# Patient Record
Sex: FEMALE | Race: WHITE | NOT HISPANIC OR LATINO | Employment: OTHER | URBAN - METROPOLITAN AREA
[De-identification: names, ages, dates, MRNs, and addresses within clinical notes are randomized per-mention and may not be internally consistent; named-entity substitution may affect disease eponyms.]

---

## 2017-01-10 ENCOUNTER — DOCUMENTATION (OUTPATIENT)
Dept: INTERNAL MEDICINE | Facility: HOSPITAL | Age: 52
End: 2017-01-10

## 2017-01-10 ENCOUNTER — HOSPITAL ENCOUNTER (OUTPATIENT)
Dept: SLEEP MEDICINE | Facility: HOSPITAL | Age: 52
Discharge: HOME | End: 2017-01-10
Admitting: INTERNAL MEDICINE

## 2017-01-10 PROCEDURE — G0463 HOSPITAL OUTPT CLINIC VISIT: HCPCS

## 2017-01-10 NOTE — PROGRESS NOTES
PULMONARY SLEEP CONSULT NOTE      PATIENT IDENTIFICATION:  Name: Sánchez Carbajal  Age: 51 y.o.  Sex: female  :  1965  MRN: [id number]    DATE OF CONSULTATION:  1/10/2017   Referring Physician: No admitting provider for patient encounter.                  CHIEF COMPLAINT: Obstructive Sleep Apnea      History of Present Illness:   Sánchez Carbajal is a 51 y.o. female Pt on CPAP feeling better more energy especially the night he use it more than 4 hours, no sleepiness no fatigue no tiredness, no mask irritation no dryness, compliance report reviewed with pt AHI< 5 with good usage.       Review of Systems:   Constitutional: negative   Eyes: negative   ENT/oropharynx: negative   Cardiovascular: negative   Respiratory: negative   Gastrointestinal: negative   Genitourinary: negative   Neurological: negative   Musculoskeletal: negative   Integument/breast: negative   Endocrine: negative   Allergic/Immunologic: negative     Past Medical History:  Past Medical History   Diagnosis Date   • Chronic back pain    • H/O gastroesophageal reflux (GERD)    • History of anemia    • Kidney stone      sched ureteroscopy, stent placement   • PONV (postoperative nausea and vomiting)        Past Surgical History:  Past Surgical History   Procedure Laterality Date   • Cholecystectomy     • Kidney stone surgery     • Extracorporeal shock wave lithotripsy (eswl)     • Cystoscopy ureteroscopy laser lithotripsy N/A 2016     Procedure: URETEROSCOPY  ;  Surgeon: Vimal Azul MD;  Location: [facility];  Service:         Family History:  No family history on file.     Social History:   Social History   Substance Use Topics   • Smoking status: Never Smoker   • Smokeless tobacco: Never Used   • Alcohol use No        Allergies:  Allergies   Allergen Reactions   • Penicillins Rash   • Sulfa Antibiotics Rash       Home Meds:    (Not in a hospital admission)    Objective:    Vitals Ranges:   BP: ()/()   Arterial Line BP: ()/()    WEIGHT: 194   pound       HEIGHT: 66  inches     BMI: 31   /84      Exam:    General Appearance:      Head:  Normocephalic, without obvious abnormality, atraumatic.   Eyes:  Conjunctiva/corneas clear, EOM's intact, both eyes.         Ears:  Normal external ear canals, both ears.    Nose:  Nares normal, no drainage      Throat:  Lips, mucosa, and tongue normal. Mallampati score 3    Neck:  Supple, symmetrical, trachea midline. No JVD.  Lungs:   Bilateral basal rhonchi bilaterally, respirations unlabored symmetrical wall movement.    Chest wall:  No tenderness or deformity.    Heart:  Regular rate and rhythm, S1 and S2 normal.  Abdomen: Soft, non-tender, no masses, no organomegaly.    Extremities: Trace edema no clubbing or Cyanosis        Data Review:  All labs (24hrs): No results found for this or any previous visit (from the past 24 hour(s)).     Imaging:  [unfilled]    ASSESSMENT:  obstructive sleep apnea   GERD       PLAN:  This patient with obstructive sleep apnea, compliance is improved. Encourage to use it more frequent, I re-emphasized on pt the long and short term benefit of treating SAGAR.    Treating SAGAR will improve GERD control           Seema Moser MD. D, ABSM.  1/10/2017  11:56 AM

## 2017-01-17 ENCOUNTER — ANESTHESIA EVENT (OUTPATIENT)
Dept: PERIOP | Facility: HOSPITAL | Age: 52
End: 2017-01-17

## 2017-01-17 RX ORDER — TRAZODONE HYDROCHLORIDE 50 MG/1
50 TABLET ORAL NIGHTLY
COMMUNITY
End: 2017-12-12

## 2017-01-18 ENCOUNTER — ANESTHESIA (OUTPATIENT)
Dept: PERIOP | Facility: HOSPITAL | Age: 52
End: 2017-01-18

## 2017-01-18 ENCOUNTER — PREP FOR SURGERY (OUTPATIENT)
Dept: GASTROENTEROLOGY | Facility: HOSPITAL | Age: 52
End: 2017-01-18

## 2017-01-18 ENCOUNTER — ON CAMPUS - OUTPATIENT (OUTPATIENT)
Dept: URBAN - METROPOLITAN AREA HOSPITAL 28 | Facility: HOSPITAL | Age: 52
End: 2017-01-18
Payer: COMMERCIAL

## 2017-01-18 ENCOUNTER — HOSPITAL ENCOUNTER (OUTPATIENT)
Facility: HOSPITAL | Age: 52
Setting detail: HOSPITAL OUTPATIENT SURGERY
Discharge: HOME | End: 2017-01-18
Attending: INTERNAL MEDICINE | Admitting: INTERNAL MEDICINE

## 2017-01-18 VITALS
BODY MASS INDEX: 30.33 KG/M2 | DIASTOLIC BLOOD PRESSURE: 100 MMHG | OXYGEN SATURATION: 100 % | WEIGHT: 193.25 LBS | HEIGHT: 67 IN | RESPIRATION RATE: 16 BRPM | HEART RATE: 85 BPM | SYSTOLIC BLOOD PRESSURE: 147 MMHG | TEMPERATURE: 97.8 F

## 2017-01-18 DIAGNOSIS — K57.30 DIVERTICULOSIS OF LARGE INTESTINE WITHOUT PERFORATION OR ABS: ICD-10-CM

## 2017-01-18 DIAGNOSIS — Z12.11 ENCOUNTER FOR SCREENING FOR MALIGNANT NEOPLASM OF COLON: ICD-10-CM

## 2017-01-18 PROCEDURE — 45378 DIAGNOSTIC COLONOSCOPY: CPT

## 2017-01-18 PROCEDURE — 25010000002 PROPOFOL 10 MG/ML EMULSION: Performed by: NURSE ANESTHETIST, CERTIFIED REGISTERED

## 2017-01-18 RX ORDER — LIDOCAINE HYDROCHLORIDE 20 MG/ML
INJECTION, SOLUTION INFILTRATION; PERINEURAL AS NEEDED
Status: DISCONTINUED | OUTPATIENT
Start: 2017-01-18 | End: 2017-01-18 | Stop reason: SURG

## 2017-01-18 RX ORDER — SODIUM CHLORIDE 0.9 % (FLUSH) 0.9 %
1-10 SYRINGE (ML) INJECTION AS NEEDED
Status: DISCONTINUED | OUTPATIENT
Start: 2017-01-18 | End: 2017-01-18 | Stop reason: HOSPADM

## 2017-01-18 RX ORDER — GLYCOPYRROLATE 0.2 MG/ML
INJECTION INTRAMUSCULAR; INTRAVENOUS AS NEEDED
Status: DISCONTINUED | OUTPATIENT
Start: 2017-01-18 | End: 2017-01-18 | Stop reason: SURG

## 2017-01-18 RX ORDER — SODIUM CHLORIDE, SODIUM LACTATE, POTASSIUM CHLORIDE, CALCIUM CHLORIDE 600; 310; 30; 20 MG/100ML; MG/100ML; MG/100ML; MG/100ML
9 INJECTION, SOLUTION INTRAVENOUS CONTINUOUS
Status: DISCONTINUED | OUTPATIENT
Start: 2017-01-18 | End: 2017-01-18 | Stop reason: HOSPADM

## 2017-01-18 RX ORDER — LIDOCAINE HYDROCHLORIDE 10 MG/ML
0.3 INJECTION, SOLUTION EPIDURAL; INFILTRATION; INTRACAUDAL; PERINEURAL ONCE
Status: COMPLETED | OUTPATIENT
Start: 2017-01-18 | End: 2017-01-18

## 2017-01-18 RX ORDER — SODIUM CHLORIDE 0.9 % (FLUSH) 0.9 %
1-10 SYRINGE (ML) INJECTION AS NEEDED
Status: CANCELLED | OUTPATIENT
Start: 2017-01-18

## 2017-01-18 RX ORDER — PROPOFOL 10 MG/ML
VIAL (ML) INTRAVENOUS AS NEEDED
Status: DISCONTINUED | OUTPATIENT
Start: 2017-01-18 | End: 2017-01-18 | Stop reason: SURG

## 2017-01-18 RX ADMIN — LIDOCAINE HYDROCHLORIDE 0.3 ML: 10 INJECTION, SOLUTION EPIDURAL; INFILTRATION; INTRACAUDAL; PERINEURAL at 13:00

## 2017-01-18 RX ADMIN — GLYCOPYRROLATE 0.1 MG: 0.2 INJECTION INTRAMUSCULAR; INTRAVENOUS at 14:23

## 2017-01-18 RX ADMIN — SODIUM CHLORIDE, POTASSIUM CHLORIDE, SODIUM LACTATE AND CALCIUM CHLORIDE: 600; 310; 30; 20 INJECTION, SOLUTION INTRAVENOUS at 14:20

## 2017-01-18 RX ADMIN — LIDOCAINE HYDROCHLORIDE 50 MG: 20 INJECTION, SOLUTION INFILTRATION; PERINEURAL at 14:23

## 2017-01-18 RX ADMIN — SODIUM CHLORIDE, POTASSIUM CHLORIDE, SODIUM LACTATE AND CALCIUM CHLORIDE 9 ML/HR: 600; 310; 30; 20 INJECTION, SOLUTION INTRAVENOUS at 13:00

## 2017-01-18 RX ADMIN — PROPOFOL 380 MG: 10 INJECTION, EMULSION INTRAVENOUS at 14:23

## 2017-01-18 NOTE — ANESTHESIA PREPROCEDURE EVALUATION
Anesthesia Evaluation     Patient summary reviewed and Nursing notes reviewed    Airway   Mallampati: II  TM distance: >3 FB  Neck ROM: full  no difficulty expected  Dental          Pulmonary - normal exam    breath sounds clear to auscultation  (+) sleep apnea on CPAP,   Cardiovascular - negative cardio ROS and normal exam    Rhythm: regular  Rate: normal    Neuro/Psych- negative ROS  GI/Hepatic/Renal/Endo    (+) obesity,  GERD well controlled,     Musculoskeletal     (+) back pain,   Abdominal   (+) obese,    Substance History - negative use     OB/GYN negative ob/gyn ROS         Other                             Anesthesia Plan    ASA 2     MAC     intravenous induction   Anesthetic plan and risks discussed with patient.  Use of blood products discussed with patient  Consented to blood products.

## 2017-01-18 NOTE — OP NOTE
DATE OF PROCEDURE:   01/18/2017    PROCEDURE PERFORMED:  Colonoscopy.     SURGEON:  Tian Henderson MD     INDICATIONS:  Screening for colorectal cancer.     MEDICATIONS:  Monitored anesthesia care; please see Anesthesia record.     DESCRIPTION OF PROCEDURE:  Risks and benefits of the procedure were explained to patient. Informed consent was signed. She was placed in left lateral decubitus position and given her IV sedation. A rectal exam revealed no external lesions, normal anal tone, no rectal mass. The scope was introduced in the rectum, advanced under direct visualization with ease; rectum, sigmoid colon, descending colon, to and around the splenic flexure, reduced; advanced through the transverse colon. There were diverticula throughout the left colon, extending into the transverse colon. The scope was reduced using abdominal pressure. The scope advanced more easily to and around the hepatic flexure, through the ascending colon, and into cecum. Cecum identified by appendiceal orifice and ileocecal valve. Ileocecal valve was not intubated. Cecum was easily distended and normal-appearing. The colon was well-prepped throughout. The scope was withdrawn. There were no mucosal lesions noted throughout the entire exam. The scope was retroflexed within the rectum, revealing intact dentate line and no mucosal lesions. The scope was de-retroflexed and withdrawn. Patient tolerated the procedure very well.     IMPRESSION:    1.  Colonoscopy to cecum with good prep.   2.  Left-sided diverticulosis.   3.  Normal mucosa throughout the exam.     RECOMMENDATIONS:  Would repeat a screening exam in 10 years.      Tian Henderson M.D.  SO/ms  D:  01/18/2017 14:49:06   T:  01/18/2017 15:25:35   Job ID:  [id number]   Document ID:  [id number]  cc: Aayush Mckeon M.D.

## 2017-01-18 NOTE — IP AVS SNAPSHOT
AFTER VISIT SUMMARY             Sánchez Carbajal           About your hospitalization     You were admitted on:  January 18, 2017 You last received care in the:  [facility] OR       Procedures & Surgeries      Procedure(s) (LRB):  COLONOSCOPY (N/A)     1/18/2017     Surgeon(s):  Tian Henderson MD  -------------------      Medications    If you or your caregiver advised us that you are currently taking a medication and that medication is marked below as “Resume”, this simply indicates that we have reviewed those medications to make sure our new therapy recommendations do not interfere.  If you have concerns about medications other than those new ones which we are prescribing today, please consult the physician who prescribed them (or your primary physician).  Our review of your home medications is not meant to indicate that we are directing their use.             Your Medications      CONTINUE taking these medications     DULoxetine 60 MG capsule   Take 60 mg by mouth every night.   Commonly known as:  CYMBALTA           GABAPENTIN (ONCE-DAILY) PO   Take 300 mg by mouth every night.           HYDROcodone-acetaminophen 7.5-325 MG per tablet   1 or 2 tablets every 6 hours as needed for pain   Commonly known as:  NORCO           ibuprofen 800 MG tablet   Take 800 mg by mouth 2 (two) times a day.   Commonly known as:  ADVIL,MOTRIN           Loratadine 10 MG capsule   Take 1 tablet by mouth every morning.           MULTI VITAMIN DAILY PO   Take 1 tablet by mouth daily.           norethindrone 0.35 MG tablet   Take 1 tablet by mouth daily.   Commonly known as:  MICRONOR           RANITIDINE 75 PO   Take 1 tablet by mouth 2 (two) times a day.           traZODone 50 MG tablet   Take 50 mg by mouth Every Night.   Commonly known as:  DESYREL           VITAMIN B 12 PO   Take  by mouth Daily.                      Your Medications      Your Medication List           Morning Noon Evening Bedtime As  Needed    DULoxetine 60 MG capsule   Take 60 mg by mouth every night.   Commonly known as:  CYMBALTA                                GABAPENTIN (ONCE-DAILY) PO   Take 300 mg by mouth every night.                                HYDROcodone-acetaminophen 7.5-325 MG per tablet   1 or 2 tablets every 6 hours as needed for pain   Commonly known as:  NORCO                                ibuprofen 800 MG tablet   Take 800 mg by mouth 2 (two) times a day.   Commonly known as:  ADVIL,MOTRIN                                Loratadine 10 MG capsule   Take 1 tablet by mouth every morning.                                MULTI VITAMIN DAILY PO   Take 1 tablet by mouth daily.                                norethindrone 0.35 MG tablet   Take 1 tablet by mouth daily.   Commonly known as:  MICRONOR                                RANITIDINE 75 PO   Take 1 tablet by mouth 2 (two) times a day.                                traZODone 50 MG tablet   Take 50 mg by mouth Every Night.   Commonly known as:  DESYREL                                VITAMIN B 12 PO   Take  by mouth Daily.                                         Instructions for After Discharge        Activity Instructions     Discharge activity       1) No driving until the next AM  2) Return to school / work in the day after your procedure.              Diet Instructions     Diet:       Diet Texture / Consistency:  Regular   Resume Previous Diet             Discharge References/Attachments     COLONOSCOPY, CARE AFTER, EASY-TO-READ (ENGLISH)    HIGH-FIBER DIET (ENGLISH)    DIVERTICULOSIS (ENGLISH)    MONITORED ANESTHESIA CARE (ENGLISH)       Follow-ups for After Discharge        Follow-up Information     Follow up with Tian Henderson MD .    Specialty:  Gastroenterology    Why:  Repeat colonoscopy in 10 years    Contact information:    [address]  [place] [id number] [phone]        ICVRx Signup     [facility] ICVRx allows you to send  messages to your doctor, view your test results, renew your prescriptions, schedule appointments, and more. To sign up, go to [email or web address] and click on the Sign Up Now link in the New User? box. Enter your Vivacta Activation Code exactly as it appears below along with the last four digits of your Social Security Number and your Date of Birth () to complete the sign-up process. If you do not sign up before the expiration date, you must request a new code.    Vivacta Activation Code: [email or web address]  Expires: 2017  5:38 AM    If you have questions, you can email [email or web address] or call [phone] to talk to our Vivact[employer] staff. Remember, [employer]acta is NOT to be used for urgent needs. For medical emergencies, dial 911.           Summary of Your Hospitalization        Reason for Hospitalization     Your primary diagnosis was:  Not on File      Care Providers     Provider Service Role Specialty    Tian Henderson MD Gastroenterology Attending Provider Gastroenterology    Tian Henderson MD Gastroenterology Surgeon  Gastroenterology      Your Allergies  Date Reviewed: 2017    Allergen Reactions    Penicillins Rash         Sulfa Antibiotics Rash      Patient Belongings Returned     Document Return of Belongings Flowsheet     Were the patient bedside belongings sent home?   --   Belongings Retrieved from Security & Sent Home   --    Belongings Sent to Safe   --   Medications Retrieved from Pharmacy & Sent Home   --              More Information      Colonoscopy, Care After  These instructions give you information on caring for yourself after your procedure. Your doctor may also give you more specific instructions. Call your doctor if you have any problems or questions after your procedure.  HOME CARE  · Do not drive for 24 hours.  · Do not sign important papers or use machinery for 24 hours.  · You may shower.  · You may go back to your usual activities, but go  slower for the first 24 hours.  · Take rest breaks often during the first 24 hours.  · Walk around or use warm packs on your belly (abdomen) if you have belly cramping or gas.  · Drink enough fluids to keep your pee (urine) clear or pale yellow.  · Resume your normal diet. Avoid heavy or fried foods.  · Avoid drinking alcohol for 24 hours or as told by your doctor.  · Only take medicines as told by your doctor.  If a tissue sample (biopsy) was taken during the procedure:   · Do not take aspirin or blood thinners for 7 days, or as told by your doctor.  · Do not drink alcohol for 7 days, or as told by your doctor.  · Eat soft foods for the first 24 hours.  GET HELP IF:  You still have a small amount of blood in your poop (stool) 2-3 days after the procedure.  GET HELP RIGHT AWAY IF:  · You have more than a small amount of blood in your poop.  · You see clumps of tissue (blood clots) in your poop.  · Your belly is puffy (swollen).  · You feel sick to your stomach (nauseous) or throw up (vomit).  · You have a fever.  · You have belly pain that gets worse and medicine does not help.  MAKE SURE YOU:  · Understand these instructions.  · Will watch your condition.  · Will get help right away if you are not doing well or get worse.     This information is not intended to replace advice given to you by your health care provider. Make sure you discuss any questions you have with your health care provider.     Document Released: 01/20/2012 Document Revised: 12/23/2014 Document Reviewed: 08/25/2014  [employer] Interactive Patient Education ©2016 [employer]          High-Fiber Diet  Fiber, also called dietary fiber, is a type of carbohydrate found in fruits, vegetables, whole grains, and beans. A high-fiber diet can have many health benefits. Your health care provider may recommend a high-fiber diet to help:  · Prevent constipation. Fiber can make your bowel movements more regular.  · Lower your cholesterol.  · Relieve  hemorrhoids, uncomplicated diverticulosis, or irritable bowel syndrome.  · Prevent overeating as part of a weight-loss plan.  · Prevent heart disease, type 2 diabetes, and certain cancers.  WHAT IS MY PLAN?  The recommended daily intake of fiber includes:  · 38 grams for men under age 50.  · 30 grams for men over age 50.  · 25 grams for women under age 50.  · 21 grams for women over age 50.  You can get the recommended daily intake of dietary fiber by eating a variety of fruits, vegetables, grains, and beans. Your health care provider may also recommend a fiber supplement if it is not possible to get enough fiber through your diet.  WHAT DO I NEED TO KNOW ABOUT A HIGH-FIBER DIET?  · Fiber supplements have not been widely studied for their effectiveness, so it is better to get fiber through food sources.  · Always check the fiber content on the nutrition facts label of any prepackaged food. Look for foods that contain at least 5 grams of fiber per serving.  · Ask your dietitian if you have questions about specific foods that are related to your condition, especially if those foods are not listed in the following section.  · Increase your daily fiber consumption gradually. Increasing your intake of dietary fiber too quickly may cause bloating, cramping, or gas.  · Drink plenty of water. Water helps you to digest fiber.  WHAT FOODS CAN I EAT?  Grains  Whole-grain breads. Multigrain cereal. Oats and oatmeal. Brown rice. Barley. Bulgur wheat. Millet. Bran muffins. Popcorn. Rye wafer crackers.  Vegetables  Sweet potatoes. Spinach. Kale. Artichokes. Cabbage. Broccoli. Green peas. Carrots. Squash.  Fruits  Berries. Pears. Apples. Oranges. Avocados. Prunes and raisins. Dried figs.  Meats and Other Protein Sources  Navy, kidney, urbina, and soy beans. Split peas. Lentils. Nuts and seeds.  Dairy  Fiber-fortified yogurt.  Beverages  Fiber-fortified soy milk. Fiber-fortified orange juice.  Other  Fiber bars.  The items listed  above may not be a complete list of recommended foods or beverages. Contact your dietitian for more options.  WHAT FOODS ARE NOT RECOMMENDED?  Grains  White bread. Pasta made with refined flour. White rice.  Vegetables  Fried potatoes. Canned vegetables. Well-cooked vegetables.   Fruits  Fruit juice. Cooked, strained fruit.  Meats and Other Protein Sources  Fatty cuts of meat. Fried poultry or fried fish.  Dairy  Milk. Yogurt. Cream cheese. Sour cream.  Beverages  Soft drinks.  Other  Cakes and pastries. Butter and oils.  The items listed above may not be a complete list of foods and beverages to avoid. Contact your dietitian for more information.  WHAT ARE SOME TIPS FOR INCLUDING HIGH-FIBER FOODS IN MY DIET?  · Eat a wide variety of high-fiber foods.  · Make sure that half of all grains consumed each day are whole grains.  · Replace breads and cereals made from refined flour or white flour with whole-grain breads and cereals.  · Replace white rice with brown rice, bulgur wheat, or millet.  · Start the day with a breakfast that is high in fiber, such as a cereal that contains at least 5 grams of fiber per serving.  · Use beans in place of meat in soups, salads, or pasta.  · Eat high-fiber snacks, such as berries, raw vegetables, nuts, or popcorn.     This information is not intended to replace advice given to you by your health care provider. Make sure you discuss any questions you have with your health care provider.     Document Released: 12/18/2006 Document Revised: 01/08/2016 Document Reviewed: 06/02/2015  [employer] Interactive Patient Education ©2016 [employer]          Diverticulosis  Diverticulosis is the condition that develops when small pouches (diverticula) form in the wall of your colon. Your colon, or large intestine, is where water is absorbed and stool is formed. The pouches form when the inside layer of your colon pushes through weak spots in the outer layers of your colon.  CAUSES   No one knows  exactly what causes diverticulosis.  RISK FACTORS  · Being older than 50. Your risk for this condition increases with age. Diverticulosis is rare in people younger than 40 years. By age 80, almost everyone has it.  · Eating a low-fiber diet.  · Being frequently constipated.  · Being overweight.  · Not getting enough exercise.  · Smoking.  · Taking over-the-counter pain medicines, like aspirin and ibuprofen.  SYMPTOMS   Most people with diverticulosis do not have symptoms.  DIAGNOSIS   Because diverticulosis often has no symptoms, health care providers often discover the condition during an exam for other colon problems. In many cases, a health care provider will diagnose diverticulosis while using a flexible scope to examine the colon (colonoscopy).  TREATMENT   If you have never developed an infection related to diverticulosis, you may not need treatment. If you have had an infection before, treatment may include:  · Eating more fruits, vegetables, and grains.  · Taking a fiber supplement.  · Taking a live bacteria supplement (probiotic).  · Taking medicine to relax your colon.  HOME CARE INSTRUCTIONS   · Drink at least 6-8 glasses of water each day to prevent constipation.  · Try not to strain when you have a bowel movement.  · Keep all follow-up appointments.  If you have had an infection before:   · Increase the fiber in your diet as directed by your health care provider or dietitian.  · Take a dietary fiber supplement if your health care provider approves.  · Only take medicines as directed by your health care provider.  SEEK MEDICAL CARE IF:   · You have abdominal pain.  · You have bloating.  · You have cramps.  · You have not gone to the bathroom in 3 days.  SEEK IMMEDIATE MEDICAL CARE IF:   · Your pain gets worse.  · Your bloating becomes very bad.  · You have a fever or chills, and your symptoms suddenly get worse.  · You begin vomiting.  · You have bowel movements that are bloody or black.  MAKE SURE  YOU:  · Understand these instructions.  · Will watch your condition.  · Will get help right away if you are not doing well or get worse.     This information is not intended to replace advice given to you by your health care provider. Make sure you discuss any questions you have with your health care provider.     Document Released: 09/14/2005 Document Revised: 12/23/2014 Document Reviewed: 11/12/2014  [employer] Interactive Patient Education ©2016 [employer]          Monitored Anesthesia Care  Monitored anesthesia care is an anesthesia service for a medical procedure. Anesthesia is the loss of the ability to feel pain. It is produced by medicines called anesthetics. It may affect a small area of your body (local anesthesia), a large area of your body (regional anesthesia), or your entire body (general anesthesia). The need for monitored anesthesia care depends your procedure, your condition, and the potential need for regional or general anesthesia. It is often provided during procedures where:   · General anesthesia may be needed if there are complications. This is because you need special care when you are under general anesthesia.    · You will be under local or regional anesthesia. This is so that you are able to have higher levels of anesthesia if needed.    · You will receive calming medicines (sedatives). This is especially the case if sedatives are given to put you in a semi-conscious state of relaxation (deep sedation). This is because the amount of sedative needed to produce this state can be hard to predict. Too much of a sedative can produce general anesthesia.  Monitored anesthesia care is performed by one or more health care providers who have special training in all types of anesthesia. You will need to meet with these health care providers before your procedure. During this meeting, they will ask you about your medical history. They will also give you instructions to follow. (For example, you will  need to stop eating and drinking before your procedure. You may also need to stop or change medicines you are taking.) During your procedure, your health care providers will stay with you. They will:   · Watch your condition. This includes watching your blood pressure, breathing, and level of pain.    · Diagnose and treat problems that occur.    · Give medicines if they are needed. These may include calming medicines (sedatives) and anesthetics.    · Make sure you are comfortable.    Having monitored anesthesia care does not necessarily mean that you will be under anesthesia. It does mean that your health care providers will be able to manage anesthesia if you need it or if it occurs. It also means that you will be able to have a different type of anesthesia than you are having if you need it. When your procedure is complete, your health care providers will continue to watch your condition. They will make sure any medicines wear off before you are allowed to go home.      This information is not intended to replace advice given to you by your health care provider. Make sure you discuss any questions you have with your health care provider.     Document Released: 09/13/2006 Document Revised: 01/08/2016 Document Reviewed: 01/29/2014  [employer] Interactive Patient Education ©2016 [employer]         PREVENTING SURGICAL SITE INFECTIONS     Surgical Site Infections FAQs  What is a Surgical Site Infection (SSI)?  A surgical site infection is an infection that occurs after surgery in the part of the body where the surgery took place. Most patients who have surgery do not develop an infection. However, infections develop in about 1 to 3 out of every 100 patients who have surgery.  Some of the common symptoms of a surgical site infection are:  · Redness and pain around the area where you had surgery  · Drainage of cloudy fluid from your surgical wound  · Fever  Can SSIs be treated?  Yes. Most surgical site infections can be  treated with antibiotics. The antibiotic given to you depends on the bacteria (germs) causing the infection. Sometimes patients with SSIs also need another surgery to treat the infection.  What are some of the things that hospitals are doing to prevent SSIs?  To prevent SSIs, doctors, nurses, and other healthcare providers:  · Clean their hands and arms up to their elbows with an antiseptic agent just before the surgery.  · Clean their hands with soap and water or an alcohol-based hand rub before and after caring for each patient.  · May remove some of your hair immediately before your surgery using electric clippers if the hair is in the same area where the procedure will occur. They should not shave you with a razor.  · Wear special hair covers, masks, gowns, and gloves during surgery to keep the surgery area clean.  · Give you antibiotics before your surgery starts. In most cases, you should get antibiotics within 60 minutes before the surgery starts and the antibiotics should be stopped within 24 hours after surgery.  · Clean the skin at the site of your surgery with a special soap that kills germs.  What can I do to help prevent SSIs?  Before your surgery:  · Tell your doctor about other medical problems you may have. Health problems such as allergies, diabetes, and obesity could affect your surgery and your treatment.  · Quit smoking. Patients who smoke get more infections. Talk to your doctor about how you can quit before your surgery.  · Do not shave near where you will have surgery. Shaving with a razor can irritate your skin and make it easier to develop an infection.  At the time of your surgery:  · Speak up if someone tries to shave you with a razor before surgery. Ask why you need to be shaved and talk with your surgeon if you have any concerns.  · Ask if you will get antibiotics before surgery.  After your surgery:  · Make sure that your healthcare providers clean their hands before examining you,  either with soap and water or an alcohol-based hand rub.    If you do not see your providers clean their hands, please ask them to do so.  · Family and friends who visit you should not touch the surgical wound or dressings.  · Family and friends should clean their hands with soap and water or an alcohol-based hand rub before and after visiting you. If you do not see them clean their hands, ask them to clean their hands.  What do I need to do when I go home from the hospital?  · Before you go home, your doctor or nurse should explain everything you need to know about taking care of your wound. Make sure you understand how to care for your wound before you leave the hospital.  · Always clean your hands before and after caring for your wound.  · Before you go home, make sure you know who to contact if you have questions or problems after you get home.  · If you have any symptoms of an infection, such as redness and pain at the surgery site, drainage, or fever, call your doctor immediately.  If you have additional questions, please ask your doctor or nurse.  Developed and co-sponsored by The Society for Healthcare Epidemiology of [place] (SHEA); Infectious Diseases Society of [place] (IDSA); American Hospital Association; Association for Professionals in Infection Control and Epidemiology (APIC); Centers for Disease Control and Prevention (CDC); and The Joint Commission.     This information is not intended to replace advice given to you by your health care provider. Make sure you discuss any questions you have with your health care provider.     Document Released: 12/23/2014 Document Revised: 01/08/2016 Document Reviewed: 03/02/2016  [employer] Interactive Patient Education ©2016 [employer]             SYMPTOMS OF A STROKE    Call 911 or have someone take you to the Emergency Department if you have any of the following:    · Sudden numbness or weakness of your face, arm or leg especially on one side of the  body  · Sudden confusion, diffiiculty speaking or trouble understanding   · Changes in your vision or loss of sight in one eye  · Sudden severe headache with no known cause  · sudden dizziness, trouble walking, loss of balance or coordination    It is important to seek emergency care right away if you have further stroke symptoms. If you get emergency help quickly, the powerful clot-dissolving medicines can reduce the disabilities caused by a stroke.     For more information:    American Stroke Association  2-776-6-STROKE  www.strokeassociation.org           IF YOU SMOKE OR USE TOBACCO PLEASE READ THE FOLLOWING:    Why is smoking bad for me?  Smoking increases the risk of heart disease, lung disease, vascular disease, stroke, and cancer.     If you smoke, STOP!    If you would like more information on quitting smoking, please visit the [employer] website: [email or web address]   This link will provide additional resources including the QUIT line and the Beat the Pack support groups.     For more information:    American Cancer Society  ([phone] 735-6269    American Heart Association  1-783.922.9246               YOU ARE THE MOST IMPORTANT FACTOR IN YOUR RECOVERY.     Follow all instructions carefully.     I have reviewed my discharge instructions with my nurse, including the following information, if applicable:     Information about my illness and diagnosis   Follow up appointments (including lab draws)   Wound Care   Equipment Needs   Medications (new and continuing) along with side effects   Preventative information such as vaccines and smoking cessations   Diet   Pain   I know when to contact my Doctor's office or seek emergency care      I want my nurse to describe the side effects of my medications: YES NO   If the answer is no, I understand the side effects of my medications: YES NO   My nurse described the side effects of my medications in a way that I could  understand: YES NO   I have taken my personal belongings and my own medications with me at discharge: YES NO            I have received this information and my questions have been answered. I have discussed any concerns I see with this plan with the nurse or physician. I understand these instructions.    Signature of Patient or Responsible Person: _____________________________________    Date: _________________  Time: __________________    Signature of Healthcare Provider: _______________________________________  Date: _________________  Time: __________________

## 2017-01-18 NOTE — ANESTHESIA POSTPROCEDURE EVALUATION
Patient: Sánchez Carbajal    Procedure Summary     Date Anesthesia Start Anesthesia Stop Room / Location    01/18/17 1420 1448 [facility] / [facility]       Procedure Diagnosis Surgeon Provider    COLONOSCOPY (N/A ) Colonic diverticulum; Screening for colon cancer  (Z12.11) MD Marisela Welsh MD          Anesthesia Type: MAC  Last vitals  /100 (01/18/17 1510)    Temp 97.8 °F (36.6 °C) (01/18/17 1450)    Pulse 85 (01/18/17 1510)   Resp 16 (01/18/17 1510)    SpO2 100 % (01/18/17 1510)      Post Anesthesia Care and Evaluation    Patient location during evaluation: PHASE II  Patient participation: complete - patient participated  Level of consciousness: awake and alert  Pain score: 0  Pain management: adequate  Airway patency: patent  Anesthetic complications: No anesthetic complications  PONV Status: none  Cardiovascular status: acceptable  Respiratory status: acceptable  Hydration status: acceptable

## 2017-01-18 NOTE — PLAN OF CARE
Problem: Patient Care Overview (Adult)  Goal: Plan of Care Review  Outcome: Ongoing (interventions implemented as appropriate)    01/18/17 1241   Coping/Psychosocial Response Interventions   Plan Of Care Reviewed With patient;spouse   Patient Care Overview   Progress no change   Outcome Evaluation   Outcome Summary/Follow up Plan vss       Goal: Adult Individualization and Mutuality  Outcome: Ongoing (interventions implemented as appropriate)    Problem: GI Endoscopy (Adult)  Goal: Signs and Symptoms of Listed Potential Problems Will be Absent or Manageable (GI Endoscopy)  Outcome: Ongoing (interventions implemented as appropriate)

## 2017-01-18 NOTE — BRIEF OP NOTE
COLONOSCOPY  Procedure Note    Sánchez Carbajal  1/18/2017    Pre-op Diagnosis:   Z12.11    Post-op Diagnosis:     Post-Op Diagnosis Codes:     * Colonic diverticulum [K57.30]     * Screening for colon cancer [Z12.11]    Procedure/CPT® Codes:      Procedure(s):  COLONOSCOPY    Surgeon(s):  Tian Henderson MD    Anesthesia: Monitor Anesthesia Care    Staff:   Circulator: Carla Barrera RN  Scrub Person: Therese Holder    Estimated Blood Loss: * No values recorded between 1/18/2017  2:20 PM and 1/18/2017  2:42 PM *    Specimens:                * No specimens in log *      Drains:           Findings: Colon to Cecum good prep  Diverticulosis    Complications: none    2174      Tian Henderson MD     Date: 1/18/2017  Time: 2:46 PM

## 2017-01-18 NOTE — PLAN OF CARE
Problem: GI Endoscopy (Adult)  Goal: Signs and Symptoms of Listed Potential Problems Will be Absent or Manageable (GI Endoscopy)  Outcome: Ongoing (interventions implemented as appropriate)    01/18/17 1232   GI Endoscopy   Problems Assessed (GI Endoscopy) all   Problems Present (GI Endoscopy) none

## 2017-02-19 ENCOUNTER — APPOINTMENT (OUTPATIENT)
Dept: CT IMAGING | Facility: HOSPITAL | Age: 52
End: 2017-02-19

## 2017-02-19 ENCOUNTER — HOSPITAL ENCOUNTER (EMERGENCY)
Facility: HOSPITAL | Age: 52
Discharge: HOME | End: 2017-02-19
Attending: EMERGENCY MEDICINE | Admitting: EMERGENCY MEDICINE

## 2017-02-19 VITALS
WEIGHT: 191 LBS | HEART RATE: 85 BPM | DIASTOLIC BLOOD PRESSURE: 81 MMHG | TEMPERATURE: 98.1 F | BODY MASS INDEX: 30.7 KG/M2 | OXYGEN SATURATION: 97 % | RESPIRATION RATE: 6 BRPM | HEIGHT: 66 IN | SYSTOLIC BLOOD PRESSURE: 133 MMHG

## 2017-02-19 DIAGNOSIS — N39.0 URINARY TRACT INFECTION IN FEMALE: ICD-10-CM

## 2017-02-19 DIAGNOSIS — N20.0 KIDNEY STONE ON LEFT SIDE: Primary | ICD-10-CM

## 2017-02-19 LAB
ALBUMIN SERPL-MCNC: 4.4 G/DL (ref 3.5–5.2)
ALBUMIN/GLOB SERPL: 1.5 G/DL
ALP SERPL-CCNC: 93 U/L (ref 40–129)
ALT SERPL W P-5'-P-CCNC: 37 U/L (ref 5–33)
AMYLASE SERPL-CCNC: 38 U/L (ref 28–100)
ANION GAP SERPL CALCULATED.3IONS-SCNC: 16.5 MMOL/L
AST SERPL-CCNC: 29 U/L (ref 5–32)
BACTERIA UR QL AUTO: ABNORMAL /HPF
BASOPHILS # BLD AUTO: 0.03 10*3/MM3 (ref 0–0.2)
BASOPHILS NFR BLD AUTO: 0.5 % (ref 0–2)
BILIRUB SERPL-MCNC: 0.4 MG/DL (ref 0.2–1.2)
BILIRUB UR QL STRIP: NEGATIVE
BUN BLD-MCNC: 21 MG/DL (ref 6–20)
BUN/CREAT SERPL: 26.6 (ref 7–25)
CALCIUM SPEC-SCNC: 9.2 MG/DL (ref 8.6–10.5)
CHLORIDE SERPL-SCNC: 101 MMOL/L (ref 98–107)
CLARITY UR: ABNORMAL
CO2 SERPL-SCNC: 23.5 MMOL/L (ref 22–29)
COLOR UR: YELLOW
CREAT BLD-MCNC: 0.79 MG/DL (ref 0.57–1)
DEPRECATED RDW RBC AUTO: 39.8 FL (ref 37–54)
EOSINOPHIL # BLD AUTO: 0.19 10*3/MM3 (ref 0.1–0.3)
EOSINOPHIL NFR BLD AUTO: 3.4 % (ref 0–4)
ERYTHROCYTE [DISTWIDTH] IN BLOOD BY AUTOMATED COUNT: 12.9 % (ref 11.5–14.5)
GFR SERPL CREATININE-BSD FRML MDRD: 76 ML/MIN/1.73
GLOBULIN UR ELPH-MCNC: 2.9 GM/DL
GLUCOSE BLD-MCNC: 169 MG/DL (ref 65–99)
GLUCOSE UR STRIP-MCNC: NEGATIVE MG/DL
HCT VFR BLD AUTO: 38 % (ref 37–47)
HGB BLD-MCNC: 12.9 G/DL (ref 12–16)
HGB UR QL STRIP.AUTO: ABNORMAL
HYALINE CASTS UR QL AUTO: ABNORMAL /LPF
IMM GRANULOCYTES # BLD: 0.03 10*3/MM3 (ref 0–0.03)
IMM GRANULOCYTES NFR BLD: 0.5 % (ref 0–0.5)
KETONES UR QL STRIP: NEGATIVE
LEUKOCYTE ESTERASE UR QL STRIP.AUTO: ABNORMAL
LIPASE SERPL-CCNC: 16 U/L (ref 13–60)
LYMPHOCYTES # BLD AUTO: 2.31 10*3/MM3 (ref 0.6–4.8)
LYMPHOCYTES NFR BLD AUTO: 41.6 % (ref 20–45)
MCH RBC QN AUTO: 28.9 PG (ref 27–31)
MCHC RBC AUTO-ENTMCNC: 33.9 G/DL (ref 31–37)
MCV RBC AUTO: 85 FL (ref 81–99)
MONOCYTES # BLD AUTO: 0.41 10*3/MM3 (ref 0–1)
MONOCYTES NFR BLD AUTO: 7.4 % (ref 3–8)
NEUTROPHILS # BLD AUTO: 2.58 10*3/MM3 (ref 1.5–8.3)
NEUTROPHILS NFR BLD AUTO: 46.6 % (ref 45–70)
NITRITE UR QL STRIP: NEGATIVE
NRBC BLD MANUAL-RTO: 0 /100 WBC (ref 0–0)
PH UR STRIP.AUTO: 6 [PH] (ref 4.5–8)
PLATELET # BLD AUTO: 288 10*3/MM3 (ref 140–500)
PMV BLD AUTO: 10.2 FL (ref 7.4–10.4)
POTASSIUM BLD-SCNC: 4 MMOL/L (ref 3.5–5.2)
PROT SERPL-MCNC: 7.3 G/DL (ref 6–8.5)
PROT UR QL STRIP: ABNORMAL
RBC # BLD AUTO: 4.47 10*6/MM3 (ref 4.2–5.4)
RBC # UR: ABNORMAL /HPF
REF LAB TEST METHOD: ABNORMAL
SODIUM BLD-SCNC: 141 MMOL/L (ref 136–145)
SP GR UR STRIP: 1.03 (ref 1–1.03)
SQUAMOUS #/AREA URNS HPF: ABNORMAL /HPF
UROBILINOGEN UR QL STRIP: ABNORMAL
WBC NRBC COR # BLD: 5.55 10*3/MM3 (ref 4.8–10.8)
WBC UR QL AUTO: ABNORMAL /HPF

## 2017-02-19 PROCEDURE — 85025 COMPLETE CBC W/AUTO DIFF WBC: CPT | Performed by: EMERGENCY MEDICINE

## 2017-02-19 PROCEDURE — 25010000002 FENTANYL CITRATE (PF) 100 MCG/2ML SOLUTION: Performed by: EMERGENCY MEDICINE

## 2017-02-19 PROCEDURE — 25010000002 CEFTRIAXONE: Performed by: EMERGENCY MEDICINE

## 2017-02-19 PROCEDURE — 87086 URINE CULTURE/COLONY COUNT: CPT | Performed by: EMERGENCY MEDICINE

## 2017-02-19 PROCEDURE — 99284 EMERGENCY DEPT VISIT MOD MDM: CPT

## 2017-02-19 PROCEDURE — 81001 URINALYSIS AUTO W/SCOPE: CPT | Performed by: EMERGENCY MEDICINE

## 2017-02-19 PROCEDURE — 96375 TX/PRO/DX INJ NEW DRUG ADDON: CPT

## 2017-02-19 PROCEDURE — 82150 ASSAY OF AMYLASE: CPT | Performed by: EMERGENCY MEDICINE

## 2017-02-19 PROCEDURE — 74176 CT ABD & PELVIS W/O CONTRAST: CPT

## 2017-02-19 PROCEDURE — 25010000002 KETOROLAC TROMETHAMINE PER 15 MG: Performed by: EMERGENCY MEDICINE

## 2017-02-19 PROCEDURE — 99285 EMERGENCY DEPT VISIT HI MDM: CPT | Performed by: EMERGENCY MEDICINE

## 2017-02-19 PROCEDURE — 96365 THER/PROPH/DIAG IV INF INIT: CPT

## 2017-02-19 PROCEDURE — 83690 ASSAY OF LIPASE: CPT | Performed by: EMERGENCY MEDICINE

## 2017-02-19 PROCEDURE — 96361 HYDRATE IV INFUSION ADD-ON: CPT

## 2017-02-19 PROCEDURE — 80053 COMPREHEN METABOLIC PANEL: CPT | Performed by: EMERGENCY MEDICINE

## 2017-02-19 RX ORDER — PROMETHAZINE HYDROCHLORIDE 25 MG/1
25 TABLET ORAL EVERY 6 HOURS PRN
Qty: 10 TABLET | Refills: 0 | OUTPATIENT
Start: 2017-02-19 | End: 2017-12-12

## 2017-02-19 RX ORDER — KETOROLAC TROMETHAMINE 30 MG/ML
30 INJECTION, SOLUTION INTRAMUSCULAR; INTRAVENOUS ONCE
Status: COMPLETED | OUTPATIENT
Start: 2017-02-19 | End: 2017-02-19

## 2017-02-19 RX ORDER — OXYCODONE HYDROCHLORIDE AND ACETAMINOPHEN 5; 325 MG/1; MG/1
TABLET ORAL
Qty: 24 TABLET | Refills: 0 | OUTPATIENT
Start: 2017-02-19 | End: 2017-12-12

## 2017-02-19 RX ORDER — FENTANYL CITRATE 50 UG/ML
75 INJECTION, SOLUTION INTRAMUSCULAR; INTRAVENOUS ONCE
Status: COMPLETED | OUTPATIENT
Start: 2017-02-19 | End: 2017-02-19

## 2017-02-19 RX ORDER — SODIUM CHLORIDE 0.9 % (FLUSH) 0.9 %
10 SYRINGE (ML) INJECTION AS NEEDED
Status: DISCONTINUED | OUTPATIENT
Start: 2017-02-19 | End: 2017-02-19 | Stop reason: HOSPADM

## 2017-02-19 RX ORDER — CEFUROXIME AXETIL 500 MG/1
500 TABLET ORAL 2 TIMES DAILY
Qty: 20 TABLET | Refills: 0 | Status: SHIPPED | OUTPATIENT
Start: 2017-02-19 | End: 2017-03-01

## 2017-02-19 RX ADMIN — SODIUM CHLORIDE 1000 ML: 900 INJECTION, SOLUTION INTRAVENOUS at 07:51

## 2017-02-19 RX ADMIN — FENTANYL CITRATE 75 MCG: 50 INJECTION, SOLUTION INTRAMUSCULAR; INTRAVENOUS at 07:54

## 2017-02-19 RX ADMIN — CEFTRIAXONE 2 G: 2 INJECTION, POWDER, FOR SOLUTION INTRAMUSCULAR; INTRAVENOUS at 08:23

## 2017-02-19 RX ADMIN — KETOROLAC TROMETHAMINE 30 MG: 30 INJECTION, SOLUTION INTRAMUSCULAR at 07:56

## 2017-02-19 RX ADMIN — Medication 10 ML: at 07:15

## 2017-02-19 NOTE — ED PROVIDER NOTES
Subjective     History provided by:  Patient    History of Present Illness    · Chief complaint: Back and abdominal pain    · Location: Generalized lower abdomen and bilateral low back is a constant ache, but when it gets severe is primarily on the left side.    · Quality/Severity: Severe aching pain.    · Timing/Onset: Pain started about 2 weeks ago and is worse today.    · Modifying Factors: No aggravating or relieving factors.    · Associated symptoms: Patient reports blood in her urine.  She denies any fever, nausea, vomiting, diarrhea, or upper respiratory symptoms.    Narrative: The patient is a 52-year-old white female complaining of bilateral flank pain and bilateral lower abdominal pain for the last 2 weeks.  The pain lateralizes to the left side when it gets worse.  She states the pain feels similar to prior kidney stone she's had.  The patient is a history of multiple kidney stones and is status post removal by lithotripsy and laser by Dr. Azul.  He denies any associated nausea or vomiting.  She does report blood in her urine.    ED Triage Vitals   Temp Heart Rate Resp BP SpO2   02/19/17 0712 02/19/17 0712 02/19/17 0712 02/19/17 0712 02/19/17 0712   98.1 °F (36.7 °C) 101 20 184/117 97 %      Temp src Heart Rate Source Patient Position BP Location FiO2 (%)   02/19/17 0712 02/19/17 0712 02/19/17 0712 02/19/17 0712 --   Oral Monitor Sitting Right arm        Review of Systems   Constitutional: Negative for activity change, appetite change, chills, diaphoresis, fatigue and fever.   HENT: Negative for congestion, dental problem, ear pain, hearing loss, mouth sores, postnasal drip, rhinorrhea, sinus pressure, sore throat and voice change.    Eyes: Negative for photophobia, pain, discharge, redness and visual disturbance.   Respiratory: Negative for cough, chest tightness, shortness of breath, wheezing and stridor.    Cardiovascular: Negative for chest pain, palpitations and leg swelling.   Gastrointestinal:  Positive for abdominal pain. Negative for diarrhea, nausea and vomiting.   Genitourinary: Positive for hematuria. Negative for difficulty urinating, dysuria, flank pain, frequency, pelvic pain, urgency, vaginal bleeding and vaginal discharge.   Musculoskeletal: Positive for back pain. Negative for arthralgias, gait problem, joint swelling, myalgias, neck pain and neck stiffness.   Skin: Negative for color change and rash.   Neurological: Negative for dizziness, tremors, seizures, syncope, facial asymmetry, speech difficulty, weakness, light-headedness, numbness and headaches.   Hematological: Negative for adenopathy.   Psychiatric/Behavioral: Negative.  Negative for confusion and decreased concentration. The patient is not nervous/anxious.        Past Medical History   Diagnosis Date   • Chronic back pain    • H/O gastroesophageal reflux (GERD)    • History of anemia    • Kidney stone      sched ureteroscopy, stent placement   • PONV (postoperative nausea and vomiting)    • Sleep apnea with use of continuous positive airway pressure (CPAP)        Allergies   Allergen Reactions   • Penicillins Rash   • Sulfa Antibiotics Rash       Past Surgical History   Procedure Laterality Date   • Cholecystectomy     • Kidney stone surgery     • Extracorporeal shock wave lithotripsy (eswl)  2015   • Cystoscopy ureteroscopy laser lithotripsy N/A 4/6/2016     Procedure: URETEROSCOPY  ;  Surgeon: Vimal Azul MD;  Location: [facility];  Service:    • Colonoscopy N/A 1/18/2017     Procedure: COLONOSCOPY;  Surgeon: Tian Henderson MD;  Location: [facility];  Service:        History reviewed. No pertinent family history.    Social History     Social History   • Marital status:      Spouse name: N/A   • Number of children: N/A   • Years of education: N/A     Social History Main Topics   • Smoking status: Never Smoker   • Smokeless tobacco: Never Used   • Alcohol use No   • Drug use: No   • Sexual activity: Defer      Other Topics Concern   • None     Social History Narrative           Objective   Physical Exam   Constitutional: She is oriented to person, place, and time. She appears well-developed and well-nourished. She appears distressed.   The patient appears in discomfort.  She does not appear toxic.   HENT:   Head: Normocephalic and atraumatic.   Right Ear: External ear normal.   Left Ear: External ear normal.   Nose: Nose normal.   Mouth/Throat: Oropharynx is clear and moist. No oropharyngeal exudate.   Eyes: Conjunctivae and EOM are normal. Pupils are equal, round, and reactive to light. Right eye exhibits no discharge. Left eye exhibits no discharge. No scleral icterus.   Neck: Normal range of motion. Neck supple. No JVD present. No thyromegaly present.   Cardiovascular: Normal rate, regular rhythm and normal heart sounds.    No murmur heard.  Pulmonary/Chest: Effort normal and breath sounds normal. She has no wheezes. She has no rales. She exhibits no tenderness.   Abdominal: Soft. Bowel sounds are normal. She exhibits no distension and no mass. There is tenderness. There is no rebound and no guarding. No hernia.   Subjective tenderness in the lower quadrants.   Musculoskeletal: Normal range of motion. She exhibits tenderness. She exhibits no edema or deformity.   Bilateral flank tenderness to percussion   Lymphadenopathy:     She has no cervical adenopathy.   Neurological: She is alert and oriented to person, place, and time. No cranial nerve deficit. Coordination normal.   No focal motor sensory deficit   Skin: Skin is warm and dry. No rash noted. She is not diaphoretic.   Psychiatric: She has a normal mood and affect. Her behavior is normal. Judgment and thought content normal.   Nursing note and vitals reviewed.      Procedures         ED Course  ED Course   Comment By Time   IVFs - NS one liter bolus.  Fentanyl 75mcg and Toridol 30mg IV. Coleman Monroe MD 02/19 0814   Rocephin 2 grams IV. Coleman Monroe MD  02/19 0814   [facility] Report [id number] Coleman Monroe MD 02/19 0906   09:17  Pt discussed with Dr. Myers, urology covering for Dr. Azul, who is comfortable with my treatment plan of putting the patient on antibiotics and following up with Dr. Azul this coming week. Coleman Monroe MD 02/19 0919                  MDM  Number of Diagnoses or Management Options  Kidney stone on left side: new and requires workup  Urinary tract infection in female: new and requires workup     Amount and/or Complexity of Data Reviewed  Clinical lab tests: ordered and reviewed  Tests in the radiology section of CPT®: ordered and reviewed  Discuss the patient with other providers: yes  Independent visualization of images, tracings, or specimens: yes    Risk of Complications, Morbidity, and/or Mortality  Presenting problems: high  Diagnostic procedures: high  Management options: high  General comments: My differential diagnosis for abdominal pain includes but is not limited to:  Gastritis, gastroenteritis, peptic ulcer disease, GERD, esophageal perforation, acute appendicitis, mesenteric adenitis, Meckel’s diverticulum, epiploic appendagitis, diverticulitis, colon cancer, ulcerative colitis, Crohn’s disease, intussusception, small bowel obstruction, adhesions, ischemic bowel, perforated viscus, ileus, obstipation, biliary colic, cholecystitis, cholelithiasis, Tay-Karlos Goyo, hepatitis, pancreatitis, common bile duct obstruction, cholangitis, bile leak, splenic trauma, splenic rupture, splenic infarction, splenic abscess, abdominal abscess, ascites, spontaneous bacterial peritonitis, hernia, UTI, cystitis, prostatitis, ureterolithiasis, urinary obstruction, ovarian cyst, torsion, pregnancy, ectopic pregnancy, PID, pelvic abscess, mittelschmerz, endometriosis, AAA, myocardial infarction, pneumonia, cancer, porphyria, DKA, medications, sickle cell, viral syndrome, zoster    Patient Progress  Patient progress: improved      Final  diagnoses:   Kidney stone on left side   Urinary tract infection in female           Labs Reviewed   URINALYSIS W/ CULTURE IF INDICATED - Abnormal; Notable for the following:        Result Value    Appearance, UA Cloudy (*)     Blood, UA Large (3+) (*)     Protein,  mg/dL (2+) (*)     Leuk Esterase, UA Small (1+) (*)     All other components within normal limits   COMPREHENSIVE METABOLIC PANEL - Abnormal; Notable for the following:     Glucose 169 (*)     BUN 21 (*)     ALT (SGPT) 37 (*)     BUN/Creatinine Ratio 26.6 (*)     All other components within normal limits   URINALYSIS, MICROSCOPIC ONLY - Abnormal; Notable for the following:     RBC, UA 21-30 (*)     WBC, UA 13-20 (*)     Bacteria, UA 2+ (*)     Squamous Epithelial Cells, UA 3-6 (*)     All other components within normal limits   LIPASE - Normal   AMYLASE - Normal   CBC WITH AUTO DIFFERENTIAL - Normal   URINE CULTURE   CBC AND DIFFERENTIAL    Narrative:     The following orders were created for panel order CBC & Differential.  Procedure                               Abnormality         Status                     ---------                               -----------         ------                     CBC Auto Differential[[id number]]         Normal              Final result                 Please view results for these tests on the individual orders.     CT Abdomen Pelvis Without Contrast   ED Interpretation   There are 2 stones identified within the left distal ureter,   a 4 mm stone at the left ureteral orifice and a distal 3 mm stone. There   is mild left-sided hydronephrosis.   2. Diffuse hepatic steatosis with postop changes of prior   cholecystectomy.       This report was finalized on 2/19/2017 9:03 AM by Dr. Robert Colin MD.          Final Result   There are 2 stones identified within the left distal ureter,   a 4 mm stone at the left ureteral orifice and a distal 3 mm stone. There   is mild left-sided hydronephrosis.   2. Diffuse hepatic steatosis  with postop changes of prior   cholecystectomy.       This report was finalized on 2/19/2017 9:03 AM by Dr. Robert Colin MD.                 Medication List      New Prescriptions          cefuroxime 500 MG tablet   Commonly known as:  CEFTIN   Take 1 tablet by mouth 2 (Two) Times a Day for 10 days.       oxyCODONE-acetaminophen 5-325 MG per tablet   Commonly known as:  PERCOCET   1 - 2 tablets po q 4 hours PRN sever pain       promethazine 25 MG tablet   Commonly known as:  PHENERGAN   Take 1 tablet by mouth Every 6 (Six) Hours As Needed for nausea or   vomiting for up to 10 doses.         Stop          HYDROcodone-acetaminophen 7.5-325 MG per tablet   Commonly known as:  BHARATHI Monroe MD  02/19/17 0979

## 2017-02-21 LAB — BACTERIA SPEC AEROBE CULT: NORMAL

## 2017-04-12 ENCOUNTER — HOSPITAL ENCOUNTER (EMERGENCY)
Facility: HOSPITAL | Age: 52
Discharge: HOME | End: 2017-04-12
Attending: EMERGENCY MEDICINE | Admitting: EMERGENCY MEDICINE

## 2017-04-12 ENCOUNTER — APPOINTMENT (OUTPATIENT)
Dept: GENERAL RADIOLOGY | Facility: HOSPITAL | Age: 52
End: 2017-04-12

## 2017-04-12 ENCOUNTER — APPOINTMENT (OUTPATIENT)
Dept: CT IMAGING | Facility: HOSPITAL | Age: 52
End: 2017-04-12
Attending: EMERGENCY MEDICINE

## 2017-04-12 VITALS
BODY MASS INDEX: 34.31 KG/M2 | TEMPERATURE: 98.3 F | HEART RATE: 83 BPM | RESPIRATION RATE: 16 BRPM | DIASTOLIC BLOOD PRESSURE: 97 MMHG | WEIGHT: 201 LBS | SYSTOLIC BLOOD PRESSURE: 153 MMHG | OXYGEN SATURATION: 100 % | HEIGHT: 64 IN

## 2017-04-12 DIAGNOSIS — R07.9 CHEST PAIN, UNSPECIFIED TYPE: Primary | ICD-10-CM

## 2017-04-12 LAB
ALBUMIN SERPL-MCNC: 4.3 G/DL (ref 3.5–5.2)
ALBUMIN/GLOB SERPL: 1.3 G/DL
ALP SERPL-CCNC: 97 U/L (ref 40–129)
ALT SERPL W P-5'-P-CCNC: 45 U/L (ref 5–33)
ANION GAP SERPL CALCULATED.3IONS-SCNC: 16 MMOL/L
APTT PPP: 31.1 SECONDS (ref 24.3–38.1)
AST SERPL-CCNC: 26 U/L (ref 5–32)
BASOPHILS # BLD AUTO: 0.02 10*3/MM3 (ref 0–0.2)
BASOPHILS NFR BLD AUTO: 0.3 % (ref 0–2)
BILIRUB SERPL-MCNC: 0.7 MG/DL (ref 0.2–1.2)
BUN BLD-MCNC: 20 MG/DL (ref 6–20)
BUN/CREAT SERPL: 27.4 (ref 7–25)
CALCIUM SPEC-SCNC: 9.1 MG/DL (ref 8.6–10.5)
CHLORIDE SERPL-SCNC: 100 MMOL/L (ref 98–107)
CO2 SERPL-SCNC: 23 MMOL/L (ref 22–29)
CREAT BLD-MCNC: 0.73 MG/DL (ref 0.57–1)
D DIMER PPP FEU-MCNC: 1.59 MCGFEU/ML (ref 0–0.46)
DEPRECATED RDW RBC AUTO: 39.7 FL (ref 37–54)
EOSINOPHIL # BLD AUTO: 0.12 10*3/MM3 (ref 0.1–0.3)
EOSINOPHIL NFR BLD AUTO: 1.7 % (ref 0–4)
ERYTHROCYTE [DISTWIDTH] IN BLOOD BY AUTOMATED COUNT: 12.9 % (ref 11.5–14.5)
GFR SERPL CREATININE-BSD FRML MDRD: 84 ML/MIN/1.73
GLOBULIN UR ELPH-MCNC: 3.2 GM/DL
GLUCOSE BLD-MCNC: 170 MG/DL (ref 65–99)
HCT VFR BLD AUTO: 40.6 % (ref 37–47)
HGB BLD-MCNC: 13.5 G/DL (ref 12–16)
IMM GRANULOCYTES # BLD: 0.03 10*3/MM3 (ref 0–0.03)
IMM GRANULOCYTES NFR BLD: 0.4 % (ref 0–0.5)
INR PPP: 0.88 (ref 0.9–1.1)
LYMPHOCYTES # BLD AUTO: 1.55 10*3/MM3 (ref 0.6–4.8)
LYMPHOCYTES NFR BLD AUTO: 22.3 % (ref 20–45)
MCH RBC QN AUTO: 28.5 PG (ref 27–31)
MCHC RBC AUTO-ENTMCNC: 33.3 G/DL (ref 31–37)
MCV RBC AUTO: 85.8 FL (ref 81–99)
MONOCYTES # BLD AUTO: 0.36 10*3/MM3 (ref 0–1)
MONOCYTES NFR BLD AUTO: 5.2 % (ref 3–8)
NEUTROPHILS # BLD AUTO: 4.87 10*3/MM3 (ref 1.5–8.3)
NEUTROPHILS NFR BLD AUTO: 70.1 % (ref 45–70)
NRBC BLD MANUAL-RTO: 0 /100 WBC (ref 0–0)
PLATELET # BLD AUTO: 284 10*3/MM3 (ref 140–500)
PMV BLD AUTO: 10.2 FL (ref 7.4–10.4)
POTASSIUM BLD-SCNC: 3.8 MMOL/L (ref 3.5–5.2)
PROT SERPL-MCNC: 7.5 G/DL (ref 6–8.5)
PROTHROMBIN TIME: 11.9 SECONDS (ref 12.1–15)
RBC # BLD AUTO: 4.73 10*6/MM3 (ref 4.2–5.4)
SODIUM BLD-SCNC: 139 MMOL/L (ref 136–145)
TROPONIN T SERPL-MCNC: <0.01 NG/ML (ref 0–0.03)
TROPONIN T SERPL-MCNC: <0.01 NG/ML (ref 0–0.03)
WBC NRBC COR # BLD: 6.95 10*3/MM3 (ref 4.8–10.8)

## 2017-04-12 PROCEDURE — 85730 THROMBOPLASTIN TIME PARTIAL: CPT | Performed by: EMERGENCY MEDICINE

## 2017-04-12 PROCEDURE — 71010 HC CHEST PA OR AP: CPT

## 2017-04-12 PROCEDURE — 93010 ELECTROCARDIOGRAM REPORT: CPT | Performed by: INTERNAL MEDICINE

## 2017-04-12 PROCEDURE — 99284 EMERGENCY DEPT VISIT MOD MDM: CPT | Performed by: EMERGENCY MEDICINE

## 2017-04-12 PROCEDURE — 0 IOPAMIDOL PER 1 ML: Performed by: EMERGENCY MEDICINE

## 2017-04-12 PROCEDURE — 84484 ASSAY OF TROPONIN QUANT: CPT | Performed by: EMERGENCY MEDICINE

## 2017-04-12 PROCEDURE — 93005 ELECTROCARDIOGRAM TRACING: CPT | Performed by: EMERGENCY MEDICINE

## 2017-04-12 PROCEDURE — 99283 EMERGENCY DEPT VISIT LOW MDM: CPT

## 2017-04-12 PROCEDURE — 71275 CT ANGIOGRAPHY CHEST: CPT

## 2017-04-12 PROCEDURE — 85610 PROTHROMBIN TIME: CPT | Performed by: EMERGENCY MEDICINE

## 2017-04-12 PROCEDURE — 85025 COMPLETE CBC W/AUTO DIFF WBC: CPT | Performed by: EMERGENCY MEDICINE

## 2017-04-12 PROCEDURE — 80053 COMPREHEN METABOLIC PANEL: CPT | Performed by: EMERGENCY MEDICINE

## 2017-04-12 PROCEDURE — 85379 FIBRIN DEGRADATION QUANT: CPT | Performed by: EMERGENCY MEDICINE

## 2017-04-12 RX ORDER — ASPIRIN 81 MG/1
324 TABLET, CHEWABLE ORAL ONCE
Status: COMPLETED | OUTPATIENT
Start: 2017-04-12 | End: 2017-04-12

## 2017-04-12 RX ORDER — ASPIRIN 81 MG/1
81 TABLET ORAL DAILY
Qty: 30 TABLET | Refills: 0 | OUTPATIENT
Start: 2017-04-12 | End: 2017-12-12

## 2017-04-12 RX ADMIN — SODIUM CHLORIDE 1000 ML: 9 INJECTION, SOLUTION INTRAVENOUS at 11:57

## 2017-04-12 RX ADMIN — ASPIRIN 81 MG CHEWABLE TABLET 324 MG: 81 TABLET CHEWABLE at 09:56

## 2017-04-12 RX ADMIN — IOPAMIDOL 100 ML: 755 INJECTION, SOLUTION INTRAVENOUS at 12:08

## 2017-04-12 NOTE — ED NOTES
Went over discharge instructions with patient, alert and oriented at time of discharge, no questions at this time. Left ambulatory.       Laquita Medina RN  04/12/17 9120

## 2017-04-12 NOTE — DISCHARGE INSTRUCTIONS
Follow-up with Aayush Mckeon within one week.  Return to the emergency department if you have increasing pain, shortness of breath, or worse in any way at all.  Reduce your stress.

## 2017-04-12 NOTE — ED PROVIDER NOTES
Subjective   History of Present Illness  History of Present Illness    Chief complaint: Chest pain    Location: Substernal    Quality/Severity:  Like a vice is squeezing her chest, 10 over 10 at its worst, 0/10 currently    Timing/Onset/Duration: Acute onset 45 minutes ago.  It lasted for minutes.    Modifying Factors: Time seems to make it better, nothing seemed to make it worse    Associated Symptoms: The patient states she has had a pounding headache..  No fever chills or cough.  The patient has had a sore throat with a right earache.  The patient said clear nasal congestion.  There is no abdominal pain.  No diarrhea or burning when she urinates.  The patient did not get short of breath with it.  She did not get sweaty.  She did not get nauseated or vomit.  When she had the chest pain, she felt weak..    Narrative: This 52-year-old white female presents with substernal chest pain that started about 45 minutes prior to arrival.  The pain lasted for minutes.  The pain was substernal.  The patient had tingling in the arms with the chest pain.  He states it was like a vice squeezing her chest.  The pain was 10 over 10 at its worst, 0/10 now.  The patient did not get short of breath.  She did not get sweaty, she did not get nauseated, she did not vomit, she felt weak.  She complains of pounding headache, a sore throat, and nasal congestion.  She claims a right earache.  She had a stress test 1-2 years ago that was normal.  The patient has no history of hypertension, diabetes, smoking, or family history of coronary artery disease.  The patient has no history of coronary artery disease.  The patient is under a lot of stress.    PCP:  Aayush Mckeon      Review of Systems   Constitutional: Negative for chills and fever.   HENT: Positive for congestion, ear pain (right ear) and sore throat.    Eyes: Negative for pain and discharge.   Respiratory: Negative for cough, chest tightness, shortness of breath and wheezing.     Cardiovascular: Positive for chest pain. Negative for palpitations and leg swelling.   Gastrointestinal: Negative for abdominal pain, blood in stool, constipation, diarrhea, nausea and vomiting.   Genitourinary: Negative for dysuria.   Musculoskeletal: Negative for back pain.   Skin: Negative for rash.   Neurological: Negative for weakness and headaches.   Hematological: Negative for adenopathy.   Psychiatric/Behavioral: Negative for confusion.        Medication List      ASK your doctor about these medications          DULoxetine 60 MG capsule   Commonly known as:  CYMBALTA       GABAPENTIN (ONCE-DAILY) PO       HYDROcodone-acetaminophen 7.5-325 MG per tablet   Commonly known as:  NORCO   1 or 2 tablets every 6 hours as needed for pain       ibuprofen 800 MG tablet   Commonly known as:  ADVIL,MOTRIN       Loratadine 10 MG capsule       MULTI VITAMIN DAILY PO       norethindrone 0.35 MG tablet   Commonly known as:  MICRONOR       oxyCODONE-acetaminophen 5-325 MG per tablet   Commonly known as:  PERCOCET   1 - 2 tablets po q 4 hours PRN sever pain       promethazine 25 MG tablet   Commonly known as:  PHENERGAN   Take 1 tablet by mouth Every 6 (Six) Hours As Needed for nausea or   vomiting for up to 10 doses.       RANITIDINE 75 PO       traZODone 50 MG tablet   Commonly known as:  DESYREL       VITAMIN B 12 PO           Past Medical History:   Diagnosis Date   • Chronic back pain    • H/O gastroesophageal reflux (GERD)    • History of anemia    • Kidney stone     sched ureteroscopy, stent placement   • PONV (postoperative nausea and vomiting)    • Sleep apnea with use of continuous positive airway pressure (CPAP)        Allergies   Allergen Reactions   • Penicillins Rash   • Sulfa Antibiotics Rash       Past Surgical History:   Procedure Laterality Date   • CHOLECYSTECTOMY     • COLONOSCOPY N/A 1/18/2017    Procedure: COLONOSCOPY;  Surgeon: Tian Henderson MD;  Location: [facility];  Service:    •  CYSTOSCOPY URETEROSCOPY LASER LITHOTRIPSY N/A 4/6/2016    Procedure: URETEROSCOPY  ;  Surgeon: Vimal Azul MD;  Location: [facility];  Service:    • EXTRACORPOREAL SHOCK WAVE LITHOTRIPSY (ESWL)  2015   • KIDNEY STONE SURGERY         No family history on file.    Social History     Social History   • Marital status:      Spouse name: N/A   • Number of children: N/A   • Years of education: N/A     Social History Main Topics   • Smoking status: Never Smoker   • Smokeless tobacco: Never Used   • Alcohol use No   • Drug use: No   • Sexual activity: Defer     Other Topics Concern   • Not on file     Social History Narrative           Objective   Physical Exam   Constitutional: She is oriented to person, place, and time. She appears well-developed and well-nourished. No distress.   ED Triage Vitals:  Temp: 98.3 °F (36.8 °C) (04/12/17 0933)  Heart Rate: 95 (04/12/17 0933)  Resp: 16 (04/12/17 0933)  BP: 164/106 (04/12/17 0933)  SpO2: 95 % (04/12/17 0933)  Temp src: Oral (04/12/17 0933)  Heart Rate Source: Monitor (04/12/17 0933)  Patient Position: Lying (04/12/17 0933)  BP Location: Right arm (04/12/17 0933)  FiO2 (%): n/a    The patient's vitals were reviewed by me.  Unless otherwise noted they are within normal limits.  The patient is hypertensive with a blood pressure 164/106.     HENT:   Head: Normocephalic and atraumatic.   Right Ear: External ear normal.   Left Ear: External ear normal.   Nose: Nose normal.   Mouth/Throat: Oropharynx is clear and moist.   Eyes: Conjunctivae and EOM are normal. Pupils are equal, round, and reactive to light. Right eye exhibits no discharge. Left eye exhibits no discharge. No scleral icterus.   Neck: Normal range of motion. Neck supple. No JVD present. No tracheal deviation present. No thyromegaly present.   No carotid bruit   Cardiovascular: Normal rate, regular rhythm, normal heart sounds and intact distal pulses.  Exam reveals no gallop and no friction rub.    No murmur  heard.  Pulmonary/Chest: Effort normal and breath sounds normal. No stridor. No respiratory distress. She has no wheezes. She has no rales. She exhibits no tenderness.   Abdominal: Soft. Bowel sounds are normal. She exhibits no distension and no mass. There is no tenderness. There is no rebound and no guarding. No hernia.   Musculoskeletal: Normal range of motion. She exhibits no edema or deformity.   Lymphadenopathy:     She has no cervical adenopathy.   Neurological: She is alert and oriented to person, place, and time.   Skin: Skin is warm and dry. No rash noted. She is not diaphoretic. No erythema. No pallor.   Psychiatric: Her behavior is normal.   Nursing note and vitals reviewed.      Procedures         ED Course  ED Course   Comment By Time   The laboratory values were reviewed by me.  The d-dimer is 1.59.  The glucose is 170.  The ALT is 45.  The PT is 11.9.  The INR 0.88.  Lab for values are otherwise unremarkable. Jam Maguire MD 04/12 1104   The repeat troponin is normal. Jam Maguire MD 04/12 1355   9:38 AM, 04/12/17:  The EKG was obtained at 934.  EKG was read by me at 936.  EKG shows a normal sinus rhythm with a rate of 92.  There is a normal axis with no hypertrophy.  There is no ectopy.  There is poor anterior R-wave progression.  The AL, QRS and QT intervals are normal.  There is no acute ST elevation or depression.     1:58 PM, 04/12/17:  The patient was reassessed by me.  Her vital signs are stable.  The patient has remained in free since she has been here in emergency department.  2 sets of troponin's are normal.  The patient's CT of the chest is unremarkable.    1:58 PM, 04/12/17:  The patient's diagnosis of chest pain was discussed with her.  The plan will be to start patient on a baby aspirin a day.  She is to follow-up with Dr. Aayush Mckeon within the next week for further evaluation.  The patient has been instructed to return to emergency department if she has increasing pain,  shortness of breath, worse in any way at all.  The patient is on ranitidine.  A component of her symptoms may be be due to anxiety level she needs further treatment and evaluation from her primary care provider.  All of the patient's questions were answered she will be discharged in good condition.              MDM  No orders to display     Labs Reviewed - No data to display  No results found.    Final diagnoses:   None         ED Medications:  Medications - No data to display    New Medications:     Medication List      ASK your doctor about these medications          DULoxetine 60 MG capsule   Commonly known as:  CYMBALTA       GABAPENTIN (ONCE-DAILY) PO       HYDROcodone-acetaminophen 7.5-325 MG per tablet   Commonly known as:  NORCO   1 or 2 tablets every 6 hours as needed for pain       ibuprofen 800 MG tablet   Commonly known as:  ADVIL,MOTRIN       Loratadine 10 MG capsule       MULTI VITAMIN DAILY PO       norethindrone 0.35 MG tablet   Commonly known as:  MICRONOR       oxyCODONE-acetaminophen 5-325 MG per tablet   Commonly known as:  PERCOCET   1 - 2 tablets po q 4 hours PRN sever pain       promethazine 25 MG tablet   Commonly known as:  PHENERGAN   Take 1 tablet by mouth Every 6 (Six) Hours As Needed for nausea or   vomiting for up to 10 doses.       RANITIDINE 75 PO       traZODone 50 MG tablet   Commonly known as:  DESYREL       VITAMIN B 12 PO           Stopped Medications:     Medication List      ASK your doctor about these medications          DULoxetine 60 MG capsule   Commonly known as:  CYMBALTA       GABAPENTIN (ONCE-DAILY) PO       HYDROcodone-acetaminophen 7.5-325 MG per tablet   Commonly known as:  NORCO   1 or 2 tablets every 6 hours as needed for pain       ibuprofen 800 MG tablet   Commonly known as:  ADVIL,MOTRIN       Loratadine 10 MG capsule       MULTI VITAMIN DAILY PO       norethindrone 0.35 MG tablet   Commonly known as:  MICRONOR       oxyCODONE-acetaminophen 5-325 MG per tablet    Commonly known as:  PERCOCET   1 - 2 tablets po q 4 hours PRN sever pain       promethazine 25 MG tablet   Commonly known as:  PHENERGAN   Take 1 tablet by mouth Every 6 (Six) Hours As Needed for nausea or   vomiting for up to 10 doses.       RANITIDINE 75 PO       traZODone 50 MG tablet   Commonly known as:  DESYREL       VITAMIN B 12 PO             Final diagnoses:   Chest pain, unspecified type            Jam Maguire MD  04/12/17 6154

## 2017-08-26 ENCOUNTER — HOSPITAL ENCOUNTER (EMERGENCY)
Facility: HOSPITAL | Age: 52
Discharge: HOME | End: 2017-08-27
Attending: EMERGENCY MEDICINE | Admitting: EMERGENCY MEDICINE

## 2017-08-26 ENCOUNTER — APPOINTMENT (OUTPATIENT)
Dept: CT IMAGING | Facility: HOSPITAL | Age: 52
End: 2017-08-26

## 2017-08-26 DIAGNOSIS — R10.9 ABDOMINAL PAIN OF UNKNOWN ETIOLOGY: Primary | ICD-10-CM

## 2017-08-26 LAB
ALBUMIN SERPL-MCNC: 4.3 G/DL (ref 3.5–5.2)
ALBUMIN/GLOB SERPL: 1.3 G/DL
ALP SERPL-CCNC: 90 U/L (ref 40–129)
ALT SERPL W P-5'-P-CCNC: 38 U/L (ref 5–33)
AMYLASE SERPL-CCNC: 33 U/L (ref 28–100)
ANION GAP SERPL CALCULATED.3IONS-SCNC: 14.6 MMOL/L
AST SERPL-CCNC: 21 U/L (ref 5–32)
BACTERIA UR QL AUTO: ABNORMAL /HPF
BASOPHILS # BLD AUTO: 0.03 10*3/MM3 (ref 0–0.2)
BASOPHILS NFR BLD AUTO: 0.2 % (ref 0–2)
BILIRUB SERPL-MCNC: 0.7 MG/DL (ref 0.2–1.2)
BILIRUB UR QL STRIP: NEGATIVE
BUN BLD-MCNC: 13 MG/DL (ref 6–20)
BUN/CREAT SERPL: 19.7 (ref 7–25)
CALCIUM SPEC-SCNC: 9.2 MG/DL (ref 8.6–10.5)
CHLORIDE SERPL-SCNC: 97 MMOL/L (ref 98–107)
CLARITY UR: ABNORMAL
CO2 SERPL-SCNC: 26.4 MMOL/L (ref 22–29)
COLOR UR: YELLOW
CREAT BLD-MCNC: 0.66 MG/DL (ref 0.57–1)
D-LACTATE SERPL-SCNC: 1.7 MMOL/L (ref 0.5–2)
DEPRECATED RDW RBC AUTO: 40.3 FL (ref 37–54)
EOSINOPHIL # BLD AUTO: 0.09 10*3/MM3 (ref 0.1–0.3)
EOSINOPHIL NFR BLD AUTO: 0.7 % (ref 0–4)
ERYTHROCYTE [DISTWIDTH] IN BLOOD BY AUTOMATED COUNT: 12.7 % (ref 11.5–14.5)
GFR SERPL CREATININE-BSD FRML MDRD: 94 ML/MIN/1.73
GLOBULIN UR ELPH-MCNC: 3.2 GM/DL
GLUCOSE BLD-MCNC: 170 MG/DL (ref 65–99)
GLUCOSE UR STRIP-MCNC: ABNORMAL MG/DL
HCG SERPL QL: NEGATIVE
HCT VFR BLD AUTO: 38.6 % (ref 37–47)
HGB BLD-MCNC: 12.9 G/DL (ref 12–16)
HGB UR QL STRIP.AUTO: NEGATIVE
HYALINE CASTS UR QL AUTO: ABNORMAL /LPF
IMM GRANULOCYTES # BLD: 0.05 10*3/MM3 (ref 0–0.03)
IMM GRANULOCYTES NFR BLD: 0.4 % (ref 0–0.5)
KETONES UR QL STRIP: NEGATIVE
LEUKOCYTE ESTERASE UR QL STRIP.AUTO: ABNORMAL
LIPASE SERPL-CCNC: 15 U/L (ref 13–60)
LYMPHOCYTES # BLD AUTO: 2.39 10*3/MM3 (ref 0.6–4.8)
LYMPHOCYTES NFR BLD AUTO: 18.2 % (ref 20–45)
MCH RBC QN AUTO: 28.9 PG (ref 27–31)
MCHC RBC AUTO-ENTMCNC: 33.4 G/DL (ref 31–37)
MCV RBC AUTO: 86.5 FL (ref 81–99)
MONOCYTES # BLD AUTO: 0.9 10*3/MM3 (ref 0–1)
MONOCYTES NFR BLD AUTO: 6.9 % (ref 3–8)
NEUTROPHILS # BLD AUTO: 9.64 10*3/MM3 (ref 1.5–8.3)
NEUTROPHILS NFR BLD AUTO: 73.6 % (ref 45–70)
NITRITE UR QL STRIP: NEGATIVE
NRBC BLD MANUAL-RTO: 0 /100 WBC (ref 0–0)
PH UR STRIP.AUTO: 7.5 [PH] (ref 4.5–8)
PLATELET # BLD AUTO: 234 10*3/MM3 (ref 140–500)
PMV BLD AUTO: 9.7 FL (ref 7.4–10.4)
POTASSIUM BLD-SCNC: 3.6 MMOL/L (ref 3.5–5.2)
PROT SERPL-MCNC: 7.5 G/DL (ref 6–8.5)
PROT UR QL STRIP: NEGATIVE
RBC # BLD AUTO: 4.46 10*6/MM3 (ref 4.2–5.4)
RBC # UR: ABNORMAL /HPF
REF LAB TEST METHOD: ABNORMAL
SODIUM BLD-SCNC: 138 MMOL/L (ref 136–145)
SP GR UR STRIP: 1.01 (ref 1–1.03)
SQUAMOUS #/AREA URNS HPF: ABNORMAL /HPF
UNIDENT CRYS URNS QL MICRO: ABNORMAL /HPF
UROBILINOGEN UR QL STRIP: ABNORMAL
WBC NRBC COR # BLD: 13.1 10*3/MM3 (ref 4.8–10.8)
WBC UR QL AUTO: ABNORMAL /HPF

## 2017-08-26 PROCEDURE — 96374 THER/PROPH/DIAG INJ IV PUSH: CPT

## 2017-08-26 PROCEDURE — 83605 ASSAY OF LACTIC ACID: CPT | Performed by: EMERGENCY MEDICINE

## 2017-08-26 PROCEDURE — 99284 EMERGENCY DEPT VISIT MOD MDM: CPT

## 2017-08-26 PROCEDURE — 84703 CHORIONIC GONADOTROPIN ASSAY: CPT | Performed by: EMERGENCY MEDICINE

## 2017-08-26 PROCEDURE — 25010000002 HYDROMORPHONE PER 4 MG: Performed by: EMERGENCY MEDICINE

## 2017-08-26 PROCEDURE — 81001 URINALYSIS AUTO W/SCOPE: CPT | Performed by: EMERGENCY MEDICINE

## 2017-08-26 PROCEDURE — 99282 EMERGENCY DEPT VISIT SF MDM: CPT | Performed by: EMERGENCY MEDICINE

## 2017-08-26 PROCEDURE — 0 IOPAMIDOL PER 1 ML: Performed by: EMERGENCY MEDICINE

## 2017-08-26 PROCEDURE — 74177 CT ABD & PELVIS W/CONTRAST: CPT

## 2017-08-26 PROCEDURE — 82150 ASSAY OF AMYLASE: CPT | Performed by: EMERGENCY MEDICINE

## 2017-08-26 PROCEDURE — 96361 HYDRATE IV INFUSION ADD-ON: CPT

## 2017-08-26 PROCEDURE — 85025 COMPLETE CBC W/AUTO DIFF WBC: CPT | Performed by: EMERGENCY MEDICINE

## 2017-08-26 PROCEDURE — 96375 TX/PRO/DX INJ NEW DRUG ADDON: CPT

## 2017-08-26 PROCEDURE — 83690 ASSAY OF LIPASE: CPT | Performed by: EMERGENCY MEDICINE

## 2017-08-26 PROCEDURE — 25010000002 ONDANSETRON PER 1 MG: Performed by: EMERGENCY MEDICINE

## 2017-08-26 PROCEDURE — 80053 COMPREHEN METABOLIC PANEL: CPT | Performed by: EMERGENCY MEDICINE

## 2017-08-26 RX ORDER — ONDANSETRON 2 MG/ML
4 INJECTION INTRAMUSCULAR; INTRAVENOUS ONCE
Status: COMPLETED | OUTPATIENT
Start: 2017-08-26 | End: 2017-08-26

## 2017-08-26 RX ORDER — SODIUM CHLORIDE 0.9 % (FLUSH) 0.9 %
10 SYRINGE (ML) INJECTION AS NEEDED
Status: DISCONTINUED | OUTPATIENT
Start: 2017-08-26 | End: 2017-08-27 | Stop reason: HOSPADM

## 2017-08-26 RX ADMIN — SODIUM CHLORIDE 500 ML: 9 INJECTION, SOLUTION INTRAVENOUS at 21:35

## 2017-08-26 RX ADMIN — HYDROMORPHONE HYDROCHLORIDE 0.5 MG: 1 INJECTION, SOLUTION INTRAMUSCULAR; INTRAVENOUS; SUBCUTANEOUS at 21:36

## 2017-08-26 RX ADMIN — IOPAMIDOL 100 ML: 755 INJECTION, SOLUTION INTRAVENOUS at 23:54

## 2017-08-26 RX ADMIN — ONDANSETRON 4 MG: 2 INJECTION, SOLUTION INTRAMUSCULAR; INTRAVENOUS at 21:36

## 2017-08-27 VITALS
TEMPERATURE: 98 F | RESPIRATION RATE: 16 BRPM | OXYGEN SATURATION: 95 % | HEART RATE: 97 BPM | HEIGHT: 66 IN | DIASTOLIC BLOOD PRESSURE: 89 MMHG | BODY MASS INDEX: 32.47 KG/M2 | SYSTOLIC BLOOD PRESSURE: 139 MMHG | WEIGHT: 202 LBS

## 2017-08-27 RX ORDER — HYDROCODONE BITARTRATE AND ACETAMINOPHEN 7.5; 325 MG/1; MG/1
1 TABLET ORAL EVERY 6 HOURS PRN
Qty: 12 TABLET | Refills: 0 | Status: SHIPPED | OUTPATIENT
Start: 2017-08-27 | End: 2018-05-06

## 2017-08-27 RX ORDER — METRONIDAZOLE 500 MG/1
500 TABLET ORAL ONCE
Status: COMPLETED | OUTPATIENT
Start: 2017-08-27 | End: 2017-08-27

## 2017-08-27 RX ORDER — HYDROCODONE BITARTRATE AND ACETAMINOPHEN 5; 325 MG/1; MG/1
1 TABLET ORAL ONCE
Status: COMPLETED | OUTPATIENT
Start: 2017-08-27 | End: 2017-08-27

## 2017-08-27 RX ADMIN — HYDROCODONE BITARTRATE AND ACETAMINOPHEN 1 TABLET: 5; 325 TABLET ORAL at 01:00

## 2017-08-27 RX ADMIN — METRONIDAZOLE 500 MG: 500 TABLET ORAL at 01:00

## 2017-12-11 ENCOUNTER — TRANSCRIBE ORDERS (OUTPATIENT)
Dept: ADMINISTRATIVE | Facility: HOSPITAL | Age: 52
End: 2017-12-11

## 2017-12-11 ENCOUNTER — HOSPITAL ENCOUNTER (OUTPATIENT)
Dept: GENERAL RADIOLOGY | Facility: HOSPITAL | Age: 52
Discharge: HOME | End: 2017-12-11
Admitting: NURSE PRACTITIONER

## 2017-12-11 DIAGNOSIS — M25.511 RIGHT SHOULDER PAIN, UNSPECIFIED CHRONICITY: Primary | ICD-10-CM

## 2017-12-11 DIAGNOSIS — M25.511 RIGHT SHOULDER PAIN, UNSPECIFIED CHRONICITY: ICD-10-CM

## 2017-12-11 PROCEDURE — 73030 X-RAY EXAM OF SHOULDER: CPT

## 2018-01-23 ENCOUNTER — OFFICE VISIT (OUTPATIENT)
Dept: SLEEP MEDICINE | Facility: HOSPITAL | Age: 53
End: 2018-01-23
Attending: INTERNAL MEDICINE

## 2018-01-23 VITALS
SYSTOLIC BLOOD PRESSURE: 157 MMHG | BODY MASS INDEX: 32.82 KG/M2 | DIASTOLIC BLOOD PRESSURE: 91 MMHG | HEIGHT: 65 IN | WEIGHT: 197 LBS | HEART RATE: 97 BPM

## 2018-01-23 DIAGNOSIS — G47.33 OBSTRUCTIVE SLEEP APNEA SYNDROME: Primary | ICD-10-CM

## 2018-01-23 DIAGNOSIS — J30.2 SEASONAL ALLERGIC RHINITIS, UNSPECIFIED CHRONICITY, UNSPECIFIED TRIGGER: ICD-10-CM

## 2018-01-23 PROCEDURE — G0463 HOSPITAL OUTPT CLINIC VISIT: HCPCS

## 2018-01-23 NOTE — PROGRESS NOTES
PULMONARY SLEEP CONSULT NOTE      PATIENT IDENTIFICATION:  Name: Sánchez Carbajal  Age: 53 y.o.  Sex: female  :  1965  MRN: [id number]    DATE OF CONSULTATION:  2018   Referring Physician: No admitting provider for patient encounter.                  CHIEF COMPLAINT: Obstructive Sleep Apnea    History of Present Illness: < 5  Sánchez Carbajal is a 53 y.o. female  Pt on CPAP feeling better more energy especially the night he use it more than 4 hours, no sleepiness no fatigue no tiredness, no mask irritation no dryness, compliance report reviewed with pt AHI 5.2 with good usage.   Review of Systems:   Constitutional: negative   Eyes: negative   ENT/oropharynx: negative   Cardiovascular: negative   Respiratory: negative   Gastrointestinal: negative   Genitourinary: negative   Neurological: negative   Musculoskeletal: negative   Integument/breast: negative   Endocrine: negative   Allergic/Immunologic: negative     Past Medical History:  Past Medical History:   Diagnosis Date   • Chronic back pain    • H/O gastroesophageal reflux (GERD)    • History of anemia    • Kidney stone     sched ureteroscopy, stent placement   • PONV (postoperative nausea and vomiting)    • Sleep apnea with use of continuous positive airway pressure (CPAP)        Past Surgical History:  Past Surgical History:   Procedure Laterality Date   • CHOLECYSTECTOMY     • COLONOSCOPY N/A 2017    Procedure: COLONOSCOPY;  Surgeon: Tian Henderson MD;  Location: [facility];  Service:    • CYSTOSCOPY URETEROSCOPY LASER LITHOTRIPSY N/A 2016    Procedure: URETEROSCOPY  ;  Surgeon: Vimal Azul MD;  Location: [facility];  Service:    • EXTRACORPOREAL SHOCK WAVE LITHOTRIPSY (ESWL)     • KIDNEY STONE SURGERY          Family History:  No family history on file.     Social History:   Social History   Substance Use Topics   • Smoking status: Never Smoker   • Smokeless tobacco: Never Used   • Alcohol use No         Allergies:  Allergies   Allergen Reactions   • Penicillins Rash   • Sulfa Antibiotics Rash       Home Meds:    (Not in a hospital admission)    Objective:    Vitals Ranges:   Heart Rate:  [97] 97  BP: (157)/(91) 157/91  Body mass index is 32.78 kg/(m^2).         Exam:     General Appearance:    WDWN  Head:  Normocephalic, without obvious abnormality, atraumatic.   Eyes:  Conjunctiva/corneas clear, EOM's intact, both eyes.         Ears:  Normal external ear canals, both ears.    Nose:  Nares normal, no drainage      Throat:  Lips, mucosa, and tongue normal. Mallampati score 3    Neck:  Supple, symmetrical, trachea midline. No JVD.  Lungs:   Bilateral basal rhonchi bilaterally, respirations unlabored symmetrical wall movement.    Chest wall:  No tenderness or deformity.    Heart:  Regular rate and rhythm, S1 and S2 normal.  Abdomen: Soft, non-tender, no masses, no organomegaly.    Extremities: Trace edema no clubbing or Cyanosis        Data Review:  All labs (24hrs): No results found for this or any previous visit (from the past 24 hour(s)).     Imaging:  [unfilled]    ASSESSMENT:  Diagnoses and all orders for this visit:    Obstructive sleep apnea syndrome    Seasonal allergic rhinitis, unspecified chronicity, unspecified trigger        PLAN:  This patient with obstructive sleep apnea, compliance is improved. Encourage to use it more frequent, I re-emphasized on pt the long and short term benefit of treating SAGAR.   change pressure to 8-16 cm H2o  It is recommended to keep allergic rhinitis  optimized to improve quality of sleep        Seema Moser MD. D, ABSM.  1/23/2018  12:09 PM

## 2018-04-09 ENCOUNTER — TRANSCRIBE ORDERS (OUTPATIENT)
Dept: ADMINISTRATIVE | Facility: HOSPITAL | Age: 53
End: 2018-04-09

## 2018-04-09 DIAGNOSIS — G45.9 TRANSIENT CEREBRAL ISCHEMIA, UNSPECIFIED TYPE: Primary | ICD-10-CM

## 2018-04-17 ENCOUNTER — HOSPITAL ENCOUNTER (OUTPATIENT)
Dept: ULTRASOUND IMAGING | Facility: HOSPITAL | Age: 53
Discharge: HOME | End: 2018-04-17
Attending: INTERNAL MEDICINE | Admitting: INTERNAL MEDICINE

## 2018-04-17 ENCOUNTER — HOSPITAL ENCOUNTER (OUTPATIENT)
Dept: CARDIOLOGY | Facility: HOSPITAL | Age: 53
Discharge: HOME | End: 2018-04-17
Attending: INTERNAL MEDICINE

## 2018-04-17 ENCOUNTER — HOSPITAL ENCOUNTER (OUTPATIENT)
Dept: MRI IMAGING | Facility: HOSPITAL | Age: 53
Discharge: HOME | End: 2018-04-17
Attending: INTERNAL MEDICINE

## 2018-04-17 VITALS
OXYGEN SATURATION: 94 % | BODY MASS INDEX: 31.66 KG/M2 | DIASTOLIC BLOOD PRESSURE: 118 MMHG | HEART RATE: 88 BPM | SYSTOLIC BLOOD PRESSURE: 197 MMHG | WEIGHT: 197 LBS | HEIGHT: 66 IN

## 2018-04-17 DIAGNOSIS — G45.9 TRANSIENT CEREBRAL ISCHEMIA, UNSPECIFIED TYPE: ICD-10-CM

## 2018-04-17 LAB
AORTIC DIMENSIONLESS INDEX: 0.8 (DI)
BH CV ECHO MEAS - ACS: 1.8 CM
BH CV ECHO MEAS - AO MAX PG (FULL): 2.8 MMHG
BH CV ECHO MEAS - AO MAX PG: 6.9 MMHG
BH CV ECHO MEAS - AO MEAN PG (FULL): 2 MMHG
BH CV ECHO MEAS - AO MEAN PG: 4 MMHG
BH CV ECHO MEAS - AO ROOT AREA (BSA CORRECTED): 1.7
BH CV ECHO MEAS - AO ROOT AREA: 8.6 CM^2
BH CV ECHO MEAS - AO ROOT DIAM: 3.3 CM
BH CV ECHO MEAS - AO V2 MAX: 131 CM/SEC
BH CV ECHO MEAS - AO V2 MEAN: 90.3 CM/SEC
BH CV ECHO MEAS - AO V2 VTI: 24.6 CM
BH CV ECHO MEAS - AVA(I,A): 1.9 CM^2
BH CV ECHO MEAS - AVA(I,D): 1.9 CM^2
BH CV ECHO MEAS - AVA(V,A): 1.8 CM^2
BH CV ECHO MEAS - AVA(V,D): 1.8 CM^2
BH CV ECHO MEAS - BSA(HAYCOCK): 2.1 M^2
BH CV ECHO MEAS - BSA: 2 M^2
BH CV ECHO MEAS - BZI_BMI: 31.6 KILOGRAMS/M^2
BH CV ECHO MEAS - BZI_METRIC_HEIGHT: 167.6 CM
BH CV ECHO MEAS - BZI_METRIC_WEIGHT: 88.9 KG
BH CV ECHO MEAS - CONTRAST EF (2CH): 53.4 ML/M^2
BH CV ECHO MEAS - CONTRAST EF 4CH: 55.4 ML/M^2
BH CV ECHO MEAS - EDV(CUBED): 82.3 ML
BH CV ECHO MEAS - EDV(MOD-SP2): 87.2 ML
BH CV ECHO MEAS - EDV(MOD-SP4): 89.9 ML
BH CV ECHO MEAS - EDV(TEICH): 85.4 ML
BH CV ECHO MEAS - EF(CUBED): 63.8 %
BH CV ECHO MEAS - EF(MOD-BP): 54 %
BH CV ECHO MEAS - EF(MOD-SP2): 53.4 %
BH CV ECHO MEAS - EF(MOD-SP4): 55.4 %
BH CV ECHO MEAS - EF(TEICH): 55.6 %
BH CV ECHO MEAS - ESV(CUBED): 29.8 ML
BH CV ECHO MEAS - ESV(MOD-SP2): 40.6 ML
BH CV ECHO MEAS - ESV(MOD-SP4): 40.1 ML
BH CV ECHO MEAS - ESV(TEICH): 37.9 ML
BH CV ECHO MEAS - FS: 28.7 %
BH CV ECHO MEAS - IVS/LVPW: 0.97
BH CV ECHO MEAS - IVSD: 0.79 CM
BH CV ECHO MEAS - LA DIMENSION: 2.8 CM
BH CV ECHO MEAS - LA/AO: 0.85
BH CV ECHO MEAS - LAT PEAK E' VEL: 7 CM/SEC
BH CV ECHO MEAS - LV DIASTOLIC VOL/BSA (35-75): 45.3 ML/M^2
BH CV ECHO MEAS - LV MASS(C)D: 107.2 GRAMS
BH CV ECHO MEAS - LV MASS(C)DI: 54.1 GRAMS/M^2
BH CV ECHO MEAS - LV MAX PG: 4.1 MMHG
BH CV ECHO MEAS - LV MEAN PG: 2 MMHG
BH CV ECHO MEAS - LV SYSTOLIC VOL/BSA (12-30): 20.2 ML/M^2
BH CV ECHO MEAS - LV V1 MAX: 101 CM/SEC
BH CV ECHO MEAS - LV V1 MEAN: 66 CM/SEC
BH CV ECHO MEAS - LV V1 VTI: 20.8 CM
BH CV ECHO MEAS - LVIDD: 4.4 CM
BH CV ECHO MEAS - LVIDS: 3.1 CM
BH CV ECHO MEAS - LVLD AP2: 7.2 CM
BH CV ECHO MEAS - LVLD AP4: 7.3 CM
BH CV ECHO MEAS - LVLS AP2: 7.2 CM
BH CV ECHO MEAS - LVLS AP4: 6.4 CM
BH CV ECHO MEAS - LVOT AREA (M): 2.3 CM^2
BH CV ECHO MEAS - LVOT AREA: 2.3 CM^2
BH CV ECHO MEAS - LVOT DIAM: 1.7 CM
BH CV ECHO MEAS - LVPWD: 0.81 CM
BH CV ECHO MEAS - MED PEAK E' VEL: 5 CM/SEC
BH CV ECHO MEAS - MR MAX PG: 91.4 MMHG
BH CV ECHO MEAS - MR MAX VEL: 478 CM/SEC
BH CV ECHO MEAS - MV A DUR: 0.14 SEC
BH CV ECHO MEAS - MV A MAX VEL: 84.9 CM/SEC
BH CV ECHO MEAS - MV DEC SLOPE: 325 CM/SEC^2
BH CV ECHO MEAS - MV DEC TIME: 0.18 SEC
BH CV ECHO MEAS - MV E MAX VEL: 50.4 CM/SEC
BH CV ECHO MEAS - MV E/A: 0.59
BH CV ECHO MEAS - MV MAX PG: 3.1 MMHG
BH CV ECHO MEAS - MV MEAN PG: 1 MMHG
BH CV ECHO MEAS - MV P1/2T MAX VEL: 63.5 CM/SEC
BH CV ECHO MEAS - MV P1/2T: 57.2 MSEC
BH CV ECHO MEAS - MV V2 MAX: 88.4 CM/SEC
BH CV ECHO MEAS - MV V2 MEAN: 45.2 CM/SEC
BH CV ECHO MEAS - MV V2 VTI: 18.2 CM
BH CV ECHO MEAS - MVA P1/2T LCG: 3.5 CM^2
BH CV ECHO MEAS - MVA(P1/2T): 3.8 CM^2
BH CV ECHO MEAS - MVA(VTI): 2.6 CM^2
BH CV ECHO MEAS - PA ACC TIME: 0.12 SEC
BH CV ECHO MEAS - PA MAX PG (FULL): 1.9 MMHG
BH CV ECHO MEAS - PA MAX PG: 3.2 MMHG
BH CV ECHO MEAS - PA PR(ACCEL): 23.7 MMHG
BH CV ECHO MEAS - PA V2 MAX: 90 CM/SEC
BH CV ECHO MEAS - PULM A REVS DUR: 0.14 SEC
BH CV ECHO MEAS - PULM A REVS VEL: 38.8 CM/SEC
BH CV ECHO MEAS - PULM DIAS VEL: 49.5 CM/SEC
BH CV ECHO MEAS - PULM S/D: 1.3
BH CV ECHO MEAS - PULM SYS VEL: 65.5 CM/SEC
BH CV ECHO MEAS - PVA(V,A): 1.8 CM^2
BH CV ECHO MEAS - PVA(V,D): 1.8 CM^2
BH CV ECHO MEAS - QP/QS: 0.66
BH CV ECHO MEAS - RAP SYSTOLE: 3 MMHG
BH CV ECHO MEAS - RV MAX PG: 1.3 MMHG
BH CV ECHO MEAS - RV MEAN PG: 1 MMHG
BH CV ECHO MEAS - RV V1 MAX: 56.9 CM/SEC
BH CV ECHO MEAS - RV V1 MEAN: 37.2 CM/SEC
BH CV ECHO MEAS - RV V1 VTI: 11 CM
BH CV ECHO MEAS - RVOT AREA: 2.8 CM^2
BH CV ECHO MEAS - RVOT DIAM: 1.9 CM
BH CV ECHO MEAS - RVSP: 16.7 MMHG
BH CV ECHO MEAS - SI(AO): 106.1 ML/M^2
BH CV ECHO MEAS - SI(CUBED): 26.5 ML/M^2
BH CV ECHO MEAS - SI(LVOT): 23.8 ML/M^2
BH CV ECHO MEAS - SI(MOD-SP2): 23.5 ML/M^2
BH CV ECHO MEAS - SI(MOD-SP4): 25.1 ML/M^2
BH CV ECHO MEAS - SI(TEICH): 23.9 ML/M^2
BH CV ECHO MEAS - SV(AO): 210.4 ML
BH CV ECHO MEAS - SV(CUBED): 52.5 ML
BH CV ECHO MEAS - SV(LVOT): 47.2 ML
BH CV ECHO MEAS - SV(MOD-SP2): 46.6 ML
BH CV ECHO MEAS - SV(MOD-SP4): 49.8 ML
BH CV ECHO MEAS - SV(RVOT): 31.2 ML
BH CV ECHO MEAS - SV(TEICH): 47.4 ML
BH CV ECHO MEAS - TAPSE (>1.6): 2.2 CM2
BH CV ECHO MEAS - TR MAX VEL: 185 CM/SEC
BH CV ECHO MEASUREMENTS AVERAGE E/E' RATIO: 9
BH CV VAS BP RIGHT ARM: NORMAL MMHG
BH CV XLRA - RV BASE: 2.9 CM
BH CV XLRA - TDI S': 18 CM/SEC
LEFT ATRIUM VOLUME INDEX: 21 ML/M2
LEFT ATRIUM VOLUME: 38 CM3
MAXIMAL PREDICTED HEART RATE: 167 BPM
STRESS TARGET HR: 142 BPM

## 2018-04-17 PROCEDURE — 93880 EXTRACRANIAL BILAT STUDY: CPT

## 2018-04-17 PROCEDURE — 70551 MRI BRAIN STEM W/O DYE: CPT

## 2018-04-17 PROCEDURE — 0399T ADULT TRANSTHORACIC ECHO COMPLETE W/ CONT IF NECESSARY PER PROTOCOL: CPT | Performed by: INTERNAL MEDICINE

## 2018-04-17 PROCEDURE — 93306 TTE W/DOPPLER COMPLETE: CPT

## 2018-04-17 PROCEDURE — 0399T HC MYOCARDL STRAIN IMAG QUAN ASSMT PER SESS: CPT

## 2018-04-17 PROCEDURE — 93306 TTE W/DOPPLER COMPLETE: CPT | Performed by: INTERNAL MEDICINE

## 2018-05-06 ENCOUNTER — APPOINTMENT (OUTPATIENT)
Dept: GENERAL RADIOLOGY | Facility: HOSPITAL | Age: 53
End: 2018-05-06

## 2018-05-06 ENCOUNTER — HOSPITAL ENCOUNTER (EMERGENCY)
Facility: HOSPITAL | Age: 53
Discharge: HOME | End: 2018-05-06
Attending: EMERGENCY MEDICINE | Admitting: EMERGENCY MEDICINE

## 2018-05-06 VITALS
TEMPERATURE: 98 F | HEART RATE: 98 BPM | OXYGEN SATURATION: 100 % | BODY MASS INDEX: 30.68 KG/M2 | RESPIRATION RATE: 16 BRPM | DIASTOLIC BLOOD PRESSURE: 92 MMHG | WEIGHT: 190 LBS | SYSTOLIC BLOOD PRESSURE: 132 MMHG

## 2018-05-06 DIAGNOSIS — M77.52 LEFT ANKLE TENDINITIS: Primary | ICD-10-CM

## 2018-05-06 PROCEDURE — 99283 EMERGENCY DEPT VISIT LOW MDM: CPT

## 2018-05-06 PROCEDURE — 73610 X-RAY EXAM OF ANKLE: CPT

## 2018-05-06 PROCEDURE — 99282 EMERGENCY DEPT VISIT SF MDM: CPT | Performed by: EMERGENCY MEDICINE

## 2018-05-06 RX ORDER — DICLOFENAC SODIUM 75 MG/1
75 TABLET, DELAYED RELEASE ORAL 2 TIMES DAILY PRN
Qty: 20 TABLET | Refills: 0 | Status: SHIPPED | OUTPATIENT
Start: 2018-05-06 | End: 2018-06-24

## 2018-05-06 RX ORDER — HYDROCODONE BITARTRATE AND ACETAMINOPHEN 5; 325 MG/1; MG/1
1-2 TABLET ORAL EVERY 4 HOURS PRN
Qty: 12 TABLET | Refills: 0 | Status: SHIPPED | OUTPATIENT
Start: 2018-05-06 | End: 2019-01-02

## 2018-05-06 RX ORDER — RANITIDINE 150 MG/1
150 TABLET ORAL 2 TIMES DAILY
COMMUNITY
End: 2022-11-29

## 2018-05-06 RX ORDER — LISINOPRIL 10 MG/1
10 TABLET ORAL DAILY
COMMUNITY

## 2018-05-07 NOTE — ED PROVIDER NOTES
Subjective     History provided by:  Patient    History of Present Illness    · Chief complaint: Ankle pain and swelling    · Location: Lateral aspect the left ankle    · Quality/Severity: Moderately severe pain and swelling in the lateral aspect of the left ankle.  Swelling goes up and down depending on whether she's been up on her feet.    · Timing/Onset: Started 3 days ago.    · Modifying Factors: Range of motion of her left ankle exacerbates pain.  Standing and walking exacerbates pain.  She states she's been up on her feet her ankle becomes more swollen, and when she laying down for a period of time the swelling in the ankle goes down.    · Associated symptoms: No pain or swelling on the medial aspect the ankle or the distal foot.    · Narrative: The patient is a 53-year-old white female complaining of swelling and pain on the lateral aspect of the left ankle 3 days.  She denies any history of trauma.  She works at [employer] and is on her feet all day which exacerbates the pain.  Her past medical history significant for recently being diagnosed with type 2 diabetes, she also has a history of hypertension.  Past surgical history significant for a cholecystectomy and lithotripsy for kidney stone.  Social history she lives with her , she doesn't smoke or drink alcohol.    ED Triage Vitals [05/06/18 2006]   Temp Heart Rate Resp BP SpO2   98 °F (36.7 °C) 98 16 132/92 100 %      Temp src Heart Rate Source Patient Position BP Location FiO2 (%)   -- -- -- -- --       Review of Systems   Constitutional: Negative for activity change, appetite change, chills, diaphoresis, fatigue and fever.   HENT: Negative for congestion, dental problem, ear pain, hearing loss, mouth sores, postnasal drip, rhinorrhea, sinus pressure, sore throat and voice change.    Eyes: Negative for photophobia, pain, discharge, redness and visual disturbance.   Respiratory: Negative for cough, chest tightness, shortness of breath, wheezing and  stridor.    Cardiovascular: Negative for chest pain, palpitations and leg swelling.   Gastrointestinal: Negative for abdominal pain, diarrhea, nausea and vomiting.   Genitourinary: Negative for difficulty urinating, dysuria, flank pain, frequency, hematuria and urgency.   Musculoskeletal: Positive for gait problem (due to left ankle pain) and joint swelling (lateral aspect of left ankle). Negative for arthralgias, back pain, myalgias, neck pain and neck stiffness.   Skin: Negative for color change and rash.   Neurological: Negative for dizziness, tremors, seizures, syncope, facial asymmetry, speech difficulty, weakness, light-headedness, numbness and headaches.   Hematological: Negative for adenopathy.   Psychiatric/Behavioral: Negative.  Negative for confusion and decreased concentration. The patient is not nervous/anxious.        Past Medical History:   Diagnosis Date   • Chronic back pain    • Diabetes mellitus    • H/O gastroesophageal reflux (GERD)    • History of anemia    • Hypertension    • Kidney stone     sched ureteroscopy, stent placement   • PONV (postoperative nausea and vomiting)    • Sleep apnea with use of continuous positive airway pressure (CPAP)        Allergies   Allergen Reactions   • Penicillins Rash   • Flagyl [Metronidazole] Hives   • Sulfa Antibiotics Rash       Past Surgical History:   Procedure Laterality Date   • CHOLECYSTECTOMY     • COLONOSCOPY N/A 1/18/2017    Procedure: COLONOSCOPY;  Surgeon: Tian Henderson MD;  Location: [facility] OR;  Service:    • CYSTOSCOPY URETEROSCOPY LASER LITHOTRIPSY N/A 4/6/2016    Procedure: URETEROSCOPY  ;  Surgeon: Vimal Azul MD;  Location: [facility] OR;  Service:    • EXTRACORPOREAL SHOCK WAVE LITHOTRIPSY (ESWL)  2015   • KIDNEY STONE SURGERY         History reviewed. No pertinent family history.    Social History     Social History   • Marital status:      Social History Main Topics   • Smoking status: Never Smoker   • Smokeless  tobacco: Never Used   • Alcohol use No   • Drug use: No   • Sexual activity: Defer     Other Topics Concern   • Not on file           Objective   Physical Exam   Constitutional: She is oriented to person, place, and time. She appears well-developed and well-nourished. No distress.   The patient appears in no acute distress.  Review of her vital signs she is afebrile, she is mildly tachycardic with a heart rate 98, the remainder of vital signs are within normal limits.   HENT:   Head: Normocephalic and atraumatic.   Eyes: Conjunctivae are normal. Pupils are equal, round, and reactive to light.   Neck: Normal range of motion.   Musculoskeletal:   Left ankle has no bony deformity nor any ligamental laxity.  She has soft tissue tenderness and mild edema on the lateral aspect of the ankle extending into the lateral aspect of the left leg.  There is no tenderness or swelling on medial aspect of the ankle.  There is no swelling or tenderness of the left foot.  Her left foot is neurovascularly intact.   Neurological: She is alert and oriented to person, place, and time. No cranial nerve deficit.   No focal motor sensory deficit   Skin: Skin is warm and dry. Capillary refill takes less than 2 seconds. No rash noted. She is not diaphoretic. No erythema.   Psychiatric: She has a normal mood and affect. Her behavior is normal. Judgment and thought content normal.   Nursing note and vitals reviewed.      Procedures           ED Course  ED Course   Comment By Time   Fam Report [id number] Coleman Monroe MD 05/06 2202   The patient's ankle x-ray was negative.  She has no history of trauma.  Is my impression she has tendinitis of the lateral aspect of the left ankle.  I instructed her to be no weightbearing for the next 4 days.  The patient states she has crutches so I instructed her to use on.  I instructed her to keep her ankle elevated and apply ice.  She is prescribed Ultram as an anti-inflammatory and Lortab 5 mg #12 for  pain.  She is instructed to make an appointment to follow-up with Dr. Bernardo, podiatry, if not improved in the next couple days. Coleman Monroe MD 05/07 0031                  MDM  Number of Diagnoses or Management Options  Left ankle tendinitis: new and requires workup     Amount and/or Complexity of Data Reviewed  Tests in the radiology section of CPT®: ordered and reviewed  Independent visualization of images, tracings, or specimens: yes    Patient Progress  Patient progress: stable        Final diagnoses:   Left ankle tendinitis           Labs Reviewed - No data to display  XR Ankle 3+ View Left   ED Interpretation   No fracture or bony deformity.             Medication List      New Prescriptions    diclofenac 75 MG EC tablet  Commonly known as:  VOLTAREN  Take 1 tablet by mouth 2 (Two) Times a Day As Needed (Pain) for up to 20   doses.     HYDROcodone-acetaminophen 5-325 MG per tablet  Commonly known as:  NORCO  Take 1-2 tablets by mouth Every 4 (Four) Hours As Needed for Severe Pain    for up to 12 doses.  Replaces:  HYDROcodone-acetaminophen 7.5-325 MG per tablet        Stop    HYDROcodone-acetaminophen 7.5-325 MG per tablet  Commonly known as:  NORCO  Replaced by:  HYDROcodone-acetaminophen 5-325 MG per tablet     ibuprofen 800 MG tablet  Commonly known as:  ADVERICA ELLIS MD  05/07/18 0033

## 2018-05-07 NOTE — DISCHARGE INSTRUCTIONS
Keep your ankle elevated above your pelvis.  Apply ice to your ankle for the next couple of days.  No weight bearing until pain free, use crutches.

## 2018-06-24 ENCOUNTER — APPOINTMENT (OUTPATIENT)
Dept: CT IMAGING | Facility: HOSPITAL | Age: 53
End: 2018-06-24

## 2018-06-24 ENCOUNTER — HOSPITAL ENCOUNTER (EMERGENCY)
Facility: HOSPITAL | Age: 53
Discharge: HOME | End: 2018-06-24
Attending: EMERGENCY MEDICINE | Admitting: EMERGENCY MEDICINE

## 2018-06-24 VITALS
HEIGHT: 65 IN | WEIGHT: 190 LBS | RESPIRATION RATE: 14 BRPM | BODY MASS INDEX: 31.65 KG/M2 | HEART RATE: 80 BPM | SYSTOLIC BLOOD PRESSURE: 120 MMHG | DIASTOLIC BLOOD PRESSURE: 74 MMHG | OXYGEN SATURATION: 95 % | TEMPERATURE: 98.2 F

## 2018-06-24 DIAGNOSIS — N20.1 LEFT URETERAL STONE: Primary | ICD-10-CM

## 2018-06-24 LAB
ALBUMIN SERPL-MCNC: 4.6 G/DL (ref 3.5–5.2)
ALBUMIN/GLOB SERPL: 1.9 G/DL
ALP SERPL-CCNC: 78 U/L (ref 40–129)
ALT SERPL W P-5'-P-CCNC: 31 U/L (ref 5–33)
ANION GAP SERPL CALCULATED.3IONS-SCNC: 14.3 MMOL/L
AST SERPL-CCNC: 18 U/L (ref 5–32)
BACTERIA UR QL AUTO: ABNORMAL /HPF
BASOPHILS # BLD AUTO: 0.05 10*3/MM3 (ref 0–0.2)
BASOPHILS NFR BLD AUTO: 0.7 % (ref 0–2)
BILIRUB SERPL-MCNC: 0.3 MG/DL (ref 0.2–1.2)
BILIRUB UR QL STRIP: NEGATIVE
BUN BLD-MCNC: 30 MG/DL (ref 6–20)
BUN/CREAT SERPL: 41.1 (ref 7–25)
CALCIUM SPEC-SCNC: 9.6 MG/DL (ref 8.6–10.5)
CHLORIDE SERPL-SCNC: 101 MMOL/L (ref 98–107)
CLARITY UR: ABNORMAL
CO2 SERPL-SCNC: 23.7 MMOL/L (ref 22–29)
COLOR UR: YELLOW
CREAT BLD-MCNC: 0.73 MG/DL (ref 0.57–1)
DEPRECATED RDW RBC AUTO: 42 FL (ref 37–54)
EOSINOPHIL # BLD AUTO: 0.17 10*3/MM3 (ref 0.1–0.3)
EOSINOPHIL NFR BLD AUTO: 2.3 % (ref 0–4)
ERYTHROCYTE [DISTWIDTH] IN BLOOD BY AUTOMATED COUNT: 13.1 % (ref 11.5–14.5)
GFR SERPL CREATININE-BSD FRML MDRD: 83 ML/MIN/1.73
GLOBULIN UR ELPH-MCNC: 2.4 GM/DL
GLUCOSE BLD-MCNC: 136 MG/DL (ref 65–99)
GLUCOSE UR STRIP-MCNC: NEGATIVE MG/DL
HCT VFR BLD AUTO: 37.7 % (ref 37–47)
HGB BLD-MCNC: 13 G/DL (ref 12–16)
HGB UR QL STRIP.AUTO: ABNORMAL
HYALINE CASTS UR QL AUTO: ABNORMAL /LPF
IMM GRANULOCYTES # BLD: 0.02 10*3/MM3 (ref 0–0.03)
IMM GRANULOCYTES NFR BLD: 0.3 % (ref 0–0.5)
KETONES UR QL STRIP: NEGATIVE
LEUKOCYTE ESTERASE UR QL STRIP.AUTO: NEGATIVE
LYMPHOCYTES # BLD AUTO: 2.13 10*3/MM3 (ref 0.6–4.8)
LYMPHOCYTES NFR BLD AUTO: 28.5 % (ref 20–45)
MCH RBC QN AUTO: 30.4 PG (ref 27–31)
MCHC RBC AUTO-ENTMCNC: 34.5 G/DL (ref 31–37)
MCV RBC AUTO: 88.3 FL (ref 81–99)
MONOCYTES # BLD AUTO: 0.57 10*3/MM3 (ref 0–1)
MONOCYTES NFR BLD AUTO: 7.6 % (ref 3–8)
NEUTROPHILS # BLD AUTO: 4.53 10*3/MM3 (ref 1.5–8.3)
NEUTROPHILS NFR BLD AUTO: 60.6 % (ref 45–70)
NITRITE UR QL STRIP: NEGATIVE
NRBC BLD MANUAL-RTO: 0 /100 WBC (ref 0–0)
PH UR STRIP.AUTO: 7 [PH] (ref 4.5–8)
PLATELET # BLD AUTO: 264 10*3/MM3 (ref 140–500)
PMV BLD AUTO: 10.1 FL (ref 7.4–10.4)
POTASSIUM BLD-SCNC: 3.8 MMOL/L (ref 3.5–5.2)
PROT SERPL-MCNC: 7 G/DL (ref 6–8.5)
PROT UR QL STRIP: NEGATIVE
RBC # BLD AUTO: 4.27 10*6/MM3 (ref 4.2–5.4)
RBC # UR: ABNORMAL /HPF
REF LAB TEST METHOD: ABNORMAL
SODIUM BLD-SCNC: 139 MMOL/L (ref 136–145)
SP GR UR STRIP: 1.01 (ref 1–1.03)
SQUAMOUS #/AREA URNS HPF: ABNORMAL /HPF
UROBILINOGEN UR QL STRIP: ABNORMAL
WBC NRBC COR # BLD: 7.47 10*3/MM3 (ref 4.8–10.8)
WBC UR QL AUTO: ABNORMAL /HPF

## 2018-06-24 PROCEDURE — 25010000002 ONDANSETRON PER 1 MG: Performed by: EMERGENCY MEDICINE

## 2018-06-24 PROCEDURE — 99284 EMERGENCY DEPT VISIT MOD MDM: CPT | Performed by: EMERGENCY MEDICINE

## 2018-06-24 PROCEDURE — 81001 URINALYSIS AUTO W/SCOPE: CPT | Performed by: EMERGENCY MEDICINE

## 2018-06-24 PROCEDURE — 96374 THER/PROPH/DIAG INJ IV PUSH: CPT

## 2018-06-24 PROCEDURE — 96375 TX/PRO/DX INJ NEW DRUG ADDON: CPT

## 2018-06-24 PROCEDURE — P9612 CATHETERIZE FOR URINE SPEC: HCPCS

## 2018-06-24 PROCEDURE — 80053 COMPREHEN METABOLIC PANEL: CPT | Performed by: EMERGENCY MEDICINE

## 2018-06-24 PROCEDURE — 85025 COMPLETE CBC W/AUTO DIFF WBC: CPT | Performed by: EMERGENCY MEDICINE

## 2018-06-24 PROCEDURE — 99283 EMERGENCY DEPT VISIT LOW MDM: CPT

## 2018-06-24 PROCEDURE — 25010000002 KETOROLAC TROMETHAMINE PER 15 MG: Performed by: EMERGENCY MEDICINE

## 2018-06-24 PROCEDURE — 74176 CT ABD & PELVIS W/O CONTRAST: CPT

## 2018-06-24 RX ORDER — KETOROLAC TROMETHAMINE 10 MG/1
10 TABLET, FILM COATED ORAL EVERY 6 HOURS PRN
Qty: 20 TABLET | Refills: 0 | Status: SHIPPED | OUTPATIENT
Start: 2018-06-24 | End: 2019-01-02

## 2018-06-24 RX ORDER — KETOROLAC TROMETHAMINE 30 MG/ML
30 INJECTION, SOLUTION INTRAMUSCULAR; INTRAVENOUS ONCE
Status: COMPLETED | OUTPATIENT
Start: 2018-06-24 | End: 2018-06-24

## 2018-06-24 RX ORDER — ONDANSETRON 2 MG/ML
4 INJECTION INTRAMUSCULAR; INTRAVENOUS ONCE
Status: COMPLETED | OUTPATIENT
Start: 2018-06-24 | End: 2018-06-24

## 2018-06-24 RX ORDER — ONDANSETRON 8 MG/1
8 TABLET, ORALLY DISINTEGRATING ORAL EVERY 8 HOURS PRN
Qty: 10 TABLET | Refills: 0 | Status: SHIPPED | OUTPATIENT
Start: 2018-06-24 | End: 2019-01-02

## 2018-06-24 RX ADMIN — KETOROLAC TROMETHAMINE 30 MG: 30 INJECTION INTRAMUSCULAR; INTRAVENOUS at 20:53

## 2018-06-24 RX ADMIN — ONDANSETRON 4 MG: 2 INJECTION, SOLUTION INTRAMUSCULAR; INTRAVENOUS at 20:49

## 2018-06-25 ENCOUNTER — HOSPITAL ENCOUNTER (OUTPATIENT)
Facility: HOSPITAL | Age: 53
Setting detail: HOSPITAL OUTPATIENT SURGERY
Discharge: HOME | End: 2018-06-25
Attending: UROLOGY | Admitting: UROLOGY

## 2018-06-25 ENCOUNTER — APPOINTMENT (OUTPATIENT)
Dept: GENERAL RADIOLOGY | Facility: HOSPITAL | Age: 53
End: 2018-06-25

## 2018-06-25 ENCOUNTER — ANESTHESIA (OUTPATIENT)
Dept: PERIOP | Facility: HOSPITAL | Age: 53
End: 2018-06-25

## 2018-06-25 ENCOUNTER — ANESTHESIA EVENT (OUTPATIENT)
Dept: PERIOP | Facility: HOSPITAL | Age: 53
End: 2018-06-25

## 2018-06-25 VITALS
WEIGHT: 184.8 LBS | BODY MASS INDEX: 30.75 KG/M2 | HEART RATE: 86 BPM | TEMPERATURE: 97.8 F | SYSTOLIC BLOOD PRESSURE: 135 MMHG | DIASTOLIC BLOOD PRESSURE: 87 MMHG | RESPIRATION RATE: 15 BRPM | OXYGEN SATURATION: 98 %

## 2018-06-25 LAB — GLUCOSE BLDC GLUCOMTR-MCNC: 105 MG/DL (ref 70–130)

## 2018-06-25 PROCEDURE — 82962 GLUCOSE BLOOD TEST: CPT

## 2018-06-25 PROCEDURE — C1769 GUIDE WIRE: HCPCS | Performed by: UROLOGY

## 2018-06-25 PROCEDURE — 25010000002 ONDANSETRON PER 1 MG: Performed by: NURSE ANESTHETIST, CERTIFIED REGISTERED

## 2018-06-25 PROCEDURE — 25010000002 MIDAZOLAM PER 1 MG: Performed by: NURSE ANESTHETIST, CERTIFIED REGISTERED

## 2018-06-25 PROCEDURE — 25010000002 DEXAMETHASONE PER 1 MG: Performed by: NURSE ANESTHETIST, CERTIFIED REGISTERED

## 2018-06-25 PROCEDURE — 25010000002 IOPAMIDOL 61 % SOLUTION: Performed by: UROLOGY

## 2018-06-25 PROCEDURE — 25010000002 LEVOFLOXACIN PER 250 MG: Performed by: UROLOGY

## 2018-06-25 PROCEDURE — C1758 CATHETER, URETERAL: HCPCS | Performed by: UROLOGY

## 2018-06-25 PROCEDURE — 25010000002 PROPOFOL 10 MG/ML EMULSION: Performed by: NURSE ANESTHETIST, CERTIFIED REGISTERED

## 2018-06-25 PROCEDURE — C2617 STENT, NON-COR, TEM W/O DEL: HCPCS | Performed by: UROLOGY

## 2018-06-25 PROCEDURE — 76000 FLUOROSCOPY <1 HR PHYS/QHP: CPT

## 2018-06-25 DEVICE — URETERAL STENT
Type: IMPLANTABLE DEVICE | Site: URETER | Status: FUNCTIONAL
Brand: CONTOUR™

## 2018-06-25 RX ORDER — ACETAMINOPHEN 650 MG/1
650 SUPPOSITORY RECTAL ONCE AS NEEDED
Status: DISCONTINUED | OUTPATIENT
Start: 2018-06-25 | End: 2018-06-26 | Stop reason: HOSPADM

## 2018-06-25 RX ORDER — LIDOCAINE HYDROCHLORIDE 20 MG/ML
INJECTION, SOLUTION INFILTRATION; PERINEURAL AS NEEDED
Status: DISCONTINUED | OUTPATIENT
Start: 2018-06-25 | End: 2018-06-25 | Stop reason: SURG

## 2018-06-25 RX ORDER — FAMOTIDINE 20 MG/1
20 TABLET, FILM COATED ORAL EVERY 12 HOURS SCHEDULED
Status: DISCONTINUED | OUTPATIENT
Start: 2018-06-25 | End: 2018-06-26 | Stop reason: HOSPADM

## 2018-06-25 RX ORDER — SODIUM CHLORIDE, SODIUM LACTATE, POTASSIUM CHLORIDE, CALCIUM CHLORIDE 600; 310; 30; 20 MG/100ML; MG/100ML; MG/100ML; MG/100ML
9 INJECTION, SOLUTION INTRAVENOUS CONTINUOUS
Status: DISCONTINUED | OUTPATIENT
Start: 2018-06-25 | End: 2018-06-26 | Stop reason: HOSPADM

## 2018-06-25 RX ORDER — LEVOFLOXACIN 5 MG/ML
500 INJECTION, SOLUTION INTRAVENOUS EVERY 24 HOURS
Status: COMPLETED | OUTPATIENT
Start: 2018-06-25 | End: 2018-06-25

## 2018-06-25 RX ORDER — SODIUM CHLORIDE 0.9 % (FLUSH) 0.9 %
1-10 SYRINGE (ML) INJECTION AS NEEDED
Status: DISCONTINUED | OUTPATIENT
Start: 2018-06-25 | End: 2018-06-26 | Stop reason: HOSPADM

## 2018-06-25 RX ORDER — PROPOFOL 10 MG/ML
VIAL (ML) INTRAVENOUS AS NEEDED
Status: DISCONTINUED | OUTPATIENT
Start: 2018-06-25 | End: 2018-06-25 | Stop reason: SURG

## 2018-06-25 RX ORDER — DEXAMETHASONE SODIUM PHOSPHATE 4 MG/ML
8 INJECTION, SOLUTION INTRA-ARTICULAR; INTRALESIONAL; INTRAMUSCULAR; INTRAVENOUS; SOFT TISSUE ONCE AS NEEDED
Status: COMPLETED | OUTPATIENT
Start: 2018-06-25 | End: 2018-06-25

## 2018-06-25 RX ORDER — MIDAZOLAM HYDROCHLORIDE 1 MG/ML
1 INJECTION INTRAMUSCULAR; INTRAVENOUS
Status: DISCONTINUED | OUTPATIENT
Start: 2018-06-25 | End: 2018-06-26 | Stop reason: HOSPADM

## 2018-06-25 RX ORDER — LIDOCAINE HYDROCHLORIDE 10 MG/ML
0.5 INJECTION, SOLUTION EPIDURAL; INFILTRATION; INTRACAUDAL; PERINEURAL ONCE AS NEEDED
Status: COMPLETED | OUTPATIENT
Start: 2018-06-25 | End: 2018-06-25

## 2018-06-25 RX ORDER — ONDANSETRON 2 MG/ML
4 INJECTION INTRAMUSCULAR; INTRAVENOUS ONCE AS NEEDED
Status: COMPLETED | OUTPATIENT
Start: 2018-06-25 | End: 2018-06-25

## 2018-06-25 RX ORDER — MAGNESIUM HYDROXIDE 1200 MG/15ML
LIQUID ORAL AS NEEDED
Status: DISCONTINUED | OUTPATIENT
Start: 2018-06-25 | End: 2018-06-25 | Stop reason: HOSPADM

## 2018-06-25 RX ORDER — DIPHENHYDRAMINE HYDROCHLORIDE 50 MG/ML
12.5 INJECTION INTRAMUSCULAR; INTRAVENOUS
Status: DISCONTINUED | OUTPATIENT
Start: 2018-06-25 | End: 2018-06-26 | Stop reason: HOSPADM

## 2018-06-25 RX ORDER — SODIUM CHLORIDE 9 MG/ML
40 INJECTION, SOLUTION INTRAVENOUS AS NEEDED
Status: DISCONTINUED | OUTPATIENT
Start: 2018-06-25 | End: 2018-06-26 | Stop reason: HOSPADM

## 2018-06-25 RX ORDER — ACETAMINOPHEN 325 MG/1
650 TABLET ORAL ONCE AS NEEDED
Status: DISCONTINUED | OUTPATIENT
Start: 2018-06-25 | End: 2018-06-26 | Stop reason: HOSPADM

## 2018-06-25 RX ORDER — OXYCODONE AND ACETAMINOPHEN 7.5; 325 MG/1; MG/1
1 TABLET ORAL ONCE AS NEEDED
Status: DISCONTINUED | OUTPATIENT
Start: 2018-06-25 | End: 2018-06-26 | Stop reason: HOSPADM

## 2018-06-25 RX ORDER — SCOLOPAMINE TRANSDERMAL SYSTEM 1 MG/1
1 PATCH, EXTENDED RELEASE TRANSDERMAL ONCE
Status: DISCONTINUED | OUTPATIENT
Start: 2018-06-25 | End: 2018-06-26 | Stop reason: HOSPADM

## 2018-06-25 RX ORDER — MIDAZOLAM HYDROCHLORIDE 1 MG/ML
2 INJECTION INTRAMUSCULAR; INTRAVENOUS
Status: DISCONTINUED | OUTPATIENT
Start: 2018-06-25 | End: 2018-06-26 | Stop reason: HOSPADM

## 2018-06-25 RX ORDER — MEPERIDINE HYDROCHLORIDE 25 MG/ML
12.5 INJECTION INTRAMUSCULAR; INTRAVENOUS; SUBCUTANEOUS
Status: DISCONTINUED | OUTPATIENT
Start: 2018-06-25 | End: 2018-06-26 | Stop reason: HOSPADM

## 2018-06-25 RX ORDER — ONDANSETRON 2 MG/ML
4 INJECTION INTRAMUSCULAR; INTRAVENOUS ONCE AS NEEDED
Status: DISCONTINUED | OUTPATIENT
Start: 2018-06-25 | End: 2018-06-26 | Stop reason: HOSPADM

## 2018-06-25 RX ORDER — PHENAZOPYRIDINE HYDROCHLORIDE 100 MG/1
200 TABLET, FILM COATED ORAL ONCE
Status: COMPLETED | OUTPATIENT
Start: 2018-06-25 | End: 2018-06-25

## 2018-06-25 RX ADMIN — FAMOTIDINE 20 MG: 10 INJECTION, SOLUTION INTRAVENOUS at 12:31

## 2018-06-25 RX ADMIN — PROPOFOL 150 MG: 10 INJECTION, EMULSION INTRAVENOUS at 13:01

## 2018-06-25 RX ADMIN — SODIUM CHLORIDE, POTASSIUM CHLORIDE, SODIUM LACTATE AND CALCIUM CHLORIDE 9 ML/HR: 600; 310; 30; 20 INJECTION, SOLUTION INTRAVENOUS at 12:05

## 2018-06-25 RX ADMIN — LIDOCAINE HYDROCHLORIDE 60 MG: 20 INJECTION, SOLUTION INFILTRATION; PERINEURAL at 13:00

## 2018-06-25 RX ADMIN — LEVOFLOXACIN 500 MG: 500 INJECTION, SOLUTION INTRAVENOUS at 13:00

## 2018-06-25 RX ADMIN — DEXAMETHASONE SODIUM PHOSPHATE 8 MG: 4 INJECTION, SOLUTION INTRAMUSCULAR; INTRAVENOUS at 12:33

## 2018-06-25 RX ADMIN — MIDAZOLAM HYDROCHLORIDE 1 MG: 1 INJECTION, SOLUTION INTRAMUSCULAR; INTRAVENOUS at 12:34

## 2018-06-25 RX ADMIN — ONDANSETRON 4 MG: 2 INJECTION, SOLUTION INTRAMUSCULAR; INTRAVENOUS at 12:31

## 2018-06-25 RX ADMIN — PHENAZOPYRIDINE HYDROCHLORIDE 200 MG: 100 TABLET ORAL at 13:57

## 2018-06-25 RX ADMIN — SCOPALAMINE 1 PATCH: 1 PATCH, EXTENDED RELEASE TRANSDERMAL at 12:33

## 2018-06-25 RX ADMIN — SODIUM CHLORIDE, POTASSIUM CHLORIDE, SODIUM LACTATE AND CALCIUM CHLORIDE: 600; 310; 30; 20 INJECTION, SOLUTION INTRAVENOUS at 12:58

## 2018-06-25 RX ADMIN — LIDOCAINE HYDROCHLORIDE 0.1 ML: 10 INJECTION, SOLUTION EPIDURAL; INFILTRATION; INTRACAUDAL; PERINEURAL at 12:05

## 2018-06-25 NOTE — ANESTHESIA PREPROCEDURE EVALUATION
Anesthesia Evaluation     Patient summary reviewed and Nursing notes reviewed   history of anesthetic complications: PONV    NPO Liquid Status: > 6 hours           Airway   Mallampati: II  TM distance: >3 FB  Neck ROM: full  no difficulty expected  Dental    (+) partials        Pulmonary - normal exam    breath sounds clear to auscultation  (+) sleep apnea ( nightly cpap) on CPAP,   Cardiovascular - normal exam  Exercise tolerance: good (4-7 METS)    Rhythm: regular  Rate: normal    (+) hypertension well controlled,       Neuro/Psych- negative ROS  GI/Hepatic/Renal/Endo    (+) obesity,  GERD well controlled,  renal disease stones, diabetes mellitus well controlled,     Musculoskeletal     (+) back pain,   Abdominal   (+) obese,    Substance History - negative use     OB/GYN negative ob/gyn ROS         Other        ROS/Med Hx Other: Coffee and cereal at 0700    Menopausal    nterpretation Summary     · Calculated EF = 54%  · Left ventricular systolic function is normal.  · Left ventricular diastolic dysfunction (grade I) consistent with impaired relaxation.  · Mild-to-moderate mitral valve regurgitation is present  · Trace to mild tricuspid valve regurgitation is present. Calculated right ventricular systolic pressure from tricuspid regurgitation is 16.7 mmHg.  · Saline test results are negative.      Narrative     RR Interval= 652 ms  IN Interval= 136 ms  QRSD Interval= 88 ms  QT Interval= 380 ms  QTc Interval= 471 ms  Heart Rate= 92 ms  P Axis= 36 deg  QRS Axis= 18 deg  T Wave Axis= 15 deg  I: 40 Axis= 2 deg  T: 40 Axis= 16 deg  ST Axis= 209 deg  SINUS RHYTHM  NO SIGNIFICANT CHANGE FROM PREVIOUS ECG  Electronically Signed by:  Jourdan Kingsley 13-Apr-2017 12:14:40  Date and Time of Study: 2017-04-12 09:34:17              Phys Exam Other: Partial up and down                Anesthesia Plan    ASA 2     general     intravenous induction   Anesthetic plan and risks discussed with patient.  Use of blood products  discussed with patient  Consented to blood products.

## 2018-06-25 NOTE — ANESTHESIA POSTPROCEDURE EVALUATION
Patient: Sánchez Carbajal    Procedure Summary     Date:  06/25/18 Room / Location:   [facility] /  [facility]    Anesthesia Start:  1259 Anesthesia Stop:  1331    Procedure:  CYSTOSCOPY, LEFT URETEROSCOPY, left  STENT PLACEMENT, right retrograde (Left Ureter) Diagnosis:      Surgeon:  Vimal Azul MD Provider:  Hari Dunaway CRNA    Anesthesia Type:  general ASA Status:  2          Anesthesia Type: general  Last vitals  BP   119/81 (06/25/18 1347)   Temp   97.3 °F (36.3 °C) (06/25/18 1328)   Pulse   83 (06/25/18 1347)   Resp   14 (06/25/18 1342)     SpO2   100 % (06/25/18 1347)     Post Anesthesia Care and Evaluation    Patient location during evaluation: PHASE II  Patient participation: complete - patient participated  Level of consciousness: awake and alert  Pain score: 0  Pain management: adequate  Airway patency: patent  Anesthetic complications: No anesthetic complications  PONV Status: none  Cardiovascular status: acceptable  Respiratory status: acceptable  Hydration status: acceptable

## 2018-06-25 NOTE — ANESTHESIA PROCEDURE NOTES
Airway  Urgency: elective    Airway not difficult    General Information and Staff    Patient location during procedure: OR  CRNA: YUMIKO ISABEL    Indications and Patient Condition  Indications for airway management: airway protection    Preoxygenated: yes  MILS maintained throughout  Mask difficulty assessment: 1 - vent by mask    Final Airway Details  Final airway type: supraglottic airway      Successful airway: classic  Size 3    Number of attempts at approach: 1    Additional Comments  To OR, monitors and O2 on. Smooth IV ind. - LMA inserted  x 1 attempt + BBS. Tape OU. Pressure points checked and padded

## 2018-07-02 ENCOUNTER — HOSPITAL ENCOUNTER (EMERGENCY)
Facility: HOSPITAL | Age: 53
Discharge: HOME | End: 2018-07-03
Attending: EMERGENCY MEDICINE | Admitting: EMERGENCY MEDICINE

## 2018-07-02 ENCOUNTER — APPOINTMENT (OUTPATIENT)
Dept: CT IMAGING | Facility: HOSPITAL | Age: 53
End: 2018-07-02

## 2018-07-02 DIAGNOSIS — N23 URETERAL COLIC: Primary | ICD-10-CM

## 2018-07-02 DIAGNOSIS — R10.9 ACUTE LEFT FLANK PAIN: ICD-10-CM

## 2018-07-02 DIAGNOSIS — R11.2 NON-INTRACTABLE VOMITING WITH NAUSEA, UNSPECIFIED VOMITING TYPE: ICD-10-CM

## 2018-07-02 DIAGNOSIS — N39.0 ACUTE UTI: ICD-10-CM

## 2018-07-02 LAB
ALBUMIN SERPL-MCNC: 4.2 G/DL (ref 3.5–5.2)
ALBUMIN/GLOB SERPL: 1.5 G/DL
ALP SERPL-CCNC: 82 U/L (ref 40–129)
ALT SERPL W P-5'-P-CCNC: 23 U/L (ref 5–33)
ANION GAP SERPL CALCULATED.3IONS-SCNC: 14.8 MMOL/L
AST SERPL-CCNC: 16 U/L (ref 5–32)
BACTERIA UR QL AUTO: ABNORMAL /HPF
BASOPHILS # BLD AUTO: 0.04 10*3/MM3 (ref 0–0.2)
BASOPHILS NFR BLD AUTO: 0.3 % (ref 0–2)
BILIRUB SERPL-MCNC: 0.4 MG/DL (ref 0.2–1.2)
BILIRUB UR QL STRIP: ABNORMAL
BUN BLD-MCNC: 25 MG/DL (ref 6–20)
BUN/CREAT SERPL: 41.7 (ref 7–25)
CALCIUM SPEC-SCNC: 9.4 MG/DL (ref 8.6–10.5)
CHLORIDE SERPL-SCNC: 101 MMOL/L (ref 98–107)
CLARITY UR: ABNORMAL
CO2 SERPL-SCNC: 22.2 MMOL/L (ref 22–29)
COLOR UR: ABNORMAL
CREAT BLD-MCNC: 0.6 MG/DL (ref 0.57–1)
DEPRECATED RDW RBC AUTO: 41.6 FL (ref 37–54)
EOSINOPHIL # BLD AUTO: 0.16 10*3/MM3 (ref 0.1–0.3)
EOSINOPHIL NFR BLD AUTO: 1.2 % (ref 0–4)
ERYTHROCYTE [DISTWIDTH] IN BLOOD BY AUTOMATED COUNT: 13 % (ref 11.5–14.5)
GFR SERPL CREATININE-BSD FRML MDRD: 105 ML/MIN/1.73
GLOBULIN UR ELPH-MCNC: 2.8 GM/DL
GLUCOSE BLD-MCNC: 194 MG/DL (ref 65–99)
GLUCOSE UR STRIP-MCNC: ABNORMAL MG/DL
HCT VFR BLD AUTO: 37.6 % (ref 37–47)
HGB BLD-MCNC: 12.7 G/DL (ref 12–16)
HGB UR QL STRIP.AUTO: ABNORMAL
HOLD SPECIMEN: NORMAL
HOLD SPECIMEN: NORMAL
HYALINE CASTS UR QL AUTO: ABNORMAL /LPF
IMM GRANULOCYTES # BLD: 0.06 10*3/MM3 (ref 0–0.03)
IMM GRANULOCYTES NFR BLD: 0.5 % (ref 0–0.5)
KETONES UR QL STRIP: ABNORMAL
LEUKOCYTE ESTERASE UR QL STRIP.AUTO: ABNORMAL
LIPASE SERPL-CCNC: 14 U/L (ref 13–60)
LYMPHOCYTES # BLD AUTO: 1.62 10*3/MM3 (ref 0.6–4.8)
LYMPHOCYTES NFR BLD AUTO: 12.4 % (ref 20–45)
MCH RBC QN AUTO: 29.7 PG (ref 27–31)
MCHC RBC AUTO-ENTMCNC: 33.8 G/DL (ref 31–37)
MCV RBC AUTO: 88.1 FL (ref 81–99)
MONOCYTES # BLD AUTO: 0.55 10*3/MM3 (ref 0–1)
MONOCYTES NFR BLD AUTO: 4.2 % (ref 3–8)
NEUTROPHILS # BLD AUTO: 10.63 10*3/MM3 (ref 1.5–8.3)
NEUTROPHILS NFR BLD AUTO: 81.4 % (ref 45–70)
NITRITE UR QL STRIP: POSITIVE
NRBC BLD MANUAL-RTO: 0 /100 WBC (ref 0–0)
PH UR STRIP.AUTO: <=5 [PH] (ref 4.5–8)
PLATELET # BLD AUTO: 297 10*3/MM3 (ref 140–500)
PMV BLD AUTO: 10.7 FL (ref 7.4–10.4)
POTASSIUM BLD-SCNC: 4.2 MMOL/L (ref 3.5–5.2)
PROT SERPL-MCNC: 7 G/DL (ref 6–8.5)
PROT UR QL STRIP: ABNORMAL
RBC # BLD AUTO: 4.27 10*6/MM3 (ref 4.2–5.4)
RBC # UR: ABNORMAL /HPF
REF LAB TEST METHOD: ABNORMAL
SODIUM BLD-SCNC: 138 MMOL/L (ref 136–145)
SP GR UR STRIP: 1.02 (ref 1–1.03)
SQUAMOUS #/AREA URNS HPF: ABNORMAL /HPF
UROBILINOGEN UR QL STRIP: ABNORMAL
WBC NRBC COR # BLD: 13.06 10*3/MM3 (ref 4.8–10.8)
WBC UR QL AUTO: ABNORMAL /HPF
WHOLE BLOOD HOLD SPECIMEN: NORMAL
WHOLE BLOOD HOLD SPECIMEN: NORMAL

## 2018-07-02 PROCEDURE — 25010000002 ONDANSETRON PER 1 MG: Performed by: EMERGENCY MEDICINE

## 2018-07-02 PROCEDURE — 87086 URINE CULTURE/COLONY COUNT: CPT | Performed by: EMERGENCY MEDICINE

## 2018-07-02 PROCEDURE — 99284 EMERGENCY DEPT VISIT MOD MDM: CPT | Performed by: EMERGENCY MEDICINE

## 2018-07-02 PROCEDURE — 81001 URINALYSIS AUTO W/SCOPE: CPT | Performed by: EMERGENCY MEDICINE

## 2018-07-02 PROCEDURE — 80053 COMPREHEN METABOLIC PANEL: CPT | Performed by: EMERGENCY MEDICINE

## 2018-07-02 PROCEDURE — 99284 EMERGENCY DEPT VISIT MOD MDM: CPT

## 2018-07-02 PROCEDURE — 83690 ASSAY OF LIPASE: CPT | Performed by: EMERGENCY MEDICINE

## 2018-07-02 PROCEDURE — 74176 CT ABD & PELVIS W/O CONTRAST: CPT

## 2018-07-02 PROCEDURE — 25010000002 HYDROMORPHONE PER 4 MG: Performed by: EMERGENCY MEDICINE

## 2018-07-02 PROCEDURE — 96374 THER/PROPH/DIAG INJ IV PUSH: CPT

## 2018-07-02 PROCEDURE — 85025 COMPLETE CBC W/AUTO DIFF WBC: CPT | Performed by: EMERGENCY MEDICINE

## 2018-07-02 PROCEDURE — 96375 TX/PRO/DX INJ NEW DRUG ADDON: CPT

## 2018-07-02 RX ORDER — NITROFURANTOIN MACROCRYSTALS 100 MG/1
100 CAPSULE ORAL DAILY
COMMUNITY
End: 2019-01-02

## 2018-07-02 RX ORDER — ONDANSETRON 2 MG/ML
4 INJECTION INTRAMUSCULAR; INTRAVENOUS ONCE
Status: COMPLETED | OUTPATIENT
Start: 2018-07-02 | End: 2018-07-02

## 2018-07-02 RX ORDER — PHENAZOPYRIDINE HYDROCHLORIDE 200 MG/1
200 TABLET, FILM COATED ORAL 3 TIMES DAILY PRN
COMMUNITY
End: 2019-01-02

## 2018-07-02 RX ORDER — SODIUM CHLORIDE 0.9 % (FLUSH) 0.9 %
10 SYRINGE (ML) INJECTION AS NEEDED
Status: DISCONTINUED | OUTPATIENT
Start: 2018-07-02 | End: 2018-07-03 | Stop reason: HOSPADM

## 2018-07-02 RX ORDER — SODIUM CHLORIDE 9 MG/ML
INJECTION, SOLUTION INTRAVENOUS
Status: DISCONTINUED
Start: 2018-07-02 | End: 2018-07-03 | Stop reason: HOSPADM

## 2018-07-02 RX ADMIN — ONDANSETRON 4 MG: 2 INJECTION, SOLUTION INTRAMUSCULAR; INTRAVENOUS at 23:40

## 2018-07-02 RX ADMIN — SODIUM CHLORIDE 500 ML: 9 INJECTION, SOLUTION INTRAVENOUS at 23:40

## 2018-07-02 RX ADMIN — HYDROMORPHONE HYDROCHLORIDE 1 MG: 1 INJECTION, SOLUTION INTRAMUSCULAR; INTRAVENOUS; SUBCUTANEOUS at 23:42

## 2018-07-03 VITALS
HEART RATE: 77 BPM | WEIGHT: 182.98 LBS | BODY MASS INDEX: 29.41 KG/M2 | HEIGHT: 66 IN | OXYGEN SATURATION: 91 % | DIASTOLIC BLOOD PRESSURE: 71 MMHG | SYSTOLIC BLOOD PRESSURE: 118 MMHG | RESPIRATION RATE: 20 BRPM | TEMPERATURE: 98.4 F

## 2018-07-03 PROCEDURE — 96375 TX/PRO/DX INJ NEW DRUG ADDON: CPT

## 2018-07-03 PROCEDURE — 25010000002 KETOROLAC TROMETHAMINE PER 15 MG: Performed by: EMERGENCY MEDICINE

## 2018-07-03 PROCEDURE — 25010000002 PROMETHAZINE PER 50 MG: Performed by: EMERGENCY MEDICINE

## 2018-07-03 RX ORDER — CEFUROXIME AXETIL 250 MG/1
250 TABLET ORAL 2 TIMES DAILY
Qty: 14 TABLET | Refills: 0 | Status: SHIPPED | OUTPATIENT
Start: 2018-07-03 | End: 2018-07-10

## 2018-07-03 RX ORDER — CEFUROXIME AXETIL 250 MG/1
250 TABLET ORAL ONCE
Status: COMPLETED | OUTPATIENT
Start: 2018-07-03 | End: 2018-07-03

## 2018-07-03 RX ORDER — KETOROLAC TROMETHAMINE 30 MG/ML
15 INJECTION, SOLUTION INTRAMUSCULAR; INTRAVENOUS ONCE
Status: COMPLETED | OUTPATIENT
Start: 2018-07-03 | End: 2018-07-03

## 2018-07-03 RX ORDER — PROMETHAZINE HYDROCHLORIDE 25 MG/1
TABLET ORAL
Qty: 30 TABLET | Refills: 0 | Status: SHIPPED | OUTPATIENT
Start: 2018-07-03 | End: 2019-01-02

## 2018-07-03 RX ORDER — PROMETHAZINE HYDROCHLORIDE 25 MG/ML
12.5 INJECTION, SOLUTION INTRAMUSCULAR; INTRAVENOUS ONCE
Status: COMPLETED | OUTPATIENT
Start: 2018-07-03 | End: 2018-07-03

## 2018-07-03 RX ADMIN — PROMETHAZINE HYDROCHLORIDE 12.5 MG: 25 INJECTION INTRAMUSCULAR; INTRAVENOUS at 00:16

## 2018-07-03 RX ADMIN — CEFUROXIME AXETIL 250 MG: 250 TABLET ORAL at 00:52

## 2018-07-03 RX ADMIN — KETOROLAC TROMETHAMINE 15 MG: 30 INJECTION, SOLUTION INTRAMUSCULAR at 00:52

## 2018-07-03 NOTE — ED PROVIDER NOTES
EMERGENCY DEPARTMENT ENCOUNTER      Room Number: D/D      HPI:    Chief complaint: Abdominal pain    Location: Left flank, radiating to left lower quadrant    Quality/Severity:  Moderately severe    Timing/Duration: One week but acutely worsened yesterday    Modifying Factors: None clearly identified    Associated Symptoms: Nausea and vomiting today.  No fever    Narrative: Pt is a 53 y.o. female who presents complaining of worsening left flank pain into the left abdomen.  Patient has a history of ureterolithiasis requiring a ureteral stent last Monday.  The stent remains in place.  She is a patient of Dr. Azul's.  Patient believes that she is on antibiotic and likely also Pyridium.  Attempting to get the name of her other medications for her son at this time.    PMD: Aayush Mckeon MD    REVIEW OF SYSTEMS  Review of Systems  All other systems reviewed and are otherwise negative as related chief complaint.  PAST MEDICAL HISTORY  Active Ambulatory Problems     Diagnosis Date Noted   • Left ureteral stone 04/06/2016   • Obstructive sleep apnea syndrome    • Seasonal allergic rhinitis 01/23/2018     Resolved Ambulatory Problems     Diagnosis Date Noted   • No Resolved Ambulatory Problems     Past Medical History:   Diagnosis Date   • Chronic back pain    • Diabetes mellitus (CMS/HCC)    • H/O gastroesophageal reflux (GERD)    • History of anemia    • Hypertension    • Kidney stone    • PONV (postoperative nausea and vomiting)    • Sleep apnea with use of continuous positive airway pressure (CPAP)        PAST SURGICAL HISTORY  Past Surgical History:   Procedure Laterality Date   • CHOLECYSTECTOMY     • COLONOSCOPY N/A 1/18/2017    Procedure: COLONOSCOPY;  Surgeon: Tian Henderson MD;  Location: [facility] OR;  Service:    • CYSTOSCOPY URETEROSCOPY LASER LITHOTRIPSY N/A 4/6/2016    Procedure: URETEROSCOPY  ;  Surgeon: Vimal Azul MD;  Location: [facility] OR;  Service:    • CYSTOSCOPY W/ LASER  LITHOTRIPSY Left 6/25/2018    Procedure: CYSTOSCOPY, LEFT URETEROSCOPY, left  STENT PLACEMENT, right retrograde;  Surgeon: Vimal Azul MD;  Location: [facility];  Service: Urology   • EXTRACORPOREAL SHOCK WAVE LITHOTRIPSY (ESWL)  2015   • KIDNEY STONE SURGERY         FAMILY HISTORY  History reviewed. No pertinent family history.    SOCIAL HISTORY  Social History     Social History   • Marital status:      Spouse name: N/A   • Number of children: N/A   • Years of education: N/A     Occupational History   • Not on file.     Social History Main Topics   • Smoking status: Never Smoker   • Smokeless tobacco: Never Used   • Alcohol use No   • Drug use: No   • Sexual activity: Defer     Other Topics Concern   • Not on file     Social History Narrative   • No narrative on file       ALLERGIES  Penicillins; Flagyl [metronidazole]; and Sulfa antibiotics    PHYSICAL EXAM  ED Triage Vitals [07/02/18 2032]   Temp Heart Rate Resp BP SpO2   98.4 °F (36.9 °C) 94 20 124/86 95 %      Temp src Heart Rate Source Patient Position BP Location FiO2 (%)   Oral Monitor Sitting Right arm --       Physical Exam   Constitutional: She is oriented to person, place, and time and well-developed, well-nourished, and in no distress. No distress.   Uncomfortable appearing though not overtly toxic   HENT:   Head: Normocephalic.   Mucous membranes moist   Eyes: No scleral icterus.   Neck:   Painless range of motion   Cardiovascular: Normal rate and regular rhythm.    Pulmonary/Chest: Effort normal and breath sounds normal. No respiratory distress.   Abdominal: Soft.   Mild left lower quadrant tenderness and mild left CVA tenderness.  No rebound, rigidity, or guarding    Musculoskeletal:   Moves all extremities equally   Neurological: She is alert and oriented to person, place, and time.   Skin: Skin is warm and dry.   Psychiatric: Mood and affect normal.   Nursing note and vitals reviewed.      LAB RESULTS  Results for orders placed or  performed during the hospital encounter of 07/02/18   Urinalysis With Microscopic If Indicated (No Culture) - Urine, Clean Catch   Result Value Ref Range    Color, UA Orange (A) Yellow, Straw    Appearance, UA Cloudy (A) Clear    pH, UA <=5.0 4.5 - 8.0    Specific Gravity, UA 1.020 1.003 - 1.030    Glucose,  mg/dL (Trace) (A) Negative    Ketones, UA 15 mg/dL (1+) (A) Negative, 80 mg/dL (3+), >=160 mg/dL (4+)    Bilirubin, UA Moderate (2+) (A) Negative    Blood, UA Large (3+) (A) Negative    Protein, UA >=300 mg/dL (3+) (A) Negative    Leuk Esterase, UA Large (3+) (A) Negative    Nitrite, UA Positive (A) Negative    Urobilinogen, UA 4.0 E.U./dL (A) 0.2 - 1.0 E.U./dL   Urinalysis, Microscopic Only - Urine, Clean Catch   Result Value Ref Range    RBC, UA Too Numerous to Count (A) None Seen /HPF    WBC, UA Unable to determine due to loaded field (A) None Seen /HPF    Bacteria, UA Trace (A) None Seen /HPF    Squamous Epithelial Cells, UA 3-6 (A) None Seen, 0-2 /HPF    Hyaline Casts, UA Unable to determine due to loaded field None Seen /LPF    Methodology Manual Light Microscopy    Comprehensive Metabolic Panel   Result Value Ref Range    Glucose 194 (H) 65 - 99 mg/dL    BUN 25 (H) 6 - 20 mg/dL    Creatinine 0.60 0.57 - 1.00 mg/dL    Sodium 138 136 - 145 mmol/L    Potassium 4.2 3.5 - 5.2 mmol/L    Chloride 101 98 - 107 mmol/L    CO2 22.2 22.0 - 29.0 mmol/L    Calcium 9.4 8.6 - 10.5 mg/dL    Total Protein 7.0 6.0 - 8.5 g/dL    Albumin 4.20 3.50 - 5.20 g/dL    ALT (SGPT) 23 5 - 33 U/L    AST (SGOT) 16 5 - 32 U/L    Alkaline Phosphatase 82 40 - 129 U/L    Total Bilirubin 0.4 0.2 - 1.2 mg/dL    eGFR Non African Amer 105 >60 mL/min/1.73    Globulin 2.8 gm/dL    A/G Ratio 1.5 g/dL    BUN/Creatinine Ratio 41.7 (H) 7.0 - 25.0    Anion Gap 14.8 mmol/L   Lipase   Result Value Ref Range    Lipase 14 13 - 60 U/L   CBC Auto Differential   Result Value Ref Range    WBC 13.06 (H) 4.80 - 10.80 10*3/mm3    RBC 4.27 4.20 - 5.40  10*6/mm3    Hemoglobin 12.7 12.0 - 16.0 g/dL    Hematocrit 37.6 37.0 - 47.0 %    MCV 88.1 81.0 - 99.0 fL    MCH 29.7 27.0 - 31.0 pg    MCHC 33.8 31.0 - 37.0 g/dL    RDW 13.0 11.5 - 14.5 %    RDW-SD 41.6 37.0 - 54.0 fl    MPV 10.7 (H) 7.4 - 10.4 fL    Platelets 297 140 - 500 10*3/mm3    Neutrophil % 81.4 (H) 45.0 - 70.0 %    Lymphocyte % 12.4 (L) 20.0 - 45.0 %    Monocyte % 4.2 3.0 - 8.0 %    Eosinophil % 1.2 0.0 - 4.0 %    Basophil % 0.3 0.0 - 2.0 %    Immature Grans % 0.5 0.0 - 0.5 %    Neutrophils, Absolute 10.63 (H) 1.50 - 8.30 10*3/mm3    Lymphocytes, Absolute 1.62 0.60 - 4.80 10*3/mm3    Monocytes, Absolute 0.55 0.00 - 1.00 10*3/mm3    Eosinophils, Absolute 0.16 0.10 - 0.30 10*3/mm3    Basophils, Absolute 0.04 0.00 - 0.20 10*3/mm3    Immature Grans, Absolute 0.06 (H) 0.00 - 0.03 10*3/mm3    nRBC 0.0 0.0 - 0.0 /100 WBC   Light Blue Top   Result Value Ref Range    Extra Tube hold for add-on    Green Top (Gel)   Result Value Ref Range    Extra Tube Hold for add-ons.    Lavender Top   Result Value Ref Range    Extra Tube hold for add-on    Gold Top - SST   Result Value Ref Range    Extra Tube Hold for add-ons.          I ordered the above labs and reviewed the results    RADIOLOGY  RADIOLOGY        Study: CT abdomen and pelvis stone protocol    Findings: Left ureteral stent in good position.  Slight dilatation of the left renal collecting system.  Bilateral nonobstructing renal calculi.  Diverticulosis and status post cholecystectomy    Interpreted contemporaneously with treatment by Dr. Peralta-radiologist  I ordered the above radiologic testing and reviewed the results    PROCEDURES  Procedures      PROGRESS AND CONSULTS  ED Course as of Jul 03 0049   Tue Jul 03, 2018   0038 Urinalysis significantly affected by the fact the patient is on Pyridium at home.  Patient is also being treated with nitrofurantoin prior to arrival.  [RS]   0038 My suspicion is that the patient is having ureteral stent pain and possibly  ureteral spasm.  Add Toradol and consider adding/changing antibiotic.  [RS]      ED Course User Index  [RS] Naeem Orr MD           MEDICAL DECISION MAKING  Results were reviewed/discussed with the patient and they were also made aware of online access. Pt also made aware that some labs, such as cultures, will not be resulted during ER visit and follow up with PMD is necessary.     MDM  Number of Diagnoses or Management Options     Amount and/or Complexity of Data Reviewed  Clinical lab tests: reviewed and ordered  Tests in the radiology section of CPT®: ordered and reviewed  Decide to obtain previous medical records or to obtain history from someone other than the patient: yes  Review and summarize past medical records: yes (Ct Abdomen Pelvis Without Contrast    Result Date: 6/25/2018  Narrative: CT ABDOMEN AND PELVIS, NONCONTRAST, 6/24/2018  HISTORY: 53-year-old female in the ED complaining of two day history of left flank pain. Microhematuria. History of kidney stones and prior lithotripsies.  TECHNIQUE: CT examination of the abdomen and pelvis without oral or IV contrast using kidney stone protocol. Radiation dose reduction techniques included automated exposure control or exposure modulation based on body size. Radiation audit for CT and nuclear cardiology exams in the last 12 months: 1.  COMPARISON: *  CT abdomen/pelvis, 8/26/2017.  ABDOMEN FINDINGS: There is a 5 mm obstructing calculus in the left distal ureter at the UVJ causing mild-to-moderate left hydronephrosis. Multiple small nonobstructing calculi are scattered throughout the medullary portion of the left kidney measuring 2 to 6 mm. In the right kidney, there is a 2 mm nonobstructing upper pole renal calculus.  Cholecystectomy. No bile duct dilatation. Mild diffuse hepatic steatosis. Liver, pancreas and spleen are otherwise unremarkable.  Severe sigmoid and descending colonic diverticulosis with a few scattered diverticula elsewhere within the  colon. No evidence of acute diverticulitis. Normal appendix.  PELVIS FINDINGS: Uterus, ovaries, urinary bladder and rectum are within normal limits. No inguinal hernia.  Limited lung base images show no active disease the lower chest.      Impression: 1. Obstructing 5 mm left distal ureter calculus at the UVJ causing mild-to-moderate left hydronephrosis. 2. Bilateral nonobstructing renal calculi, more numerous on the left. 3. Cholecystectomy. Diffuse hepatic steatosis. 4. Severe colonic diverticulosis. Normal appendix. 5. Initial stat report to the ED from Dr. Dot Brower at 2203 hours on 6/24/2018.  This report was finalized on 6/25/2018 6:47 AM by Dr. Christiano Peralta MD.      )           DIAGNOSIS  Final diagnoses:   Ureteral colic   Acute left flank pain   Acute UTI   Non-intractable vomiting with nausea, unspecified vomiting type       Latest Documented Vital Signs:  As of 12:49 AM  BP- 114/77 HR- 86 Temp- 98.4 °F (36.9 °C) (Oral) O2 sat- 94%    DISPOSITION  Discharged in stable condition       Medication List      New Prescriptions    cefuroxime 250 MG tablet  Commonly known as:  CEFTIN  Take 1 tablet by mouth 2 (Two) Times a Day for 7 days.     promethazine 25 MG tablet  Commonly known as:  PHENERGAN  Take one tablet by mouth every 6 hours as needed for nausea and vomiting          Follow-up Information     Schedule an appointment as soon as possible for a visit  with Aayush Mckeon MD.    Specialty:  Family Medicine  Why:  As needed  Contact information:  [address] [place] [address]  [phone]             Maxi Azul MD.    Specialty:  Urology  Why:  As needed  Contact information:  [address]  [place] [id number] [phone]                        Naeem Orr MD  07/03/18 0049

## 2018-07-03 NOTE — ED NOTES
Educated pt on medications, home care, follow-up care, and reasons to return to ER. Patient verbalized understanding. Patient ambulatory from ER to POV.     Dominga Vogt RN  07/03/18 0106

## 2018-07-04 LAB — BACTERIA SPEC AEROBE CULT: NO GROWTH

## 2018-10-01 ENCOUNTER — TRANSCRIBE ORDERS (OUTPATIENT)
Dept: ADMINISTRATIVE | Facility: HOSPITAL | Age: 53
End: 2018-10-01

## 2018-10-01 DIAGNOSIS — N20.0 CALCULUS OF KIDNEY: Primary | ICD-10-CM

## 2018-11-30 ENCOUNTER — HOSPITAL ENCOUNTER (OUTPATIENT)
Dept: GENERAL RADIOLOGY | Facility: HOSPITAL | Age: 53
Discharge: HOME | End: 2018-11-30

## 2018-11-30 ENCOUNTER — HOSPITAL ENCOUNTER (OUTPATIENT)
Dept: ULTRASOUND IMAGING | Facility: HOSPITAL | Age: 53
Discharge: HOME | End: 2018-11-30
Attending: UROLOGY | Admitting: UROLOGY

## 2018-11-30 DIAGNOSIS — N20.0 CALCULUS OF KIDNEY: ICD-10-CM

## 2018-11-30 PROCEDURE — 74018 RADEX ABDOMEN 1 VIEW: CPT

## 2018-11-30 PROCEDURE — 76775 US EXAM ABDO BACK WALL LIM: CPT

## 2019-01-02 ENCOUNTER — APPOINTMENT (OUTPATIENT)
Dept: PREADMISSION TESTING | Facility: HOSPITAL | Age: 54
End: 2019-01-02

## 2019-01-02 VITALS
BODY MASS INDEX: 30.73 KG/M2 | DIASTOLIC BLOOD PRESSURE: 84 MMHG | HEIGHT: 64 IN | TEMPERATURE: 98 F | RESPIRATION RATE: 16 BRPM | HEART RATE: 90 BPM | WEIGHT: 180 LBS | OXYGEN SATURATION: 98 % | SYSTOLIC BLOOD PRESSURE: 143 MMHG

## 2019-01-02 LAB
ABO GROUP BLD: NORMAL
ANION GAP SERPL CALCULATED.3IONS-SCNC: 13.7 MMOL/L
BACTERIA UR QL AUTO: ABNORMAL /HPF
BILIRUB UR QL STRIP: NEGATIVE
BLD GP AB SCN SERPL QL: NEGATIVE
BUN BLD-MCNC: 22 MG/DL (ref 6–20)
BUN/CREAT SERPL: 31.9 (ref 7–25)
CALCIUM SPEC-SCNC: 9.9 MG/DL (ref 8.6–10.5)
CHLORIDE SERPL-SCNC: 103 MMOL/L (ref 98–107)
CLARITY UR: CLEAR
CO2 SERPL-SCNC: 25.3 MMOL/L (ref 22–29)
COLOR UR: ABNORMAL
CREAT BLD-MCNC: 0.69 MG/DL (ref 0.57–1)
DEPRECATED RDW RBC AUTO: 42.5 FL (ref 37–54)
ERYTHROCYTE [DISTWIDTH] IN BLOOD BY AUTOMATED COUNT: 13.4 % (ref 11.7–13)
GFR SERPL CREATININE-BSD FRML MDRD: 89 ML/MIN/1.73
GLUCOSE BLD-MCNC: 145 MG/DL (ref 65–99)
GLUCOSE UR STRIP-MCNC: NEGATIVE MG/DL
HCT VFR BLD AUTO: 39.9 % (ref 35.6–45.5)
HGB BLD-MCNC: 13.4 G/DL (ref 11.9–15.5)
HGB UR QL STRIP.AUTO: NEGATIVE
HYALINE CASTS UR QL AUTO: ABNORMAL /LPF
KETONES UR QL STRIP: ABNORMAL
LEUKOCYTE ESTERASE UR QL STRIP.AUTO: ABNORMAL
MCH RBC QN AUTO: 29.1 PG (ref 26.9–32)
MCHC RBC AUTO-ENTMCNC: 33.6 G/DL (ref 32.4–36.3)
MCV RBC AUTO: 86.6 FL (ref 80.5–98.2)
NITRITE UR QL STRIP: NEGATIVE
PH UR STRIP.AUTO: 5.5 [PH] (ref 5–8)
PLATELET # BLD AUTO: 262 10*3/MM3 (ref 140–500)
PMV BLD AUTO: 10 FL (ref 6–12)
POTASSIUM BLD-SCNC: 4.2 MMOL/L (ref 3.5–5.2)
PROT UR QL STRIP: NEGATIVE
RBC # BLD AUTO: 4.61 10*6/MM3 (ref 3.9–5.2)
RBC # UR: ABNORMAL /HPF
REF LAB TEST METHOD: ABNORMAL
RH BLD: POSITIVE
SODIUM BLD-SCNC: 142 MMOL/L (ref 136–145)
SP GR UR STRIP: 1.02 (ref 1–1.03)
SQUAMOUS #/AREA URNS HPF: ABNORMAL /HPF
T&S EXPIRATION DATE: NORMAL
UROBILINOGEN UR QL STRIP: ABNORMAL
WBC NRBC COR # BLD: 5.61 10*3/MM3 (ref 4.5–10.7)
WBC UR QL AUTO: ABNORMAL /HPF

## 2019-01-02 PROCEDURE — 85027 COMPLETE CBC AUTOMATED: CPT | Performed by: OBSTETRICS & GYNECOLOGY

## 2019-01-02 PROCEDURE — 36415 COLL VENOUS BLD VENIPUNCTURE: CPT

## 2019-01-02 PROCEDURE — 86850 RBC ANTIBODY SCREEN: CPT | Performed by: OBSTETRICS & GYNECOLOGY

## 2019-01-02 PROCEDURE — 93005 ELECTROCARDIOGRAM TRACING: CPT

## 2019-01-02 PROCEDURE — 86901 BLOOD TYPING SEROLOGIC RH(D): CPT | Performed by: OBSTETRICS & GYNECOLOGY

## 2019-01-02 PROCEDURE — 80048 BASIC METABOLIC PNL TOTAL CA: CPT | Performed by: OBSTETRICS & GYNECOLOGY

## 2019-01-02 PROCEDURE — 86900 BLOOD TYPING SEROLOGIC ABO: CPT | Performed by: OBSTETRICS & GYNECOLOGY

## 2019-01-02 PROCEDURE — 93010 ELECTROCARDIOGRAM REPORT: CPT | Performed by: INTERNAL MEDICINE

## 2019-01-02 PROCEDURE — 81001 URINALYSIS AUTO W/SCOPE: CPT | Performed by: OBSTETRICS & GYNECOLOGY

## 2019-01-02 PROCEDURE — 87086 URINE CULTURE/COLONY COUNT: CPT | Performed by: OBSTETRICS & GYNECOLOGY

## 2019-01-02 RX ORDER — ASPIRIN 81 MG/1
81 TABLET ORAL DAILY
COMMUNITY

## 2019-01-02 RX ORDER — DULOXETIN HYDROCHLORIDE 60 MG/1
60 CAPSULE, DELAYED RELEASE ORAL EVERY EVENING
COMMUNITY

## 2019-01-02 RX ORDER — IBUPROFEN 800 MG/1
800 TABLET ORAL 2 TIMES DAILY
COMMUNITY
End: 2022-11-29

## 2019-01-02 RX ORDER — VITAMIN E 268 MG
400 CAPSULE ORAL DAILY
Status: ON HOLD | COMMUNITY
End: 2019-11-25

## 2019-01-02 RX ORDER — ZINC 25 MG
25 TABLET ORAL DAILY
Status: ON HOLD | COMMUNITY
End: 2019-11-25

## 2019-01-02 NOTE — DISCHARGE INSTRUCTIONS
Take the following medications the morning of surgery with a small sip of water:BRING LISINOPRIL AM OF SURGERY        General Instructions:  • Do not eat or drink anything after midnight the night before surgery.  • If applicable bring your C-PAP/ BI-PAP machine.  • Bring any papers given to you in the doctor’s office.  • Wear clean comfortable clothes and socks.  • Do not wear contact lenses or make-up.  Bring a case for your glasses.   • Remove all piercings.  Leave jewelry and any other valuables at home.  • The Pre-Admission Testing nurse will instruct you to bring medications if unable to obtain an accurate list in Pre-Admission Testing.        If you were given a blood bank ID arm band remember to bring it with you the day of surgery.    Preventing a Surgical Site Infection:  • For 2 to 3 days before surgery, avoid shaving with a razor because the razor can irritate skin and make it easier to develop an infection.    • Any areas of open skin can increase the risk of a post-operative wound infection by allowing bacteria to enter and travel throughout the body.  Notify your surgeon if you have any skin wounds / rashes even if it is not near the expected surgical site.  The area will need assessed to determine if surgery should be delayed until it is healed.  • The night prior to surgery sleep in a clean bed with clean clothing.  Do not allow pets to sleep with you.  • Shower on the morning of surgery using a fresh bar of anti-bacterial soap (such as Dial) and clean washcloth.  Dry with a clean towel and dress in clean clothing.  • Ask your surgeon if you will be receiving antibiotics prior to surgery.  • Make sure you, your family, and all healthcare providers clean their hands with soap and water or an alcohol based hand  before caring for you or your wound.    Day of surgery: 1/15/2019. MAIN OR. ARRIVAL TIME 9AM  Upon arrival, a Pre-op nurse and Anesthesiologist will review your health history, obtain  vital signs, and answer questions you may have.  The only belongings needed at this time will be your home medications and if applicable your C-PAP/BI-PAP machine.  If you are staying overnight your family can leave the rest of your belongings in the car and bring them to your room later.  A Pre-op nurse will start an IV and you may receive medication in preparation for surgery, including something to help you relax.  Your family will be able to see you in the Pre-op area.  While you are in surgery your family should notify the waiting room  if they leave the waiting room area and provide a contact phone number.    Please be aware that surgery does come with discomfort.  We want to make every effort to control your discomfort so please discuss any uncontrolled symptoms with your nurse.   Your doctor will most likely have prescribed pain medications.          If you are staying overnight following surgery, you will be transported to your hospital room following the recovery period.  [facility] has all private rooms.    You have received a list of surgical assistants for your reference.  If you have any questions please call Pre-Admission Testing at [phone].  Deductibles and co-payments are collected on the day of service. Please be prepared to pay the required co-pay, deductible or deposit on the day of service as defined by your plan.

## 2019-01-04 LAB — BACTERIA SPEC AEROBE CULT: NORMAL

## 2019-01-14 NOTE — H&P
I have reviewed the attached history and physical and there are no updates or changes to history and or physical.

## 2019-01-15 ENCOUNTER — ANESTHESIA (OUTPATIENT)
Dept: PERIOP | Facility: HOSPITAL | Age: 54
End: 2019-01-15

## 2019-01-15 ENCOUNTER — ANESTHESIA EVENT (OUTPATIENT)
Dept: PERIOP | Facility: HOSPITAL | Age: 54
End: 2019-01-15

## 2019-01-15 ENCOUNTER — HOSPITAL ENCOUNTER (OUTPATIENT)
Facility: HOSPITAL | Age: 54
Discharge: HOME | End: 2019-01-16
Attending: OBSTETRICS & GYNECOLOGY | Admitting: OBSTETRICS & GYNECOLOGY

## 2019-01-15 DIAGNOSIS — N81.4 UTEROVAGINAL PROLAPSE: ICD-10-CM

## 2019-01-15 LAB
GLUCOSE BLDC GLUCOMTR-MCNC: 146 MG/DL (ref 70–130)
GLUCOSE BLDC GLUCOMTR-MCNC: 222 MG/DL (ref 70–130)

## 2019-01-15 PROCEDURE — 25010000002 MIDAZOLAM PER 1 MG: Performed by: ANESTHESIOLOGY

## 2019-01-15 PROCEDURE — 25010000002 DEXAMETHASONE PER 1 MG: Performed by: NURSE ANESTHETIST, CERTIFIED REGISTERED

## 2019-01-15 PROCEDURE — 94799 UNLISTED PULMONARY SVC/PX: CPT

## 2019-01-15 PROCEDURE — 25010000002 ONDANSETRON PER 1 MG: Performed by: ANESTHESIOLOGY

## 2019-01-15 PROCEDURE — G0378 HOSPITAL OBSERVATION PER HR: HCPCS

## 2019-01-15 PROCEDURE — 25010000002 PROPOFOL 10 MG/ML EMULSION: Performed by: NURSE ANESTHETIST, CERTIFIED REGISTERED

## 2019-01-15 PROCEDURE — 25010000002 MORPHINE (PF) 10 MG/ML SOLUTION: Performed by: OBSTETRICS & GYNECOLOGY

## 2019-01-15 PROCEDURE — 25010000002 FENTANYL CITRATE (PF) 100 MCG/2ML SOLUTION: Performed by: NURSE ANESTHETIST, CERTIFIED REGISTERED

## 2019-01-15 PROCEDURE — 25010000002 KETOROLAC TROMETHAMINE PER 15 MG: Performed by: ANESTHESIOLOGY

## 2019-01-15 PROCEDURE — 25010000002 ONDANSETRON PER 1 MG: Performed by: OBSTETRICS & GYNECOLOGY

## 2019-01-15 PROCEDURE — 25010000002 HYDROMORPHONE PER 4 MG: Performed by: NURSE ANESTHETIST, CERTIFIED REGISTERED

## 2019-01-15 PROCEDURE — 82962 GLUCOSE BLOOD TEST: CPT

## 2019-01-15 PROCEDURE — 88305 TISSUE EXAM BY PATHOLOGIST: CPT | Performed by: OBSTETRICS & GYNECOLOGY

## 2019-01-15 PROCEDURE — C1763 CONN TISS, NON-HUMAN: HCPCS | Performed by: OBSTETRICS & GYNECOLOGY

## 2019-01-15 PROCEDURE — 25010000003 CEFAZOLIN IN DEXTROSE 2-4 GM/100ML-% SOLUTION: Performed by: OBSTETRICS & GYNECOLOGY

## 2019-01-15 DEVICE — POLYPROPYLENE MESH FOR SACROCOLPOSUSPENSION/SACROCOLPOPEXY - Y
Type: IMPLANTABLE DEVICE | Site: PELVIS | Status: FUNCTIONAL
Brand: RESTORELLE

## 2019-01-15 RX ORDER — ESMOLOL HYDROCHLORIDE 10 MG/ML
INJECTION, SOLUTION INTRAVENOUS CONTINUOUS PRN
Status: DISCONTINUED | OUTPATIENT
Start: 2019-01-15 | End: 2019-01-15

## 2019-01-15 RX ORDER — EPHEDRINE SULFATE 50 MG/ML
INJECTION, SOLUTION INTRAVENOUS AS NEEDED
Status: DISCONTINUED | OUTPATIENT
Start: 2019-01-15 | End: 2019-01-15 | Stop reason: SURG

## 2019-01-15 RX ORDER — MEPERIDINE HYDROCHLORIDE 25 MG/ML
12.5 INJECTION INTRAMUSCULAR; INTRAVENOUS; SUBCUTANEOUS
Status: DISCONTINUED | OUTPATIENT
Start: 2019-01-15 | End: 2019-01-15 | Stop reason: HOSPADM

## 2019-01-15 RX ORDER — ONDANSETRON 4 MG/1
4 TABLET, FILM COATED ORAL EVERY 6 HOURS PRN
Status: DISCONTINUED | OUTPATIENT
Start: 2019-01-15 | End: 2019-01-16 | Stop reason: HOSPADM

## 2019-01-15 RX ORDER — FENTANYL CITRATE 50 UG/ML
INJECTION, SOLUTION INTRAMUSCULAR; INTRAVENOUS AS NEEDED
Status: DISCONTINUED | OUTPATIENT
Start: 2019-01-15 | End: 2019-01-15 | Stop reason: SURG

## 2019-01-15 RX ORDER — CEFAZOLIN SODIUM 2 G/100ML
2 INJECTION, SOLUTION INTRAVENOUS ONCE
Status: COMPLETED | OUTPATIENT
Start: 2019-01-15 | End: 2019-01-15

## 2019-01-15 RX ORDER — ACETAMINOPHEN 325 MG/1
650 TABLET ORAL ONCE AS NEEDED
Status: DISCONTINUED | OUTPATIENT
Start: 2019-01-15 | End: 2019-01-15 | Stop reason: HOSPADM

## 2019-01-15 RX ORDER — HYDROMORPHONE HYDROCHLORIDE 1 MG/ML
0.5 INJECTION, SOLUTION INTRAMUSCULAR; INTRAVENOUS; SUBCUTANEOUS
Status: DISCONTINUED | OUTPATIENT
Start: 2019-01-15 | End: 2019-01-15 | Stop reason: HOSPADM

## 2019-01-15 RX ORDER — ONDANSETRON 4 MG/1
4 TABLET, ORALLY DISINTEGRATING ORAL EVERY 6 HOURS PRN
Status: DISCONTINUED | OUTPATIENT
Start: 2019-01-15 | End: 2019-01-16 | Stop reason: HOSPADM

## 2019-01-15 RX ORDER — DOCUSATE SODIUM 100 MG/1
100 CAPSULE, LIQUID FILLED ORAL 2 TIMES DAILY PRN
Status: DISCONTINUED | OUTPATIENT
Start: 2019-01-15 | End: 2019-01-16 | Stop reason: HOSPADM

## 2019-01-15 RX ORDER — PROMETHAZINE HYDROCHLORIDE 25 MG/ML
12.5 INJECTION, SOLUTION INTRAMUSCULAR; INTRAVENOUS ONCE AS NEEDED
Status: DISCONTINUED | OUTPATIENT
Start: 2019-01-15 | End: 2019-01-15 | Stop reason: HOSPADM

## 2019-01-15 RX ORDER — DIPHENHYDRAMINE HCL 25 MG
25 CAPSULE ORAL
Status: DISCONTINUED | OUTPATIENT
Start: 2019-01-15 | End: 2019-01-15 | Stop reason: HOSPADM

## 2019-01-15 RX ORDER — ZOLPIDEM TARTRATE 5 MG/1
5 TABLET ORAL NIGHTLY PRN
Status: DISCONTINUED | OUTPATIENT
Start: 2019-01-15 | End: 2019-01-16 | Stop reason: HOSPADM

## 2019-01-15 RX ORDER — ESMOLOL HYDROCHLORIDE 10 MG/ML
INJECTION INTRAVENOUS AS NEEDED
Status: DISCONTINUED | OUTPATIENT
Start: 2019-01-15 | End: 2019-01-15 | Stop reason: SURG

## 2019-01-15 RX ORDER — SODIUM CHLORIDE, SODIUM LACTATE, POTASSIUM CHLORIDE, CALCIUM CHLORIDE 600; 310; 30; 20 MG/100ML; MG/100ML; MG/100ML; MG/100ML
INJECTION, SOLUTION INTRAVENOUS CONTINUOUS PRN
Status: DISCONTINUED | OUTPATIENT
Start: 2019-01-15 | End: 2019-01-15 | Stop reason: SURG

## 2019-01-15 RX ORDER — MIDAZOLAM HYDROCHLORIDE 1 MG/ML
1 INJECTION INTRAMUSCULAR; INTRAVENOUS
Status: DISCONTINUED | OUTPATIENT
Start: 2019-01-15 | End: 2019-01-15 | Stop reason: HOSPADM

## 2019-01-15 RX ORDER — PROMETHAZINE HYDROCHLORIDE 25 MG/1
25 SUPPOSITORY RECTAL ONCE AS NEEDED
Status: DISCONTINUED | OUTPATIENT
Start: 2019-01-15 | End: 2019-01-15 | Stop reason: HOSPADM

## 2019-01-15 RX ORDER — LIDOCAINE HYDROCHLORIDE 10 MG/ML
0.5 INJECTION, SOLUTION EPIDURAL; INFILTRATION; INTRACAUDAL; PERINEURAL ONCE AS NEEDED
Status: DISCONTINUED | OUTPATIENT
Start: 2019-01-15 | End: 2019-01-15 | Stop reason: HOSPADM

## 2019-01-15 RX ORDER — HYDROMORPHONE HCL 110MG/55ML
PATIENT CONTROLLED ANALGESIA SYRINGE INTRAVENOUS AS NEEDED
Status: DISCONTINUED | OUTPATIENT
Start: 2019-01-15 | End: 2019-01-15 | Stop reason: SURG

## 2019-01-15 RX ORDER — LIDOCAINE HYDROCHLORIDE 40 MG/ML
SOLUTION TOPICAL AS NEEDED
Status: DISCONTINUED | OUTPATIENT
Start: 2019-01-15 | End: 2019-01-15 | Stop reason: SURG

## 2019-01-15 RX ORDER — SODIUM CHLORIDE 0.9 % (FLUSH) 0.9 %
1-10 SYRINGE (ML) INJECTION AS NEEDED
Status: DISCONTINUED | OUTPATIENT
Start: 2019-01-15 | End: 2019-01-15 | Stop reason: HOSPADM

## 2019-01-15 RX ORDER — NALOXONE HCL 0.4 MG/ML
0.2 VIAL (ML) INJECTION AS NEEDED
Status: DISCONTINUED | OUTPATIENT
Start: 2019-01-15 | End: 2019-01-15 | Stop reason: HOSPADM

## 2019-01-15 RX ORDER — OXYCODONE AND ACETAMINOPHEN 7.5; 325 MG/1; MG/1
1 TABLET ORAL ONCE AS NEEDED
Status: DISCONTINUED | OUTPATIENT
Start: 2019-01-15 | End: 2019-01-15 | Stop reason: HOSPADM

## 2019-01-15 RX ORDER — PROPOFOL 10 MG/ML
VIAL (ML) INTRAVENOUS AS NEEDED
Status: DISCONTINUED | OUTPATIENT
Start: 2019-01-15 | End: 2019-01-15 | Stop reason: SURG

## 2019-01-15 RX ORDER — NALOXONE HCL 0.4 MG/ML
0.4 VIAL (ML) INJECTION
Status: DISCONTINUED | OUTPATIENT
Start: 2019-01-15 | End: 2019-01-16 | Stop reason: HOSPADM

## 2019-01-15 RX ORDER — OXYCODONE AND ACETAMINOPHEN 7.5; 325 MG/1; MG/1
2 TABLET ORAL EVERY 4 HOURS PRN
Status: DISCONTINUED | OUTPATIENT
Start: 2019-01-15 | End: 2019-01-16 | Stop reason: HOSPADM

## 2019-01-15 RX ORDER — EPHEDRINE SULFATE 50 MG/ML
5 INJECTION, SOLUTION INTRAVENOUS ONCE AS NEEDED
Status: DISCONTINUED | OUTPATIENT
Start: 2019-01-15 | End: 2019-01-15 | Stop reason: HOSPADM

## 2019-01-15 RX ORDER — SODIUM CHLORIDE 9 MG/ML
125 INJECTION, SOLUTION INTRAVENOUS CONTINUOUS
Status: DISCONTINUED | OUTPATIENT
Start: 2019-01-15 | End: 2019-01-16 | Stop reason: HOSPADM

## 2019-01-15 RX ORDER — LIDOCAINE HYDROCHLORIDE 20 MG/ML
INJECTION, SOLUTION INFILTRATION; PERINEURAL AS NEEDED
Status: DISCONTINUED | OUTPATIENT
Start: 2019-01-15 | End: 2019-01-15 | Stop reason: SURG

## 2019-01-15 RX ORDER — SODIUM CHLORIDE, SODIUM LACTATE, POTASSIUM CHLORIDE, CALCIUM CHLORIDE 600; 310; 30; 20 MG/100ML; MG/100ML; MG/100ML; MG/100ML
9 INJECTION, SOLUTION INTRAVENOUS CONTINUOUS
Status: DISCONTINUED | OUTPATIENT
Start: 2019-01-15 | End: 2019-01-15 | Stop reason: HOSPADM

## 2019-01-15 RX ORDER — PROMETHAZINE HYDROCHLORIDE 25 MG/1
25 TABLET ORAL ONCE AS NEEDED
Status: DISCONTINUED | OUTPATIENT
Start: 2019-01-15 | End: 2019-01-15 | Stop reason: HOSPADM

## 2019-01-15 RX ORDER — KETOROLAC TROMETHAMINE 30 MG/ML
INJECTION, SOLUTION INTRAMUSCULAR; INTRAVENOUS AS NEEDED
Status: DISCONTINUED | OUTPATIENT
Start: 2019-01-15 | End: 2019-01-15 | Stop reason: SURG

## 2019-01-15 RX ORDER — SCOLOPAMINE TRANSDERMAL SYSTEM 1 MG/1
1 PATCH, EXTENDED RELEASE TRANSDERMAL ONCE
Status: DISCONTINUED | OUTPATIENT
Start: 2019-01-15 | End: 2019-01-16

## 2019-01-15 RX ORDER — ROCURONIUM BROMIDE 10 MG/ML
INJECTION, SOLUTION INTRAVENOUS AS NEEDED
Status: DISCONTINUED | OUTPATIENT
Start: 2019-01-15 | End: 2019-01-15 | Stop reason: SURG

## 2019-01-15 RX ORDER — FAMOTIDINE 10 MG/ML
20 INJECTION, SOLUTION INTRAVENOUS ONCE
Status: COMPLETED | OUTPATIENT
Start: 2019-01-15 | End: 2019-01-15

## 2019-01-15 RX ORDER — MIDAZOLAM HYDROCHLORIDE 1 MG/ML
2 INJECTION INTRAMUSCULAR; INTRAVENOUS
Status: DISCONTINUED | OUTPATIENT
Start: 2019-01-15 | End: 2019-01-15 | Stop reason: HOSPADM

## 2019-01-15 RX ORDER — FLUMAZENIL 0.1 MG/ML
0.2 INJECTION INTRAVENOUS AS NEEDED
Status: DISCONTINUED | OUTPATIENT
Start: 2019-01-15 | End: 2019-01-15 | Stop reason: HOSPADM

## 2019-01-15 RX ORDER — DEXAMETHASONE SODIUM PHOSPHATE 10 MG/ML
INJECTION INTRAMUSCULAR; INTRAVENOUS AS NEEDED
Status: DISCONTINUED | OUTPATIENT
Start: 2019-01-15 | End: 2019-01-15 | Stop reason: SURG

## 2019-01-15 RX ORDER — HYDROCODONE BITARTRATE AND ACETAMINOPHEN 7.5; 325 MG/1; MG/1
1 TABLET ORAL ONCE AS NEEDED
Status: DISCONTINUED | OUTPATIENT
Start: 2019-01-15 | End: 2019-01-15 | Stop reason: HOSPADM

## 2019-01-15 RX ORDER — SODIUM CHLORIDE 9 MG/ML
INJECTION, SOLUTION INTRAVENOUS AS NEEDED
Status: DISCONTINUED | OUTPATIENT
Start: 2019-01-15 | End: 2019-01-15 | Stop reason: HOSPADM

## 2019-01-15 RX ORDER — DEXTROSE MONOHYDRATE 25 G/50ML
INJECTION, SOLUTION INTRAVENOUS AS NEEDED
Status: DISCONTINUED | OUTPATIENT
Start: 2019-01-15 | End: 2019-01-15 | Stop reason: HOSPADM

## 2019-01-15 RX ORDER — MORPHINE SULFATE 2 MG/ML
2 INJECTION, SOLUTION INTRAMUSCULAR; INTRAVENOUS EVERY 4 HOURS PRN
Status: DISCONTINUED | OUTPATIENT
Start: 2019-01-15 | End: 2019-01-16 | Stop reason: HOSPADM

## 2019-01-15 RX ORDER — ONDANSETRON 2 MG/ML
4 INJECTION INTRAMUSCULAR; INTRAVENOUS EVERY 6 HOURS PRN
Status: DISCONTINUED | OUTPATIENT
Start: 2019-01-15 | End: 2019-01-16 | Stop reason: HOSPADM

## 2019-01-15 RX ORDER — MAGNESIUM HYDROXIDE 1200 MG/15ML
LIQUID ORAL AS NEEDED
Status: DISCONTINUED | OUTPATIENT
Start: 2019-01-15 | End: 2019-01-15 | Stop reason: HOSPADM

## 2019-01-15 RX ORDER — ONDANSETRON 2 MG/ML
4 INJECTION INTRAMUSCULAR; INTRAVENOUS ONCE AS NEEDED
Status: DISCONTINUED | OUTPATIENT
Start: 2019-01-15 | End: 2019-01-15 | Stop reason: HOSPADM

## 2019-01-15 RX ORDER — ONDANSETRON 2 MG/ML
INJECTION INTRAMUSCULAR; INTRAVENOUS AS NEEDED
Status: DISCONTINUED | OUTPATIENT
Start: 2019-01-15 | End: 2019-01-15 | Stop reason: SURG

## 2019-01-15 RX ORDER — FENTANYL CITRATE 50 UG/ML
50 INJECTION, SOLUTION INTRAMUSCULAR; INTRAVENOUS
Status: DISCONTINUED | OUTPATIENT
Start: 2019-01-15 | End: 2019-01-15 | Stop reason: HOSPADM

## 2019-01-15 RX ADMIN — SCOPALAMINE 1 PATCH: 1 PATCH, EXTENDED RELEASE TRANSDERMAL at 09:26

## 2019-01-15 RX ADMIN — ONDANSETRON 4 MG: 2 INJECTION INTRAMUSCULAR; INTRAVENOUS at 15:11

## 2019-01-15 RX ADMIN — LIDOCAINE HYDROCHLORIDE 1 EACH: 40 SOLUTION TOPICAL at 12:01

## 2019-01-15 RX ADMIN — PROPOFOL 150 MG: 10 INJECTION, EMULSION INTRAVENOUS at 11:59

## 2019-01-15 RX ADMIN — PROPOFOL 50 MG: 10 INJECTION, EMULSION INTRAVENOUS at 12:02

## 2019-01-15 RX ADMIN — KETOROLAC TROMETHAMINE 30 MG: 30 INJECTION, SOLUTION INTRAMUSCULAR; INTRAVENOUS at 15:32

## 2019-01-15 RX ADMIN — ESMOLOL HYDROCHLORIDE 20 MG: 10 INJECTION, SOLUTION INTRAVENOUS at 12:10

## 2019-01-15 RX ADMIN — ONDANSETRON 4 MG: 2 INJECTION INTRAMUSCULAR; INTRAVENOUS at 21:02

## 2019-01-15 RX ADMIN — FENTANYL CITRATE 50 MCG: 50 INJECTION INTRAMUSCULAR; INTRAVENOUS at 11:59

## 2019-01-15 RX ADMIN — SUGAMMADEX 165 MG: 100 INJECTION, SOLUTION INTRAVENOUS at 15:22

## 2019-01-15 RX ADMIN — ROCURONIUM BROMIDE 30 MG: 10 INJECTION INTRAVENOUS at 11:59

## 2019-01-15 RX ADMIN — FENTANYL CITRATE 50 MCG: 50 INJECTION INTRAMUSCULAR; INTRAVENOUS at 15:02

## 2019-01-15 RX ADMIN — FENTANYL CITRATE 50 MCG: 50 INJECTION INTRAMUSCULAR; INTRAVENOUS at 12:30

## 2019-01-15 RX ADMIN — FENTANYL CITRATE 50 MCG: 50 INJECTION INTRAMUSCULAR; INTRAVENOUS at 14:23

## 2019-01-15 RX ADMIN — FENTANYL CITRATE 50 MCG: 50 INJECTION INTRAMUSCULAR; INTRAVENOUS at 12:02

## 2019-01-15 RX ADMIN — SODIUM CHLORIDE, POTASSIUM CHLORIDE, SODIUM LACTATE AND CALCIUM CHLORIDE: 600; 310; 30; 20 INJECTION, SOLUTION INTRAVENOUS at 10:28

## 2019-01-15 RX ADMIN — CEFAZOLIN SODIUM 2 G: 2 INJECTION, SOLUTION INTRAVENOUS at 11:55

## 2019-01-15 RX ADMIN — FAMOTIDINE 20 MG: 10 INJECTION, SOLUTION INTRAVENOUS at 09:26

## 2019-01-15 RX ADMIN — MORPHINE SULFATE 2 MG: 10 INJECTION INTRAVENOUS at 21:19

## 2019-01-15 RX ADMIN — SODIUM CHLORIDE, POTASSIUM CHLORIDE, SODIUM LACTATE AND CALCIUM CHLORIDE 9 ML/HR: 600; 310; 30; 20 INJECTION, SOLUTION INTRAVENOUS at 09:26

## 2019-01-15 RX ADMIN — DEXAMETHASONE SODIUM PHOSPHATE 6 MG: 10 INJECTION INTRAMUSCULAR; INTRAVENOUS at 11:59

## 2019-01-15 RX ADMIN — ROCURONIUM BROMIDE 20 MG: 10 INJECTION INTRAVENOUS at 12:48

## 2019-01-15 RX ADMIN — FENTANYL CITRATE 50 MCG: 50 INJECTION INTRAMUSCULAR; INTRAVENOUS at 13:20

## 2019-01-15 RX ADMIN — HYDROMORPHONE HYDROCHLORIDE 0.5 MG: 2 INJECTION INTRAMUSCULAR; INTRAVENOUS; SUBCUTANEOUS at 13:09

## 2019-01-15 RX ADMIN — ROCURONIUM BROMIDE 10 MG: 10 INJECTION INTRAVENOUS at 15:05

## 2019-01-15 RX ADMIN — EPHEDRINE SULFATE 10 MG: 50 INJECTION INTRAMUSCULAR; INTRAVENOUS; SUBCUTANEOUS at 12:47

## 2019-01-15 RX ADMIN — ROCURONIUM BROMIDE 20 MG: 10 INJECTION INTRAVENOUS at 13:37

## 2019-01-15 RX ADMIN — Medication 2 MG: at 09:26

## 2019-01-15 RX ADMIN — ROCURONIUM BROMIDE 15 MG: 10 INJECTION INTRAVENOUS at 14:32

## 2019-01-15 RX ADMIN — FENTANYL CITRATE 50 MCG: 50 INJECTION, SOLUTION INTRAMUSCULAR; INTRAVENOUS at 17:18

## 2019-01-15 RX ADMIN — HYDROMORPHONE HYDROCHLORIDE 0.5 MG: 2 INJECTION INTRAMUSCULAR; INTRAVENOUS; SUBCUTANEOUS at 12:59

## 2019-01-15 RX ADMIN — LIDOCAINE HYDROCHLORIDE 80 MG: 20 INJECTION, SOLUTION INFILTRATION; PERINEURAL at 11:59

## 2019-01-15 RX ADMIN — Medication 2 MG: at 11:55

## 2019-01-15 RX ADMIN — SODIUM CHLORIDE 125 ML/HR: 9 INJECTION, SOLUTION INTRAVENOUS at 18:44

## 2019-01-15 NOTE — ANESTHESIA PROCEDURE NOTES
ANESTHESIA INTUBATION  Urgency: elective    Date/Time: 1/15/2019 12:01 PM  Airway not difficult    General Information and Staff    Patient location during procedure: OR  Anesthesiologist: Chicho Ballard MD  CRNA: Edouard Hale CRNA    Indications and Patient Condition  Indications for airway management: airway protection    Preoxygenated: yes  MILS not maintained throughout  Mask difficulty assessment: 1 - vent by mask    Final Airway Details  Final airway type: endotracheal airway      Successful airway: ETT  Cuffed: yes   Successful intubation technique: direct laryngoscopy  Endotracheal tube insertion site: oral  Blade: Jaquan  Blade size: 3  ETT size (mm): 7.0  Cormack-Lehane Classification: grade I - full view of glottis  Placement verified by: chest auscultation   Cuff volume (mL): 6  Measured from: lips  ETT to lips (cm): 19  Number of attempts at approach: 1    Additional Comments  Pre O2, SIAI

## 2019-01-15 NOTE — BRIEF OP NOTE
SACROCOLPOPEXY LAPAROSCOPIC WITH DAVINCI ROBOT  Progress Note    Sánchez JULIAN Carbajal  1/15/2019    Pre-op Diagnosis:   Uterovaginal Prolpase       Post-Op Diagnosis Codes:   Uterovsginal  Prolapse    Procedure/CPT® Codes:      Procedure(s):  ROBOTIC SACROCOLPOPEXY WITH MESH, TOTAL LAPAROSCOPICE HYSTERECTOMY BILATERAL SALPINGECTOMY WITH DAVINCI   /POSTERIOR REPAIR/ Cystosopy     Surgeon(s):  James Holloway MD Francis, Sean L., MD    Anesthesia: General    Staff:   Circulator: Lima Cruz RN; Chari Brink RN; Tian Pride RN  Scrub Person: Loly Toscano    Estimated Blood Loss: 25 mL    Urine Voided: 200 mL    Specimens:                ID Type Source Tests Collected by Time   A : UTERUS, CERVIX, BILATERAL FALLOPIAN TUBE Tissue Uterus, Cervix, Bilateral Fallopian Tubes  TISSUE PATHOLOGY EXAM James Holloway MD 1/15/2019 1329         Drains:   Urethral Catheter Non-latex 16 Fr. (Active)       Findings: Significant posterior wall elongation and prolpase    Complications: none      James Holloway MD     Date: 1/15/2019  Time: 3:56 PM

## 2019-01-15 NOTE — OP NOTE
PREOPERATIVE DIAGNOSIS: Vaginal prolapse.     POSTOPERATIVE DIAGNOSIS: Vaginal prolapse.     PROCEDURES: Robotic hysterectomy with salpingectomy bilaterally, sacrocolpopexy posterior repair post cystoscopy.     SURGEON: James Holloway MD    ASSISTANT: FILI Patel    ANESTHESIA: General with endotracheal intubation.     ESTIMATED BLOOD LOSS: Less than 100 mL.    IV FLUIDS: Per anesthesia.     DRAINS: Whitfield catheter.    POSITION: Lithotomy.     DESCRIPTION OF PROCEDURE: Once the risks, benefits, and alternatives were discussed with the patient written consent was obtained. The patient was taken to the operating room with IV fluids running, she was placed on the operating room table in the supine position. Anesthesia was obtained. Once anesthesia was achieved she was placed in the lithotomy position using Jason stirrups. She was prepped and draped in usual sterile fashion for vaginal/abdominal surgery. Attention was turned to the cervix where a WINSOME II uterine manipulator was inserted without difficulty. We then moved to the umbilicus where a midline vertical incision was made through the umbilicus and peritoneal cavity entered under direct visualization. Two 8 mm robotic trocars were inserted to the right of the midline under direct visualization, 1 to the left. An additional 12 mm trocar was inserted under direct visualization. At this point the robot was docked and the instruments inserted. Attention was first turned to the right round ligament which was transected. The lateral leaf of the broad ligament was incised, isolating the infundibulopelvic and uterine ovarian ligament. After identifying the ureter, the medial leaf of the broad ligament was incised further isolating the ligaments. This incision was carried down to the peritoneum over the bladder and the bladder flap was created. The same was performed on the left side. This was done bilaterally after excising using monopolar fallopian tubes bilaterally,  leaving them connected to the uterus. Next, the LigaSure device, the vessel sealing device for the robot, was used to fulgurate and cut the uterine ovarian ligaments followed by the uterine ligaments, cardinal ligaments and uterosacral ligaments. This was performed on the left side as well, after which the cervix was excised and uterus excised with monopolar. The uterus was delivered through the vagina without difficulty. Next, the vagina was closed with 2 separate 0 Vicryl sutures, first running to close the cut edges followed by a running imbricating 0 Vicryl suture covering the entire original incision line from the peritoneal cavity. Once this was completed a sacral promontory was reached. The dissection using sharp dissection was carried down to the underling sacral promontory so it could be visualized so it could be visualized clearly. The peritoneum was opened, extending the incision all the way to the vagina. Next, the peritoneum and the bladder were dissected off of the vagina both anteriorly and posteriorly. A portion of Y-shaped mesh from [employer] was attached to the anterior portion of the mesh using 8 interrupted sutures, 4 of Prolene and 4 PDS posteriorly, 12 interrupted sutures, 4 of Prolene and the rest PDS. These were all 2-0 PDS. The Y-portion of the mesh was attached to the uterosacral ligament with 2-0 interrupted Prolene sutures. The peritoneum was then closed with running Monocryl suture and running fascia without difficulty. Cystoscopy was then performed with bilateral patency of the ureters. There was no damage to the bladder. We then turned our attention to proceed with the posterior repair. After 2 Allis clamps were attached tot he peroneal body an inverted triangular incision was made excising approximately 1 cm wide portion of the posterior wall. The defect was closed with 0 Vicryl suture in a running, locked fashion. The peroneum was approximated with a #1 Vicryl suture and  closed with 3-0 similar to an episiotomy repair. At that point we moved back to the abdomen, all large incisions greater than 10 were closed by closing the fascia with 0 Vicryl suture and all skin incisions were closed.     All skin incisions were closed with 4-0 Monocryl. The patient was then taken out of lithotomy position, awakened from anesthesia, and sent to the recovery room in excellent condition. Sponge, instrument, and lap count was correct x2.

## 2019-01-15 NOTE — ANESTHESIA PREPROCEDURE EVALUATION
Anesthesia Evaluation     Patient summary reviewed and Nursing notes reviewed   history of anesthetic complications: PONV  NPO Solid Status: > 8 hours  NPO Liquid Status: > 8 hours           Airway   Mallampati: II  TM distance: >3 FB  Neck ROM: full  no difficulty expected  Dental - normal exam     Pulmonary - normal exam   (+) sleep apnea on CPAP,   Cardiovascular - normal exam    (+) hypertension,       Neuro/Psych  GI/Hepatic/Renal/Endo    (+) obesity,  GERD,  diabetes mellitus type 2,     Musculoskeletal     Abdominal  - normal exam   Substance History      OB/GYN          Other   (+) arthritis                     Anesthesia Plan    ASA 3     general     intravenous induction   Anesthetic plan, all risks, benefits, and alternatives have been provided, discussed and informed consent has been obtained with: patient.

## 2019-01-15 NOTE — ANESTHESIA POSTPROCEDURE EVALUATION
"Patient: Sánchez Carbajal    Procedure Summary     Date:  01/15/19 Room / Location:  [facility] OR 08 /  [facility] OR    Anesthesia Start:  1152 Anesthesia Stop:  1600    Procedures:       ROBOTIC SACROCOLPOPEXY WITH MESH, TOTAL LAPAROSCOPICE HYSTERECTOMY BILATERAL SALPINGECTOMY WITH DAVINCI  (N/A Abdomen)      /POSTERIOR REPAIR (N/A Vagina) Diagnosis:      Surgeon:  James Holloway MD Provider:  Chicho Ballard MD    Anesthesia Type:  general ASA Status:  3          Anesthesia Type: general  Last vitals  BP   123/76 (01/15/19 1815)   Temp   36.7 °C (98 °F) (01/15/19 1745)   Pulse   89 (01/15/19 1815)   Resp   12 (01/15/19 1815)     SpO2   94 % (01/15/19 1815)     Post Anesthesia Care and Evaluation    Patient location during evaluation: bedside  Patient participation: complete - patient participated  Level of consciousness: awake and alert  Pain management: adequate  Airway patency: patent  Anesthetic complications: No anesthetic complications    Cardiovascular status: acceptable  Respiratory status: acceptable  Hydration status: acceptable    Comments: /76   Pulse 89   Temp 36.7 °C (98 °F) (Oral)   Resp 12   Ht 167.6 cm (66\")   Wt 82.6 kg (182 lb 1.6 oz)   LMP  (LMP Unknown) Comment: thinks 1-2 yrs ago  SpO2 94%   BMI 29.39 kg/m²         "

## 2019-01-16 VITALS
TEMPERATURE: 98.1 F | HEIGHT: 66 IN | WEIGHT: 182.1 LBS | DIASTOLIC BLOOD PRESSURE: 74 MMHG | OXYGEN SATURATION: 90 % | SYSTOLIC BLOOD PRESSURE: 120 MMHG | HEART RATE: 81 BPM | RESPIRATION RATE: 16 BRPM | BODY MASS INDEX: 29.27 KG/M2

## 2019-01-16 PROCEDURE — 63710000001 PROMETHAZINE PER 25 MG: Performed by: OBSTETRICS & GYNECOLOGY

## 2019-01-16 PROCEDURE — G0378 HOSPITAL OBSERVATION PER HR: HCPCS

## 2019-01-16 RX ORDER — PSEUDOEPHEDRINE HCL 30 MG
100 TABLET ORAL 2 TIMES DAILY PRN
Qty: 60 EACH | Refills: 0 | Status: SHIPPED | OUTPATIENT
Start: 2019-01-16 | End: 2022-11-29

## 2019-01-16 RX ORDER — PROMETHAZINE HYDROCHLORIDE 25 MG/1
25 TABLET ORAL EVERY 6 HOURS PRN
Qty: 12 TABLET | Refills: 0 | Status: ON HOLD | OUTPATIENT
Start: 2019-01-16 | End: 2019-11-25

## 2019-01-16 RX ORDER — PROMETHAZINE HYDROCHLORIDE 25 MG/1
25 TABLET ORAL EVERY 6 HOURS PRN
Status: DISCONTINUED | OUTPATIENT
Start: 2019-01-16 | End: 2019-01-16 | Stop reason: HOSPADM

## 2019-01-16 RX ORDER — OXYCODONE AND ACETAMINOPHEN 7.5; 325 MG/1; MG/1
1 TABLET ORAL EVERY 4 HOURS PRN
Qty: 40 TABLET | Refills: 0 | Status: SHIPPED | OUTPATIENT
Start: 2019-01-16 | End: 2019-01-25

## 2019-01-16 RX ADMIN — OXYCODONE HYDROCHLORIDE AND ACETAMINOPHEN 2 TABLET: 7.5; 325 TABLET ORAL at 17:11

## 2019-01-16 RX ADMIN — SODIUM CHLORIDE 125 ML/HR: 9 INJECTION, SOLUTION INTRAVENOUS at 03:12

## 2019-01-16 RX ADMIN — PROMETHAZINE HYDROCHLORIDE 25 MG: 25 TABLET ORAL at 08:04

## 2019-01-16 RX ADMIN — ONDANSETRON 4 MG: 4 TABLET, FILM COATED ORAL at 17:11

## 2019-01-16 RX ADMIN — OXYCODONE HYDROCHLORIDE AND ACETAMINOPHEN 2 TABLET: 7.5; 325 TABLET ORAL at 12:36

## 2019-01-16 NOTE — DISCHARGE SUMMARY
Date of Discharge:  1/16/2019    Discharge Diagnosis: Uterovaginal prolapse [N81.4]     Presenting Problem/History of Present Illness  Uterovaginal prolapse [N81.4]     Hospital Course  Patient is a 53 y.o. female presented for her planned surgical procedure for Uterovaginal prolapse [N81.4]. Her surgery was uncomplicated and on the morning of postoperative day #1 she had nausea with one episode of emesis. As the day progressed, her nausea improved and she began to tolerate po intake and was able to convert to oral medication. As such she was discharged to home on postoperative day #1 with instructions to follow up in 6 weeks for a postoperative evaluation.    Procedures Performed  Procedure(s):  ROBOTIC SACROCOLPOPEXY WITH MESH, TOTAL LAPAROSCOPIC HYSTERECTOMY BILATERAL SALPINGECTOMY WITH DAVINCI   /POSTERIOR REPAIR       Condition on Discharge:  good    Discharge Disposition: Home      Discharge Diet: regular    Activity at Discharge: pelvic rest, otherwise activity as tolerated    Follow-up Appointments   In 6 weeks with [facility], [phone]      Luca Knox MD  01/16/19  7:27 AM

## 2019-01-16 NOTE — PROGRESS NOTES
Discharge Planning Assessment  [facility]     Patient Name: Sánchez Carbajal  MRN: [id number]  Today's Date: 1/16/2019    Admit Date: 1/15/2019    Discharge Needs Assessment    No documentation.       Discharge Plan     Row Name 01/16/19 1625       Plan    Final Discharge Disposition Code  01 - home or self-care         Najma Thomas RN

## 2019-01-16 NOTE — PLAN OF CARE
Problem: Patient Care Overview  Goal: Plan of Care Review  Outcome: Ongoing (interventions implemented as appropriate)   01/16/19 0557   Coping/Psychosocial   Plan of Care Reviewed With patient   Plan of Care Review   Progress no change   OTHER   Outcome Summary slow to progress. intermittent nausea and vomiting. refusing prn meds. up at bedside x2 but unable to walk due to nausea, vomiting, weakness. good uop from newton cath. cath left in for now until mobilizing more. trying to take some jello and gingerale this am.      Goal: Individualization and Mutuality  Outcome: Ongoing (interventions implemented as appropriate)    Goal: Discharge Needs Assessment  Outcome: Ongoing (interventions implemented as appropriate)    Goal: Interprofessional Rounds/Family Conf  Outcome: Ongoing (interventions implemented as appropriate)      Problem: Pain, Acute (Adult)  Goal: Identify Related Risk Factors and Signs and Symptoms  Outcome: Outcome(s) achieved Date Met: 01/16/19    Goal: Acceptable Pain Control/Comfort Level  Outcome: Ongoing (interventions implemented as appropriate)

## 2019-01-16 NOTE — PROGRESS NOTES
Doing okay this morning. Has had some nausea. Newton remains in place. Has been up at bedside but no ambulation thus far. Has not tried po. Pain controlled on IV meds.    Vitals:    01/16/19 0341   BP: 110/67   Pulse: 92   Resp: 16   Temp: 98 °F (36.7 °C)   SpO2: 94%       AO, NT, NAD  Normal perfusion  Nonlabored breathing  Abdomen soft, appropriately tender, incisions clean/dry/intact    Postop day #: 1  Will treat nausea.  Continue routine postop care.  Advance diet as tolerated.  Oral pain meds.  Remove newton catheter and perform voiding trial.  Encourage ambulation & incentive spirometry.    Will reevaluate around 1400 to determine disposition  I have reviewed the attached history and physical and there are no updates or changes to history and or physical.

## 2019-01-17 LAB
CYTO UR: NORMAL
LAB AP CASE REPORT: NORMAL
PATH REPORT.FINAL DX SPEC: NORMAL
PATH REPORT.GROSS SPEC: NORMAL

## 2019-01-22 ENCOUNTER — OFFICE VISIT (OUTPATIENT)
Dept: SLEEP MEDICINE | Facility: HOSPITAL | Age: 54
End: 2019-01-22
Attending: INTERNAL MEDICINE

## 2019-01-22 VITALS
HEART RATE: 100 BPM | BODY MASS INDEX: 28.93 KG/M2 | HEIGHT: 66 IN | DIASTOLIC BLOOD PRESSURE: 76 MMHG | SYSTOLIC BLOOD PRESSURE: 133 MMHG | WEIGHT: 180 LBS

## 2019-01-22 DIAGNOSIS — G47.33 OBSTRUCTIVE SLEEP APNEA SYNDROME: Primary | ICD-10-CM

## 2019-01-22 DIAGNOSIS — J30.2 SEASONAL ALLERGIC RHINITIS, UNSPECIFIED TRIGGER: ICD-10-CM

## 2019-01-22 PROCEDURE — G0463 HOSPITAL OUTPT CLINIC VISIT: HCPCS

## 2019-01-22 NOTE — PROGRESS NOTES
PULMONARY SLEEP CONSULT NOTE      PATIENT IDENTIFICATION:  Name: Sánchez Carbajal  Age: 54 y.o.  Sex: female  :  1965  MRN: [id number]    DATE OF CONSULTATION:  2019   Referring Physician: No admitting provider for patient encounter.                  CHIEF COMPLAINT: Obstructive Sleep Apnea    History of Present Illness:   Sánchez Carbajal is a 54 y.o. female Pt on CPAP feeling better more energy especially the night he use it more than 4 hours, no sleepiness no fatigue no tiredness, no mask irritation no dryness, compliance report reviewed with pt AHI< 5 with good usage.       Review of Systems:   Constitutional: negative   Eyes: negative   ENT/oropharynx: negative   Cardiovascular: negative   Respiratory: negative   Gastrointestinal: negative   Genitourinary: negative   Neurological: negative   Musculoskeletal: negative   Integument/breast: negative   Endocrine: negative   Allergic/Immunologic: negative     Past Medical History:  Past Medical History:   Diagnosis Date   • Arthritis     RIGHT SHOULDER   • Chronic back pain    • Diabetes mellitus (CMS/HCC)    • GERD (gastroesophageal reflux disease)    • H/O seasonal allergies    • History of anemia    • History of diverticulitis    • History of kidney stones    • Hypertension    • PONV (postoperative nausea and vomiting)    • Sleep apnea with use of continuous positive airway pressure (CPAP)    • Uterine prolapse     UTERINE VAG PROLAPSE       Past Surgical History:  Past Surgical History:   Procedure Laterality Date   • ANTERIOR AND POSTERIOR VAGINAL REPAIR N/A 1/15/2019    Procedure: /POSTERIOR REPAIR;  Surgeon: James Holloway MD;  Location: [facility];  Service: Gynecology   • CHOLECYSTECTOMY     • COLONOSCOPY N/A 2017    Procedure: COLONOSCOPY;  Surgeon: Tian Henderson MD;  Location: [facility];  Service:    • CYSTOSCOPY URETEROSCOPY LASER LITHOTRIPSY N/A 2016    Procedure: URETEROSCOPY  ;  Surgeon: Vimal MUNSON  MD Jorge Alberto;  Location: [facility] OR;  Service:    • CYSTOSCOPY W/ LASER LITHOTRIPSY Left 6/25/2018    Procedure: CYSTOSCOPY, LEFT URETEROSCOPY, left  STENT PLACEMENT, right retrograde;  Surgeon: Vimal Azul MD;  Location: [facility] OR;  Service: Urology   • EXTRACORPOREAL SHOCK WAVE LITHOTRIPSY (ESWL)  2015   • KIDNEY STONE SURGERY     • SACROCOLPOPEXY N/A 1/15/2019    Procedure: ROBOTIC SACROCOLPOPEXY WITH MESH, TOTAL LAPAROSCOPICE HYSTERECTOMY BILATERAL SALPINGECTOMY WITH DAVINCI ;  Surgeon: James Holloway MD;  Location: [facility] OR;  Service: DaVinci        Family History:  No family history on file.     Social History:   Social History     Tobacco Use   • Smoking status: Never Smoker   • Smokeless tobacco: Never Used   Substance Use Topics   • Alcohol use: No        Allergies:  Allergies   Allergen Reactions   • Penicillins Rash   • Flagyl [Metronidazole] Hives   • Sulfa Antibiotics Rash       Home Meds:    (Not in a hospital admission)    Objective:    Vitals Ranges:   Heart Rate:  [100] 100  BP: (133)/(76) 133/76  Body mass index is 29.05 kg/m².     Exam:  General Appearance:  WDWN    HEENT:   without obvious abnormality,  Conjunctiva/corneas clear,  Normal external ear canals, no drainage    Clear orsalmucosa,  Mallampati score 3    Neck:  Supple, symmetrical, trachea midline. No JVD.  Lungs:   Bilateral basal rhonchi bilaterally, respirations unlabored symmetrical wall movement.    Chest wall:  No tenderness or deformity.    Heart:  Regular rate and rhythm, S1 and S2 normal.  Extremities: Trace edema no clubbing or Cyanosis        Data Review:  All labs (24hrs): No results found for this or any previous visit (from the past 24 hour(s)).     Imaging:  [unfilled]    ASSESSMENT:  Diagnoses and all orders for this visit:    Obstructive sleep apnea syndrome    Seasonal allergic rhinitis, unspecified trigger        PLAN:     This patient with obstructive sleep apnea, compliance is improved. Encourage to use  it more frequent, I re-emphasized on pt the long and short term benefit of treating SAAGR.       It is recommended to keep allergic rhinitis treatment  optimized to improve quality of sleep    Seema Moser MD. D, ABSM.  1/22/2019  11:31 AM

## 2019-06-10 ENCOUNTER — TRANSCRIBE ORDERS (OUTPATIENT)
Dept: ADMINISTRATIVE | Facility: HOSPITAL | Age: 54
End: 2019-06-10

## 2019-06-10 ENCOUNTER — HOSPITAL ENCOUNTER (OUTPATIENT)
Dept: GENERAL RADIOLOGY | Facility: HOSPITAL | Age: 54
Discharge: HOME | End: 2019-06-10
Admitting: UROLOGY

## 2019-06-10 DIAGNOSIS — N20.0 RENAL CALCULUS: ICD-10-CM

## 2019-06-10 DIAGNOSIS — N20.0 RENAL CALCULUS: Primary | ICD-10-CM

## 2019-06-10 PROCEDURE — 74018 RADEX ABDOMEN 1 VIEW: CPT

## 2019-06-24 ENCOUNTER — HOSPITAL ENCOUNTER (OUTPATIENT)
Dept: GENERAL RADIOLOGY | Facility: HOSPITAL | Age: 54
Discharge: HOME | End: 2019-06-24
Admitting: UROLOGY

## 2019-06-24 ENCOUNTER — TRANSCRIBE ORDERS (OUTPATIENT)
Dept: ADMINISTRATIVE | Facility: HOSPITAL | Age: 54
End: 2019-06-24

## 2019-06-24 DIAGNOSIS — N20.0 KIDNEY STONES: Primary | ICD-10-CM

## 2019-06-24 DIAGNOSIS — N20.0 KIDNEY STONES: ICD-10-CM

## 2019-06-24 PROCEDURE — 74018 RADEX ABDOMEN 1 VIEW: CPT

## 2019-07-18 ENCOUNTER — TRANSCRIBE ORDERS (OUTPATIENT)
Dept: ADMINISTRATIVE | Facility: HOSPITAL | Age: 54
End: 2019-07-18

## 2019-07-18 DIAGNOSIS — N20.0 RENAL CALCULI: ICD-10-CM

## 2019-07-18 DIAGNOSIS — N28.89 RENAL CALCIFICATION: Primary | ICD-10-CM

## 2019-07-20 ENCOUNTER — APPOINTMENT (OUTPATIENT)
Dept: CT IMAGING | Facility: HOSPITAL | Age: 54
End: 2019-07-20

## 2019-07-20 ENCOUNTER — HOSPITAL ENCOUNTER (EMERGENCY)
Facility: HOSPITAL | Age: 54
Discharge: HOME | End: 2019-07-20
Attending: EMERGENCY MEDICINE | Admitting: EMERGENCY MEDICINE

## 2019-07-20 VITALS
WEIGHT: 185 LBS | RESPIRATION RATE: 16 BRPM | HEIGHT: 66 IN | TEMPERATURE: 97.9 F | DIASTOLIC BLOOD PRESSURE: 90 MMHG | SYSTOLIC BLOOD PRESSURE: 148 MMHG | HEART RATE: 84 BPM | OXYGEN SATURATION: 99 % | BODY MASS INDEX: 29.73 KG/M2

## 2019-07-20 DIAGNOSIS — N39.0 URINARY TRACT INFECTION IN FEMALE: Primary | ICD-10-CM

## 2019-07-20 LAB
ALBUMIN SERPL-MCNC: 4.5 G/DL (ref 3.5–5.2)
ALBUMIN/GLOB SERPL: 1.7 G/DL
ALP SERPL-CCNC: 90 U/L (ref 39–117)
ALT SERPL W P-5'-P-CCNC: 20 U/L (ref 1–33)
ANION GAP SERPL CALCULATED.3IONS-SCNC: 16.8 MMOL/L (ref 5–15)
AST SERPL-CCNC: 15 U/L (ref 1–32)
BACTERIA UR QL AUTO: ABNORMAL /HPF
BASOPHILS # BLD AUTO: 0.03 10*3/MM3 (ref 0–0.2)
BASOPHILS NFR BLD AUTO: 0.5 % (ref 0–1.5)
BILIRUB SERPL-MCNC: 0.3 MG/DL (ref 0.2–1.2)
BILIRUB UR QL STRIP: NEGATIVE
BUN BLD-MCNC: 23 MG/DL (ref 6–20)
BUN/CREAT SERPL: 37.7 (ref 7–25)
CALCIUM SPEC-SCNC: 9.5 MG/DL (ref 8.6–10.5)
CHLORIDE SERPL-SCNC: 101 MMOL/L (ref 98–107)
CLARITY UR: CLEAR
CO2 SERPL-SCNC: 22.2 MMOL/L (ref 22–29)
COLOR UR: YELLOW
CREAT BLD-MCNC: 0.61 MG/DL (ref 0.57–1)
DEPRECATED RDW RBC AUTO: 40.8 FL (ref 37–54)
EOSINOPHIL # BLD AUTO: 0.18 10*3/MM3 (ref 0–0.4)
EOSINOPHIL NFR BLD AUTO: 2.7 % (ref 0.3–6.2)
ERYTHROCYTE [DISTWIDTH] IN BLOOD BY AUTOMATED COUNT: 13.2 % (ref 12.3–15.4)
GFR SERPL CREATININE-BSD FRML MDRD: 102 ML/MIN/1.73
GLOBULIN UR ELPH-MCNC: 2.6 GM/DL
GLUCOSE BLD-MCNC: 173 MG/DL (ref 65–99)
GLUCOSE UR STRIP-MCNC: NEGATIVE MG/DL
HCT VFR BLD AUTO: 37.6 % (ref 34–46.6)
HGB BLD-MCNC: 12.7 G/DL (ref 12–15.9)
HGB UR QL STRIP.AUTO: NEGATIVE
HOLD SPECIMEN: NORMAL
HOLD SPECIMEN: NORMAL
HYALINE CASTS UR QL AUTO: ABNORMAL /LPF
IMM GRANULOCYTES # BLD AUTO: 0.04 10*3/MM3 (ref 0–0.05)
IMM GRANULOCYTES NFR BLD AUTO: 0.6 % (ref 0–0.5)
KETONES UR QL STRIP: NEGATIVE
LEUKOCYTE ESTERASE UR QL STRIP.AUTO: ABNORMAL
LYMPHOCYTES # BLD AUTO: 2.41 10*3/MM3 (ref 0.7–3.1)
LYMPHOCYTES NFR BLD AUTO: 36.5 % (ref 19.6–45.3)
MCH RBC QN AUTO: 29.1 PG (ref 26.6–33)
MCHC RBC AUTO-ENTMCNC: 33.8 G/DL (ref 31.5–35.7)
MCV RBC AUTO: 86.2 FL (ref 79–97)
MONOCYTES # BLD AUTO: 0.42 10*3/MM3 (ref 0.1–0.9)
MONOCYTES NFR BLD AUTO: 6.4 % (ref 5–12)
NEUTROPHILS # BLD AUTO: 3.52 10*3/MM3 (ref 1.7–7)
NEUTROPHILS NFR BLD AUTO: 53.3 % (ref 42.7–76)
NITRITE UR QL STRIP: NEGATIVE
NRBC BLD AUTO-RTO: 0 /100 WBC (ref 0–0.2)
PH UR STRIP.AUTO: 5.5 [PH] (ref 4.5–8)
PLATELET # BLD AUTO: 273 10*3/MM3 (ref 140–450)
PMV BLD AUTO: 9.9 FL (ref 6–12)
POTASSIUM BLD-SCNC: 3.8 MMOL/L (ref 3.5–5.2)
PROT SERPL-MCNC: 7.1 G/DL (ref 6–8.5)
PROT UR QL STRIP: NEGATIVE
RBC # BLD AUTO: 4.36 10*6/MM3 (ref 3.77–5.28)
RBC # UR: ABNORMAL /HPF
REF LAB TEST METHOD: ABNORMAL
SODIUM BLD-SCNC: 140 MMOL/L (ref 136–145)
SP GR UR STRIP: 1.01 (ref 1–1.03)
SQUAMOUS #/AREA URNS HPF: ABNORMAL /HPF
UROBILINOGEN UR QL STRIP: ABNORMAL
WBC NRBC COR # BLD: 6.6 10*3/MM3 (ref 3.4–10.8)
WBC UR QL AUTO: ABNORMAL /HPF
WHOLE BLOOD HOLD SPECIMEN: NORMAL
WHOLE BLOOD HOLD SPECIMEN: NORMAL

## 2019-07-20 PROCEDURE — 0 IOPAMIDOL PER 1 ML: Performed by: EMERGENCY MEDICINE

## 2019-07-20 PROCEDURE — 96374 THER/PROPH/DIAG INJ IV PUSH: CPT

## 2019-07-20 PROCEDURE — 99284 EMERGENCY DEPT VISIT MOD MDM: CPT | Performed by: PHYSICIAN ASSISTANT

## 2019-07-20 PROCEDURE — 74177 CT ABD & PELVIS W/CONTRAST: CPT

## 2019-07-20 PROCEDURE — 81001 URINALYSIS AUTO W/SCOPE: CPT | Performed by: EMERGENCY MEDICINE

## 2019-07-20 PROCEDURE — 80053 COMPREHEN METABOLIC PANEL: CPT | Performed by: EMERGENCY MEDICINE

## 2019-07-20 PROCEDURE — 85025 COMPLETE CBC W/AUTO DIFF WBC: CPT | Performed by: EMERGENCY MEDICINE

## 2019-07-20 PROCEDURE — 25010000002 ONDANSETRON PER 1 MG: Performed by: EMERGENCY MEDICINE

## 2019-07-20 PROCEDURE — 99283 EMERGENCY DEPT VISIT LOW MDM: CPT

## 2019-07-20 PROCEDURE — 25010000002 HYDROMORPHONE PER 4 MG: Performed by: EMERGENCY MEDICINE

## 2019-07-20 PROCEDURE — 96375 TX/PRO/DX INJ NEW DRUG ADDON: CPT

## 2019-07-20 PROCEDURE — 96361 HYDRATE IV INFUSION ADD-ON: CPT

## 2019-07-20 RX ORDER — CEFUROXIME AXETIL 500 MG/1
500 TABLET ORAL 2 TIMES DAILY
Qty: 14 TABLET | Refills: 0 | Status: SHIPPED | OUTPATIENT
Start: 2019-07-20 | End: 2019-07-27

## 2019-07-20 RX ORDER — HYDROMORPHONE HCL 110MG/55ML
0.5 PATIENT CONTROLLED ANALGESIA SYRINGE INTRAVENOUS ONCE
Status: COMPLETED | OUTPATIENT
Start: 2019-07-20 | End: 2019-07-20

## 2019-07-20 RX ORDER — ONDANSETRON 4 MG/1
4 TABLET, ORALLY DISINTEGRATING ORAL EVERY 6 HOURS PRN
Qty: 20 TABLET | Refills: 0 | Status: ON HOLD | OUTPATIENT
Start: 2019-07-20 | End: 2019-11-25

## 2019-07-20 RX ORDER — CEFUROXIME AXETIL 250 MG/1
500 TABLET ORAL ONCE
Status: COMPLETED | OUTPATIENT
Start: 2019-07-20 | End: 2019-07-20

## 2019-07-20 RX ORDER — ONDANSETRON 2 MG/ML
4 INJECTION INTRAMUSCULAR; INTRAVENOUS ONCE
Status: COMPLETED | OUTPATIENT
Start: 2019-07-20 | End: 2019-07-20

## 2019-07-20 RX ADMIN — ONDANSETRON 4 MG: 2 INJECTION, SOLUTION INTRAMUSCULAR; INTRAVENOUS at 19:08

## 2019-07-20 RX ADMIN — IOPAMIDOL 100 ML: 755 INJECTION, SOLUTION INTRAVENOUS at 19:45

## 2019-07-20 RX ADMIN — CEFUROXIME AXETIL 500 MG: 250 TABLET, FILM COATED ORAL at 21:28

## 2019-07-20 RX ADMIN — HYDROMORPHONE HYDROCHLORIDE 0.5 MG: 2 INJECTION, SOLUTION INTRAMUSCULAR; INTRAVENOUS; SUBCUTANEOUS at 19:10

## 2019-07-20 RX ADMIN — SODIUM CHLORIDE 1000 ML: 9 INJECTION, SOLUTION INTRAVENOUS at 19:34

## 2019-08-22 ENCOUNTER — APPOINTMENT (OUTPATIENT)
Dept: CT IMAGING | Facility: HOSPITAL | Age: 54
End: 2019-08-22

## 2019-08-31 ENCOUNTER — APPOINTMENT (OUTPATIENT)
Dept: CT IMAGING | Facility: HOSPITAL | Age: 54
End: 2019-08-31

## 2019-08-31 ENCOUNTER — HOSPITAL ENCOUNTER (EMERGENCY)
Facility: HOSPITAL | Age: 54
Discharge: ANOTHER HOSPITAL | End: 2019-09-01
Attending: EMERGENCY MEDICINE | Admitting: EMERGENCY MEDICINE

## 2019-08-31 DIAGNOSIS — N20.1 LEFT URETERAL STONE: Primary | ICD-10-CM

## 2019-08-31 DIAGNOSIS — N39.0 ACUTE UTI: ICD-10-CM

## 2019-08-31 LAB
ALBUMIN SERPL-MCNC: 4.6 G/DL (ref 3.5–5.2)
ALBUMIN/GLOB SERPL: 1.6 G/DL
ALP SERPL-CCNC: 93 U/L (ref 39–117)
ALT SERPL W P-5'-P-CCNC: 13 U/L (ref 1–33)
ANION GAP SERPL CALCULATED.3IONS-SCNC: 12.1 MMOL/L (ref 5–15)
AST SERPL-CCNC: 13 U/L (ref 1–32)
BACTERIA UR QL AUTO: ABNORMAL /HPF
BASOPHILS # BLD AUTO: 0.02 10*3/MM3 (ref 0–0.2)
BASOPHILS NFR BLD AUTO: 0.3 % (ref 0–1.5)
BILIRUB SERPL-MCNC: 0.2 MG/DL (ref 0.2–1.2)
BILIRUB UR QL STRIP: NEGATIVE
BUN BLD-MCNC: 24 MG/DL (ref 6–20)
BUN/CREAT SERPL: 31.2 (ref 7–25)
CALCIUM SPEC-SCNC: 9.6 MG/DL (ref 8.6–10.5)
CHLORIDE SERPL-SCNC: 103 MMOL/L (ref 98–107)
CLARITY UR: CLEAR
CO2 SERPL-SCNC: 24.9 MMOL/L (ref 22–29)
COLOR UR: YELLOW
CREAT BLD-MCNC: 0.77 MG/DL (ref 0.57–1)
DEPRECATED RDW RBC AUTO: 41.8 FL (ref 37–54)
EOSINOPHIL # BLD AUTO: 0.15 10*3/MM3 (ref 0–0.4)
EOSINOPHIL NFR BLD AUTO: 2.5 % (ref 0.3–6.2)
ERYTHROCYTE [DISTWIDTH] IN BLOOD BY AUTOMATED COUNT: 13.3 % (ref 12.3–15.4)
GFR SERPL CREATININE-BSD FRML MDRD: 78 ML/MIN/1.73
GLOBULIN UR ELPH-MCNC: 2.9 GM/DL
GLUCOSE BLD-MCNC: 176 MG/DL (ref 65–99)
GLUCOSE UR STRIP-MCNC: NEGATIVE MG/DL
HCT VFR BLD AUTO: 36.1 % (ref 34–46.6)
HGB BLD-MCNC: 12.1 G/DL (ref 12–15.9)
HGB UR QL STRIP.AUTO: ABNORMAL
HYALINE CASTS UR QL AUTO: ABNORMAL /LPF
IMM GRANULOCYTES # BLD AUTO: 0.01 10*3/MM3 (ref 0–0.05)
IMM GRANULOCYTES NFR BLD AUTO: 0.2 % (ref 0–0.5)
KETONES UR QL STRIP: NEGATIVE
LEUKOCYTE ESTERASE UR QL STRIP.AUTO: ABNORMAL
LYMPHOCYTES # BLD AUTO: 2.82 10*3/MM3 (ref 0.7–3.1)
LYMPHOCYTES NFR BLD AUTO: 46.9 % (ref 19.6–45.3)
MCH RBC QN AUTO: 29.1 PG (ref 26.6–33)
MCHC RBC AUTO-ENTMCNC: 33.5 G/DL (ref 31.5–35.7)
MCV RBC AUTO: 86.8 FL (ref 79–97)
MONOCYTES # BLD AUTO: 0.49 10*3/MM3 (ref 0.1–0.9)
MONOCYTES NFR BLD AUTO: 8.2 % (ref 5–12)
NEUTROPHILS # BLD AUTO: 2.52 10*3/MM3 (ref 1.7–7)
NEUTROPHILS NFR BLD AUTO: 41.9 % (ref 42.7–76)
NITRITE UR QL STRIP: NEGATIVE
NRBC BLD AUTO-RTO: 0 /100 WBC (ref 0–0.2)
PH UR STRIP.AUTO: 6 [PH] (ref 4.5–8)
PLATELET # BLD AUTO: 288 10*3/MM3 (ref 140–450)
PMV BLD AUTO: 9.9 FL (ref 6–12)
POTASSIUM BLD-SCNC: 4 MMOL/L (ref 3.5–5.2)
PROT SERPL-MCNC: 7.5 G/DL (ref 6–8.5)
PROT UR QL STRIP: ABNORMAL
RBC # BLD AUTO: 4.16 10*6/MM3 (ref 3.77–5.28)
RBC # UR: ABNORMAL /HPF
REF LAB TEST METHOD: ABNORMAL
SODIUM BLD-SCNC: 140 MMOL/L (ref 136–145)
SP GR UR STRIP: 1.02 (ref 1–1.03)
SQUAMOUS #/AREA URNS HPF: ABNORMAL /HPF
UROBILINOGEN UR QL STRIP: ABNORMAL
WBC NRBC COR # BLD: 6.01 10*3/MM3 (ref 3.4–10.8)
WBC UR QL AUTO: ABNORMAL /HPF

## 2019-08-31 PROCEDURE — 85025 COMPLETE CBC W/AUTO DIFF WBC: CPT | Performed by: EMERGENCY MEDICINE

## 2019-08-31 PROCEDURE — 99284 EMERGENCY DEPT VISIT MOD MDM: CPT | Performed by: EMERGENCY MEDICINE

## 2019-08-31 PROCEDURE — 81001 URINALYSIS AUTO W/SCOPE: CPT | Performed by: EMERGENCY MEDICINE

## 2019-08-31 PROCEDURE — 87086 URINE CULTURE/COLONY COUNT: CPT | Performed by: EMERGENCY MEDICINE

## 2019-08-31 PROCEDURE — 96375 TX/PRO/DX INJ NEW DRUG ADDON: CPT

## 2019-08-31 PROCEDURE — 80053 COMPREHEN METABOLIC PANEL: CPT | Performed by: EMERGENCY MEDICINE

## 2019-08-31 PROCEDURE — 74176 CT ABD & PELVIS W/O CONTRAST: CPT

## 2019-08-31 PROCEDURE — 25010000002 ONDANSETRON PER 1 MG: Performed by: EMERGENCY MEDICINE

## 2019-08-31 PROCEDURE — 99283 EMERGENCY DEPT VISIT LOW MDM: CPT

## 2019-08-31 PROCEDURE — 25010000002 KETOROLAC TROMETHAMINE PER 15 MG: Performed by: EMERGENCY MEDICINE

## 2019-08-31 RX ORDER — KETOROLAC TROMETHAMINE 30 MG/ML
30 INJECTION, SOLUTION INTRAMUSCULAR; INTRAVENOUS ONCE
Status: COMPLETED | OUTPATIENT
Start: 2019-08-31 | End: 2019-08-31

## 2019-08-31 RX ORDER — HYDROMORPHONE HCL 110MG/55ML
1 PATIENT CONTROLLED ANALGESIA SYRINGE INTRAVENOUS ONCE
Status: COMPLETED | OUTPATIENT
Start: 2019-08-31 | End: 2019-09-01

## 2019-08-31 RX ORDER — SODIUM CHLORIDE 0.9 % (FLUSH) 0.9 %
10 SYRINGE (ML) INJECTION AS NEEDED
Status: DISCONTINUED | OUTPATIENT
Start: 2019-08-31 | End: 2019-09-01 | Stop reason: HOSPADM

## 2019-08-31 RX ORDER — CEFTRIAXONE 1 G/50ML
1 INJECTION, SOLUTION INTRAVENOUS ONCE
Status: COMPLETED | OUTPATIENT
Start: 2019-09-01 | End: 2019-09-01

## 2019-08-31 RX ORDER — ONDANSETRON 2 MG/ML
4 INJECTION INTRAMUSCULAR; INTRAVENOUS ONCE
Status: COMPLETED | OUTPATIENT
Start: 2019-08-31 | End: 2019-08-31

## 2019-08-31 RX ADMIN — ONDANSETRON 4 MG: 2 INJECTION, SOLUTION INTRAMUSCULAR; INTRAVENOUS at 23:02

## 2019-08-31 RX ADMIN — KETOROLAC TROMETHAMINE 30 MG: 30 INJECTION, SOLUTION INTRAMUSCULAR at 23:05

## 2019-09-01 ENCOUNTER — HOSPITAL ENCOUNTER (OUTPATIENT)
Facility: HOSPITAL | Age: 54
Discharge: HOME | End: 2019-09-01
Attending: UROLOGY | Admitting: UROLOGY

## 2019-09-01 ENCOUNTER — ANESTHESIA (OUTPATIENT)
Dept: PERIOP | Facility: HOSPITAL | Age: 54
End: 2019-09-01

## 2019-09-01 ENCOUNTER — APPOINTMENT (OUTPATIENT)
Dept: GENERAL RADIOLOGY | Facility: HOSPITAL | Age: 54
End: 2019-09-01

## 2019-09-01 ENCOUNTER — ANESTHESIA EVENT (OUTPATIENT)
Dept: PERIOP | Facility: HOSPITAL | Age: 54
End: 2019-09-01

## 2019-09-01 VITALS
RESPIRATION RATE: 18 BRPM | BODY MASS INDEX: 30.53 KG/M2 | WEIGHT: 190 LBS | HEIGHT: 66 IN | TEMPERATURE: 97.9 F | SYSTOLIC BLOOD PRESSURE: 123 MMHG | HEART RATE: 80 BPM | DIASTOLIC BLOOD PRESSURE: 72 MMHG | OXYGEN SATURATION: 94 %

## 2019-09-01 VITALS
WEIGHT: 193 LBS | BODY MASS INDEX: 31.15 KG/M2 | HEART RATE: 77 BPM | SYSTOLIC BLOOD PRESSURE: 128 MMHG | TEMPERATURE: 97.9 F | RESPIRATION RATE: 16 BRPM | DIASTOLIC BLOOD PRESSURE: 83 MMHG | OXYGEN SATURATION: 95 %

## 2019-09-01 PROBLEM — N13.2 URETERAL STONE WITH HYDRONEPHROSIS: Status: ACTIVE | Noted: 2019-09-01

## 2019-09-01 LAB
GLUCOSE BLDC GLUCOMTR-MCNC: 126 MG/DL (ref 70–130)
VIT B12 BLD-MCNC: 1384 PG/ML (ref 211–946)

## 2019-09-01 PROCEDURE — 96365 THER/PROPH/DIAG IV INF INIT: CPT

## 2019-09-01 PROCEDURE — G0378 HOSPITAL OBSERVATION PER HR: HCPCS

## 2019-09-01 PROCEDURE — 74420 UROGRAPHY RTRGR +-KUB: CPT

## 2019-09-01 PROCEDURE — 82607 VITAMIN B-12: CPT | Performed by: UROLOGY

## 2019-09-01 PROCEDURE — 25010000002 FENTANYL CITRATE (PF) 100 MCG/2ML SOLUTION: Performed by: ANESTHESIOLOGY

## 2019-09-01 PROCEDURE — 25010000002 HYDROMORPHONE HCL PF 2 MG/ML SOLUTION: Performed by: EMERGENCY MEDICINE

## 2019-09-01 PROCEDURE — 96375 TX/PRO/DX INJ NEW DRUG ADDON: CPT

## 2019-09-01 PROCEDURE — 25010000002 PROPOFOL 10 MG/ML EMULSION: Performed by: NURSE ANESTHETIST, CERTIFIED REGISTERED

## 2019-09-01 PROCEDURE — 25010000002 DEXAMETHASONE PER 1 MG: Performed by: NURSE ANESTHETIST, CERTIFIED REGISTERED

## 2019-09-01 PROCEDURE — 25010000002 FENTANYL CITRATE (PF) 100 MCG/2ML SOLUTION: Performed by: NURSE ANESTHETIST, CERTIFIED REGISTERED

## 2019-09-01 PROCEDURE — 25010000002 LEVOFLOXACIN PER 250 MG: Performed by: UROLOGY

## 2019-09-01 PROCEDURE — 96360 HYDRATION IV INFUSION INIT: CPT

## 2019-09-01 PROCEDURE — 82962 GLUCOSE BLOOD TEST: CPT

## 2019-09-01 PROCEDURE — 82360 CALCULUS ASSAY QUANT: CPT | Performed by: UROLOGY

## 2019-09-01 PROCEDURE — 25010000002 MIDAZOLAM PER 1 MG: Performed by: ANESTHESIOLOGY

## 2019-09-01 PROCEDURE — 25010000002 CEFTRIAXONE SODIUM-DEXTROSE 1-3.74 GM-%(50ML) RECONSTITUTED SOLUTION: Performed by: EMERGENCY MEDICINE

## 2019-09-01 PROCEDURE — 25010000002 ONDANSETRON PER 1 MG: Performed by: NURSE ANESTHETIST, CERTIFIED REGISTERED

## 2019-09-01 PROCEDURE — 0 IOTHALAMATE 60 % SOLUTION: Performed by: UROLOGY

## 2019-09-01 PROCEDURE — 96361 HYDRATE IV INFUSION ADD-ON: CPT

## 2019-09-01 RX ORDER — NALOXONE HCL 0.4 MG/ML
0.2 VIAL (ML) INJECTION AS NEEDED
Status: DISCONTINUED | OUTPATIENT
Start: 2019-09-01 | End: 2019-09-01 | Stop reason: HOSPADM

## 2019-09-01 RX ORDER — SODIUM CHLORIDE, SODIUM LACTATE, POTASSIUM CHLORIDE, CALCIUM CHLORIDE 600; 310; 30; 20 MG/100ML; MG/100ML; MG/100ML; MG/100ML
9 INJECTION, SOLUTION INTRAVENOUS CONTINUOUS
Status: DISCONTINUED | OUTPATIENT
Start: 2019-09-01 | End: 2019-09-01

## 2019-09-01 RX ORDER — TRAZODONE HYDROCHLORIDE 50 MG/1
50 TABLET ORAL NIGHTLY PRN
Status: DISCONTINUED | OUTPATIENT
Start: 2019-09-01 | End: 2019-09-01 | Stop reason: HOSPADM

## 2019-09-01 RX ORDER — HYDRALAZINE HYDROCHLORIDE 20 MG/ML
5 INJECTION INTRAMUSCULAR; INTRAVENOUS
Status: DISCONTINUED | OUTPATIENT
Start: 2019-09-01 | End: 2019-09-01 | Stop reason: HOSPADM

## 2019-09-01 RX ORDER — HYDROMORPHONE HYDROCHLORIDE 1 MG/ML
0.5 INJECTION, SOLUTION INTRAMUSCULAR; INTRAVENOUS; SUBCUTANEOUS
Status: DISCONTINUED | OUTPATIENT
Start: 2019-09-01 | End: 2019-09-01 | Stop reason: HOSPADM

## 2019-09-01 RX ORDER — FLUMAZENIL 0.1 MG/ML
0.2 INJECTION INTRAVENOUS AS NEEDED
Status: DISCONTINUED | OUTPATIENT
Start: 2019-09-01 | End: 2019-09-01 | Stop reason: HOSPADM

## 2019-09-01 RX ORDER — CETIRIZINE HYDROCHLORIDE 10 MG/1
10 TABLET ORAL DAILY
Status: DISCONTINUED | OUTPATIENT
Start: 2019-09-01 | End: 2019-09-01 | Stop reason: HOSPADM

## 2019-09-01 RX ORDER — FENTANYL CITRATE 50 UG/ML
INJECTION, SOLUTION INTRAMUSCULAR; INTRAVENOUS AS NEEDED
Status: DISCONTINUED | OUTPATIENT
Start: 2019-09-01 | End: 2019-09-01 | Stop reason: SURG

## 2019-09-01 RX ORDER — DIPHENHYDRAMINE HYDROCHLORIDE 50 MG/ML
12.5 INJECTION INTRAMUSCULAR; INTRAVENOUS
Status: DISCONTINUED | OUTPATIENT
Start: 2019-09-01 | End: 2019-09-01 | Stop reason: HOSPADM

## 2019-09-01 RX ORDER — HYDROMORPHONE HYDROCHLORIDE 1 MG/ML
0.25 INJECTION, SOLUTION INTRAMUSCULAR; INTRAVENOUS; SUBCUTANEOUS
Status: DISCONTINUED | OUTPATIENT
Start: 2019-09-01 | End: 2019-09-01 | Stop reason: HOSPADM

## 2019-09-01 RX ORDER — LABETALOL HYDROCHLORIDE 5 MG/ML
5 INJECTION, SOLUTION INTRAVENOUS
Status: DISCONTINUED | OUTPATIENT
Start: 2019-09-01 | End: 2019-09-01 | Stop reason: HOSPADM

## 2019-09-01 RX ORDER — ONDANSETRON 2 MG/ML
4 INJECTION INTRAMUSCULAR; INTRAVENOUS EVERY 4 HOURS PRN
Status: DISCONTINUED | OUTPATIENT
Start: 2019-09-01 | End: 2019-09-01 | Stop reason: HOSPADM

## 2019-09-01 RX ORDER — SODIUM CHLORIDE 0.9 % (FLUSH) 0.9 %
3 SYRINGE (ML) INJECTION EVERY 12 HOURS SCHEDULED
Status: DISCONTINUED | OUTPATIENT
Start: 2019-09-01 | End: 2019-09-01 | Stop reason: HOSPADM

## 2019-09-01 RX ORDER — SODIUM CHLORIDE, SODIUM LACTATE, POTASSIUM CHLORIDE, CALCIUM CHLORIDE 600; 310; 30; 20 MG/100ML; MG/100ML; MG/100ML; MG/100ML
100 INJECTION, SOLUTION INTRAVENOUS CONTINUOUS
Status: DISCONTINUED | OUTPATIENT
Start: 2019-09-01 | End: 2019-09-01 | Stop reason: HOSPADM

## 2019-09-01 RX ORDER — METHENAMINE, SODIUM PHOSPHATE, MONOBASIC, MONOHYDRATE, PHENYL SALICYLATE, METHYLENE BLUE, AND HYOSCYAMINE SULFATE 120; 40.8; 36; 10; .12 MG/1; MG/1; MG/1; MG/1; MG/1
CAPSULE ORAL
Status: COMPLETED
Start: 2019-09-01 | End: 2019-09-01

## 2019-09-01 RX ORDER — ASPIRIN 81 MG/1
81 TABLET ORAL DAILY
Status: DISCONTINUED | OUTPATIENT
Start: 2019-09-01 | End: 2019-09-01 | Stop reason: HOSPADM

## 2019-09-01 RX ORDER — MAGNESIUM HYDROXIDE 1200 MG/15ML
LIQUID ORAL AS NEEDED
Status: DISCONTINUED | OUTPATIENT
Start: 2019-09-01 | End: 2019-09-01 | Stop reason: HOSPADM

## 2019-09-01 RX ORDER — DOCUSATE SODIUM 100 MG/1
100 CAPSULE, LIQUID FILLED ORAL 2 TIMES DAILY PRN
Status: DISCONTINUED | OUTPATIENT
Start: 2019-09-01 | End: 2019-09-01 | Stop reason: HOSPADM

## 2019-09-01 RX ORDER — DEXAMETHASONE SODIUM PHOSPHATE 10 MG/ML
INJECTION INTRAMUSCULAR; INTRAVENOUS AS NEEDED
Status: DISCONTINUED | OUTPATIENT
Start: 2019-09-01 | End: 2019-09-01 | Stop reason: SURG

## 2019-09-01 RX ORDER — LEVOFLOXACIN 5 MG/ML
500 INJECTION, SOLUTION INTRAVENOUS EVERY 24 HOURS
Status: COMPLETED | OUTPATIENT
Start: 2019-09-01 | End: 2019-09-01

## 2019-09-01 RX ORDER — LISINOPRIL 10 MG/1
10 TABLET ORAL
Status: DISCONTINUED | OUTPATIENT
Start: 2019-09-01 | End: 2019-09-01 | Stop reason: HOSPADM

## 2019-09-01 RX ORDER — SCOLOPAMINE TRANSDERMAL SYSTEM 1 MG/1
1 PATCH, EXTENDED RELEASE TRANSDERMAL ONCE
Status: DISCONTINUED | OUTPATIENT
Start: 2019-09-01 | End: 2019-09-01

## 2019-09-01 RX ORDER — MIDAZOLAM HYDROCHLORIDE 1 MG/ML
2 INJECTION INTRAMUSCULAR; INTRAVENOUS
Status: DISCONTINUED | OUTPATIENT
Start: 2019-09-01 | End: 2019-09-01 | Stop reason: HOSPADM

## 2019-09-01 RX ORDER — ONDANSETRON 2 MG/ML
INJECTION INTRAMUSCULAR; INTRAVENOUS AS NEEDED
Status: DISCONTINUED | OUTPATIENT
Start: 2019-09-01 | End: 2019-09-01 | Stop reason: SURG

## 2019-09-01 RX ORDER — LIDOCAINE HYDROCHLORIDE 10 MG/ML
0.5 INJECTION, SOLUTION EPIDURAL; INFILTRATION; INTRACAUDAL; PERINEURAL ONCE AS NEEDED
Status: DISCONTINUED | OUTPATIENT
Start: 2019-09-01 | End: 2019-09-01 | Stop reason: HOSPADM

## 2019-09-01 RX ORDER — LIDOCAINE HYDROCHLORIDE 20 MG/ML
INJECTION, SOLUTION INFILTRATION; PERINEURAL AS NEEDED
Status: DISCONTINUED | OUTPATIENT
Start: 2019-09-01 | End: 2019-09-01 | Stop reason: SURG

## 2019-09-01 RX ORDER — METHENAMINE, SODIUM PHOSPHATE, MONOBASIC, MONOHYDRATE, PHENYL SALICYLATE, METHYLENE BLUE, AND HYOSCYAMINE SULFATE 120; 40.8; 36; 10; .12 MG/1; MG/1; MG/1; MG/1; MG/1
1 CAPSULE ORAL ONCE
Status: COMPLETED | OUTPATIENT
Start: 2019-09-01 | End: 2019-09-01

## 2019-09-01 RX ORDER — HYDROCODONE BITARTRATE AND ACETAMINOPHEN 7.5; 325 MG/1; MG/1
1 TABLET ORAL ONCE AS NEEDED
Status: DISCONTINUED | OUTPATIENT
Start: 2019-09-01 | End: 2019-09-01 | Stop reason: HOSPADM

## 2019-09-01 RX ORDER — DULOXETIN HYDROCHLORIDE 60 MG/1
60 CAPSULE, DELAYED RELEASE ORAL NIGHTLY
Status: DISCONTINUED | OUTPATIENT
Start: 2019-09-01 | End: 2019-09-01 | Stop reason: HOSPADM

## 2019-09-01 RX ORDER — EPHEDRINE SULFATE 50 MG/ML
5 INJECTION, SOLUTION INTRAVENOUS ONCE AS NEEDED
Status: DISCONTINUED | OUTPATIENT
Start: 2019-09-01 | End: 2019-09-01 | Stop reason: HOSPADM

## 2019-09-01 RX ORDER — FENTANYL CITRATE 50 UG/ML
50 INJECTION, SOLUTION INTRAMUSCULAR; INTRAVENOUS
Status: DISCONTINUED | OUTPATIENT
Start: 2019-09-01 | End: 2019-09-01 | Stop reason: HOSPADM

## 2019-09-01 RX ORDER — PROPOFOL 10 MG/ML
VIAL (ML) INTRAVENOUS AS NEEDED
Status: DISCONTINUED | OUTPATIENT
Start: 2019-09-01 | End: 2019-09-01 | Stop reason: SURG

## 2019-09-01 RX ORDER — PROMETHAZINE HYDROCHLORIDE 25 MG/ML
6.25 INJECTION, SOLUTION INTRAMUSCULAR; INTRAVENOUS
Status: DISCONTINUED | OUTPATIENT
Start: 2019-09-01 | End: 2019-09-01 | Stop reason: HOSPADM

## 2019-09-01 RX ORDER — PROMETHAZINE HYDROCHLORIDE 25 MG/ML
12.5 INJECTION, SOLUTION INTRAMUSCULAR; INTRAVENOUS ONCE AS NEEDED
Status: DISCONTINUED | OUTPATIENT
Start: 2019-09-01 | End: 2019-09-01 | Stop reason: HOSPADM

## 2019-09-01 RX ORDER — DIPHENHYDRAMINE HCL 25 MG
25 CAPSULE ORAL
Status: DISCONTINUED | OUTPATIENT
Start: 2019-09-01 | End: 2019-09-01 | Stop reason: HOSPADM

## 2019-09-01 RX ORDER — MIDAZOLAM HYDROCHLORIDE 1 MG/ML
1 INJECTION INTRAMUSCULAR; INTRAVENOUS
Status: DISCONTINUED | OUTPATIENT
Start: 2019-09-01 | End: 2019-09-01 | Stop reason: HOSPADM

## 2019-09-01 RX ORDER — SODIUM CHLORIDE 0.9 % (FLUSH) 0.9 %
3-10 SYRINGE (ML) INJECTION AS NEEDED
Status: DISCONTINUED | OUTPATIENT
Start: 2019-09-01 | End: 2019-09-01 | Stop reason: HOSPADM

## 2019-09-01 RX ORDER — FAMOTIDINE 20 MG/1
20 TABLET, FILM COATED ORAL 2 TIMES DAILY
Status: DISCONTINUED | OUTPATIENT
Start: 2019-09-01 | End: 2019-09-01 | Stop reason: HOSPADM

## 2019-09-01 RX ORDER — ACETAMINOPHEN 325 MG/1
650 TABLET ORAL ONCE AS NEEDED
Status: DISCONTINUED | OUTPATIENT
Start: 2019-09-01 | End: 2019-09-01 | Stop reason: HOSPADM

## 2019-09-01 RX ORDER — PROMETHAZINE HYDROCHLORIDE 25 MG/1
25 TABLET ORAL ONCE AS NEEDED
Status: DISCONTINUED | OUTPATIENT
Start: 2019-09-01 | End: 2019-09-01 | Stop reason: HOSPADM

## 2019-09-01 RX ORDER — FAMOTIDINE 10 MG/ML
20 INJECTION, SOLUTION INTRAVENOUS ONCE
Status: COMPLETED | OUTPATIENT
Start: 2019-09-01 | End: 2019-09-01

## 2019-09-01 RX ORDER — GABAPENTIN 300 MG/1
300 CAPSULE ORAL NIGHTLY
Status: DISCONTINUED | OUTPATIENT
Start: 2019-09-01 | End: 2019-09-01 | Stop reason: HOSPADM

## 2019-09-01 RX ORDER — ONDANSETRON 2 MG/ML
4 INJECTION INTRAMUSCULAR; INTRAVENOUS ONCE AS NEEDED
Status: DISCONTINUED | OUTPATIENT
Start: 2019-09-01 | End: 2019-09-01 | Stop reason: HOSPADM

## 2019-09-01 RX ORDER — OXYCODONE AND ACETAMINOPHEN 7.5; 325 MG/1; MG/1
1 TABLET ORAL ONCE AS NEEDED
Status: DISCONTINUED | OUTPATIENT
Start: 2019-09-01 | End: 2019-09-01 | Stop reason: HOSPADM

## 2019-09-01 RX ORDER — GABAPENTIN 300 MG/1
300 CAPSULE ORAL NIGHTLY
Status: DISCONTINUED | OUTPATIENT
Start: 2019-09-01 | End: 2019-09-01

## 2019-09-01 RX ORDER — PROMETHAZINE HYDROCHLORIDE 25 MG/1
25 SUPPOSITORY RECTAL ONCE AS NEEDED
Status: DISCONTINUED | OUTPATIENT
Start: 2019-09-01 | End: 2019-09-01 | Stop reason: HOSPADM

## 2019-09-01 RX ORDER — FENTANYL CITRATE 50 UG/ML
INJECTION, SOLUTION INTRAMUSCULAR; INTRAVENOUS
Status: COMPLETED
Start: 2019-09-01 | End: 2019-09-01

## 2019-09-01 RX ADMIN — GABAPENTIN 300 MG: 300 CAPSULE ORAL at 02:51

## 2019-09-01 RX ADMIN — SODIUM CHLORIDE, POTASSIUM CHLORIDE, SODIUM LACTATE AND CALCIUM CHLORIDE 9 ML/HR: 600; 310; 30; 20 INJECTION, SOLUTION INTRAVENOUS at 10:20

## 2019-09-01 RX ADMIN — LISINOPRIL 10 MG: 10 TABLET ORAL at 13:25

## 2019-09-01 RX ADMIN — PROPOFOL 200 MG: 10 INJECTION, EMULSION INTRAVENOUS at 12:11

## 2019-09-01 RX ADMIN — LEVOFLOXACIN 500 MG: 5 INJECTION, SOLUTION INTRAVENOUS at 10:20

## 2019-09-01 RX ADMIN — CETIRIZINE HYDROCHLORIDE 10 MG: 10 TABLET, FILM COATED ORAL at 13:25

## 2019-09-01 RX ADMIN — METHENAMINE, SODIUM PHOSPHATE, MONOBASIC, MONOHYDRATE, PHENYL SALICYLATE, METHYLENE BLUE, AND HYOSCYAMINE SULFATE 118 MG: 120; 40.8; 36; 10; .12 CAPSULE ORAL at 12:54

## 2019-09-01 RX ADMIN — LIDOCAINE HYDROCHLORIDE 60 MG: 20 INJECTION, SOLUTION INFILTRATION; PERINEURAL at 12:11

## 2019-09-01 RX ADMIN — FAMOTIDINE 20 MG: 10 INJECTION INTRAVENOUS at 10:20

## 2019-09-01 RX ADMIN — FAMOTIDINE 20 MG: 20 TABLET, FILM COATED ORAL at 13:26

## 2019-09-01 RX ADMIN — SCOPALAMINE 1 PATCH: 1 PATCH, EXTENDED RELEASE TRANSDERMAL at 10:31

## 2019-09-01 RX ADMIN — SODIUM CHLORIDE, POTASSIUM CHLORIDE, SODIUM LACTATE AND CALCIUM CHLORIDE 100 ML/HR: 600; 310; 30; 20 INJECTION, SOLUTION INTRAVENOUS at 02:51

## 2019-09-01 RX ADMIN — FENTANYL CITRATE 50 MCG: 50 INJECTION INTRAMUSCULAR; INTRAVENOUS at 10:20

## 2019-09-01 RX ADMIN — FENTANYL CITRATE 75 MCG: 50 INJECTION INTRAMUSCULAR; INTRAVENOUS at 12:34

## 2019-09-01 RX ADMIN — CEFTRIAXONE 1 G: 1 INJECTION, SOLUTION INTRAVENOUS at 00:12

## 2019-09-01 RX ADMIN — MIDAZOLAM 1 MG: 1 INJECTION INTRAMUSCULAR; INTRAVENOUS at 10:20

## 2019-09-01 RX ADMIN — FENTANYL CITRATE 25 MCG: 50 INJECTION INTRAMUSCULAR; INTRAVENOUS at 12:10

## 2019-09-01 RX ADMIN — ONDANSETRON 4 MG: 2 INJECTION INTRAMUSCULAR; INTRAVENOUS at 12:27

## 2019-09-01 RX ADMIN — HYDROMORPHONE HYDROCHLORIDE 1 MG: 2 INJECTION, SOLUTION INTRAMUSCULAR; INTRAVENOUS; SUBCUTANEOUS at 00:01

## 2019-09-01 RX ADMIN — DEXAMETHASONE SODIUM PHOSPHATE 4 MG: 10 INJECTION INTRAMUSCULAR; INTRAVENOUS at 12:18

## 2019-09-01 NOTE — H&P
[facility] History and Physical    Patient Care Team:  Aayush Mckeon MD as PCP - General  Aayush Mckeon MD as PCP - Family Medicine    Chief complaint got a left-sided kidney stone    Subjective     Patient is a 54 y.o. female presents with history of recurrent stone disease CT scan showing bilateral stones with left-sided colic pain up to 8 out of 10 nausea without fever and chills findings and CT scan of left distal 6 mm ureteral stone hydronephrosis bilateral nephro calculi      Review of Systems   No fever chills    Past Medical History:   Diagnosis Date   • Arthritis     RIGHT SHOULDER   • Chronic back pain    • Diabetes mellitus (CMS/HCC)    • GERD (gastroesophageal reflux disease)    • H/O seasonal allergies    • History of anemia    • History of diverticulitis    • History of kidney stones    • Hypertension    • PONV (postoperative nausea and vomiting)    • Sleep apnea with use of continuous positive airway pressure (CPAP)    • Uterine prolapse     UTERINE VAG PROLAPSE     Past Surgical History:   Procedure Laterality Date   • ANTERIOR AND POSTERIOR VAGINAL REPAIR N/A 1/15/2019    Procedure: /POSTERIOR REPAIR;  Surgeon: James Holloway MD;  Location: [facility] OR;  Service: Gynecology   • CHOLECYSTECTOMY     • COLONOSCOPY N/A 1/18/2017    Procedure: COLONOSCOPY;  Surgeon: Tian Henderson MD;  Location: [facility] OR;  Service:    • CYSTOSCOPY URETEROSCOPY LASER LITHOTRIPSY N/A 4/6/2016    Procedure: URETEROSCOPY  ;  Surgeon: Vimal Azul MD;  Location: [facility] OR;  Service:    • CYSTOSCOPY W/ LASER LITHOTRIPSY Left 6/25/2018    Procedure: CYSTOSCOPY, LEFT URETEROSCOPY, left  STENT PLACEMENT, right retrograde;  Surgeon: Vimal Azul MD;  Location: [facility] OR;  Service: Urology   • EXTRACORPOREAL SHOCK WAVE LITHOTRIPSY (ESWL)  2015   • KIDNEY STONE SURGERY     • SACROCOLPOPEXY N/A 1/15/2019    Procedure: ROBOTIC SACROCOLPOPEXY WITH MESH, TOTAL LAPAROSCOPICE HYSTERECTOMY  BILATERAL SALPINGECTOMY WITH DAVINCI ;  Surgeon: James Holloway MD;  Location: [facility] OR;  Service: DaVinci     No family history on file.  Social History     Tobacco Use   • Smoking status: Never Smoker   • Smokeless tobacco: Never Used   Substance Use Topics   • Alcohol use: No   • Drug use: No       Meds:  Medications Prior to Admission   Medication Sig Dispense Refill Last Dose   • aspirin 81 MG EC tablet Take 81 mg by mouth Daily. HOLD FOR SURGERY   7/20/2019 at Unknown time   • Cyanocobalamin (VITAMIN B 12 PO) Take 1,000 Units by mouth Daily.   7/20/2019 at Unknown time   • docusate sodium 100 MG capsule Take 100 mg by mouth 2 (Two) Times a Day As Needed (constipation). 60 each 0 7/19/2019 at Unknown time   • DULoxetine (CYMBALTA) 60 MG capsule Take 60 mg by mouth Every Evening.   7/19/2019 at Unknown time   • GABAPENTIN, ONCE-DAILY, PO Take 300 mg by mouth every night.   7/19/2019 at Unknown time   • ibuprofen (ADVIL,MOTRIN) 800 MG tablet Take 800 mg by mouth 2 (Two) Times a Day. WILL CHECK WHEN TO STOP    7/20/2019 at Unknown time   • lisinopril (PRINIVIL,ZESTRIL) 10 MG tablet Take 10 mg by mouth Daily.   7/20/2019 at Unknown time   • Loratadine 10 MG capsule Take 10 mg by mouth Every Morning.   7/20/2019 at Unknown time   • METFORMIN HCL PO Take 500 mg by mouth 2 (Two) Times a Day Before Meals.   7/20/2019 at Unknown time   • Multiple Vitamin (MULTI VITAMIN DAILY PO) Take 1 tablet by mouth Daily.   7/20/2019 at Unknown time   • ondansetron ODT (ZOFRAN-ODT) 4 MG disintegrating tablet Take 1 tablet by mouth Every 6 (Six) Hours As Needed for Nausea or Vomiting. 20 tablet 0    • promethazine (PHENERGAN) 25 MG tablet Take 1 tablet by mouth Every 6 (Six) Hours As Needed for Nausea or Vomiting. 12 tablet 0 More than a month at Unknown time   • raNITIdine (ZANTAC) 150 MG tablet Take 150 mg by mouth 2 (Two) Times a Day.   7/20/2019 at Unknown time   • TRAZODONE HCL PO Take 50 mg by mouth As Needed.    1/14/2019 at Unknown time   • TURMERIC PO Take 1 tablet by mouth Daily. HOLD FOR SURGERY   1/8/2019   • vitamin E 400 UNIT capsule Take 400 Units by mouth Daily. WILL HOLD FOR SURGERY   1/8/2019   • Zinc 25 MG tablet Take 25 mg by mouth Daily.   1/14/2019 at Unknown time       Allergies:  Penicillins; Flagyl [metronidazole]; and Sulfa antibiotics    Debilities:  None    Objective     Vital Signs  Temp:  [97.5 °F (36.4 °C)-97.9 °F (36.6 °C)] 97.5 °F (36.4 °C)  Heart Rate:  [80-93] 90  Resp:  [16-18] 18  BP: (123-156)/(72-91) 143/81    Intake/Output Summary (Last 24 hours) at 9/1/2019 0722  Last data filed at 9/1/2019 0514  Gross per 24 hour   Intake --   Output 400 ml   Net -400 ml     Flowsheet Rows      First Filed Value   Admission Height  --   Admission Weight  87.5 kg (193 lb) Documented at 09/01/2019 0243           Physical Exam:      General Appearance:    Alert, cooperative, in no acute distress   Head:    Normocephalic, without obvious abnormality, atraumatic   Eyes:            Lids and lashes normal, conjunctivae and sclerae normal, no   icterus, no pallor, corneas clear,    Ears:    Ears appear intact with no abnormalities noted   Throat:   No oral lesions, no thrush, oral mucosa moist   Neck:   No adenopathy, supple, trachea midline, no thyromegaly, no     carotid bruit, no JVD   Back:     No kyphosis present, no scoliosis present, no skin lesions,       erythema or scars, left greater than right CVA tenderness to percussion or                   palpation,   range of motion normal   Lungs:     Clear to auscultation,respirations regular, even and                   unlabored    Heart:    Regular rhythm and normal rate, normal S1 and S2, no            murmur, no gallop, no rub, no click   Breast Exam:    Deferred   Abdomen:     Normal bowel sounds, no masses, no organomegaly, soft        non-tender, non-distended, no guarding, no rebound                 tenderness   Genitalia:    Deferred   Extremities:    Moves all extremities well, no edema, no cyanosis, no              redness   Pulses:   Pulses palpable and equal bilaterally   Skin:   No bleeding, bruising or rash   Lymph nodes:   No palpable adenopathy         Results Review:    I reviewed the patient's new clinical results.  Including CT scan and films  Results for orders placed or performed during the hospital encounter of 09/01/19   Vitamin B12   Result Value Ref Range    Vitamin B-12 1,384 (H) 211 - 946 pg/mL        Assessment:  Left distal ureteral stone hydronephrosis and colic inability to pass    Plan:    Parenteral analgesics hydration and plan stone manipulation R-B-0  I discussed the patients findings and my recommendations with patient and nursing staff .     Vimal Azul MD  09/01/19  7:22 AM

## 2019-09-01 NOTE — PLAN OF CARE
"Allyssa Wright  : 1978   MRN: [id number]  Date: 2018     Psychiatric - ECT  H&P     Chief complaint: MDD recurrent in partial remission   Procedure: ECT #50    SUBJECTIVE:     HPI:   Allyssa Wright is a 40 y.o. female with MDD recurrent in partial remission  who presents for ECT #50. The pt does not have any physical complaints this AM. Last ECT treatment on  and patient reports she feels "better than before".    The pt does not need any prescriptions and has not had any med changes since meeting with Dr Boyce. Patient denies changes in dentition. The pt has not had anything by mouth since 9:30pm, and is ready for ECT.    Denies lightheadedness, headache, changes in vision, chest pain, shortness of breath, abdominal pain, nausea, vomiting, diarrhea, constipation, dysuria, hematuria, hematochezia, numbness and weakness.     Psychiatric Review of Systems:  Mood: Mild  Appetite: No problem  Psychomotor: No problem  Cognitive Impairment: None  Insomnia: None  Psychosis: None  Diurnal Variation: None  Suicidal Ideation: Absent      Medical Review Of Systems:  Pertinent items are noted in HPI.    Current Medications:   No current facility-administered medications on file prior to encounter.      Current Outpatient Prescriptions on File Prior to Encounter   Medication Sig Dispense Refill    clozapine (CLOZARIL) 50 MG tablet Take 100 mg by mouth once daily.       dapsone 7.5 % GlwP Apply topically once daily.      famciclovir (FAMVIR) 500 MG tablet Take 1 tablet (500 mg total) by mouth 2 (two) times daily. 30 tablet 12    mirtazapine (REMERON) 15 MG tablet Take 1 tablet (15 mg total) by mouth every evening. (Patient taking differently: Take 7.5 mg by mouth every evening. ) 90 tablet 0    spironolactone (ALDACTONE) 50 MG tablet 50 mg 2 (two) times daily. 50  mg qAM, 50 mg qHS.      thyroid (ARMOUR THYROID) 30 mg Tab Take 1 tablet (30 mg total) by mouth every morning. 30 tablet 11    trazodone (DESYREL) " Problem: Patient Care Overview  Goal: Plan of Care Review  Outcome: Ongoing (interventions implemented as appropriate)    Goal: Individualization and Mutuality  Outcome: Ongoing (interventions implemented as appropriate)    Goal: Discharge Needs Assessment  Outcome: Ongoing (interventions implemented as appropriate)    Goal: Interprofessional Rounds/Family Conf  Outcome: Ongoing (interventions implemented as appropriate)      Problem: Pain, Chronic (Adult)  Goal: Identify Related Risk Factors and Signs and Symptoms  Outcome: Outcome(s) achieved Date Met: 09/01/19    Goal: Acceptable Pain/Comfort Level and Functional Ability  Outcome: Ongoing (interventions implemented as appropriate)         100 MG tablet Take 200 mg by mouth every evening.       UNABLE TO FIND 2 (two) times daily. n-azetyl-cysteine      venlafaxine (EFFEXOR) 100 MG Tab Take 3 tablets (300 mg total) by mouth once daily. (Patient taking differently: Take 75 mg by mouth once daily. )      vortioxetine (TRINTELLIX) 5 mg Tab Take 2.5 mg by mouth once daily.      dextroamphetamine-amphetamine 30 mg Tab Take 30 mg by mouth 2 (two) times daily.       ZOVIRAX 5 % Crea   5          Allergies:   Review of patient's allergies indicates:   Allergen Reactions    Benzodiazepines Other (See Comments)     Contraindicated while taking Clozapine    Ampicillin      Mom says so    Erythromycin     Levaquin [levofloxacin] Other (See Comments)     Depression side effects    Penicillins      Mom says so    Pristiq [desvenlafaxine]      psycotic      Sulfa (sulfonamide antibiotics)      Rash      Azithromycin Anxiety        Past Medical/Surgical History:   Past Medical History:   Diagnosis Date    Anxiety     Depression     History of psychiatric hospitalization     HSV-1 (herpes simplex virus 1) infection     Hx of psychiatric care     Moderate depressed bipolar II disorder 06/13/2016    reports no history of bipolar    Obsessive-compulsive disorder     Psychiatric problem     Schizophrenia 4/3/2018    Self-harming behavior     Therapy      Past Surgical History:   Procedure Laterality Date    ANKLE SURGERY Right     BREAST augmentation      OVARIAN CYST REMOVAL Bilateral           OBJECTIVE:   Vitals (pre-procedure):  Vitals:    08/09/18 0618   BP: 110/66   Pulse: 79   Resp: 16   Temp: 97.5 °F (36.4 °C)        Labs/Imaging/Studies:   Recent Results (from the past 48 hour(s))   POCT urine pregnancy    Collection Time: 08/09/18  6:35 AM   Result Value Ref Range    POC Preg Test, Ur Negative Negative     Acceptable Yes       No results found for: PHENYTOIN, PHENOBARB, VALPROATE, CBMZ      Physical Exam:   General: well  "nourished and NAD  Orientation: oriented to person, place, and time  Mental Status: alert and responsive  Chest: breath sounds clear and equal bilaterally  Heart: RRR, no murmurs rubs or gallops   Abdomen: soft, non-, no palpable masses, +BS     Mental Status Exam:   Appearance: age appropriate, well nourished, dressed in hospital gown  Behavior: friendly and cooperative, eye contact appropriate  Speech: normal tone, normal rate, normal pitch, normal volume  Mood: "better, about a 5"  Affect: full, reactive  Thought Process: linear and organized  Thought Content: no SI, no HI or AVH  Cognition: grossly intact  Insight: good  Judgment: appropriate for setting     ASSESSMENT/PLAN:   Allyssa Wright is a 40 y.o. female with MDD recurrent in partial remission who presents for ECT treatment #50.    Recommendations:   Proceed with ECT #50.      Pamella Viramontes M.D.  PGY 2 [employer] [facility]   8/9/2018    "

## 2019-09-01 NOTE — ED NOTES
Call made to Urology @[phone], Dr. Azul is on-call     Alicia Claudio  08/31/19 3333       Alicia Claudio  08/31/19 4206

## 2019-09-01 NOTE — ED PROVIDER NOTES
Subjective   History of Present Illness  History of Present Illness    Chief complaint: Flank pain    Location: Right flank and left flank pain.  It radiates around to the front of the abdomen.    Quality/Severity: 8/10 is worst, 8/10 currently, stabbing    Timing/Onset/Duration: Began this afternoon    Modifying Factors: Nothing seems to make it better or worse    Associated Symptoms: No headache.  No fever chills or cough.  No sore throat or earache.  The patient has clear nasal congestion related to allergies.  No chest pain or shortness of breath.  The patient complains of diffuse abdominal pain.  No diarrhea or burning when she urinates.  She does complain of pressure when she urinates.    Narrative: This 54-year-old white female with a history of kidney stones 1 month ago, presents with right flank pain.    PCP:  Linda    Urologist: Jorge Alberto      Review of Systems   Constitutional: Negative for chills and fever.   HENT: Negative for ear pain and sore throat.    Respiratory: Negative for cough, chest tightness and shortness of breath.    Cardiovascular: Negative for chest pain and leg swelling.   Gastrointestinal: Positive for abdominal pain and nausea. Negative for blood in stool, constipation, diarrhea and vomiting.   Genitourinary: Positive for flank pain. Negative for decreased urine volume, difficulty urinating, dysuria, frequency, hematuria and urgency.   Musculoskeletal: Negative for back pain and neck stiffness.   Skin: Negative for rash.   Neurological: Negative for weakness, numbness and headaches.   Psychiatric/Behavioral: Negative.  Negative for confusion.        Medication List      ASK your doctor about these medications    aspirin 81 MG EC tablet     docusate sodium 100 MG capsule  Take 100 mg by mouth 2 (Two) Times a Day As Needed (constipation).     DULoxetine 60 MG capsule  Commonly known as:  CYMBALTA     GABAPENTIN (ONCE-DAILY) PO     ibuprofen 800 MG tablet  Commonly known as:   ADVIL,MOTRIN     lisinopril 10 MG tablet  Commonly known as:  PRINIVIL,ZESTRIL     Loratadine 10 MG capsule     METFORMIN HCL PO     MULTI VITAMIN DAILY PO     ondansetron ODT 4 MG disintegrating tablet  Commonly known as:  ZOFRAN-ODT  Take 1 tablet by mouth Every 6 (Six) Hours As Needed for Nausea or   Vomiting.     promethazine 25 MG tablet  Commonly known as:  PHENERGAN  Take 1 tablet by mouth Every 6 (Six) Hours As Needed for Nausea or   Vomiting.     raNITIdine 150 MG tablet  Commonly known as:  ZANTAC     TRAZODONE HCL PO     TURMERIC PO     VITAMIN B 12 PO     vitamin E 400 UNIT capsule     Zinc 25 MG tablet          Past Medical History:   Diagnosis Date   • Arthritis     RIGHT SHOULDER   • Chronic back pain    • Diabetes mellitus (CMS/HCC)    • GERD (gastroesophageal reflux disease)    • H/O seasonal allergies    • History of anemia    • History of diverticulitis    • History of kidney stones    • Hypertension    • PONV (postoperative nausea and vomiting)    • Sleep apnea with use of continuous positive airway pressure (CPAP)    • Uterine prolapse     UTERINE VAG PROLAPSE       Allergies   Allergen Reactions   • Penicillins Rash   • Flagyl [Metronidazole] Hives   • Sulfa Antibiotics Rash       Past Surgical History:   Procedure Laterality Date   • ANTERIOR AND POSTERIOR VAGINAL REPAIR N/A 1/15/2019    Procedure: /POSTERIOR REPAIR;  Surgeon: James Holloway MD;  Location: [facility];  Service: Gynecology   • CHOLECYSTECTOMY     • COLONOSCOPY N/A 1/18/2017    Procedure: COLONOSCOPY;  Surgeon: Tian Henderson MD;  Location: [facility] OR;  Service:    • CYSTOSCOPY URETEROSCOPY LASER LITHOTRIPSY N/A 4/6/2016    Procedure: URETEROSCOPY  ;  Surgeon: Vimal Azul MD;  Location: [facility] OR;  Service:    • CYSTOSCOPY W/ LASER LITHOTRIPSY Left 6/25/2018    Procedure: CYSTOSCOPY, LEFT URETEROSCOPY, left  STENT PLACEMENT, right retrograde;  Surgeon: Vimal Azul MD;  Location: [facility] OR;  Service:  Urology   • EXTRACORPOREAL SHOCK WAVE LITHOTRIPSY (ESWL)  2015   • KIDNEY STONE SURGERY     • SACROCOLPOPEXY N/A 1/15/2019    Procedure: ROBOTIC SACROCOLPOPEXY WITH MESH, TOTAL LAPAROSCOPICE HYSTERECTOMY BILATERAL SALPINGECTOMY WITH DAVINCI ;  Surgeon: James Holloway MD;  Location: [facility];  Service: [facility]       No family history on file.    Social History     Socioeconomic History   • Marital status:      Spouse name: Not on file   • Number of children: Not on file   • Years of education: Not on file   • Highest education level: Not on file   Tobacco Use   • Smoking status: Never Smoker   • Smokeless tobacco: Never Used   Substance and Sexual Activity   • Alcohol use: No   • Drug use: No           Objective   Physical Exam   Constitutional: She is oriented to person, place, and time. She appears well-developed and well-nourished. No distress.   ED Triage Vitals (08/31/19 2229)  Temp: 97.9 °F (36.6 °C)  Heart Rate: 93  Resp: 16  BP: 156/91  SpO2: 96 %  Temp src: Oral  Heart Rate Source: n/a  Patient Position: n/a  BP Location: n/a  FiO2 (%): n/a    The patient's vitals were reviewed by me.  Unless otherwise noted they are within normal limits.     HENT:   Head: Normocephalic and atraumatic.   Right Ear: External ear normal.   Left Ear: External ear normal.   Nose: Nose normal.   Mouth/Throat: Oropharynx is clear and moist. No oropharyngeal exudate.   Eyes: Conjunctivae and EOM are normal. Pupils are equal, round, and reactive to light. Right eye exhibits no discharge. Left eye exhibits no discharge. No scleral icterus.   Neck: Normal range of motion. Neck supple. No JVD present. No tracheal deviation present. No thyromegaly present.   Cardiovascular: Normal rate, regular rhythm, normal heart sounds and intact distal pulses. Exam reveals no gallop and no friction rub.   No murmur heard.  Pulmonary/Chest: Effort normal and breath sounds normal. No stridor. No respiratory distress. She has no  wheezes. She has no rales. She exhibits no tenderness.   Abdominal: Soft. Bowel sounds are normal. She exhibits no distension and no mass. There is tenderness (Mild anterior abdominal tenderness diffuse). There is no rebound and no guarding. No hernia.   Moderate bilateral cva tenderness, worse on left.   Musculoskeletal: Normal range of motion. She exhibits no edema or deformity.   Lymphadenopathy:     She has no cervical adenopathy.   Neurological: She is alert and oriented to person, place, and time. No cranial nerve deficit or sensory deficit. She exhibits normal muscle tone. Coordination normal.   Skin: Skin is warm and dry. No rash noted. She is not diaphoretic. No erythema. No pallor.   Psychiatric: Her behavior is normal.   Nursing note and vitals reviewed.      Procedures           ED Course  ED Course as of Aug 31 2340   Sat Aug 31, 2019   2338 The laboratory values were reviewed by me.  The serum glucose is 176.  The BUN is 24.  The relative neutrophil count is 41%.  The urinalysis shows too numerous to count red blood cells, 6-12 white blood cells, trace bacteria.  The leukocyte is trace.  The nitrite is negative.  The laboratory values are otherwise unremarkable.  [RC]      ED Course User Index  [RC] Jam Maguire MD      11:44 PM, 08/31/19:  The patient was reassessed.  She rates her pain 5/10.  Her vital signs were reviewed and are stable.  Abdominal exam: Soft nontender no masses positive bowel sounds.    11:44 PM, 08/31/19:  The patient's diagnosis of left ureteral lithiasis with UTI was discussed with her.  I will consult urology.  Urology has been paged out.    11:54 PM, 08/31/19:  I spoke with Dr. Azul.  He would like to admit the patient.  He has cases scheduled in the morning at [facility] and would not be able to get to the patient to later in the afternoon.  The patient was informed of this.  She prefers to go to [facility] to receive her procedure  earlier and to help relieve her pain.          MDM    No orders to display     Labs Reviewed - No data to display  No results found.    Final diagnoses:   Left ureteral stone   Acute UTI         ED Medications:  Medications - No data to display    New Medications:     Medication List      ASK your doctor about these medications    aspirin 81 MG EC tablet     docusate sodium 100 MG capsule  Take 100 mg by mouth 2 (Two) Times a Day As Needed (constipation).     DULoxetine 60 MG capsule  Commonly known as:  CYMBALTA     GABAPENTIN (ONCE-DAILY) PO     ibuprofen 800 MG tablet  Commonly known as:  ADVIL,MOTRIN     lisinopril 10 MG tablet  Commonly known as:  PRINIVIL,ZESTRIL     Loratadine 10 MG capsule     METFORMIN HCL PO     MULTI VITAMIN DAILY PO     ondansetron ODT 4 MG disintegrating tablet  Commonly known as:  ZOFRAN-ODT  Take 1 tablet by mouth Every 6 (Six) Hours As Needed for Nausea or   Vomiting.     promethazine 25 MG tablet  Commonly known as:  PHENERGAN  Take 1 tablet by mouth Every 6 (Six) Hours As Needed for Nausea or   Vomiting.     raNITIdine 150 MG tablet  Commonly known as:  ZANTAC     TRAZODONE HCL PO     TURMERIC PO     VITAMIN B 12 PO     vitamin E 400 UNIT capsule     Zinc 25 MG tablet          Stopped Medications:     Medication List      ASK your doctor about these medications    aspirin 81 MG EC tablet     docusate sodium 100 MG capsule  Take 100 mg by mouth 2 (Two) Times a Day As Needed (constipation).     DULoxetine 60 MG capsule  Commonly known as:  CYMBALTA     GABAPENTIN (ONCE-DAILY) PO     ibuprofen 800 MG tablet  Commonly known as:  ADVIL,MOTRIN     lisinopril 10 MG tablet  Commonly known as:  PRINIVIL,ZESTRIL     Loratadine 10 MG capsule     METFORMIN HCL PO     MULTI VITAMIN DAILY PO     ondansetron ODT 4 MG disintegrating tablet  Commonly known as:  ZOFRAN-ODT  Take 1 tablet by mouth Every 6 (Six) Hours As Needed for Nausea or   Vomiting.     promethazine 25 MG tablet  Commonly known  as:  PHENERGAN  Take 1 tablet by mouth Every 6 (Six) Hours As Needed for Nausea or   Vomiting.     raNITIdine 150 MG tablet  Commonly known as:  ZANTAC     TRAZODONE HCL PO     TURMERIC PO     VITAMIN B 12 PO     vitamin E 400 UNIT capsule     Zinc 25 MG tablet            Final diagnoses:   Left ureteral stone   Acute UTI            Jam Maguire MD  08/31/19 1002

## 2019-09-01 NOTE — BRIEF OP NOTE
CYSTOSCOPY URETEROSCOPY LASER LITHOTRIPSY  Progress Note    Sánchez Carbajal  9/1/2019    Pre-op Diagnosis:   LEFT URETERAL STONES HYDRO COLIC        Post-Op Diagnosis Codes:  NORMAL RETROGRADE PYELOGRAMS     Procedure/CPT® Codes:      Procedure(s):  CYSTOSCOPY RETROGRADE PYELOGRAMS     Surgeon(s):  Vimal Azul MD    Anesthesia: General    Staff:   * No surgical staff found *    Estimated Blood Loss: none    Urine Voided: 850 mL    Specimens:                          Drains:      Findings:STONES LEFT PASSED MILD HYDRO  NO OBSTRUCTION    Complications:      Vimal Azul MD     Date: 9/1/2019  Time: 11:41 AM

## 2019-09-01 NOTE — PLAN OF CARE
Problem: Patient Care Overview  Goal: Plan of Care Review   09/01/19 4746   OTHER   Outcome Summary Admitted from ED for kidney stones. C/o nausea with one episode of emesis noted. No c/o pain. Straining all urine. Kept NPO tonight. 2LO2 throughout the night since pt wears CPAP at home and does not have it here. IVF started. Up ad ruth. Will continue to monitor.

## 2019-09-01 NOTE — OP NOTE
Preop diagnosis bilateral nephro calculi left ureteral stones colic    Postop diagnosis stones passed mild hydronephrosis with no obstruction    Operative procedure cystoscopy retrograde pyelograms    Surgeon Jorge Alberto    Anesthesia General    Procedure note 54-year-old presenting with recurrent stone disease with the above-mentioned findings and CT scan passed a couple stones earlier this morning which were sent off for chemical analysis but continued discomfort and multiple stones were seen in the ureter suggest the possibility of still retained stones CT scan also revealed bilateral renal stones    Levaquin was given timeout was taken after adequate general anesthesia was placed very carefully modified lithotomy position all pressure points relieved she was prepped and draped sterile fashion of her genitalia after clinical gross pelvic examination revealed a unremarkable rectocele but normal anterior wall support the scope was advanced the bladder no stones were in the bladder left ureteral orifice with with edema and erythema right ureteral orifice was normal with clear reflux no bladder tumors were seen  films reveal pelvic calcifications retrograde pyelograms show mild hydroureter on the left side with excellent emptying on that side that side was negative as well the bladder is a partially filled patient procedure well was sent to the recovery in satisfactory condition.  Disposition on the instructions she will be called the [place] office to see me l me 1 month review of findings and follow-up and course of treatment at that time instruction she has been given with phone numbers in case there is any concerns or questions to contact us accordingly

## 2019-09-01 NOTE — ED NOTES
"The pt arrived to the ED ambulatory. Accompanied by spouse. The pt reports bilateral flank pain \"for awhile\" with increasing severity today. The pt reports the pain started in her R flank and \"moved across her stomach\" to her R flank. The pt describes the pain as sharp. Denies dysuria or urinary frequency.      Carla Banuelos RN  08/31/19 3228    "

## 2019-09-01 NOTE — ANESTHESIA POSTPROCEDURE EVALUATION
Patient: Sánchez Carbajal    Procedure Summary     Date:  09/01/19 Room / Location:  [facility] OR 01 / [facility] MAIN OR    Anesthesia Start:  1207 Anesthesia Stop:  1236    Procedure:  CYSTOSCOPY left retrograde pyelogram (Left ) Diagnosis:      Surgeon:  Vimal Azul MD Provider:  Peter Orr MD    Anesthesia Type:  general ASA Status:  3          Anesthesia Type: general  Last vitals  BP   138/94 (09/01/19 1235)   Temp   36.6 °C (97.8 °F) (09/01/19 1235)   Pulse   79 (09/01/19 1235)   Resp   16 (09/01/19 1235)     SpO2   100 % (09/01/19 1235)     Post Anesthesia Care and Evaluation    Patient location during evaluation: bedside  Patient participation: complete - patient participated  Level of consciousness: awake and alert  Pain management: adequate  Airway patency: patent  Anesthetic complications: No anesthetic complications    Cardiovascular status: acceptable  Respiratory status: acceptable  Hydration status: acceptable    Comments: /94 (BP Location: Right arm, Patient Position: Lying)   Pulse 79   Temp 36.6 °C (97.8 °F) (Oral)   Resp 16   Wt 87.5 kg (193 lb)   LMP  (LMP Unknown) Comment: thinks 1-2 yrs ago  SpO2 100%   BMI 31.15 kg/m²

## 2019-09-01 NOTE — ANESTHESIA PROCEDURE NOTES
Airway  Urgency: elective    Date/Time: 9/1/2019 12:13 PM  Airway not difficult    General Information and Staff    Patient location during procedure: OR  Anesthesiologist: Peter Orr MD  CRNA: Yoselin Nix CRNA    Indications and Patient Condition  Indications for airway management: airway protection    Preoxygenated: yes  Mask difficulty assessment: 1 - vent by mask    Final Airway Details  Final airway type: supraglottic airway      Successful airway: classic  Size 4    Number of attempts at approach: 1    Additional Comments  Pre 02, sivi, easy bvm, lma placed easily, appears atraumatic, teeth unchanged

## 2019-09-01 NOTE — ANESTHESIA PREPROCEDURE EVALUATION
Anesthesia Evaluation     Patient summary reviewed and Nursing notes reviewed   history of anesthetic complications: PONV  NPO Solid Status: > 8 hours  NPO Liquid Status: > 8 hours           Airway   Mallampati: II  TM distance: >3 FB  Neck ROM: full  no difficulty expected  Dental - normal exam   (+) partials    Pulmonary - normal exam   (+) sleep apnea on CPAP,   Cardiovascular - normal exam    (+) hypertension,       Neuro/Psych  GI/Hepatic/Renal/Endo    (+) obesity,  GERD,  diabetes mellitus type 2,     Musculoskeletal     Abdominal  - normal exam   Substance History      OB/GYN          Other   (+) arthritis                       Anesthesia Plan    ASA 3     general     intravenous induction   Anesthetic plan, all risks, benefits, and alternatives have been provided, discussed and informed consent has been obtained with: patient.

## 2019-09-02 LAB — BACTERIA SPEC AEROBE CULT: NORMAL

## 2019-09-10 LAB
CA PHOS CRY STONE QL IR: 10 %
COD CRY STONE QL IR: 30 %
COLOR STONE: NORMAL
COM CRY STONE QL IR: 60 %
COMPN STONE: NORMAL
Lab: NORMAL
Lab: NORMAL
NIDUS STONE QL: NORMAL
PATH REPORT.COMMENTS IMP SPEC: NORMAL
SIZE STONE: NORMAL MM
WT STONE: 41.8 MG

## 2019-11-24 ENCOUNTER — APPOINTMENT (OUTPATIENT)
Dept: GENERAL RADIOLOGY | Facility: HOSPITAL | Age: 54
End: 2019-11-24

## 2019-11-24 ENCOUNTER — APPOINTMENT (OUTPATIENT)
Dept: CT IMAGING | Facility: HOSPITAL | Age: 54
End: 2019-11-24

## 2019-11-24 ENCOUNTER — HOSPITAL ENCOUNTER (OUTPATIENT)
Facility: HOSPITAL | Age: 54
Setting detail: OBSERVATION
Discharge: HOME | End: 2019-11-26
Attending: EMERGENCY MEDICINE | Admitting: HOSPITALIST

## 2019-11-24 DIAGNOSIS — J20.9 BRONCHITIS, ACUTE, WITH BRONCHOSPASM: ICD-10-CM

## 2019-11-24 DIAGNOSIS — J06.9 UPPER RESPIRATORY TRACT INFECTION, UNSPECIFIED TYPE: Primary | ICD-10-CM

## 2019-11-24 DIAGNOSIS — R06.02 SHORTNESS OF BREATH: ICD-10-CM

## 2019-11-24 DIAGNOSIS — R00.0 SINUS TACHYCARDIA: ICD-10-CM

## 2019-11-24 LAB
ALBUMIN SERPL-MCNC: 4.6 G/DL (ref 3.5–5.2)
ALBUMIN/GLOB SERPL: 1.2 G/DL
ALP SERPL-CCNC: 97 U/L (ref 39–117)
ALT SERPL W P-5'-P-CCNC: 22 U/L (ref 1–33)
ANION GAP SERPL CALCULATED.3IONS-SCNC: 17.5 MMOL/L (ref 5–15)
AST SERPL-CCNC: 16 U/L (ref 1–32)
BASOPHILS # BLD AUTO: 0.04 10*3/MM3 (ref 0–0.2)
BASOPHILS NFR BLD AUTO: 0.3 % (ref 0–1.5)
BILIRUB SERPL-MCNC: 0.4 MG/DL (ref 0.2–1.2)
BUN BLD-MCNC: 20 MG/DL (ref 6–20)
BUN/CREAT SERPL: 28.6 (ref 7–25)
CALCIUM SPEC-SCNC: 10 MG/DL (ref 8.6–10.5)
CHLORIDE SERPL-SCNC: 97 MMOL/L (ref 98–107)
CO2 SERPL-SCNC: 23.5 MMOL/L (ref 22–29)
CREAT BLD-MCNC: 0.7 MG/DL (ref 0.57–1)
D DIMER PPP FEU-MCNC: 0.52 MCGFEU/ML (ref 0–0.46)
D-LACTATE SERPL-SCNC: 1.2 MMOL/L (ref 0.5–2)
D-LACTATE SERPL-SCNC: 2.1 MMOL/L (ref 0.5–2)
DEPRECATED RDW RBC AUTO: 41.2 FL (ref 37–54)
EOSINOPHIL # BLD AUTO: 0.17 10*3/MM3 (ref 0–0.4)
EOSINOPHIL NFR BLD AUTO: 1.3 % (ref 0.3–6.2)
ERYTHROCYTE [DISTWIDTH] IN BLOOD BY AUTOMATED COUNT: 12.9 % (ref 12.3–15.4)
FLUAV AG NPH QL: NEGATIVE
FLUBV AG NPH QL IA: NEGATIVE
GFR SERPL CREATININE-BSD FRML MDRD: 87 ML/MIN/1.73
GLOBULIN UR ELPH-MCNC: 3.9 GM/DL
GLUCOSE BLD-MCNC: 135 MG/DL (ref 65–99)
HCT VFR BLD AUTO: 38.5 % (ref 34–46.6)
HGB BLD-MCNC: 12.7 G/DL (ref 12–15.9)
HOLD SPECIMEN: NORMAL
IMM GRANULOCYTES # BLD AUTO: 0.06 10*3/MM3 (ref 0–0.05)
IMM GRANULOCYTES NFR BLD AUTO: 0.5 % (ref 0–0.5)
LYMPHOCYTES # BLD AUTO: 1.63 10*3/MM3 (ref 0.7–3.1)
LYMPHOCYTES NFR BLD AUTO: 12.2 % (ref 19.6–45.3)
MCH RBC QN AUTO: 29.1 PG (ref 26.6–33)
MCHC RBC AUTO-ENTMCNC: 33 G/DL (ref 31.5–35.7)
MCV RBC AUTO: 88.1 FL (ref 79–97)
MONOCYTES # BLD AUTO: 0.53 10*3/MM3 (ref 0.1–0.9)
MONOCYTES NFR BLD AUTO: 4 % (ref 5–12)
NEUTROPHILS # BLD AUTO: 10.89 10*3/MM3 (ref 1.7–7)
NEUTROPHILS NFR BLD AUTO: 81.7 % (ref 42.7–76)
NRBC BLD AUTO-RTO: 0 /100 WBC (ref 0–0.2)
PLATELET # BLD AUTO: 275 10*3/MM3 (ref 140–450)
PMV BLD AUTO: 9.6 FL (ref 6–12)
POTASSIUM BLD-SCNC: 3.6 MMOL/L (ref 3.5–5.2)
PROT SERPL-MCNC: 8.5 G/DL (ref 6–8.5)
RBC # BLD AUTO: 4.37 10*6/MM3 (ref 3.77–5.28)
S PYO AG THROAT QL: NEGATIVE
SODIUM BLD-SCNC: 138 MMOL/L (ref 136–145)
WBC NRBC COR # BLD: 13.32 10*3/MM3 (ref 3.4–10.8)
WHOLE BLOOD HOLD SPECIMEN: NORMAL
WHOLE BLOOD HOLD SPECIMEN: NORMAL

## 2019-11-24 PROCEDURE — 25010000002 METHYLPREDNISOLONE PER 125 MG: Performed by: EMERGENCY MEDICINE

## 2019-11-24 PROCEDURE — G0378 HOSPITAL OBSERVATION PER HR: HCPCS

## 2019-11-24 PROCEDURE — 83605 ASSAY OF LACTIC ACID: CPT | Performed by: EMERGENCY MEDICINE

## 2019-11-24 PROCEDURE — 93005 ELECTROCARDIOGRAM TRACING: CPT | Performed by: EMERGENCY MEDICINE

## 2019-11-24 PROCEDURE — 80053 COMPREHEN METABOLIC PANEL: CPT | Performed by: EMERGENCY MEDICINE

## 2019-11-24 PROCEDURE — 99284 EMERGENCY DEPT VISIT MOD MDM: CPT | Performed by: EMERGENCY MEDICINE

## 2019-11-24 PROCEDURE — 71275 CT ANGIOGRAPHY CHEST: CPT

## 2019-11-24 PROCEDURE — 85025 COMPLETE CBC W/AUTO DIFF WBC: CPT | Performed by: EMERGENCY MEDICINE

## 2019-11-24 PROCEDURE — 94799 UNLISTED PULMONARY SVC/PX: CPT

## 2019-11-24 PROCEDURE — 93010 ELECTROCARDIOGRAM REPORT: CPT | Performed by: INTERNAL MEDICINE

## 2019-11-24 PROCEDURE — 85379 FIBRIN DEGRADATION QUANT: CPT | Performed by: EMERGENCY MEDICINE

## 2019-11-24 PROCEDURE — 0 IOPAMIDOL PER 1 ML: Performed by: EMERGENCY MEDICINE

## 2019-11-24 PROCEDURE — 71046 X-RAY EXAM CHEST 2 VIEWS: CPT

## 2019-11-24 PROCEDURE — 94761 N-INVAS EAR/PLS OXIMETRY MLT: CPT

## 2019-11-24 PROCEDURE — 94640 AIRWAY INHALATION TREATMENT: CPT

## 2019-11-24 PROCEDURE — 96374 THER/PROPH/DIAG INJ IV PUSH: CPT

## 2019-11-24 PROCEDURE — 99285 EMERGENCY DEPT VISIT HI MDM: CPT

## 2019-11-24 PROCEDURE — 87880 STREP A ASSAY W/OPTIC: CPT | Performed by: EMERGENCY MEDICINE

## 2019-11-24 PROCEDURE — 87040 BLOOD CULTURE FOR BACTERIA: CPT | Performed by: EMERGENCY MEDICINE

## 2019-11-24 PROCEDURE — 87081 CULTURE SCREEN ONLY: CPT | Performed by: EMERGENCY MEDICINE

## 2019-11-24 PROCEDURE — 87804 INFLUENZA ASSAY W/OPTIC: CPT | Performed by: EMERGENCY MEDICINE

## 2019-11-24 PROCEDURE — 0100U HC BIOFIRE FILMARRAY RESP PANEL 2: CPT | Performed by: EMERGENCY MEDICINE

## 2019-11-24 RX ORDER — ALBUTEROL SULFATE 2.5 MG/3ML
2.5 SOLUTION RESPIRATORY (INHALATION) ONCE
Status: COMPLETED | OUTPATIENT
Start: 2019-11-24 | End: 2019-11-24

## 2019-11-24 RX ORDER — ACETAMINOPHEN 500 MG
1000 TABLET ORAL ONCE
Status: COMPLETED | OUTPATIENT
Start: 2019-11-24 | End: 2019-11-24

## 2019-11-24 RX ORDER — METHYLPREDNISOLONE SODIUM SUCCINATE 125 MG/2ML
125 INJECTION, POWDER, LYOPHILIZED, FOR SOLUTION INTRAMUSCULAR; INTRAVENOUS ONCE
Status: COMPLETED | OUTPATIENT
Start: 2019-11-24 | End: 2019-11-24

## 2019-11-24 RX ORDER — SODIUM CHLORIDE 0.9 % (FLUSH) 0.9 %
10 SYRINGE (ML) INJECTION AS NEEDED
Status: DISCONTINUED | OUTPATIENT
Start: 2019-11-24 | End: 2019-11-26 | Stop reason: HOSPADM

## 2019-11-24 RX ORDER — LEVOFLOXACIN 750 MG/1
750 TABLET ORAL ONCE
Status: COMPLETED | OUTPATIENT
Start: 2019-11-24 | End: 2019-11-24

## 2019-11-24 RX ADMIN — ALBUTEROL SULFATE 2.5 MG: 2.5 SOLUTION RESPIRATORY (INHALATION) at 21:18

## 2019-11-24 RX ADMIN — SODIUM CHLORIDE 1000 ML: 9 INJECTION, SOLUTION INTRAVENOUS at 19:28

## 2019-11-24 RX ADMIN — ACETAMINOPHEN 1000 MG: 500 TABLET, FILM COATED ORAL at 21:12

## 2019-11-24 RX ADMIN — LEVOFLOXACIN 750 MG: 750 TABLET, FILM COATED ORAL at 20:18

## 2019-11-24 RX ADMIN — ALBUTEROL SULFATE 2.5 MG: 2.5 SOLUTION RESPIRATORY (INHALATION) at 23:03

## 2019-11-24 RX ADMIN — IOPAMIDOL 100 ML: 755 INJECTION, SOLUTION INTRAVENOUS at 22:33

## 2019-11-24 RX ADMIN — ALBUTEROL SULFATE 2.5 MG: 2.5 SOLUTION RESPIRATORY (INHALATION) at 19:34

## 2019-11-24 RX ADMIN — METHYLPREDNISOLONE SODIUM SUCCINATE 125 MG: 125 INJECTION, POWDER, FOR SOLUTION INTRAMUSCULAR; INTRAVENOUS at 21:13

## 2019-11-25 LAB
B PARAPERT DNA SPEC QL NAA+PROBE: NOT DETECTED
B PERT DNA SPEC QL NAA+PROBE: NOT DETECTED
BASOPHILS # BLD AUTO: 0.01 10*3/MM3 (ref 0–0.2)
BASOPHILS NFR BLD AUTO: 0.1 % (ref 0–1.5)
C PNEUM DNA NPH QL NAA+NON-PROBE: NOT DETECTED
DEPRECATED RDW RBC AUTO: 41.6 FL (ref 37–54)
EOSINOPHIL # BLD AUTO: 0 10*3/MM3 (ref 0–0.4)
EOSINOPHIL NFR BLD AUTO: 0 % (ref 0.3–6.2)
ERYTHROCYTE [DISTWIDTH] IN BLOOD BY AUTOMATED COUNT: 13.1 % (ref 12.3–15.4)
FLUAV H1 2009 PAND RNA NPH QL NAA+PROBE: NOT DETECTED
FLUAV H1 HA GENE NPH QL NAA+PROBE: NOT DETECTED
FLUAV H3 RNA NPH QL NAA+PROBE: NOT DETECTED
FLUAV SUBTYP SPEC NAA+PROBE: NOT DETECTED
FLUBV RNA ISLT QL NAA+PROBE: NOT DETECTED
HADV DNA SPEC NAA+PROBE: NOT DETECTED
HCOV 229E RNA SPEC QL NAA+PROBE: NOT DETECTED
HCOV HKU1 RNA SPEC QL NAA+PROBE: NOT DETECTED
HCOV NL63 RNA SPEC QL NAA+PROBE: NOT DETECTED
HCOV OC43 RNA SPEC QL NAA+PROBE: NOT DETECTED
HCT VFR BLD AUTO: 36.2 % (ref 34–46.6)
HGB BLD-MCNC: 11.8 G/DL (ref 12–15.9)
HMPV RNA NPH QL NAA+NON-PROBE: NOT DETECTED
HPIV1 RNA SPEC QL NAA+PROBE: NOT DETECTED
HPIV2 RNA SPEC QL NAA+PROBE: NOT DETECTED
HPIV3 RNA NPH QL NAA+PROBE: NOT DETECTED
HPIV4 P GENE NPH QL NAA+PROBE: NOT DETECTED
IMM GRANULOCYTES # BLD AUTO: 0.1 10*3/MM3 (ref 0–0.05)
IMM GRANULOCYTES NFR BLD AUTO: 0.8 % (ref 0–0.5)
LYMPHOCYTES # BLD AUTO: 0.61 10*3/MM3 (ref 0.7–3.1)
LYMPHOCYTES NFR BLD AUTO: 4.8 % (ref 19.6–45.3)
M PNEUMO IGG SER IA-ACNC: NOT DETECTED
MAGNESIUM SERPL-MCNC: 1.9 MG/DL (ref 1.6–2.6)
MCH RBC QN AUTO: 28.6 PG (ref 26.6–33)
MCHC RBC AUTO-ENTMCNC: 32.6 G/DL (ref 31.5–35.7)
MCV RBC AUTO: 87.9 FL (ref 79–97)
MONOCYTES # BLD AUTO: 0.12 10*3/MM3 (ref 0.1–0.9)
MONOCYTES NFR BLD AUTO: 0.9 % (ref 5–12)
NEUTROPHILS # BLD AUTO: 11.97 10*3/MM3 (ref 1.7–7)
NEUTROPHILS NFR BLD AUTO: 93.4 % (ref 42.7–76)
NRBC BLD AUTO-RTO: 0 /100 WBC (ref 0–0.2)
PLATELET # BLD AUTO: 278 10*3/MM3 (ref 140–450)
PMV BLD AUTO: 10.2 FL (ref 6–12)
PROCALCITONIN SERPL-MCNC: 0.03 NG/ML (ref 0.1–0.25)
RBC # BLD AUTO: 4.12 10*6/MM3 (ref 3.77–5.28)
RHINOVIRUS RNA SPEC NAA+PROBE: NOT DETECTED
RSV RNA NPH QL NAA+NON-PROBE: DETECTED
TROPONIN T SERPL-MCNC: <0.01 NG/ML (ref 0–0.03)
TSH SERPL DL<=0.05 MIU/L-ACNC: 0.81 UIU/ML (ref 0.27–4.2)
WBC NRBC COR # BLD: 12.81 10*3/MM3 (ref 3.4–10.8)

## 2019-11-25 PROCEDURE — 84145 PROCALCITONIN (PCT): CPT | Performed by: INTERNAL MEDICINE

## 2019-11-25 PROCEDURE — 63710000001 PREDNISONE PER 1 MG: Performed by: INTERNAL MEDICINE

## 2019-11-25 PROCEDURE — 93005 ELECTROCARDIOGRAM TRACING: CPT | Performed by: INTERNAL MEDICINE

## 2019-11-25 PROCEDURE — 84443 ASSAY THYROID STIM HORMONE: CPT | Performed by: INTERNAL MEDICINE

## 2019-11-25 PROCEDURE — 94799 UNLISTED PULMONARY SVC/PX: CPT

## 2019-11-25 PROCEDURE — 96375 TX/PRO/DX INJ NEW DRUG ADDON: CPT

## 2019-11-25 PROCEDURE — 25010000002 ONDANSETRON PER 1 MG

## 2019-11-25 PROCEDURE — 83735 ASSAY OF MAGNESIUM: CPT | Performed by: INTERNAL MEDICINE

## 2019-11-25 PROCEDURE — 99219 PR INITIAL OBSERVATION CARE/DAY 50 MINUTES: CPT | Performed by: INTERNAL MEDICINE

## 2019-11-25 PROCEDURE — 96361 HYDRATE IV INFUSION ADD-ON: CPT

## 2019-11-25 PROCEDURE — 85025 COMPLETE CBC W/AUTO DIFF WBC: CPT | Performed by: INTERNAL MEDICINE

## 2019-11-25 PROCEDURE — G0378 HOSPITAL OBSERVATION PER HR: HCPCS

## 2019-11-25 PROCEDURE — 93010 ELECTROCARDIOGRAM REPORT: CPT | Performed by: INTERNAL MEDICINE

## 2019-11-25 PROCEDURE — 84484 ASSAY OF TROPONIN QUANT: CPT | Performed by: INTERNAL MEDICINE

## 2019-11-25 RX ORDER — TRAZODONE HYDROCHLORIDE 50 MG/1
50 TABLET ORAL NIGHTLY PRN
Status: DISCONTINUED | OUTPATIENT
Start: 2019-11-25 | End: 2019-11-26 | Stop reason: HOSPADM

## 2019-11-25 RX ORDER — GUAIFENESIN 600 MG/1
600 TABLET, EXTENDED RELEASE ORAL EVERY 12 HOURS SCHEDULED
Status: DISCONTINUED | OUTPATIENT
Start: 2019-11-25 | End: 2019-11-26 | Stop reason: HOSPADM

## 2019-11-25 RX ORDER — ONDANSETRON 2 MG/ML
4 INJECTION INTRAMUSCULAR; INTRAVENOUS EVERY 8 HOURS PRN
Status: DISCONTINUED | OUTPATIENT
Start: 2019-11-25 | End: 2019-11-26 | Stop reason: HOSPADM

## 2019-11-25 RX ORDER — DOXYCYCLINE 100 MG/1
CAPSULE ORAL
Status: COMPLETED
Start: 2019-11-25 | End: 2019-11-25

## 2019-11-25 RX ORDER — CETIRIZINE HYDROCHLORIDE 10 MG/1
10 TABLET ORAL DAILY
Status: DISCONTINUED | OUTPATIENT
Start: 2019-11-25 | End: 2019-11-26 | Stop reason: HOSPADM

## 2019-11-25 RX ORDER — DULOXETIN HYDROCHLORIDE 60 MG/1
60 CAPSULE, DELAYED RELEASE ORAL EVERY EVENING
Status: DISCONTINUED | OUTPATIENT
Start: 2019-11-25 | End: 2019-11-26 | Stop reason: HOSPADM

## 2019-11-25 RX ORDER — FAMOTIDINE 20 MG/1
20 TABLET, FILM COATED ORAL DAILY
Status: DISCONTINUED | OUTPATIENT
Start: 2019-11-25 | End: 2019-11-26 | Stop reason: HOSPADM

## 2019-11-25 RX ORDER — IPRATROPIUM BROMIDE AND ALBUTEROL SULFATE 2.5; .5 MG/3ML; MG/3ML
3 SOLUTION RESPIRATORY (INHALATION)
Status: DISCONTINUED | OUTPATIENT
Start: 2019-11-25 | End: 2019-11-26 | Stop reason: HOSPADM

## 2019-11-25 RX ORDER — ACETAMINOPHEN 325 MG/1
650 TABLET ORAL EVERY 6 HOURS PRN
Status: DISCONTINUED | OUTPATIENT
Start: 2019-11-25 | End: 2019-11-26 | Stop reason: HOSPADM

## 2019-11-25 RX ORDER — ONDANSETRON 2 MG/ML
INJECTION INTRAMUSCULAR; INTRAVENOUS
Status: COMPLETED
Start: 2019-11-25 | End: 2019-11-25

## 2019-11-25 RX ORDER — PREDNISONE 20 MG/1
40 TABLET ORAL
Status: DISCONTINUED | OUTPATIENT
Start: 2019-11-25 | End: 2019-11-26 | Stop reason: HOSPADM

## 2019-11-25 RX ORDER — LISINOPRIL 10 MG/1
10 TABLET ORAL
Status: DISCONTINUED | OUTPATIENT
Start: 2019-11-25 | End: 2019-11-26 | Stop reason: HOSPADM

## 2019-11-25 RX ORDER — DOXYCYCLINE 100 MG/1
100 CAPSULE ORAL EVERY 12 HOURS SCHEDULED
Status: DISCONTINUED | OUTPATIENT
Start: 2019-11-25 | End: 2019-11-26 | Stop reason: HOSPADM

## 2019-11-25 RX ORDER — SODIUM CHLORIDE 9 MG/ML
125 INJECTION, SOLUTION INTRAVENOUS CONTINUOUS
Status: DISCONTINUED | OUTPATIENT
Start: 2019-11-25 | End: 2019-11-25

## 2019-11-25 RX ORDER — SODIUM CHLORIDE 0.9 % (FLUSH) 0.9 %
10 SYRINGE (ML) INJECTION AS NEEDED
Status: DISCONTINUED | OUTPATIENT
Start: 2019-11-25 | End: 2019-11-26 | Stop reason: HOSPADM

## 2019-11-25 RX ORDER — SODIUM CHLORIDE 9 MG/ML
40 INJECTION, SOLUTION INTRAVENOUS AS NEEDED
Status: DISCONTINUED | OUTPATIENT
Start: 2019-11-25 | End: 2019-11-26 | Stop reason: HOSPADM

## 2019-11-25 RX ORDER — IPRATROPIUM BROMIDE AND ALBUTEROL SULFATE 2.5; .5 MG/3ML; MG/3ML
3 SOLUTION RESPIRATORY (INHALATION) EVERY 4 HOURS PRN
Status: DISCONTINUED | OUTPATIENT
Start: 2019-11-25 | End: 2019-11-26 | Stop reason: HOSPADM

## 2019-11-25 RX ORDER — SODIUM CHLORIDE 0.9 % (FLUSH) 0.9 %
10 SYRINGE (ML) INJECTION EVERY 12 HOURS SCHEDULED
Status: DISCONTINUED | OUTPATIENT
Start: 2019-11-25 | End: 2019-11-26 | Stop reason: HOSPADM

## 2019-11-25 RX ORDER — AZITHROMYCIN 250 MG/1
500 TABLET, FILM COATED ORAL
Status: DISCONTINUED | OUTPATIENT
Start: 2019-11-25 | End: 2019-11-25

## 2019-11-25 RX ORDER — ASPIRIN 81 MG/1
81 TABLET ORAL DAILY
Status: DISCONTINUED | OUTPATIENT
Start: 2019-11-25 | End: 2019-11-26 | Stop reason: HOSPADM

## 2019-11-25 RX ADMIN — SODIUM CHLORIDE 1000 ML: 9 INJECTION, SOLUTION INTRAVENOUS at 01:14

## 2019-11-25 RX ADMIN — GUAIFENESIN 600 MG: 600 TABLET, EXTENDED RELEASE ORAL at 21:04

## 2019-11-25 RX ADMIN — SODIUM CHLORIDE, PRESERVATIVE FREE 10 ML: 5 INJECTION INTRAVENOUS at 08:38

## 2019-11-25 RX ADMIN — DOXYCYCLINE 100 MG: 100 CAPSULE ORAL at 21:14

## 2019-11-25 RX ADMIN — ACETAMINOPHEN 650 MG: 325 TABLET, FILM COATED ORAL at 03:34

## 2019-11-25 RX ADMIN — SODIUM CHLORIDE, PRESERVATIVE FREE 10 ML: 5 INJECTION INTRAVENOUS at 21:14

## 2019-11-25 RX ADMIN — SODIUM CHLORIDE 125 ML/HR: 9 INJECTION, SOLUTION INTRAVENOUS at 02:30

## 2019-11-25 RX ADMIN — SODIUM CHLORIDE 125 ML/HR: 9 INJECTION, SOLUTION INTRAVENOUS at 10:46

## 2019-11-25 RX ADMIN — ONDANSETRON 4 MG: 2 INJECTION INTRAMUSCULAR; INTRAVENOUS at 05:05

## 2019-11-25 RX ADMIN — IPRATROPIUM BROMIDE AND ALBUTEROL SULFATE 3 ML: .5; 3 SOLUTION RESPIRATORY (INHALATION) at 20:10

## 2019-11-25 RX ADMIN — DOXYCYCLINE 100 MG: 100 CAPSULE ORAL at 12:17

## 2019-11-25 RX ADMIN — ACETAMINOPHEN 650 MG: 325 TABLET, FILM COATED ORAL at 09:29

## 2019-11-25 RX ADMIN — CETIRIZINE HYDROCHLORIDE 10 MG: 10 TABLET, FILM COATED ORAL at 08:37

## 2019-11-25 RX ADMIN — FAMOTIDINE 20 MG: 20 TABLET ORAL at 08:37

## 2019-11-25 RX ADMIN — DULOXETINE HYDROCHLORIDE 60 MG: 60 CAPSULE, DELAYED RELEASE ORAL at 18:16

## 2019-11-25 RX ADMIN — SODIUM CHLORIDE, PRESERVATIVE FREE 10 ML: 5 INJECTION INTRAVENOUS at 01:18

## 2019-11-25 RX ADMIN — LISINOPRIL 10 MG: 10 TABLET ORAL at 09:29

## 2019-11-25 RX ADMIN — SODIUM CHLORIDE 125 ML/HR: 9 INJECTION, SOLUTION INTRAVENOUS at 18:16

## 2019-11-25 RX ADMIN — PREDNISONE 40 MG: 20 TABLET ORAL at 12:17

## 2019-11-25 RX ADMIN — ONDANSETRON 4 MG: 2 INJECTION, SOLUTION INTRAMUSCULAR; INTRAVENOUS at 05:05

## 2019-11-25 RX ADMIN — ASPIRIN 81 MG: 81 TABLET, COATED ORAL at 08:37

## 2019-11-26 VITALS
HEIGHT: 66 IN | WEIGHT: 188 LBS | SYSTOLIC BLOOD PRESSURE: 149 MMHG | BODY MASS INDEX: 30.22 KG/M2 | OXYGEN SATURATION: 96 % | TEMPERATURE: 98.2 F | HEART RATE: 90 BPM | RESPIRATION RATE: 16 BRPM | DIASTOLIC BLOOD PRESSURE: 69 MMHG

## 2019-11-26 LAB
ANION GAP SERPL CALCULATED.3IONS-SCNC: 14 MMOL/L (ref 5–15)
BACTERIA SPEC AEROBE CULT: NORMAL
BASOPHILS # BLD AUTO: 0.04 10*3/MM3 (ref 0–0.2)
BASOPHILS NFR BLD AUTO: 0.3 % (ref 0–1.5)
BUN BLD-MCNC: 22 MG/DL (ref 6–20)
BUN/CREAT SERPL: 35.5 (ref 7–25)
CALCIUM SPEC-SCNC: 9 MG/DL (ref 8.6–10.5)
CHLORIDE SERPL-SCNC: 103 MMOL/L (ref 98–107)
CO2 SERPL-SCNC: 20 MMOL/L (ref 22–29)
CREAT BLD-MCNC: 0.62 MG/DL (ref 0.57–1)
DEPRECATED RDW RBC AUTO: 42.5 FL (ref 37–54)
EOSINOPHIL # BLD AUTO: 0.03 10*3/MM3 (ref 0–0.4)
EOSINOPHIL NFR BLD AUTO: 0.2 % (ref 0.3–6.2)
ERYTHROCYTE [DISTWIDTH] IN BLOOD BY AUTOMATED COUNT: 13 % (ref 12.3–15.4)
GFR SERPL CREATININE-BSD FRML MDRD: 100 ML/MIN/1.73
GLUCOSE BLD-MCNC: 138 MG/DL (ref 65–99)
HCT VFR BLD AUTO: 36.1 % (ref 34–46.6)
HGB BLD-MCNC: 11.7 G/DL (ref 12–15.9)
IMM GRANULOCYTES # BLD AUTO: 0.19 10*3/MM3 (ref 0–0.05)
IMM GRANULOCYTES NFR BLD AUTO: 1.5 % (ref 0–0.5)
LYMPHOCYTES # BLD AUTO: 3.27 10*3/MM3 (ref 0.7–3.1)
LYMPHOCYTES NFR BLD AUTO: 25 % (ref 19.6–45.3)
MCH RBC QN AUTO: 28.9 PG (ref 26.6–33)
MCHC RBC AUTO-ENTMCNC: 32.4 G/DL (ref 31.5–35.7)
MCV RBC AUTO: 89.1 FL (ref 79–97)
MONOCYTES # BLD AUTO: 0.88 10*3/MM3 (ref 0.1–0.9)
MONOCYTES NFR BLD AUTO: 6.7 % (ref 5–12)
NEUTROPHILS # BLD AUTO: 8.66 10*3/MM3 (ref 1.7–7)
NEUTROPHILS NFR BLD AUTO: 66.3 % (ref 42.7–76)
NRBC BLD AUTO-RTO: 0 /100 WBC (ref 0–0.2)
PLATELET # BLD AUTO: 298 10*3/MM3 (ref 140–450)
PMV BLD AUTO: 10.2 FL (ref 6–12)
POTASSIUM BLD-SCNC: 3.8 MMOL/L (ref 3.5–5.2)
RBC # BLD AUTO: 4.05 10*6/MM3 (ref 3.77–5.28)
SODIUM BLD-SCNC: 137 MMOL/L (ref 136–145)
WBC NRBC COR # BLD: 13.07 10*3/MM3 (ref 3.4–10.8)

## 2019-11-26 PROCEDURE — 94799 UNLISTED PULMONARY SVC/PX: CPT

## 2019-11-26 PROCEDURE — 93010 ELECTROCARDIOGRAM REPORT: CPT | Performed by: INTERNAL MEDICINE

## 2019-11-26 PROCEDURE — G0378 HOSPITAL OBSERVATION PER HR: HCPCS

## 2019-11-26 PROCEDURE — 80048 BASIC METABOLIC PNL TOTAL CA: CPT | Performed by: HOSPITALIST

## 2019-11-26 PROCEDURE — 63710000001 PREDNISONE PER 1 MG: Performed by: INTERNAL MEDICINE

## 2019-11-26 PROCEDURE — 94618 PULMONARY STRESS TESTING: CPT

## 2019-11-26 PROCEDURE — 99217 PR OBSERVATION CARE DISCHARGE MANAGEMENT: CPT | Performed by: HOSPITALIST

## 2019-11-26 PROCEDURE — 93005 ELECTROCARDIOGRAM TRACING: CPT | Performed by: HOSPITALIST

## 2019-11-26 PROCEDURE — 85025 COMPLETE CBC W/AUTO DIFF WBC: CPT | Performed by: HOSPITALIST

## 2019-11-26 RX ORDER — PREDNISONE 10 MG/1
30 TABLET ORAL
Qty: 12 TABLET | Refills: 0 | Status: SHIPPED | OUTPATIENT
Start: 2019-11-27 | End: 2019-11-29

## 2019-11-26 RX ORDER — ALBUTEROL SULFATE 90 UG/1
2 AEROSOL, METERED RESPIRATORY (INHALATION) EVERY 4 HOURS PRN
Qty: 1 INHALER | Refills: 0 | Status: SHIPPED | OUTPATIENT
Start: 2019-11-26 | End: 2020-06-24

## 2019-11-26 RX ORDER — ACETAMINOPHEN 325 MG/1
650 TABLET ORAL EVERY 6 HOURS PRN
Start: 2019-11-26 | End: 2020-06-24

## 2019-11-26 RX ORDER — GUAIFENESIN 600 MG/1
600 TABLET, EXTENDED RELEASE ORAL EVERY 12 HOURS SCHEDULED
Start: 2019-11-26 | End: 2020-05-23

## 2019-11-26 RX ORDER — IBUPROFEN 400 MG/1
600 TABLET ORAL EVERY 8 HOURS PRN
Status: DISCONTINUED | OUTPATIENT
Start: 2019-11-26 | End: 2019-11-26 | Stop reason: HOSPADM

## 2019-11-26 RX ORDER — DOXYCYCLINE 100 MG/1
100 CAPSULE ORAL EVERY 12 HOURS SCHEDULED
Qty: 11 CAPSULE | Refills: 0 | Status: SHIPPED | OUTPATIENT
Start: 2019-11-26 | End: 2019-12-02

## 2019-11-26 RX ADMIN — SODIUM CHLORIDE, PRESERVATIVE FREE 10 ML: 5 INJECTION INTRAVENOUS at 09:09

## 2019-11-26 RX ADMIN — FAMOTIDINE 20 MG: 20 TABLET ORAL at 09:09

## 2019-11-26 RX ADMIN — IPRATROPIUM BROMIDE AND ALBUTEROL SULFATE 3 ML: .5; 3 SOLUTION RESPIRATORY (INHALATION) at 07:27

## 2019-11-26 RX ADMIN — GUAIFENESIN 600 MG: 600 TABLET, EXTENDED RELEASE ORAL at 09:09

## 2019-11-26 RX ADMIN — IPRATROPIUM BROMIDE AND ALBUTEROL SULFATE 3 ML: .5; 3 SOLUTION RESPIRATORY (INHALATION) at 11:25

## 2019-11-26 RX ADMIN — CETIRIZINE HYDROCHLORIDE 10 MG: 10 TABLET, FILM COATED ORAL at 09:09

## 2019-11-26 RX ADMIN — LISINOPRIL 10 MG: 10 TABLET ORAL at 09:09

## 2019-11-26 RX ADMIN — PREDNISONE 40 MG: 20 TABLET ORAL at 09:09

## 2019-11-26 RX ADMIN — ASPIRIN 81 MG: 81 TABLET, COATED ORAL at 09:09

## 2019-11-26 RX ADMIN — DOXYCYCLINE 100 MG: 100 CAPSULE ORAL at 10:52

## 2019-11-27 ENCOUNTER — READMISSION MANAGEMENT (OUTPATIENT)
Dept: CALL CENTER | Facility: HOSPITAL | Age: 54
End: 2019-11-27

## 2019-11-27 NOTE — OUTREACH NOTE
Prep Survey      Responses   Facility patient discharged from?  [facility]   Is LACE score < 7 ?  Yes   Is patient eligible?  Yes   Discharge diagnosis  acute hypoxia,  RSV bronchitis    Does the patient have one of the following disease processes/diagnoses(primary or secondary)?  Other   Does the patient have Home health ordered?  No   Is there a DME ordered?  No   Prep survey completed?  Yes          Neda Tony RN

## 2019-11-29 LAB
BACTERIA SPEC AEROBE CULT: NORMAL
BACTERIA SPEC AEROBE CULT: NORMAL

## 2019-12-02 ENCOUNTER — READMISSION MANAGEMENT (OUTPATIENT)
Dept: CALL CENTER | Facility: HOSPITAL | Age: 54
End: 2019-12-02

## 2019-12-02 NOTE — OUTREACH NOTE
LAG < 7 Survey      Responses   Facility patient discharged from?  [facility]   Does the patient have one of the following disease processes/diagnoses(primary or secondary)?  Other   Is there a successful TCM telephone encounter documented?  No   BHLAG <7 Attempt successful?  Yes   Call start time  0814   Call end time  0818   Meds reviewed with patient/caregiver?  Yes   Is the patient having any side effects they believe may be caused by any medication additions or changes?  No   Does the patient have all medications ordered at discharge?  Yes   Is the patient taking all medications as directed (includes completed medication regime)?  Yes   Does the patient have a primary care provider?   Yes   Does the patient have an appointment with their PCP within 7 days of discharge?  No   Comments regarding PCP  Dr Mckeon   What is preventing the patient from scheduling follow up appointments within 7 days of discharge?  Haven't had time   Nursing Interventions  Educated patient on importance of making appointment   Has the patient kept scheduled appointments due by today?  Yes   Has home health visited the patient within 72 hours of discharge?  N/A   Psychosocial issues?  No   Did the patient receive a copy of their discharge instructions?  Yes   Nursing interventions  Reviewed instructions with patient, Educated on MyChart   What is the patient's perception of their health status since discharge?  Improving   Is the patient/caregiver able to teach back signs and symptoms related to disease process for when to call PCP?  Yes   Is the patient/caregiver able to teach back signs and symptoms related to disease process for when to call 911?  Yes   Is the patient/caregiver able to teach back the hierarchy of who to call/visit for symptoms/problems? PCP, Specialist, Home health nurse, Urgent Care, ED, 911  Yes   If the patient is a current smoker, are they able to teach back resources for cessation?  -- [nonsmoker]   Additional  teach back comments  Reviewed s/s of lung infection.   Graduated  Yes   Wrap up additional comments  States is slowly improving-has returned to work. Denies any fever or SOA. No complaints today.          Dara Dao RN

## 2020-02-11 ENCOUNTER — OFFICE VISIT (OUTPATIENT)
Dept: SLEEP MEDICINE | Facility: HOSPITAL | Age: 55
End: 2020-02-11

## 2020-02-11 VITALS
BODY MASS INDEX: 32.95 KG/M2 | HEIGHT: 64 IN | SYSTOLIC BLOOD PRESSURE: 133 MMHG | DIASTOLIC BLOOD PRESSURE: 80 MMHG | HEART RATE: 100 BPM | WEIGHT: 193 LBS

## 2020-02-11 DIAGNOSIS — J30.2 SEASONAL ALLERGIC RHINITIS, UNSPECIFIED TRIGGER: ICD-10-CM

## 2020-02-11 DIAGNOSIS — G47.33 OBSTRUCTIVE SLEEP APNEA SYNDROME: Primary | ICD-10-CM

## 2020-02-11 PROCEDURE — G0463 HOSPITAL OUTPT CLINIC VISIT: HCPCS

## 2020-02-11 NOTE — PROGRESS NOTES
PULMONARY  CONSULT NOTE      PATIENT IDENTIFICATION:  Name: Sánchez Carbajal  Age: 55 y.o.  Sex: female  :  1965  MRN: [id number]    DATE OF CONSULTATION:  2020   Referring Physician: No admitting provider for patient encounter.                  CHIEF COMPLAINT: SAGAR    History of Present Illness:   Sánchez Carbajal is a 55 y.o. female Pt on CPAP feeling better more energy especially the night he use it more than 4 hours, no sleepiness no fatigue no tiredness, no mask irritation no dryness, compliance report reviewed with pt AHI< 5 with good usage.       Review of Systems:   Constitutional: negative   Eyes: negative   ENT/oropharynx: negative   Cardiovascular: negative   Respiratory: negative   Gastrointestinal: negative   Genitourinary: negative   Neurological: negative   Musculoskeletal: negative   Integument/breast: negative   Endocrine: negative   Allergic/Immunologic: negative     Past Medical History:  Past Medical History:   Diagnosis Date   • Arthritis     RIGHT SHOULDER   • Chronic back pain    • Diabetes mellitus (CMS/HCC)    • GERD (gastroesophageal reflux disease)    • H/O seasonal allergies    • History of anemia    • History of diverticulitis    • History of kidney stones    • Hypertension    • PONV (postoperative nausea and vomiting)    • Sleep apnea with use of continuous positive airway pressure (CPAP)    • Uterine prolapse     UTERINE VAG PROLAPSE       Past Surgical History:  Past Surgical History:   Procedure Laterality Date   • ANTERIOR AND POSTERIOR VAGINAL REPAIR N/A 1/15/2019    Procedure: /POSTERIOR REPAIR;  Surgeon: James Holloway MD;  Location: [facility] OR;  Service: Gynecology   • CHOLECYSTECTOMY     • COLONOSCOPY N/A 2017    Procedure: COLONOSCOPY;  Surgeon: Tian Henderson MD;  Location: [facility] OR;  Service:    • CYSTOSCOPY URETEROSCOPY LASER LITHOTRIPSY N/A 2016    Procedure: URETEROSCOPY  ;  Surgeon: Vimal Azul MD;  Location: [facility]  OR;  Service:    • CYSTOSCOPY URETEROSCOPY LASER LITHOTRIPSY Left 9/1/2019    Procedure: CYSTOSCOPY left retrograde pyelogram;  Surgeon: Vimal Azul MD;  Location: [facility] OR;  Service: Urology   • CYSTOSCOPY W/ LASER LITHOTRIPSY Left 6/25/2018    Procedure: CYSTOSCOPY, LEFT URETEROSCOPY, left  STENT PLACEMENT, right retrograde;  Surgeon: Vimal Azul MD;  Location: [facility] OR;  Service: Urology   • EXTRACORPOREAL SHOCK WAVE LITHOTRIPSY (ESWL)  2015   • KIDNEY STONE SURGERY     • SACROCOLPOPEXY N/A 1/15/2019    Procedure: ROBOTIC SACROCOLPOPEXY WITH MESH, TOTAL LAPAROSCOPICE HYSTERECTOMY BILATERAL SALPINGECTOMY WITH DAVINCI ;  Surgeon: James Holloway MD;  Location: [facility] OR;  Service: [facility]        Family History:  No family history on file.     Social History:   Social History     Tobacco Use   • Smoking status: Never Smoker   • Smokeless tobacco: Never Used   Substance Use Topics   • Alcohol use: No        Allergies:  Allergies   Allergen Reactions   • Penicillins Rash   • Flagyl [Metronidazole] Hives   • Sulfa Antibiotics Rash       Home Meds:    (Not in a hospital admission)    Objective:    Vitals Ranges:   Heart Rate:  [100] 100  BP: (133)/(80) 133/80  Body mass index is 33.13 kg/m².     Exam:  General Appearance:  WDWN    HEENT:   without obvious abnormality,  Conjunctiva/corneas clear,  Normal external ear canals, no drainage    Clear orsalmucosa,  Mallampati score 3    Neck:  Supple, symmetrical, trachea midline. No JVD.  Lungs:   Bilateral basal rhonchi bilaterally, respirations unlabored symmetrical wall movement.    Chest wall:  No tenderness or deformity.    Heart:  Regular rate and rhythm, S1 and S2 normal.  Extremities: Trace edema no clubbing or Cyanosis        Data Review:  All labs (24hrs): No results found for this or any previous visit (from the past 24 hour(s)).     Imaging:  Walking Oximetry  Robyn Rogers, GARCIA     11/26/2019 10:51 AM  Exercise Oximetry    Patient  Name:Sánchez Carbajal   MRN: [id number]   Date: 11/26/19             ROOM AIR BASELINE   SpO2% 93   Heart Rate 119   Blood Pressure 149/69     EXERCISE ON ROOM AIR SpO2% EXERCISE ON O2 @  LPM SpO2%   1 MINUTE 94 1 MINUTE    2 MINUTES 95 2 MINUTES    3 MINUTES 96 3 MINUTES    4 MINUTES 94 4 MINUTES    5 MINUTES 95 5 MINUTES    6 MINUTES 94 6 MINUTES               Distance Walked  594 feet Distance Walked   Dyspnea (Ramon Scale)  0 Dyspnea (Ramon Scale)   Fatigue (Ramon Scale)  1 Fatigue (Ramon Scale)   SpO2% Post Exercise  94 SpO2% Post Exercise   HR Post Exercise  120 HR Post Exercise   Time to Recovery  1 min Time to Recovery     Comments:        ASSESSMENT:  Diagnoses and all orders for this visit:    Obstructive sleep apnea syndrome    Seasonal allergic rhinitis, unspecified trigger        PLAN    This patient with obstructive sleep apnea, compliance is improved. Encourage to use it more frequent, I re-emphasized on pt the long and short term benefit of treating SAGAR.       It is recommended to keep allergic rhinitis treatment  optimized to improve quality of sleep    Seema Moser MD. D, ABSM.  2/11/2020  11:09 AM

## 2020-03-02 ENCOUNTER — APPOINTMENT (OUTPATIENT)
Dept: CT IMAGING | Facility: HOSPITAL | Age: 55
End: 2020-03-02

## 2020-03-02 ENCOUNTER — APPOINTMENT (OUTPATIENT)
Dept: GENERAL RADIOLOGY | Facility: HOSPITAL | Age: 55
End: 2020-03-02

## 2020-03-02 ENCOUNTER — HOSPITAL ENCOUNTER (EMERGENCY)
Facility: HOSPITAL | Age: 55
Discharge: HOME | End: 2020-03-02
Attending: EMERGENCY MEDICINE | Admitting: EMERGENCY MEDICINE

## 2020-03-02 VITALS
HEIGHT: 65 IN | HEART RATE: 100 BPM | OXYGEN SATURATION: 97 % | SYSTOLIC BLOOD PRESSURE: 129 MMHG | TEMPERATURE: 97.6 F | BODY MASS INDEX: 32.99 KG/M2 | DIASTOLIC BLOOD PRESSURE: 87 MMHG | RESPIRATION RATE: 16 BRPM | WEIGHT: 198 LBS

## 2020-03-02 DIAGNOSIS — M47.812 CERVICAL ARTHRITIS: Primary | ICD-10-CM

## 2020-03-02 DIAGNOSIS — M25.511 ACUTE PAIN OF RIGHT SHOULDER: ICD-10-CM

## 2020-03-02 PROCEDURE — 99284 EMERGENCY DEPT VISIT MOD MDM: CPT | Performed by: EMERGENCY MEDICINE

## 2020-03-02 PROCEDURE — 72125 CT NECK SPINE W/O DYE: CPT

## 2020-03-02 PROCEDURE — 73030 X-RAY EXAM OF SHOULDER: CPT

## 2020-03-02 PROCEDURE — 99283 EMERGENCY DEPT VISIT LOW MDM: CPT

## 2020-03-02 PROCEDURE — 70450 CT HEAD/BRAIN W/O DYE: CPT

## 2020-03-02 RX ORDER — AMPICILLIN TRIHYDRATE 250 MG
1000 CAPSULE ORAL DAILY
COMMUNITY
End: 2022-11-29

## 2020-03-02 RX ORDER — CYCLOBENZAPRINE HCL 10 MG
10 TABLET ORAL 3 TIMES DAILY PRN
Qty: 30 TABLET | Refills: 0 | Status: SHIPPED | OUTPATIENT
Start: 2020-03-02 | End: 2020-05-23

## 2020-03-02 RX ORDER — TETRACYCLINE HCL 500 MG
CAPSULE ORAL
COMMUNITY
End: 2022-11-29

## 2020-03-02 RX ORDER — CYCLOBENZAPRINE HCL 10 MG
10 TABLET ORAL ONCE
Status: COMPLETED | OUTPATIENT
Start: 2020-03-02 | End: 2020-03-02

## 2020-03-02 RX ORDER — ASCORBIC ACID 500 MG
500 TABLET ORAL DAILY
COMMUNITY

## 2020-03-02 RX ORDER — HYDROCODONE BITARTRATE AND ACETAMINOPHEN 5; 325 MG/1; MG/1
1 TABLET ORAL EVERY 4 HOURS PRN
Qty: 20 TABLET | Refills: 0 | Status: SHIPPED | OUTPATIENT
Start: 2020-03-02 | End: 2020-05-23

## 2020-03-02 RX ORDER — METHYLPREDNISOLONE 4 MG/1
TABLET ORAL
Qty: 21 TABLET | Refills: 0 | Status: SHIPPED | OUTPATIENT
Start: 2020-03-02 | End: 2020-06-24

## 2020-03-02 RX ORDER — POTASSIUM CHLORIDE 20 MEQ/1
20 TABLET, EXTENDED RELEASE ORAL 2 TIMES DAILY
COMMUNITY
End: 2022-11-29

## 2020-03-02 RX ORDER — FAMOTIDINE 40 MG/1
40 TABLET, FILM COATED ORAL DAILY
COMMUNITY

## 2020-03-02 RX ADMIN — CYCLOBENZAPRINE 10 MG: 10 TABLET, FILM COATED ORAL at 11:18

## 2020-03-02 NOTE — DISCHARGE INSTRUCTIONS
Follow-up with Aayush Mckeon within 1 week return to the emergency department if there is increasing pain, numbness, tingling, weakness, change in bladder bowel function, worse in any way at all.  Take Colace as needed as directed for constipation if taking the Norco.

## 2020-03-02 NOTE — ED PROVIDER NOTES
Subjective   History of Present Illness  History of Present Illness    Chief complaint: Neck pain, headache and  shoulder pain    Location: Right side right side shoulder and occipital region of the head    Quality/Severity: 8/10 at its worst, 8/10 currently, sharp    Timing/Onset/Duration: Present for the last 6-week, constant    Modifying Factors: Worse with movement    Associated Symptoms: No fever chills or cough.  No sore throat earache or nasal congestion.  No chest pain or shortness of breath.  Patient complains of some lower abdominal pain related to a hysterectomy, this is no worse than usual, this is being followed by her gynecologist.  No diarrhea or burning when she urinates.  No numbness, tingling, weakness, or change in bladder or bowel function    Narrative: This 55-year-old white female fell 6 weeks ago.  She presents complaining of occipital headache, neck pain, and right shoulder pain.  Patient saw her PCP after the fall and had a cortisone shot.  No x-rays were obtained.    PCP: Aayush Mckeon    GYN: Masood        Review of Systems   Constitutional: Negative for chills and fever.   HENT: Negative for ear pain and sore throat.    Eyes: Negative for discharge and redness.   Respiratory: Negative for cough, chest tightness and shortness of breath.    Cardiovascular: Negative for chest pain, palpitations and leg swelling.   Gastrointestinal: Positive for abdominal pain. Negative for diarrhea, nausea and vomiting.   Genitourinary: Negative for decreased urine volume, difficulty urinating, dysuria, flank pain, frequency, hematuria and urgency.   Musculoskeletal: Positive for neck pain and neck stiffness. Negative for back pain.   Skin: Negative for rash.   Neurological: Positive for headaches. Negative for dizziness, speech difficulty, weakness, light-headedness and numbness.   Psychiatric/Behavioral: Negative.  Negative for confusion.       Past Medical History:   Diagnosis Date   • Arthritis     RIGHT  SHOULDER   • Chronic back pain    • Diabetes mellitus (CMS/HCC)    • GERD (gastroesophageal reflux disease)    • H/O seasonal allergies    • History of anemia    • History of diverticulitis    • History of kidney stones    • Hypertension    • PONV (postoperative nausea and vomiting)    • Sleep apnea with use of continuous positive airway pressure (CPAP)    • Uterine prolapse     UTERINE VAG PROLAPSE       Allergies   Allergen Reactions   • Penicillins Rash   • Flagyl [Metronidazole] Hives   • Sulfa Antibiotics Rash       Past Surgical History:   Procedure Laterality Date   • ANTERIOR AND POSTERIOR VAGINAL REPAIR N/A 1/15/2019    Procedure: /POSTERIOR REPAIR;  Surgeon: James Holloway MD;  Location: [facility] OR;  Service: Gynecology   • CHOLECYSTECTOMY     • COLONOSCOPY N/A 1/18/2017    Procedure: COLONOSCOPY;  Surgeon: Tian Henderson MD;  Location: [facility] OR;  Service:    • CYSTOSCOPY URETEROSCOPY LASER LITHOTRIPSY N/A 4/6/2016    Procedure: URETEROSCOPY  ;  Surgeon: Vimal Azul MD;  Location: [facility] OR;  Service:    • CYSTOSCOPY URETEROSCOPY LASER LITHOTRIPSY Left 9/1/2019    Procedure: CYSTOSCOPY left retrograde pyelogram;  Surgeon: Vimal Azul MD;  Location: [facility] OR;  Service: Urology   • CYSTOSCOPY W/ LASER LITHOTRIPSY Left 6/25/2018    Procedure: CYSTOSCOPY, LEFT URETEROSCOPY, left  STENT PLACEMENT, right retrograde;  Surgeon: Vimal Azul MD;  Location: [facility];  Service: Urology   • EXTRACORPOREAL SHOCK WAVE LITHOTRIPSY (ESWL)  2015   • KIDNEY STONE SURGERY     • SACROCOLPOPEXY N/A 1/15/2019    Procedure: ROBOTIC SACROCOLPOPEXY WITH MESH, TOTAL LAPAROSCOPICE HYSTERECTOMY BILATERAL SALPINGECTOMY WITH DAVINCI ;  Surgeon: James Holloway MD;  Location: [facility];  Service: DaVinci       History reviewed. No pertinent family history.    Social History     Socioeconomic History   • Marital status:      Spouse name: Not on file   • Number of children: Not on  file   • Years of education: Not on file   • Highest education level: Not on file   Tobacco Use   • Smoking status: Never Smoker   • Smokeless tobacco: Never Used   Substance and Sexual Activity   • Alcohol use: No   • Drug use: No           Objective   Physical Exam   Constitutional: She is oriented to person, place, and time. She appears well-developed and well-nourished. No distress.   ED Triage Vitals (03/02/20 0909)  Temp: 97.6 °F (36.4 °C)  Heart Rate: 100  Resp: 16  BP: 138/91  SpO2: 97 %  Temp src: Oral  Heart Rate Source: Monitor  Patient Position: Sitting  BP Location: Left arm  FiO2 (%): n/a    The patient's vitals were reviewed by me.  Unless otherwise noted they are within normal limits.     HENT:   Head: Normocephalic and atraumatic.   Right Ear: External ear normal.   Left Ear: External ear normal.   Nose: Nose normal.   Mouth/Throat: Oropharynx is clear and moist.   Eyes: Pupils are equal, round, and reactive to light. Conjunctivae and EOM are normal. Right eye exhibits no discharge. Left eye exhibits no discharge. No scleral icterus.   Neck: Normal range of motion. Neck supple. No JVD present. No tracheal deviation present. No thyromegaly present.   There is bilateral paracervical muscle spasm.  There is tenderness in the midline with no bony step-off or deformity.   Cardiovascular: Normal rate, regular rhythm, normal heart sounds and intact distal pulses. Exam reveals no gallop and no friction rub.   No murmur heard.  Pulmonary/Chest: Effort normal and breath sounds normal. No stridor. No respiratory distress. She has no wheezes. She has no rales. She exhibits no tenderness.   Abdominal: Soft. Bowel sounds are normal. She exhibits no distension and no mass. There is no tenderness. There is no rebound and no guarding. No hernia.   Musculoskeletal: She exhibits no edema or deformity.   There is decreased abduction of the right arm secondary to pain.  The capillary refill is less than 2 seconds.  The  sensation is intact.  There is no joint laxity noted.  There is a 2+ radial and ulnar pulse.  There is no other tenderness or deformity of the right upper extremity.  Reflexes are symmetric bilaterally.   Lymphadenopathy:     She has no cervical adenopathy.   Neurological: She is alert and oriented to person, place, and time. She displays normal reflexes. No cranial nerve deficit or sensory deficit. She exhibits normal muscle tone. Coordination normal.   Skin: Skin is warm and dry. Capillary refill takes less than 2 seconds. No rash noted. She is not diaphoretic. No erythema. No pallor.   Psychiatric: Her behavior is normal.   Nursing note and vitals reviewed.      Procedures           ED Course      11:24 AM, 03/02/20:  The patient was reassessed.  She has no new complaints.  She states the Flexeril did not improve her pain much.  The patient's vital signs were reviewed and are stable neurological exam: Conscious alert oriented x4 with no focal deficits noted.    11:25 AM, 03/02/20:  The patient's diagnosis of neck pain with degenerative joint disease and right shoulder pain were discussed with her.  The patient will be placed on a Medrol Dosepak, Flexeril, and Norco.  The patient should follow-up with Dr. Aayush Mckeon this week.  She should return to the emergency department there is increased pain, numbness, tingling, weakness, change in color or temperature, change in bladder or bowel function, worse in any way at all.  Patient's questions were answered she will be discharged in good condition.  Take Colace as needed as directed for constipation if you are taking the Norco.                                     MDM    Final diagnoses:   Cervical arthritis   Acute pain of right shoulder            Jam Maguire MD  03/02/20 9585

## 2020-03-09 ENCOUNTER — TRANSCRIBE ORDERS (OUTPATIENT)
Dept: ADMINISTRATIVE | Facility: HOSPITAL | Age: 55
End: 2020-03-09

## 2020-03-09 DIAGNOSIS — M54.12 CERVICAL RADICULOPATHY: Primary | ICD-10-CM

## 2020-03-19 ENCOUNTER — APPOINTMENT (OUTPATIENT)
Dept: MRI IMAGING | Facility: HOSPITAL | Age: 55
End: 2020-03-19

## 2020-03-23 ENCOUNTER — APPOINTMENT (OUTPATIENT)
Dept: PAIN MEDICINE | Facility: HOSPITAL | Age: 55
End: 2020-03-23

## 2020-05-20 ENCOUNTER — HOSPITAL ENCOUNTER (OUTPATIENT)
Dept: MRI IMAGING | Facility: HOSPITAL | Age: 55
End: 2020-05-20

## 2020-05-23 ENCOUNTER — APPOINTMENT (OUTPATIENT)
Dept: CT IMAGING | Facility: HOSPITAL | Age: 55
End: 2020-05-23

## 2020-05-23 ENCOUNTER — HOSPITAL ENCOUNTER (EMERGENCY)
Facility: HOSPITAL | Age: 55
Discharge: HOME | End: 2020-05-23
Attending: EMERGENCY MEDICINE | Admitting: EMERGENCY MEDICINE

## 2020-05-23 VITALS
BODY MASS INDEX: 32.14 KG/M2 | DIASTOLIC BLOOD PRESSURE: 82 MMHG | TEMPERATURE: 97.8 F | HEIGHT: 66 IN | WEIGHT: 200 LBS | OXYGEN SATURATION: 96 % | HEART RATE: 85 BPM | RESPIRATION RATE: 16 BRPM | SYSTOLIC BLOOD PRESSURE: 125 MMHG

## 2020-05-23 DIAGNOSIS — N23 RENAL COLIC ON LEFT SIDE: ICD-10-CM

## 2020-05-23 DIAGNOSIS — N39.0 ACUTE UTI: Primary | ICD-10-CM

## 2020-05-23 LAB
ALBUMIN SERPL-MCNC: 4.3 G/DL (ref 3.5–5.2)
ALBUMIN/GLOB SERPL: 1.5 G/DL
ALP SERPL-CCNC: 82 U/L (ref 39–117)
ALT SERPL W P-5'-P-CCNC: 21 U/L (ref 1–33)
ANION GAP SERPL CALCULATED.3IONS-SCNC: 14.5 MMOL/L (ref 5–15)
AST SERPL-CCNC: 14 U/L (ref 1–32)
BACTERIA UR QL AUTO: ABNORMAL /HPF
BASOPHILS # BLD AUTO: 0.04 10*3/MM3 (ref 0–0.2)
BASOPHILS NFR BLD AUTO: 0.6 % (ref 0–1.5)
BILIRUB SERPL-MCNC: 0.2 MG/DL (ref 0.2–1.2)
BILIRUB UR QL STRIP: NEGATIVE
BUN BLD-MCNC: 25 MG/DL (ref 6–20)
BUN/CREAT SERPL: 37.3 (ref 7–25)
CALCIUM SPEC-SCNC: 9.5 MG/DL (ref 8.6–10.5)
CHLORIDE SERPL-SCNC: 100 MMOL/L (ref 98–107)
CLARITY UR: CLEAR
CO2 SERPL-SCNC: 23.5 MMOL/L (ref 22–29)
COLOR UR: YELLOW
CREAT BLD-MCNC: 0.67 MG/DL (ref 0.57–1)
DEPRECATED RDW RBC AUTO: 42.1 FL (ref 37–54)
EOSINOPHIL # BLD AUTO: 0.11 10*3/MM3 (ref 0–0.4)
EOSINOPHIL NFR BLD AUTO: 1.7 % (ref 0.3–6.2)
ERYTHROCYTE [DISTWIDTH] IN BLOOD BY AUTOMATED COUNT: 12.9 % (ref 12.3–15.4)
GFR SERPL CREATININE-BSD FRML MDRD: 91 ML/MIN/1.73
GLOBULIN UR ELPH-MCNC: 2.8 GM/DL
GLUCOSE BLD-MCNC: 163 MG/DL (ref 65–99)
GLUCOSE UR STRIP-MCNC: NEGATIVE MG/DL
HCT VFR BLD AUTO: 39.5 % (ref 34–46.6)
HGB BLD-MCNC: 13.2 G/DL (ref 12–15.9)
HGB UR QL STRIP.AUTO: NEGATIVE
HYALINE CASTS UR QL AUTO: ABNORMAL /LPF
IMM GRANULOCYTES # BLD AUTO: 0.03 10*3/MM3 (ref 0–0.05)
IMM GRANULOCYTES NFR BLD AUTO: 0.5 % (ref 0–0.5)
KETONES UR QL STRIP: NEGATIVE
LEUKOCYTE ESTERASE UR QL STRIP.AUTO: ABNORMAL
LYMPHOCYTES # BLD AUTO: 2.27 10*3/MM3 (ref 0.7–3.1)
LYMPHOCYTES NFR BLD AUTO: 34.6 % (ref 19.6–45.3)
MCH RBC QN AUTO: 29.8 PG (ref 26.6–33)
MCHC RBC AUTO-ENTMCNC: 33.4 G/DL (ref 31.5–35.7)
MCV RBC AUTO: 89.2 FL (ref 79–97)
MONOCYTES # BLD AUTO: 0.47 10*3/MM3 (ref 0.1–0.9)
MONOCYTES NFR BLD AUTO: 7.2 % (ref 5–12)
MUCOUS THREADS URNS QL MICRO: ABNORMAL /HPF
NEUTROPHILS # BLD AUTO: 3.65 10*3/MM3 (ref 1.7–7)
NEUTROPHILS NFR BLD AUTO: 55.4 % (ref 42.7–76)
NITRITE UR QL STRIP: NEGATIVE
NRBC BLD AUTO-RTO: 0 /100 WBC (ref 0–0.2)
PH UR STRIP.AUTO: 6 [PH] (ref 4.5–8)
PLATELET # BLD AUTO: 285 10*3/MM3 (ref 140–450)
PMV BLD AUTO: 10.6 FL (ref 6–12)
POTASSIUM BLD-SCNC: 4.3 MMOL/L (ref 3.5–5.2)
PROT SERPL-MCNC: 7.1 G/DL (ref 6–8.5)
PROT UR QL STRIP: NEGATIVE
RBC # BLD AUTO: 4.43 10*6/MM3 (ref 3.77–5.28)
RBC # UR: ABNORMAL /HPF
REF LAB TEST METHOD: ABNORMAL
SODIUM BLD-SCNC: 138 MMOL/L (ref 136–145)
SP GR UR STRIP: 1.02 (ref 1–1.03)
SQUAMOUS #/AREA URNS HPF: ABNORMAL /HPF
UROBILINOGEN UR QL STRIP: ABNORMAL
WBC NRBC COR # BLD: 6.57 10*3/MM3 (ref 3.4–10.8)
WBC UR QL AUTO: ABNORMAL /HPF

## 2020-05-23 PROCEDURE — 25010000002 KETOROLAC TROMETHAMINE PER 15 MG: Performed by: EMERGENCY MEDICINE

## 2020-05-23 PROCEDURE — 96375 TX/PRO/DX INJ NEW DRUG ADDON: CPT

## 2020-05-23 PROCEDURE — 96374 THER/PROPH/DIAG INJ IV PUSH: CPT

## 2020-05-23 PROCEDURE — 87086 URINE CULTURE/COLONY COUNT: CPT | Performed by: EMERGENCY MEDICINE

## 2020-05-23 PROCEDURE — 99283 EMERGENCY DEPT VISIT LOW MDM: CPT

## 2020-05-23 PROCEDURE — 99284 EMERGENCY DEPT VISIT MOD MDM: CPT | Performed by: EMERGENCY MEDICINE

## 2020-05-23 PROCEDURE — 80053 COMPREHEN METABOLIC PANEL: CPT | Performed by: EMERGENCY MEDICINE

## 2020-05-23 PROCEDURE — 81001 URINALYSIS AUTO W/SCOPE: CPT | Performed by: EMERGENCY MEDICINE

## 2020-05-23 PROCEDURE — 85025 COMPLETE CBC W/AUTO DIFF WBC: CPT | Performed by: EMERGENCY MEDICINE

## 2020-05-23 PROCEDURE — 25010000002 ONDANSETRON PER 1 MG: Performed by: EMERGENCY MEDICINE

## 2020-05-23 PROCEDURE — 25010000002 MORPHINE PER 10 MG: Performed by: EMERGENCY MEDICINE

## 2020-05-23 PROCEDURE — 74176 CT ABD & PELVIS W/O CONTRAST: CPT

## 2020-05-23 RX ORDER — CEFUROXIME AXETIL 250 MG/1
250 TABLET ORAL ONCE
Status: COMPLETED | OUTPATIENT
Start: 2020-05-23 | End: 2020-05-23

## 2020-05-23 RX ORDER — SODIUM CHLORIDE 0.9 % (FLUSH) 0.9 %
10 SYRINGE (ML) INJECTION AS NEEDED
Status: DISCONTINUED | OUTPATIENT
Start: 2020-05-23 | End: 2020-05-23 | Stop reason: HOSPADM

## 2020-05-23 RX ORDER — CEFUROXIME AXETIL 250 MG/1
250 TABLET ORAL 2 TIMES DAILY
Qty: 14 TABLET | Refills: 0 | Status: SHIPPED | OUTPATIENT
Start: 2020-05-23 | End: 2020-05-30

## 2020-05-23 RX ORDER — ONDANSETRON 2 MG/ML
4 INJECTION INTRAMUSCULAR; INTRAVENOUS ONCE
Status: COMPLETED | OUTPATIENT
Start: 2020-05-23 | End: 2020-05-23

## 2020-05-23 RX ORDER — KETOROLAC TROMETHAMINE 30 MG/ML
30 INJECTION, SOLUTION INTRAMUSCULAR; INTRAVENOUS ONCE
Status: COMPLETED | OUTPATIENT
Start: 2020-05-23 | End: 2020-05-23

## 2020-05-23 RX ORDER — ONDANSETRON 4 MG/1
4 TABLET, ORALLY DISINTEGRATING ORAL EVERY 6 HOURS PRN
Qty: 20 TABLET | Refills: 0 | Status: SHIPPED | OUTPATIENT
Start: 2020-05-23 | End: 2022-11-29

## 2020-05-23 RX ORDER — HYDROCODONE BITARTRATE AND ACETAMINOPHEN 5; 325 MG/1; MG/1
1 TABLET ORAL EVERY 6 HOURS PRN
Qty: 15 TABLET | Refills: 0 | Status: SHIPPED | OUTPATIENT
Start: 2020-05-23 | End: 2020-06-24

## 2020-05-23 RX ADMIN — MORPHINE SULFATE 4 MG: 4 INJECTION INTRAVENOUS at 15:38

## 2020-05-23 RX ADMIN — CEFUROXIME AXETIL 250 MG: 250 TABLET ORAL at 17:43

## 2020-05-23 RX ADMIN — ONDANSETRON 4 MG: 2 INJECTION INTRAMUSCULAR; INTRAVENOUS at 15:34

## 2020-05-23 RX ADMIN — KETOROLAC TROMETHAMINE 30 MG: 30 INJECTION, SOLUTION INTRAMUSCULAR at 15:36

## 2020-05-23 NOTE — ED PROVIDER NOTES
Subjective   Sánchez Carbajal is a 55-year-old white female who presents secondary to left-sided flank pain.  Patient has a history of kidney stones.  She states this pain is identical to prior stones.  Mild pain for a week however the pain worsened last night.  The pain radiates to the left lower quadrant.  Associated nausea but no vomiting.  No fevers or chills.  No dysuria or hematuria.  Patient presents for evaluation.    Patient is under the care of Dr. Brock Azul.  She has had multiple lithotripsies.  She is only passed 1 stone without intervention.      History provided by:  Patient      Review of Systems   Constitutional: Negative.  Negative for fever.   HENT: Negative.  Negative for rhinorrhea.    Eyes: Negative.  Negative for redness.   Respiratory: Negative for cough.    Cardiovascular: Negative for chest pain.   Gastrointestinal: Positive for abdominal pain and nausea.   Endocrine: Negative.    Genitourinary: Negative.  Negative for difficulty urinating.   Musculoskeletal: Negative.  Negative for back pain.   Skin: Negative.  Negative for color change.   Neurological: Negative.  Negative for syncope.   Hematological: Negative.    Psychiatric/Behavioral: Negative.    All other systems reviewed and are negative.      Past Medical History:   Diagnosis Date   • Arthritis     RIGHT SHOULDER   • Chronic back pain    • Diabetes mellitus (CMS/HCC)    • GERD (gastroesophageal reflux disease)    • H/O seasonal allergies    • History of anemia    • History of diverticulitis    • History of kidney stones    • Hypertension    • PONV (postoperative nausea and vomiting)    • Sleep apnea with use of continuous positive airway pressure (CPAP)    • Uterine prolapse     UTERINE VAG PROLAPSE       Allergies   Allergen Reactions   • Penicillins Rash   • Flagyl [Metronidazole] Hives   • Sulfa Antibiotics Rash       Past Surgical History:   Procedure Laterality Date   • ANTERIOR AND POSTERIOR VAGINAL REPAIR N/A 1/15/2019     Procedure: /POSTERIOR REPAIR;  Surgeon: James Holloway MD;  Location: [facility] OR;  Service: Gynecology   • CHOLECYSTECTOMY     • COLONOSCOPY N/A 1/18/2017    Procedure: COLONOSCOPY;  Surgeon: Tian Henderson MD;  Location: [facility] OR;  Service:    • CYSTOSCOPY URETEROSCOPY LASER LITHOTRIPSY N/A 4/6/2016    Procedure: URETEROSCOPY  ;  Surgeon: Vimal Azul MD;  Location: [facility] OR;  Service:    • CYSTOSCOPY URETEROSCOPY LASER LITHOTRIPSY Left 9/1/2019    Procedure: CYSTOSCOPY left retrograde pyelogram;  Surgeon: Vimal Azul MD;  Location: [facility] OR;  Service: Urology   • CYSTOSCOPY W/ LASER LITHOTRIPSY Left 6/25/2018    Procedure: CYSTOSCOPY, LEFT URETEROSCOPY, left  STENT PLACEMENT, right retrograde;  Surgeon: Vimal Azul MD;  Location: [facility] OR;  Service: Urology   • EXTRACORPOREAL SHOCK WAVE LITHOTRIPSY (ESWL)  2015   • KIDNEY STONE SURGERY     • SACROCOLPOPEXY N/A 1/15/2019    Procedure: ROBOTIC SACROCOLPOPEXY WITH MESH, TOTAL LAPAROSCOPICE HYSTERECTOMY BILATERAL SALPINGECTOMY WITH DAVINCI ;  Surgeon: James Holloway MD;  Location: [facility] OR;  Service: DaVinci       History reviewed. No pertinent family history.    Social History     Socioeconomic History   • Marital status:      Spouse name: Not on file   • Number of children: Not on file   • Years of education: Not on file   • Highest education level: Not on file   Tobacco Use   • Smoking status: Never Smoker   • Smokeless tobacco: Never Used   Substance and Sexual Activity   • Alcohol use: No   • Drug use: No   • Sexual activity: Yes     Partners: Male           Objective   Physical Exam   Constitutional: She is oriented to person, place, and time. She appears well-developed and well-nourished. No distress.   55-year-old white female laying in bed.  Patient appears in good overall health.  She appears in mild discomfort.  Vital signs notable for pulse of 103 otherwise unremarkable.  Patient is friendly  and cooperative.   HENT:   Head: Normocephalic and atraumatic.   Right Ear: External ear normal.   Left Ear: External ear normal.   Nose: Nose normal.   Mouth/Throat: Oropharynx is clear and moist.   Eyes: Pupils are equal, round, and reactive to light. Conjunctivae and EOM are normal.   Neck: Normal range of motion. Neck supple.   Cardiovascular: Normal rate, regular rhythm, normal heart sounds and intact distal pulses. Exam reveals no gallop and no friction rub.   No murmur heard.  Pulmonary/Chest: Effort normal and breath sounds normal.   Abdominal: Soft. Bowel sounds are normal. She exhibits no distension. There is no tenderness.   Musculoskeletal: Normal range of motion.   Neurological: She is alert and oriented to person, place, and time. She has normal reflexes.   Skin: Skin is warm and dry. She is not diaphoretic.   Psychiatric: She has a normal mood and affect. Her behavior is normal.   Nursing note and vitals reviewed.      Procedures           ED Course  ED Course as of May 23 1812   Sat May 23, 2020   1515 Obtaining CBC, CMP, UA and a noncontrasted CT of abdomen pelvis.  Giving morphine, Toradol and Zofran.  Patient's son is driving.    [SS]   1737 CBC is unremarkable.  CMP notable for blood sugar of 163, BUN of 25 with normal creatinine 0.67.  Urinalysis shows small leukocytes, 1+ bacteria with 6-12 WBCs.  0-2 RBCs.  CT shows intrarenal stones but no evidence of ureteral stone or recent passage.  I feel patient symptoms are secondary to urinary tract infection as well as possible renal colic.  Will start on oral antibiotics.  Will DC home.    [SS]      ED Course User Index  [SS] Rik Rodriguez MD      Labs Reviewed   COMPREHENSIVE METABOLIC PANEL - Abnormal; Notable for the following components:       Result Value    Glucose 163 (*)     BUN 25 (*)     BUN/Creatinine Ratio 37.3 (*)     All other components within normal limits    Narrative:     GFR Normal >60  Chronic Kidney Disease <60  Kidney  Failure <15     URINALYSIS W/ CULTURE IF INDICATED - Abnormal; Notable for the following components:    Leuk Esterase, UA Small (1+) (*)     All other components within normal limits   URINALYSIS, MICROSCOPIC ONLY - Abnormal; Notable for the following components:    RBC, UA 0-2 (*)     WBC, UA 6-12 (*)     Bacteria, UA 1+ (*)     Squamous Epithelial Cells, UA 3-6 (*)     Mucus, UA Small/1+ (*)     All other components within normal limits   CBC WITH AUTO DIFFERENTIAL - Normal   URINE CULTURE   CBC AND DIFFERENTIAL    Narrative:     The following orders were created for panel order CBC & Differential.  Procedure                               Abnormality         Status                     ---------                               -----------         ------                     CBC Auto Differential[[id number]]        Normal              Final result                 Please view results for these tests on the individual orders.     Ct Abdomen Pelvis Without Contrast    Result Date: 5/23/2020  Narrative: PROCEDURE: CT Abdomen Pelvis WO COMPARISON: August 2019 INDICATIONS: Flank pain, recurrent stone disease suspected TECHNIQUE: CT of the abdomen and pelvis without IV contrast. Coronal and sagittal reconstructions were obtained.  Radiation dose reduction techniques included automated exposure control or exposure modulation based on body size. Count of known CT and cardiac nuc med studies performed in previous 12 months: 7. FINDINGS:  Noncontrast CT abdomen and pelvis are performed. Lack of IV contrast hinders assessment of the parenchyma of the liver, spleen, pancreas, adrenal glands and kidneys. There is no obstructive uropathy.  Numerous small bilateral nonobstructing renal calculi seen the largest under 3 mm in the lower pole left kidney. Gallbladder absent. No evidence of bowel obstruction. Appendix not seen. Diverticulosis of the left colon seen. Uterus and adnexa are not identified.  No gross free air, free fluid or  focal inflammatory changes are present. Lung bases are clear and osseous structures are intact.     Impression: Nonobstructing renal calculi without evidence of hydronephrosis.   Signer Name: Muna Youssef MD  Signed: 5/23/2020 3:09 PM  Workstation Name: [id number]  [facility]    My differential diagnosis for abdominal pain includes but is not limited to:  Gastritis, gastroenteritis, peptic ulcer disease, GERD, esophageal perforation, acute appendicitis, mesenteric adenitis, Meckel’s diverticulum, epiploic appendagitis, diverticulitis, colon cancer, ulcerative colitis, Crohn’s disease, intussusception, small bowel obstruction, adhesions, ischemic bowel, perforated viscus, ileus, obstipation, biliary colic, cholecystitis, cholelithiasis, Tay-Karlos Goyo, hepatitis, pancreatitis, common bile duct obstruction, cholangitis, bile leak, splenic trauma, splenic rupture, splenic infarction, splenic abscess, abdominal abscess, ascites, spontaneous bacterial peritonitis, hernia, UTI, cystitis, prostatitis, ureterolithiasis, urinary obstruction, ovarian cyst, torsion, pregnancy, ectopic pregnancy, PID, pelvic abscess, mittelschmerz, endometriosis, AAA, myocardial infarction, pneumonia, cancer, porphyria, DKA, medications, sickle cell, viral syndrome, zoster                                       MDM    Final diagnoses:   Acute UTI   Renal colic on left side            Rik Rodriguez MD  05/23/20 5902

## 2020-05-23 NOTE — DISCHARGE INSTRUCTIONS
You have a urinary tract infection.  Medications as directed.  Plenty of fluids.  Tylenol or ibuprofen as needed for fever.  Follow-up with your PCP as above.  Return to ED for worsening symptoms, medical emergencies.

## 2020-05-24 LAB — BACTERIA SPEC AEROBE CULT: NO GROWTH

## 2020-06-03 ENCOUNTER — HOSPITAL ENCOUNTER (OUTPATIENT)
Dept: MRI IMAGING | Facility: HOSPITAL | Age: 55
Discharge: HOME | End: 2020-06-03
Admitting: INTERNAL MEDICINE

## 2020-06-03 DIAGNOSIS — M54.12 CERVICAL RADICULOPATHY: ICD-10-CM

## 2020-06-03 PROCEDURE — 72141 MRI NECK SPINE W/O DYE: CPT

## 2020-06-24 ENCOUNTER — HOSPITAL ENCOUNTER (EMERGENCY)
Facility: HOSPITAL | Age: 55
Discharge: HOME | End: 2020-06-24
Attending: EMERGENCY MEDICINE | Admitting: EMERGENCY MEDICINE

## 2020-06-24 ENCOUNTER — APPOINTMENT (OUTPATIENT)
Dept: CT IMAGING | Facility: HOSPITAL | Age: 55
End: 2020-06-24

## 2020-06-24 VITALS
HEART RATE: 72 BPM | BODY MASS INDEX: 32.14 KG/M2 | OXYGEN SATURATION: 94 % | DIASTOLIC BLOOD PRESSURE: 71 MMHG | TEMPERATURE: 98 F | RESPIRATION RATE: 16 BRPM | HEIGHT: 66 IN | WEIGHT: 200 LBS | SYSTOLIC BLOOD PRESSURE: 139 MMHG

## 2020-06-24 DIAGNOSIS — R42 DIZZINESS: ICD-10-CM

## 2020-06-24 DIAGNOSIS — R51.9 ACUTE NONINTRACTABLE HEADACHE, UNSPECIFIED HEADACHE TYPE: Primary | ICD-10-CM

## 2020-06-24 DIAGNOSIS — M54.12 CERVICAL RADICULOPATHY: ICD-10-CM

## 2020-06-24 LAB
ANION GAP SERPL CALCULATED.3IONS-SCNC: 12.8 MMOL/L (ref 5–15)
BASOPHILS # BLD AUTO: 0.01 10*3/MM3 (ref 0–0.2)
BASOPHILS NFR BLD AUTO: 0.2 % (ref 0–1.5)
BUN BLD-MCNC: 24 MG/DL (ref 6–20)
BUN/CREAT SERPL: 33.3 (ref 7–25)
CALCIUM SPEC-SCNC: 10.1 MG/DL (ref 8.6–10.5)
CHLORIDE SERPL-SCNC: 100 MMOL/L (ref 98–107)
CO2 SERPL-SCNC: 23.2 MMOL/L (ref 22–29)
CREAT BLD-MCNC: 0.72 MG/DL (ref 0.57–1)
DEPRECATED RDW RBC AUTO: 42.3 FL (ref 37–54)
EOSINOPHIL # BLD AUTO: 0.16 10*3/MM3 (ref 0–0.4)
EOSINOPHIL NFR BLD AUTO: 3.4 % (ref 0.3–6.2)
ERYTHROCYTE [DISTWIDTH] IN BLOOD BY AUTOMATED COUNT: 12.9 % (ref 12.3–15.4)
GFR SERPL CREATININE-BSD FRML MDRD: 84 ML/MIN/1.73
GLUCOSE BLD-MCNC: 196 MG/DL (ref 65–99)
HCT VFR BLD AUTO: 39.6 % (ref 34–46.6)
HGB BLD-MCNC: 12.8 G/DL (ref 12–15.9)
IMM GRANULOCYTES # BLD AUTO: 0 10*3/MM3 (ref 0–0.05)
IMM GRANULOCYTES NFR BLD AUTO: 0 % (ref 0–0.5)
LYMPHOCYTES # BLD AUTO: 1.71 10*3/MM3 (ref 0.7–3.1)
LYMPHOCYTES NFR BLD AUTO: 35.8 % (ref 19.6–45.3)
MCH RBC QN AUTO: 28.6 PG (ref 26.6–33)
MCHC RBC AUTO-ENTMCNC: 32.3 G/DL (ref 31.5–35.7)
MCV RBC AUTO: 88.6 FL (ref 79–97)
MONOCYTES # BLD AUTO: 0.37 10*3/MM3 (ref 0.1–0.9)
MONOCYTES NFR BLD AUTO: 7.8 % (ref 5–12)
NEUTROPHILS # BLD AUTO: 2.52 10*3/MM3 (ref 1.7–7)
NEUTROPHILS NFR BLD AUTO: 52.8 % (ref 42.7–76)
PLATELET # BLD AUTO: 250 10*3/MM3 (ref 140–450)
PMV BLD AUTO: 9.7 FL (ref 6–12)
POTASSIUM BLD-SCNC: 4 MMOL/L (ref 3.5–5.2)
RBC # BLD AUTO: 4.47 10*6/MM3 (ref 3.77–5.28)
SODIUM BLD-SCNC: 136 MMOL/L (ref 136–145)
WBC NRBC COR # BLD: 4.77 10*3/MM3 (ref 3.4–10.8)

## 2020-06-24 PROCEDURE — 99284 EMERGENCY DEPT VISIT MOD MDM: CPT

## 2020-06-24 PROCEDURE — 96375 TX/PRO/DX INJ NEW DRUG ADDON: CPT

## 2020-06-24 PROCEDURE — 25010000002 METOCLOPRAMIDE PER 10 MG: Performed by: EMERGENCY MEDICINE

## 2020-06-24 PROCEDURE — 70450 CT HEAD/BRAIN W/O DYE: CPT

## 2020-06-24 PROCEDURE — 85025 COMPLETE CBC W/AUTO DIFF WBC: CPT | Performed by: EMERGENCY MEDICINE

## 2020-06-24 PROCEDURE — 80048 BASIC METABOLIC PNL TOTAL CA: CPT | Performed by: EMERGENCY MEDICINE

## 2020-06-24 PROCEDURE — 96374 THER/PROPH/DIAG INJ IV PUSH: CPT

## 2020-06-24 PROCEDURE — 99284 EMERGENCY DEPT VISIT MOD MDM: CPT | Performed by: EMERGENCY MEDICINE

## 2020-06-24 PROCEDURE — 25010000002 DIPHENHYDRAMINE PER 50 MG: Performed by: EMERGENCY MEDICINE

## 2020-06-24 RX ORDER — SODIUM CHLORIDE 0.9 % (FLUSH) 0.9 %
10 SYRINGE (ML) INJECTION AS NEEDED
Status: DISCONTINUED | OUTPATIENT
Start: 2020-06-24 | End: 2020-06-24 | Stop reason: HOSPADM

## 2020-06-24 RX ORDER — METOCLOPRAMIDE HYDROCHLORIDE 5 MG/ML
10 INJECTION INTRAMUSCULAR; INTRAVENOUS ONCE
Status: COMPLETED | OUTPATIENT
Start: 2020-06-24 | End: 2020-06-24

## 2020-06-24 RX ORDER — DIPHENHYDRAMINE HYDROCHLORIDE 50 MG/ML
25 INJECTION INTRAMUSCULAR; INTRAVENOUS ONCE
Status: COMPLETED | OUTPATIENT
Start: 2020-06-24 | End: 2020-06-24

## 2020-06-24 RX ORDER — CYCLOBENZAPRINE HCL 10 MG
10 TABLET ORAL 3 TIMES DAILY PRN
Qty: 30 TABLET | Refills: 0 | Status: SHIPPED | OUTPATIENT
Start: 2020-06-24 | End: 2022-11-29

## 2020-06-24 RX ADMIN — METOCLOPRAMIDE 10 MG: 5 INJECTION, SOLUTION INTRAMUSCULAR; INTRAVENOUS at 09:40

## 2020-06-24 RX ADMIN — DIPHENHYDRAMINE HYDROCHLORIDE 25 MG: 50 INJECTION, SOLUTION INTRAMUSCULAR; INTRAVENOUS at 09:42

## 2020-06-24 NOTE — ED PROVIDER NOTES
EMERGENCY DEPARTMENT ENCOUNTER    Room Number:  4/04  Date seen:  6/24/2020  Time seen: 9:43 AM  PCP: aAyush Mckeon MD  Historian: Patient      HPI:  Chief Complaint: Headache  A complete HPI/ROS/PMH/PSH/SH/FH are unobtainable due to: Nothing  Context: Sánchez Carbajal is a 55 y.o. female who presents to the ED c/o headache that she noticed upon waking up this morning around 7 AM.  She reports that her head initially felt heavy.  She then developed more of a pain along the right side of her head and down to the right side of her neck.  She gets headaches often but they are typically more in the front or back and pressure-like.  This headache is different.  She also reports that she felt a little dizzy when she got up to walk.  It is not like vertigo but she just felt a little off.  She reports some pain radiating down the right arm as well.  This is been present for some time now and she is awaiting referral to a specialist by her primary care provider.  She takes Advil as needed for this pain.  She has had outpatient evaluation and told that it is related to disc in her neck.  She denies any speech or vision disturbance today.  She denies any focal weakness in her arms or legs.  No numbness.  She has had no fever or chills.  She denies neck stiffness but reports that she has pain when she turns her neck to the right.            PAST MEDICAL HISTORY  Active Ambulatory Problems     Diagnosis Date Noted   • Left ureteral stone 04/06/2016   • Obstructive sleep apnea syndrome    • Seasonal allergic rhinitis 01/23/2018   • Uterovaginal prolapse 01/15/2019   • Ureteral stone with hydronephrosis 09/01/2019   • Upper respiratory tract infection 11/24/2019     Resolved Ambulatory Problems     Diagnosis Date Noted   • No Resolved Ambulatory Problems     Past Medical History:   Diagnosis Date   • Arthritis    • Chronic back pain    • Diabetes mellitus (CMS/[facility])    • GERD (gastroesophageal reflux disease)    • H/O  seasonal allergies    • History of anemia    • History of diverticulitis    • History of kidney stones    • Hypertension    • PONV (postoperative nausea and vomiting)    • Sleep apnea with use of continuous positive airway pressure (CPAP)    • Uterine prolapse          PAST SURGICAL HISTORY  Past Surgical History:   Procedure Laterality Date   • ANTERIOR AND POSTERIOR VAGINAL REPAIR N/A 1/15/2019    Procedure: /POSTERIOR REPAIR;  Surgeon: James Holloway MD;  Location: [facility] OR;  Service: Gynecology   • CHOLECYSTECTOMY     • COLONOSCOPY N/A 1/18/2017    Procedure: COLONOSCOPY;  Surgeon: Tian Henderson MD;  Location: [facility] OR;  Service:    • CYSTOSCOPY URETEROSCOPY LASER LITHOTRIPSY N/A 4/6/2016    Procedure: URETEROSCOPY  ;  Surgeon: Vimal Azul MD;  Location: [facility] OR;  Service:    • CYSTOSCOPY URETEROSCOPY LASER LITHOTRIPSY Left 9/1/2019    Procedure: CYSTOSCOPY left retrograde pyelogram;  Surgeon: Vimal Azul MD;  Location: [facility] OR;  Service: Urology   • CYSTOSCOPY W/ LASER LITHOTRIPSY Left 6/25/2018    Procedure: CYSTOSCOPY, LEFT URETEROSCOPY, left  STENT PLACEMENT, right retrograde;  Surgeon: Vimal Azul MD;  Location: [facility] OR;  Service: Urology   • EXTRACORPOREAL SHOCK WAVE LITHOTRIPSY (ESWL)  2015   • KIDNEY STONE SURGERY     • SACROCOLPOPEXY N/A 1/15/2019    Procedure: ROBOTIC SACROCOLPOPEXY WITH MESH, TOTAL LAPAROSCOPICE HYSTERECTOMY BILATERAL SALPINGECTOMY WITH DAVINCI ;  Surgeon: James Holloway MD;  Location: [facility] OR;  Service: DaVinci         FAMILY HISTORY  History reviewed. No pertinent family history.      SOCIAL HISTORY  Social History     Socioeconomic History   • Marital status:      Spouse name: Not on file   • Number of children: Not on file   • Years of education: Not on file   • Highest education level: Not on file   Tobacco Use   • Smoking status: Never Smoker   • Smokeless tobacco: Never Used   Substance and Sexual Activity    • Alcohol use: No   • Drug use: No   • Sexual activity: Yes     Partners: Male         ALLERGIES  Penicillins; Flagyl [metronidazole]; and Sulfa antibiotics        REVIEW OF SYSTEMS  Review of Systems   Review of all 14 systems is negative other than stated in the HPI above.      PHYSICAL EXAM  ED Triage Vitals [06/24/20 0919]   Temp Heart Rate Resp BP SpO2   98 °F (36.7 °C) 98 16 152/91 97 %      Temp src Heart Rate Source Patient Position BP Location FiO2 (%)   Oral Monitor Lying Left arm --         GENERAL: Awake and alert, no acute distress  HENT: nares patent  EYES: no scleral icterus  CV: regular rhythm, normal rate  RESPIRATORY: normal effort, lungs clear to auscultation bilaterally  ABDOMEN: soft, nontender throughout  MUSCULOSKELETAL: no deformity, mild lower cervical spine tenderness  NEURO: alert, moves all extremities, follows commands.  Normal sensation to light touch throughout the face, both arms, both legs.  5 out of 5 strength in bilateral upper and lower extremities with no drift.  Cranial nerves II through XII grossly intact.  Speech is fluent and clear.  SKIN: warm, dry, normal to inspection, no rash.    Vital signs and nursing notes reviewed.          LAB RESULTS  Recent Results (from the past 24 hour(s))   Basic Metabolic Panel    Collection Time: 06/24/20  9:36 AM   Result Value Ref Range    Glucose 196 (H) 65 - 99 mg/dL    BUN 24 (H) 6 - 20 mg/dL    Creatinine 0.72 0.57 - 1.00 mg/dL    Sodium 136 136 - 145 mmol/L    Potassium 4.0 3.5 - 5.2 mmol/L    Chloride 100 98 - 107 mmol/L    CO2 23.2 22.0 - 29.0 mmol/L    Calcium 10.1 8.6 - 10.5 mg/dL    eGFR Non African Amer 84 >60 mL/min/1.73    BUN/Creatinine Ratio 33.3 (H) 7.0 - 25.0    Anion Gap 12.8 5.0 - 15.0 mmol/L   CBC Auto Differential    Collection Time: 06/24/20  9:36 AM   Result Value Ref Range    WBC 4.77 3.40 - 10.80 10*3/mm3    RBC 4.47 3.77 - 5.28 10*6/mm3    Hemoglobin 12.8 12.0 - 15.9 g/dL    Hematocrit 39.6 34.0 - 46.6 %    MCV  88.6 79.0 - 97.0 fL    MCH 28.6 26.6 - 33.0 pg    MCHC 32.3 31.5 - 35.7 g/dL    RDW 12.9 12.3 - 15.4 %    RDW-SD 42.3 37.0 - 54.0 fl    MPV 9.7 6.0 - 12.0 fL    Platelets 250 140 - 450 10*3/mm3    Neutrophil % 52.8 42.7 - 76.0 %    Lymphocyte % 35.8 19.6 - 45.3 %    Monocyte % 7.8 5.0 - 12.0 %    Eosinophil % 3.4 0.3 - 6.2 %    Basophil % 0.2 0.0 - 1.5 %    Immature Grans % 0.0 0.0 - 0.5 %    Neutrophils, Absolute 2.52 1.70 - 7.00 10*3/mm3    Lymphocytes, Absolute 1.71 0.70 - 3.10 10*3/mm3    Monocytes, Absolute 0.37 0.10 - 0.90 10*3/mm3    Eosinophils, Absolute 0.16 0.00 - 0.40 10*3/mm3    Basophils, Absolute 0.01 0.00 - 0.20 10*3/mm3    Immature Grans, Absolute 0.00 0.00 - 0.05 10*3/mm3       Ordered the above labs and reviewed the results.        RADIOLOGY  Ct Head Without Contrast    Result Date: 6/24/2020  CT HEAD, NONCONTRAST,  HISTORY: Right-sided headaches started this morning. Dizziness.  COMPARISON: 03/02/2020  TECHNIQUE: CT examination of the head was performed without IV contrast. Multiplanar reformats obtained. Radiation dose reduction techniques included automated exposure control or exposure modulation based on body size. Radiation audit for CT and nuclear cardiology exams in the last12 months: 6.  FINDINGS: Ventricular size and configuration are within normal limits. No acute infarct or hemorrhage is seen. There are no masses. There is no skull fracture. The visualized mastoid air cells and middle ear cavities are clear.      Negative noncontrast head CT examination.  This report was finalized on 6/24/2020 10:45 AM by Dr. Fracisco Soria MD.        Ordered the above noted radiological studies. Reviewed by me in PACS.            PROCEDURES  Procedures              MEDICATIONS GIVEN IN ER  Medications   sodium chloride 0.9 % flush 10 mL (has no administration in time range)   metoclopramide (REGLAN) injection 10 mg (10 mg Intravenous Given 6/24/20 6839)   diphenhydrAMINE (BENADRYL) injection 25 mg (25  mg Intravenous Given 6/24/20 0942)                   MEDICAL DECISION MAKING, PROGRESS, and CONSULTS    ED Course as of Jun 24 1144 Wed Jun 24, 2020   0947 55-year-old female presenting with complaint of headache onset around 7:00 this morning upon waking up.  She also reports some mild dizziness.  NIH stroke scale of 0.  She does have some pain in the neck and right upper extremity that has been chronic and related to cervical disc disease.  I have ordered CT brain and labs for further evaluation.  She was given Reglan and Benadryl for her headache.    [JR]   1131 Patient reassessed.  She reports significant improvement of her headache.  I was able to stand her up and walk her and she required no assistance, had steady gait.  She reports that her dizziness has resolved.  I explained that her symptoms may be related to vestibular migraine or complex migraine.  I do not suspect that she has had a stroke.  I did recommend outpatient neurology follow-up regarding her headaches.  Also, regarding her cervical radiculopathy, I will provide contact information for [facility] neurosurgery.  I explained that I could offer oral steroids today however given her diabetes this would likely cause hyperglycemia.  Patient elects to hold off on any oral steroids at this time.  We will trial Flexeril PRN which she has tolerated previously.  Return precautions were discussed.    [JR]      ED Course User Index  [JR] Brian Alcala MD       [facility]           DIAGNOSIS  Final diagnoses:   Acute nonintractable headache, unspecified headache type   Dizziness   Cervical radiculopathy         DISPOSITION  DISCHARGE    Patient discharged in stable condition.    Reviewed implications of results, diagnosis, meds, responsibility to follow up, warning signs and symptoms of possible worsening, potential complications and reasons to return to ER.    Patient/Family voiced understanding of above instructions.    Discussed plan for  discharge, as there is no emergent indication for admission. Patient referred to primary care provider for BP management due to today's BP. Pt/family is agreeable and understands need for follow up and repeat testing.  Pt is aware that discharge does not mean that nothing is wrong but it indicates no emergency is present that requires admission and they must continue care with follow-up as given below or physician of their choice.     FOLLOW-UP  Aayush Mckeon MD  [address] [facility] [address] [phone]      As needed    Rik Birch MD  [address] [id number] [phone]    Schedule an appointment as soon as possible for a visit            Medication List      New Prescriptions    cyclobenzaprine 10 MG tablet  Commonly known as:  FLEXERIL  Take 1 tablet by mouth 3 (Three) Times a Day As Needed for Muscle Spasms.                      Latest Documented Vital Signs:  As of 11:44  BP- 139/71 HR- 72 Temp- 98 °F (36.7 °C) (Oral) O2 sat- 94%        --    Please note that portions of this were completed with a voice recognition program.          Brian Alcala MD  06/24/20 6031

## 2020-07-13 ENCOUNTER — TRANSCRIBE ORDERS (OUTPATIENT)
Dept: ADMINISTRATIVE | Facility: HOSPITAL | Age: 55
End: 2020-07-13

## 2020-07-13 DIAGNOSIS — M54.41 RIGHT-SIDED LOW BACK PAIN WITH RIGHT-SIDED SCIATICA, UNSPECIFIED CHRONICITY: Primary | ICD-10-CM

## 2020-07-22 ENCOUNTER — HOSPITAL ENCOUNTER (OUTPATIENT)
Dept: MRI IMAGING | Facility: HOSPITAL | Age: 55
End: 2020-07-22

## 2020-07-31 ENCOUNTER — HOSPITAL ENCOUNTER (OUTPATIENT)
Dept: MRI IMAGING | Facility: HOSPITAL | Age: 55
Discharge: HOME | End: 2020-07-31

## 2020-08-07 ENCOUNTER — HOSPITAL ENCOUNTER (OUTPATIENT)
Dept: MRI IMAGING | Facility: HOSPITAL | Age: 55
Discharge: HOME | End: 2020-08-07

## 2020-08-12 ENCOUNTER — TRANSCRIBE ORDERS (OUTPATIENT)
Dept: ADMINISTRATIVE | Facility: HOSPITAL | Age: 55
End: 2020-08-12

## 2020-08-12 DIAGNOSIS — M54.41 ACUTE BACK PAIN WITH SCIATICA, RIGHT: Primary | ICD-10-CM

## 2020-08-13 ENCOUNTER — HOSPITAL ENCOUNTER (OUTPATIENT)
Dept: MRI IMAGING | Facility: HOSPITAL | Age: 55
Discharge: HOME | End: 2020-08-13
Admitting: ANESTHESIOLOGY

## 2020-08-13 DIAGNOSIS — M54.41 ACUTE BACK PAIN WITH SCIATICA, RIGHT: ICD-10-CM

## 2020-08-13 PROCEDURE — 72148 MRI LUMBAR SPINE W/O DYE: CPT

## 2021-02-09 ENCOUNTER — OFFICE VISIT (OUTPATIENT)
Dept: SLEEP MEDICINE | Facility: HOSPITAL | Age: 56
End: 2021-02-09

## 2021-02-09 VITALS
WEIGHT: 198 LBS | SYSTOLIC BLOOD PRESSURE: 162 MMHG | HEIGHT: 64 IN | DIASTOLIC BLOOD PRESSURE: 94 MMHG | BODY MASS INDEX: 33.8 KG/M2 | HEART RATE: 107 BPM

## 2021-02-09 DIAGNOSIS — G47.00 INSOMNIA, UNSPECIFIED TYPE: ICD-10-CM

## 2021-02-09 DIAGNOSIS — R06.81 GERD WITH APNEA: ICD-10-CM

## 2021-02-09 DIAGNOSIS — J30.2 SEASONAL ALLERGIC RHINITIS, UNSPECIFIED TRIGGER: ICD-10-CM

## 2021-02-09 DIAGNOSIS — K21.9 GERD WITH APNEA: ICD-10-CM

## 2021-02-09 DIAGNOSIS — G47.33 OBSTRUCTIVE SLEEP APNEA SYNDROME: Primary | ICD-10-CM

## 2021-02-09 PROCEDURE — G0463 HOSPITAL OUTPT CLINIC VISIT: HCPCS

## 2021-02-09 NOTE — PROGRESS NOTES
PULMONARY  CONSULT NOTE      PATIENT IDENTIFICATION:  Name: Sánchez Carbajal  Age: 56 y.o.  Sex: female  :  1965  MRN: [id number]    DATE OF CONSULTATION:  2021                     CHIEF COMPLAINT: Obstructive sleep apnea    History of Present Illness:   Sánchez Carbajal is a 56 y.o. female Pt on CPAP feeling better more energy especially the night he use it more than 4 hours, no sleepiness no fatigue no tiredness, no mask irritation no dryness, compliance report reviewed with pt AHI< 5 with good usage.   Patient still having gastroesophageal reflux symptoms disorder  Patient still with insomnia she has to take her trazodone maybe 1-2 times a week    Review of Systems:   Constitutional: negative   Eyes: negative   ENT/oropharynx: negative   Cardiovascular: negative   Respiratory: negative   Gastrointestinal: negative   Genitourinary: negative   Neurological: negative   Musculoskeletal: negative   Integument/breast: negative   Endocrine: negative   Allergic/Immunologic: negative     Past Medical History:  Past Medical History:   Diagnosis Date   • Arthritis     RIGHT SHOULDER   • Chronic back pain    • Diabetes mellitus (CMS/HCC)    • GERD (gastroesophageal reflux disease)    • H/O seasonal allergies    • History of anemia    • History of diverticulitis    • History of kidney stones    • Hypertension    • PONV (postoperative nausea and vomiting)    • Sleep apnea with use of continuous positive airway pressure (CPAP)    • Uterine prolapse     UTERINE VAG PROLAPSE       Past Surgical History:  Past Surgical History:   Procedure Laterality Date   • ANTERIOR AND POSTERIOR VAGINAL REPAIR N/A 1/15/2019    Procedure: /POSTERIOR REPAIR;  Surgeon: James Holloway MD;  Location: [facility] OR;  Service: Gynecology   • CHOLECYSTECTOMY     • COLONOSCOPY N/A 2017    Procedure: COLONOSCOPY;  Surgeon: Tian Henderson MD;  Location: [facility] OR;  Service:    • CYSTOSCOPY URETEROSCOPY LASER  LITHOTRIPSY N/A 4/6/2016    Procedure: URETEROSCOPY  ;  Surgeon: Vimal Azul MD;  Location: [facility] OR;  Service:    • CYSTOSCOPY URETEROSCOPY LASER LITHOTRIPSY Left 9/1/2019    Procedure: CYSTOSCOPY left retrograde pyelogram;  Surgeon: Vimal Azul MD;  Location: [facility] OR;  Service: Urology   • CYSTOSCOPY W/ LASER LITHOTRIPSY Left 6/25/2018    Procedure: CYSTOSCOPY, LEFT URETEROSCOPY, left  STENT PLACEMENT, right retrograde;  Surgeon: Vimal Azul MD;  Location: [facility] OR;  Service: Urology   • EXTRACORPOREAL SHOCK WAVE LITHOTRIPSY (ESWL)  2015   • KIDNEY STONE SURGERY     • SACROCOLPOPEXY N/A 1/15/2019    Procedure: ROBOTIC SACROCOLPOPEXY WITH MESH, TOTAL LAPAROSCOPICE HYSTERECTOMY BILATERAL SALPINGECTOMY WITH DAVINCI ;  Surgeon: James Holloway MD;  Location: [facility] OR;  Service: DaVinci        Family History:  No family history on file.     Social History:   Social History     Tobacco Use   • Smoking status: Never Smoker   • Smokeless tobacco: Never Used   Substance Use Topics   • Alcohol use: No        Allergies:  Allergies   Allergen Reactions   • Penicillins Rash   • Flagyl [Metronidazole] Hives   • Sulfa Antibiotics Rash       Home Meds:  (Not in a hospital admission)      Objective:    Vitals Ranges:   Heart Rate:  [107] 107  BP: (162)/(94) 162/94  Body mass index is 33.99 kg/m².     Exam:  General Appearance:  WDWN    HEENT:   without obvious abnormality,  Conjunctiva/corneas clear,  Normal external ear canals, no drainage    Clear orsalmucosa,  Mallampati score 3    Neck:  Supple, symmetrical, trachea midline. No JVD.  Lungs:   Bilateral basal rhonchi bilaterally, respirations unlabored symmetrical wall movement.    Chest wall:  No tenderness or deformity.    Heart:  Regular rate and rhythm, S1 and S2 normal.  Extremities: Trace edema no clubbing or Cyanosis        Data Review:  All labs (24hrs): No results found for this or any previous visit (from the past 24  hour(s)).     Imaging:  MRI Lumbar Spine Without Contrast  Narrative: INDICATION:    Low back pain for 8 years and getting worse. Some left leg pain right leg pain and numbness.    TECHNIQUE:   MRI of the lumbar spine without contrast.    COMPARISON:    CT abdomen pelvis May 23, 2020.    FINDINGS:  There is mild exaggeration of lumbar lordosis. There is mild multilevel intervertebral disc desiccation. Bone marrow signal intensity is normal. Mild chronic anterior wedging at T12 with small Schmorl's nodes T11-12 T12-L1. Conus medullaris terminates at  L1-2 and is normal.    At L1-2, no significant canal or foraminal compromise. There is a mild posterior disc bulge more prominent left paramedian to posterolateral location. There is no foraminal impingement.    At L2-3, there is mild facet hypertrophy but no canal or foraminal impingement.    At L3-4, there is mild facet degenerative change bilaterally with ligamentum flavum thickening. There is a mild posterior disc bulge with mild effacement of the anterior thecal sac. No canal stenosis. No significant foraminal impingement.    At L4-5, there is moderate facet degenerative change bilaterally with moderate ligament flavum thickening. There is a broad posterior disc bulge more prominent left-sided posterior laterally. There is mild left and minimal right inferior foraminal  narrowing. There is mild canal stenosis and mass effect on the bilateral lateral recesses.    At L5-S1, there is mild facet degenerative change on the left. There is a minimal posterior disc bulge and mild left foraminal narrowing. No canal stenosis.  Impression: Lumbar degenerative changes are detailed above. There is mild canal stenosis at L4-5. See level by level description of findings.    Signer Name: Dot Brower MD   Signed: 8/14/2020 10:49 AM   Workstation Name: [id number]    [facility]       ASSESSMENT:  Diagnoses and all orders for this visit:    Obstructive sleep  apnea syndrome    Insomnia, unspecified type    Seasonal allergic rhinitis, unspecified trigger    GERD with apnea        PLAN:     This patient with obstructive sleep apnea, compliance is improved. Encourage to use it more frequent, I re-emphasized on pt the long and short term benefit of treating SAGAR.   Sleep hygiene tips discussed in details with pt and plan to follow it with maintaining regular time for sleep and using sleep aid as needed     Treating SAGAR will improve gastroesophageal reflex symptoms control    It is recommended to keep treatment for allergic rhinitis optimized to improve quality of sleep    Seema Moser MD. D, ABSM.  2/9/2021  10:36 EST

## 2021-07-27 ENCOUNTER — TRANSCRIBE ORDERS (OUTPATIENT)
Dept: ADMINISTRATIVE | Facility: HOSPITAL | Age: 56
End: 2021-07-27

## 2021-07-27 DIAGNOSIS — M54.50 LOW BACK PAIN, UNSPECIFIED BACK PAIN LATERALITY, UNSPECIFIED CHRONICITY, UNSPECIFIED WHETHER SCIATICA PRESENT: Primary | ICD-10-CM

## 2021-07-30 ENCOUNTER — TRANSCRIBE ORDERS (OUTPATIENT)
Dept: ADMINISTRATIVE | Facility: HOSPITAL | Age: 56
End: 2021-07-30

## 2021-07-30 DIAGNOSIS — M47.812 CERVICAL SPONDYLOSIS: Primary | ICD-10-CM

## 2021-08-12 ENCOUNTER — HOSPITAL ENCOUNTER (OUTPATIENT)
Dept: GENERAL RADIOLOGY | Facility: HOSPITAL | Age: 56
Discharge: HOME | End: 2021-08-12

## 2021-08-12 ENCOUNTER — HOSPITAL ENCOUNTER (OUTPATIENT)
Dept: MRI IMAGING | Facility: HOSPITAL | Age: 56
Discharge: HOME | End: 2021-08-12

## 2021-08-12 ENCOUNTER — TRANSCRIBE ORDERS (OUTPATIENT)
Dept: ADMINISTRATIVE | Facility: HOSPITAL | Age: 56
End: 2021-08-12

## 2021-08-12 DIAGNOSIS — M54.50 LOW BACK PAIN, UNSPECIFIED BACK PAIN LATERALITY, UNSPECIFIED CHRONICITY, UNSPECIFIED WHETHER SCIATICA PRESENT: Primary | ICD-10-CM

## 2021-08-12 DIAGNOSIS — M54.50 LOW BACK PAIN, UNSPECIFIED BACK PAIN LATERALITY, UNSPECIFIED CHRONICITY, UNSPECIFIED WHETHER SCIATICA PRESENT: ICD-10-CM

## 2021-08-12 PROCEDURE — 72148 MRI LUMBAR SPINE W/O DYE: CPT

## 2021-08-12 PROCEDURE — 72100 X-RAY EXAM L-S SPINE 2/3 VWS: CPT

## 2021-08-18 ENCOUNTER — APPOINTMENT (OUTPATIENT)
Dept: MRI IMAGING | Facility: HOSPITAL | Age: 56
End: 2021-08-18

## 2021-08-18 ENCOUNTER — HOSPITAL ENCOUNTER (OUTPATIENT)
Dept: MRI IMAGING | Facility: HOSPITAL | Age: 56
Discharge: HOME | End: 2021-08-18
Admitting: NURSE PRACTITIONER

## 2021-08-18 DIAGNOSIS — M47.812 CERVICAL SPONDYLOSIS: ICD-10-CM

## 2021-08-18 PROCEDURE — 72141 MRI NECK SPINE W/O DYE: CPT

## 2021-10-06 ENCOUNTER — HOSPITAL ENCOUNTER (EMERGENCY)
Facility: HOSPITAL | Age: 56
Discharge: HOME | End: 2021-10-06
Attending: EMERGENCY MEDICINE | Admitting: EMERGENCY MEDICINE

## 2021-10-06 ENCOUNTER — APPOINTMENT (OUTPATIENT)
Dept: GENERAL RADIOLOGY | Facility: HOSPITAL | Age: 56
End: 2021-10-06

## 2021-10-06 VITALS
WEIGHT: 203 LBS | HEIGHT: 66 IN | TEMPERATURE: 97.4 F | RESPIRATION RATE: 16 BRPM | DIASTOLIC BLOOD PRESSURE: 115 MMHG | SYSTOLIC BLOOD PRESSURE: 141 MMHG | BODY MASS INDEX: 32.62 KG/M2 | HEART RATE: 100 BPM | OXYGEN SATURATION: 98 %

## 2021-10-06 DIAGNOSIS — S40.012A CONTUSION OF LEFT SHOULDER, INITIAL ENCOUNTER: Primary | ICD-10-CM

## 2021-10-06 PROCEDURE — 99282 EMERGENCY DEPT VISIT SF MDM: CPT | Performed by: EMERGENCY MEDICINE

## 2021-10-06 PROCEDURE — 99283 EMERGENCY DEPT VISIT LOW MDM: CPT

## 2021-10-06 PROCEDURE — 73030 X-RAY EXAM OF SHOULDER: CPT

## 2021-10-06 RX ORDER — ASPIRIN 81 MG/1
TABLET, CHEWABLE ORAL
COMMUNITY

## 2021-10-06 RX ORDER — HYDROCODONE BITARTRATE AND ACETAMINOPHEN 5; 325 MG/1; MG/1
1 TABLET ORAL EVERY 6 HOURS PRN
COMMUNITY
End: 2023-01-09

## 2021-10-06 NOTE — ED NOTES
Patient arrives to ER with c/o left shoulder pain after falling in her kitchen and hitting her shoulder on her cabinets earlier today. ROM limited with raising her arm due to pain. No bruising or redness noted. Noted patient moving her arm/shoulder to place items in purse and holding her purse with the left arm/hand without appearance of pain/discomfort.     Maria R Dejesus, RN  10/06/21 1914

## 2021-10-06 NOTE — ED PROVIDER NOTES
Subjective     History provided by:  Patient    History of Present Illness    · Chief complaint: Shoulder injury    · Location: Left shoulder    · Quality/Severity: Moderate pain.    · Timing/Onset: Injury occurred an hour ago.    · Modifying Factors: Movement of the left shoulder, especially abduction, exacerbates pain.    · Associated symptoms: Denies any neck pain.    · Narrative: The patient is a 56-year-old white female who fell back striking her left shoulder against the cabinet.  She reports pain on the lateral aspect of the left shoulder radiating down the proximal left arm.    Review of Systems   Constitutional: Negative for activity change, appetite change, chills, diaphoresis, fatigue and fever.   HENT: Negative for congestion, dental problem, ear pain, hearing loss, mouth sores, postnasal drip, rhinorrhea, sinus pressure, sore throat and voice change.    Eyes: Negative for photophobia, pain, discharge, redness and visual disturbance.   Respiratory: Negative for cough, chest tightness, shortness of breath, wheezing and stridor.    Cardiovascular: Negative for chest pain, palpitations and leg swelling.   Gastrointestinal: Negative for abdominal pain, diarrhea, nausea and vomiting.   Genitourinary: Negative for difficulty urinating, dysuria, flank pain, frequency, hematuria and urgency.   Musculoskeletal: Negative for arthralgias, back pain, gait problem, joint swelling, myalgias, neck pain and neck stiffness.   Skin: Negative for color change and rash.   Neurological: Negative for dizziness, tremors, seizures, syncope, facial asymmetry, speech difficulty, weakness, light-headedness, numbness and headaches.   Hematological: Negative for adenopathy.   Psychiatric/Behavioral: Negative.  Negative for confusion and decreased concentration. The patient is not nervous/anxious.      Past Medical History:   Diagnosis Date   • Arthritis     RIGHT SHOULDER   • Chronic back pain    • Diabetes mellitus (HCC)    • GERD  "(gastroesophageal reflux disease)    • H/O seasonal allergies    • History of anemia    • History of diverticulitis    • History of kidney stones    • Hypertension    • PONV (postoperative nausea and vomiting)    • Sleep apnea with use of continuous positive airway pressure (CPAP)    • Uterine prolapse     UTERINE VAG PROLAPSE     BP (!) 141/115   Pulse 100   Temp 97.4 °F (36.3 °C) (Oral)   Resp 16   Ht 167.6 cm (66\")   Wt 92.1 kg (203 lb)   LMP  (LMP Unknown) Comment: thinks 1-2 yrs ago  SpO2 98%   BMI 32.77 kg/m²     Past Medical History:   Diagnosis Date   • Arthritis     RIGHT SHOULDER   • Chronic back pain    • Diabetes mellitus (HCC)    • GERD (gastroesophageal reflux disease)    • H/O seasonal allergies    • History of anemia    • History of diverticulitis    • History of kidney stones    • Hypertension    • PONV (postoperative nausea and vomiting)    • Sleep apnea with use of continuous positive airway pressure (CPAP)    • Uterine prolapse     UTERINE VAG PROLAPSE       Allergies   Allergen Reactions   • Penicillins Rash   • Flagyl [Metronidazole] Hives   • Sulfa Antibiotics Rash       Past Surgical History:   Procedure Laterality Date   • ANTERIOR AND POSTERIOR VAGINAL REPAIR N/A 1/15/2019    Procedure: /POSTERIOR REPAIR;  Surgeon: James Holloway MD;  Location: [facility];  Service: Gynecology   • CHOLECYSTECTOMY     • COLONOSCOPY N/A 1/18/2017    Procedure: COLONOSCOPY;  Surgeon: Tian Henderson MD;  Location: [facility] OR;  Service:    • CYSTOSCOPY URETEROSCOPY LASER LITHOTRIPSY N/A 4/6/2016    Procedure: URETEROSCOPY  ;  Surgeon: Vimal Azul MD;  Location: [facility] OR;  Service:    • CYSTOSCOPY URETEROSCOPY LASER LITHOTRIPSY Left 9/1/2019    Procedure: CYSTOSCOPY left retrograde pyelogram;  Surgeon: Vimal Azul MD;  Location: [facility] OR;  Service: Urology   • CYSTOSCOPY W/ LASER LITHOTRIPSY Left 6/25/2018    Procedure: CYSTOSCOPY, LEFT URETEROSCOPY, left  STENT " PLACEMENT, right retrograde;  Surgeon: Vimal Azul MD;  Location: [facility] OR;  Service: Urology   • EXTRACORPOREAL SHOCK WAVE LITHOTRIPSY (ESWL)  2015   • KIDNEY STONE SURGERY     • SACROCOLPOPEXY N/A 1/15/2019    Procedure: ROBOTIC SACROCOLPOPEXY WITH MESH, TOTAL LAPAROSCOPICE HYSTERECTOMY BILATERAL SALPINGECTOMY WITH DAVINCI ;  Surgeon: James Holloway MD;  Location: [facility] OR;  Service: DaVinci       History reviewed. No pertinent family history.    Social History     Socioeconomic History   • Marital status:      Spouse name: Not on file   • Number of children: Not on file   • Years of education: Not on file   • Highest education level: Not on file   Tobacco Use   • Smoking status: Never Smoker   • Smokeless tobacco: Never Used   Substance and Sexual Activity   • Alcohol use: No   • Drug use: No   • Sexual activity: Yes     Partners: Male           Objective   Physical Exam  Vitals and nursing note reviewed.   Constitutional:       General: She is not in acute distress.     Appearance: Normal appearance. She is not ill-appearing, toxic-appearing or diaphoretic.      Comments: The patient appears healthy in no acute distress.  Review of her vital signs: She is afebrile with a temperature 97.2, blood pressure markedly elevated 160/111, mildly tachycardic with a heart rate of 100, respirations normal 16 with a normal room air oxygen saturation of 99%.   HENT:      Head: Normocephalic and atraumatic.      Nose: Nose normal.   Eyes:      General: No scleral icterus.     Conjunctiva/sclera: Conjunctivae normal.      Pupils: Pupils are equal, round, and reactive to light.   Neck:      Comments: No posterior cervical spine tenderness or deformity.  Musculoskeletal:      Cervical back: Normal range of motion and neck supple. No tenderness.      Comments: The patient's left shoulder has no bony deformities.  She has mild soft tissue tenderness on the lateral aspect of the humeral head.  She has limited  abduction due to discomfort.  There is no deformity or tenderness of the left elbow, forearm wrist or hand.  Her left hand is neurovascular intact.   Lymphadenopathy:      Cervical: No cervical adenopathy.   Skin:     General: Skin is warm and dry.      Capillary Refill: Capillary refill takes less than 2 seconds.      Coloration: Skin is not pale.      Findings: No bruising, erythema or rash.   Neurological:      General: No focal deficit present.      Mental Status: She is alert and oriented to person, place, and time.      Cranial Nerves: No cranial nerve deficit.      Sensory: No sensory deficit.      Motor: No weakness.   Psychiatric:         Mood and Affect: Mood normal.         Behavior: Behavior normal.         Thought Content: Thought content normal.         Judgment: Judgment normal.         Procedures           ED Course  ED Course as of Oct 06 2138   Wed Oct 06, 2021   1921 The patient is left shoulder x-ray shows no evidence of fracture or dislocation.  Degenerative changes were noted.    [TP]   1922 Is my impression the patient has a contusion of the left shoulder.  Her left arm was placed in a sling by the tech.  Her left hand was neurovascular intact after the arm was placed in a sling.  She will be instructed to follow-up with her PCP Dr. Mckeon in 2 weeks.    [TP]      ED Course User Index  [TP] Coleman Monroe MD                                           MDM  Number of Diagnoses or Management Options  Contusion of left shoulder, initial encounter: new and requires workup     Amount and/or Complexity of Data Reviewed  Tests in the radiology section of CPT®: ordered and reviewed    Risk of Complications, Morbidity, and/or Mortality  Presenting problems: moderate  Diagnostic procedures: moderate  Management options: moderate  General comments: My differential includes but is not limited to shoulder contusion, clavicle fracture, scapular fracture, humeral head, neck or shaft fracture, AC  separation, shoulder dislocation, shoulder sprain, axillary nerve palsy and myocardial infarction    Patient Progress  Patient progress: stable      Final diagnoses:   Contusion of left shoulder, initial encounter       ED Disposition  ED Disposition     ED Disposition Condition Comment    Discharge Stable           Aayush Mckeon MD  [address]  [facility] [address]  [phone]    Schedule an appointment as soon as possible for a visit in 2 weeks           Medication List      No changes were made to your prescriptions during this visit.         Labs Reviewed - No data to display  XR Shoulder 2+ View Left   Final Result   Degenerative change at the left acromioclavicular joint, otherwise negative plain film assessment left shoulder.      Signer Name: Dot Brower MD    Signed: 10/6/2021 7:17 PM    Workstation Name: [email or web address]     [facility]             Medication List      No changes were made to your prescriptions during this visit.              Coleman Monroe MD  10/06/21 2770

## 2022-02-08 ENCOUNTER — APPOINTMENT (OUTPATIENT)
Dept: SLEEP MEDICINE | Facility: HOSPITAL | Age: 57
End: 2022-02-08

## 2022-02-10 ENCOUNTER — TELEPHONE (OUTPATIENT)
Dept: NEUROSURGERY | Facility: OTHER | Age: 57
End: 2022-02-10

## 2022-02-10 NOTE — TELEPHONE ENCOUNTER
PT CALLED AND STATES SHE WANTS TO COME TO SEE OUR OFFICE FOR A SECOND OPINION-PT STATES SHE HAS BEEN SEEING [place]--PT HAS IMAGING IN [facility] AND AT [facility] IN HER CHART-SHE IS CALLING HER PCP TO SEE ABOUT A REFERRAL-

## 2022-02-15 ENCOUNTER — OFFICE VISIT (OUTPATIENT)
Dept: SLEEP MEDICINE | Facility: HOSPITAL | Age: 57
End: 2022-02-15

## 2022-02-15 VITALS
DIASTOLIC BLOOD PRESSURE: 92 MMHG | HEART RATE: 84 BPM | HEIGHT: 64 IN | BODY MASS INDEX: 36.54 KG/M2 | SYSTOLIC BLOOD PRESSURE: 143 MMHG | WEIGHT: 214 LBS

## 2022-02-15 DIAGNOSIS — K21.9 GERD WITH APNEA: ICD-10-CM

## 2022-02-15 DIAGNOSIS — R06.81 GERD WITH APNEA: ICD-10-CM

## 2022-02-15 DIAGNOSIS — G47.33 OBSTRUCTIVE SLEEP APNEA SYNDROME: Primary | ICD-10-CM

## 2022-02-15 DIAGNOSIS — F51.01 PRIMARY INSOMNIA: ICD-10-CM

## 2022-02-15 PROCEDURE — G0463 HOSPITAL OUTPT CLINIC VISIT: HCPCS

## 2022-02-15 RX ORDER — TRAZODONE HYDROCHLORIDE 100 MG/1
150 TABLET ORAL NIGHTLY
Qty: 30 TABLET | Refills: 11 | Status: SHIPPED | OUTPATIENT
Start: 2022-02-15 | End: 2023-03-23

## 2022-02-15 NOTE — PROGRESS NOTES
SLEEP/PULMONARY  CLINIC NOTE      PATIENT IDENTIFICATION:  Name: Sánchez Carbajal  Age: 57 y.o.  Sex: female  :  1965  MRN: [id number]    DATE OF CONSULTATION:  2/15/2022                     CHIEF COMPLAINT: SAGAR    History of Present Illness:   Sánchez Carbajal is a 57 y.o. female Pt on CPAP feeling better more energy especially the night he use it more than 4 hours, no sleepiness no fatigue no tiredness, no mask irritation no dryness, compliance report reviewed with pt AHI< 5 with good usage.     Patient with insomnia she is able to maintain around 8 hours of sleep, but she has multiple waking up during the night she is taking her trazodone 100 mg at night, and taking her pain medication mostly at night, otherwise no change in her medication      Review of Systems:   Constitutional: As above   Eyes: negative   ENT/oropharynx: negative   Cardiovascular: negative   Respiratory: negative   Gastrointestinal: negative   Genitourinary: negative   Neurological: negative   Musculoskeletal: negative   Integument/breast: negative   Endocrine: negative   Allergic/Immunologic: negative     Past Medical History:  Past Medical History:   Diagnosis Date   • Arthritis     RIGHT SHOULDER   • Chronic back pain    • Diabetes mellitus (HCC)    • GERD (gastroesophageal reflux disease)    • H/O seasonal allergies    • History of anemia    • History of diverticulitis    • History of kidney stones    • Hypertension    • PONV (postoperative nausea and vomiting)    • Sleep apnea with use of continuous positive airway pressure (CPAP)    • Uterine prolapse     UTERINE VAG PROLAPSE       Past Surgical History:  Past Surgical History:   Procedure Laterality Date   • ANTERIOR AND POSTERIOR VAGINAL REPAIR N/A 1/15/2019    Procedure: /POSTERIOR REPAIR;  Surgeon: James Holloway MD;  Location: [facility];  Service: Gynecology   • CHOLECYSTECTOMY     • COLONOSCOPY N/A 2017    Procedure: COLONOSCOPY;  Surgeon: Tian Kim  MD Santiago;  Location: [facility] OR;  Service:    • CYSTOSCOPY URETEROSCOPY LASER LITHOTRIPSY N/A 4/6/2016    Procedure: URETEROSCOPY  ;  Surgeon: Vimal Azul MD;  Location: [facility] OR;  Service:    • CYSTOSCOPY URETEROSCOPY LASER LITHOTRIPSY Left 9/1/2019    Procedure: CYSTOSCOPY left retrograde pyelogram;  Surgeon: Vimal Azul MD;  Location: [facility] OR;  Service: Urology   • CYSTOSCOPY W/ LASER LITHOTRIPSY Left 6/25/2018    Procedure: CYSTOSCOPY, LEFT URETEROSCOPY, left  STENT PLACEMENT, right retrograde;  Surgeon: Vimal Azul MD;  Location: [facility] OR;  Service: Urology   • EXTRACORPOREAL SHOCK WAVE LITHOTRIPSY (ESWL)  2015   • KIDNEY STONE SURGERY     • SACROCOLPOPEXY N/A 1/15/2019    Procedure: ROBOTIC SACROCOLPOPEXY WITH MESH, TOTAL LAPAROSCOPICE HYSTERECTOMY BILATERAL SALPINGECTOMY WITH DAVINCI ;  Surgeon: James Holloway MD;  Location: [facility];  Service: DaVinc        Family History:  No family history on file.     Social History:   Social History     Tobacco Use   • Smoking status: Never Smoker   • Smokeless tobacco: Never Used   Substance Use Topics   • Alcohol use: No        Allergies:  Allergies   Allergen Reactions   • Penicillins Rash   • Flagyl [Metronidazole] Hives   • Sulfa Antibiotics Rash       Home Meds:  (Not in a hospital admission)      Objective:    Vitals Ranges:   Heart Rate:  [84] 84  BP: (143)/(92) 143/92  Body mass index is 37.31 kg/m².     Exam:  General Appearance:  WDWN    HEENT:   without obvious abnormality,  Conjunctiva/corneas clear,  Normal external ear canals, no drainage    Clear orsalmucosa,  Mallampati score 3    Neck:  Supple, symmetrical, trachea midline. No JVD.  Lungs:   Bilateral basal rhonchi bilaterally, respirations unlabored symmetrical wall movement.    Chest wall:  No tenderness or deformity.    Heart:  Regular rate and rhythm, S1 and S2 normal.  Extremities: Trace edema no clubbing or Cyanosis        Data Review:  All labs (24hrs):  No results found for this or any previous visit (from the past 24 hour(s)).     Imaging:  XR Shoulder 2+ View Left  Narrative: CR Shoulder Comp Min 2 Vws LT    INDICATION:   Patient fell backwards and hit shoulder on a cabinet this evening with trauma/pain    COMPARISON:   None available.    FINDINGS:   AP internal rotation, AP external rotation, views of the left shoulder.  There is degenerative change at the acromioclavicular joint.  There is no evidence for acute fracture dislocation or radiopaque foreign body.  There is no osseous destructive  lesion.  Impression: Degenerative change at the left acromioclavicular joint, otherwise negative plain film assessment left shoulder.    Signer Name: Dot Brower MD   Signed: 10/6/2021 7:17 PM   Workstation Name: [email or web address]    [facility]       ASSESSMENT:  Diagnoses and all orders for this visit:    Obstructive sleep apnea syndrome    Primary insomnia    GERD with apnea        PLAN:  •Set a bedtime that is early enough for you to get at least 7 hours of sleep.  •Don’t go to bed unless you are sleepy.  •If you don’t fall asleep after 20 minutes, get out of bed.  •Establish a relaxing bedtime routine.  •Use your bed only for sleep and sex.  •Make your bedroom quiet and relaxing. Keep the room at a comfortable, cool temperature.  •Limit exposure to bright light in the evenings.  •Turn off electronic devices at least 30 minutes before bedtime.  •Don’t eat a large meal before bedtime. If you are hungry at night, eat a light, healthy snack.  •Exercise regularly and maintain a healthy diet.  •Avoid consuming caffeine in the late afternoon or evening.  •Avoid consuming alcohol before bedtime.  •Reduce your fluid intake before bedtime.  •Avoid naps during the day time.   change trazdone to 150    This patient with obstructive sleep apnea, compliance is improved. Encourage to use it more frequent, I re-emphasized on pt the long and short term benefit of  treating SAGAR.     Treating SAGAR will improve GERD symptoms  control    It is recommended to keep thyroid function optimized to improve quality of sleep    Follow-up  1 year    Seema Moser MD. D, ABSM.  2/15/2022  10:49 EST

## 2022-04-18 NOTE — PROGRESS NOTES
"Subjective   Patient ID: Sánchez Carbajal is a 57 y.o. female is being seen for consultation to/day at the request of Aayush Mckeon MD for/ cervicalgia. Patient had a MRI C-spine on 08/18/2021 at [facility], and a CT C-spine on 01.12.2022 at [facility].      Treatment:PAUL    Today patient states that she has neck pian that radiates to B/L arms. Patient also states that she has numbness and tinging in B/L upper extremities.     Patient, Provider, and MA are all wearing a mask in our office today.     History of Present Illness     This patient has been having pain in her neck with radiation to both of her arms.  She had surgery in November 2020 and at that time had pain in her neck and into her left arm.  Postoperatively she had pain in both arms.  She has continue with the pain in both arms and in her neck.  It really has not improved very much at all.  She has no other associated symptoms.  She has had an MRI of her cervical spine last year and a CT of her cervical spine in January of this year.    The following portions of the patient's history were reviewed and updated as appropriate: allergies, current medications, past family history, past medical history, past social history, past surgical history and problem list.    Review of Systems   Constitutional: Negative for chills and fever.   HENT: Negative for congestion.    Musculoskeletal: Positive for neck pain and neck stiffness.   Neurological: Positive for weakness and numbness.       I have reviewed the review of systems as documented by my MA.      Objective     Vitals:    04/19/22 1102   BP: 138/84   Cuff Size: Adult   Pulse: 94   Temp: 98 °F (36.7 °C)   Weight: 97.1 kg (214 lb)   Height: 161.3 cm (63.5\")     Body mass index is 37.31 kg/m².      Physical Exam  Constitutional:       Appearance: She is well-developed.   HENT:      Head: Normocephalic and atraumatic.   Eyes:      Extraocular Movements: EOM normal.      Conjunctiva/sclera: " Conjunctivae normal.      Pupils: Pupils are equal, round, and reactive to light.   Neck:      Vascular: No carotid bruit.   Neurological:      Mental Status: She is alert and oriented to person, place, and time.      Coordination: Finger-Nose-Finger Test and Heel to Shin Test normal.      Gait: Gait is intact.      Deep Tendon Reflexes:      Reflex Scores:       Tricep reflexes are 2+ on the right side and 2+ on the left side.       Bicep reflexes are 2+ on the right side and 2+ on the left side.       Brachioradialis reflexes are 2+ on the right side and 2+ on the left side.       Patellar reflexes are 2+ on the right side and 2+ on the left side.       Achilles reflexes are 2+ on the right side and 2+ on the left side.  Psychiatric:         Speech: Speech normal.       Neurologic Exam     Mental Status   Oriented to person, place, and time.   Registration of memory: Good recent and remote memory.   Attention: normal. Concentration: normal.   Speech: speech is normal   Level of consciousness: alert  Knowledge: consistent with education.     Cranial Nerves     CN II   Visual fields full to confrontation.   Visual acuity: normal    CN III, IV, VI   Pupils are equal, round, and reactive to light.  Extraocular motions are normal.     CN V   Facial sensation intact.   Right corneal reflex: normal  Left corneal reflex: normal    CN VII   Facial expression full, symmetric.   Right facial weakness: none  Left facial weakness: none    CN VIII   Hearing: intact    CN IX, X   Palate: symmetric    CN XI   Right sternocleidomastoid strength: normal  Left sternocleidomastoid strength: normal    CN XII   Tongue: not atrophic  Tongue deviation: none    Motor Exam   Muscle bulk: normal  Right arm tone: normal  Left arm tone: normal  Right leg tone: normal  Left leg tone: normal    Strength   Strength 5/5 except as noted.     Sensory Exam   Light touch normal.     Gait, Coordination, and Reflexes     Gait  Gait:  normal    Coordination   Finger to nose coordination: normal  Heel to shin coordination: normal    Reflexes   Right brachioradialis: 2+  Left brachioradialis: 2+  Right biceps: 2+  Left biceps: 2+  Right triceps: 2+  Left triceps: 2+  Right patellar: 2+  Left patellar: 2+  Right achilles: 2+  Left achilles: 2+  Right : 2+  Left : 2+          Assessment/Plan   Independent Review of Radiographic Studies:      I personally reviewed the images from the following studies.    I reviewed an MRI of the cervical spine done in August of last year at [facility].  This does show the fusion at C4-5 and C5-6.  It is difficult to assess the patency of the foramina but the radiologist seem to think that there was no evidence of foraminal narrowing.  The other levels look okay as well.    Medical Decision Making:      I told the patient that from my point of view I do not think I have enough information at this point to say exactly what to do.  She wanted to know if it is uncommon to have to have a posterior fusion I explained to her that it is not at all uncommon to have to have a posterior fusion.  1 might consider a cervical myelogram before proceeding with any further surgery just to be absolutely certain there is no evidence of pressure on the nerves at any level since she has pain in both of her arms.  She will go back and see her surgeon.    Diagnoses and all orders for this visit:    1. Cervical radiculitis (Primary)      No follow-ups on file.

## 2022-04-19 ENCOUNTER — OFFICE VISIT (OUTPATIENT)
Dept: NEUROSURGERY | Facility: CLINIC | Age: 57
End: 2022-04-19

## 2022-04-19 VITALS
BODY MASS INDEX: 36.54 KG/M2 | WEIGHT: 214 LBS | HEART RATE: 94 BPM | SYSTOLIC BLOOD PRESSURE: 138 MMHG | DIASTOLIC BLOOD PRESSURE: 84 MMHG | HEIGHT: 64 IN | TEMPERATURE: 98 F

## 2022-04-19 DIAGNOSIS — M54.12 CERVICAL RADICULITIS: Primary | ICD-10-CM

## 2022-04-19 PROCEDURE — 99204 OFFICE O/P NEW MOD 45 MIN: CPT | Performed by: NEUROLOGICAL SURGERY

## 2022-04-22 ENCOUNTER — PATIENT ROUNDING (BHMG ONLY) (OUTPATIENT)
Dept: NEUROSURGERY | Facility: CLINIC | Age: 57
End: 2022-04-22

## 2022-11-29 ENCOUNTER — OFFICE VISIT (OUTPATIENT)
Dept: ORTHOPEDIC SURGERY | Facility: CLINIC | Age: 57
End: 2022-11-29

## 2022-11-29 VITALS
HEIGHT: 64 IN | WEIGHT: 212.4 LBS | DIASTOLIC BLOOD PRESSURE: 74 MMHG | HEART RATE: 92 BPM | BODY MASS INDEX: 36.26 KG/M2 | SYSTOLIC BLOOD PRESSURE: 120 MMHG

## 2022-11-29 DIAGNOSIS — R52 PAIN: ICD-10-CM

## 2022-11-29 DIAGNOSIS — M17.0 PRIMARY OSTEOARTHRITIS OF KNEES, BILATERAL: Primary | ICD-10-CM

## 2022-11-29 PROCEDURE — 73562 X-RAY EXAM OF KNEE 3: CPT | Performed by: NURSE PRACTITIONER

## 2022-11-29 PROCEDURE — 20610 DRAIN/INJ JOINT/BURSA W/O US: CPT | Performed by: NURSE PRACTITIONER

## 2022-11-29 PROCEDURE — 99204 OFFICE O/P NEW MOD 45 MIN: CPT | Performed by: NURSE PRACTITIONER

## 2022-11-29 RX ORDER — TRIAMCINOLONE ACETONIDE 40 MG/ML
80 INJECTION, SUSPENSION INTRA-ARTICULAR; INTRAMUSCULAR
Status: COMPLETED | OUTPATIENT
Start: 2022-11-29 | End: 2022-11-29

## 2022-11-29 RX ORDER — ARIPIPRAZOLE 5 MG/1
5 TABLET ORAL DAILY
COMMUNITY
Start: 2022-11-17

## 2022-11-29 RX ORDER — LIDOCAINE HYDROCHLORIDE 10 MG/ML
8 INJECTION, SOLUTION EPIDURAL; INFILTRATION; INTRACAUDAL; PERINEURAL
Status: COMPLETED | OUTPATIENT
Start: 2022-11-29 | End: 2022-11-29

## 2022-11-29 RX ORDER — BACLOFEN 10 MG/1
TABLET ORAL
COMMUNITY

## 2022-11-29 RX ADMIN — TRIAMCINOLONE ACETONIDE 80 MG: 40 INJECTION, SUSPENSION INTRA-ARTICULAR; INTRAMUSCULAR at 09:49

## 2022-11-29 RX ADMIN — LIDOCAINE HYDROCHLORIDE 8 ML: 10 INJECTION, SOLUTION EPIDURAL; INFILTRATION; INTRACAUDAL; PERINEURAL at 09:49

## 2022-11-29 NOTE — PROGRESS NOTES
Subjective:     Patient ID: Sánchez Cabrajal is a 57 y.o. female.    Chief Complaint:  Bilateral knee pain, new patient to examiner  History of Present Illness  Sánchez Carbajal 37-year-old female presents to clinic for evaluation bilateral knees.  She has been seen by primary care receive corticosteroid injection right knee with a few day symptom relief.  Continues to experience maximal tenderness along the anterior aspect of the knee also present lateral compartment.  Positive for popping crepitus with activity.  Worsening of symptoms with ambulating long distances and standing for extended periods of time.  She was previously walking for exercise however unable to tolerate activity therefore has been sent to our office for further evaluation.  She does report an injury approximately 4 years ago at the right knee where she tripped while she was working over a pallet anette landing on the anterior aspect of her right knee.  She was not seen at that time did not require any treatment was able to ambulate.  Since that time symptoms have continued to progress.  Rates discomfort a 9 out of a 10 described as throbbing aching in nature.  She is also experiencing pain in her left knee when she is compensating for the right knee.  Positive for swelling.  Pain with driving, standing, climbing stairs.  Has tried heat rest currently taking over-the-counter Tylenol is also been prescribed meloxicam by primary care.  She does have diabetes currently treated by primary care glucose does run around 190-200 currently awaiting different treatment but her A1c has been around 7.  Pain is not radiating to the groin but does experience from time to time pain at the lateral aspect of the hips.  Denies any presence of numbness or tingling bilateral lower extremities.  She does participate in pain management with Dr. Perez.  Denies any other concerns present.     Social History     Occupational History   • Not on file   Tobacco Use   •  Smoking status: Never   • Smokeless tobacco: Never   Substance and Sexual Activity   • Alcohol use: No   • Drug use: No   • Sexual activity: Yes     Partners: Male     Birth control/protection: Hysterectomy      Past Medical History:   Diagnosis Date   • Arthritis     RIGHT SHOULDER   • Arthritis of back     Not sure   • Arthritis of neck     Two years ago   • Cervical disc disorder     Two maybe three years ago   • Chronic back pain    • Diabetes mellitus (HCC)    • GERD (gastroesophageal reflux disease)    • H/O seasonal allergies    • History of anemia    • History of diverticulitis    • History of kidney stones    • Hypertension    • PONV (postoperative nausea and vomiting)    • Primary osteoarthritis of knees, bilateral 11/29/2022   • Sleep apnea with use of continuous positive airway pressure (CPAP)    • Uterine prolapse     UTERINE VAG PROLAPSE     Past Surgical History:   Procedure Laterality Date   • ANTERIOR AND POSTERIOR VAGINAL REPAIR N/A 01/15/2019    Procedure: /POSTERIOR REPAIR;  Surgeon: James Holloway MD;  Location: [facility];  Service: Gynecology   • CHOLECYSTECTOMY     • COLONOSCOPY N/A 01/18/2017    Procedure: COLONOSCOPY;  Surgeon: Tian Henderson MD;  Location: [facility] OR;  Service:    • CYSTOSCOPY URETEROSCOPY LASER LITHOTRIPSY N/A 04/06/2016    Procedure: URETEROSCOPY  ;  Surgeon: Vimal Azul MD;  Location: [facility] OR;  Service:    • CYSTOSCOPY URETEROSCOPY LASER LITHOTRIPSY Left 09/01/2019    Procedure: CYSTOSCOPY left retrograde pyelogram;  Surgeon: Vimal Azul MD;  Location: [facility] OR;  Service: Urology   • CYSTOSCOPY W/ LASER LITHOTRIPSY Left 06/25/2018    Procedure: CYSTOSCOPY, LEFT URETEROSCOPY, left  STENT PLACEMENT, right retrograde;  Surgeon: Vimal Azul MD;  Location: [facility];  Service: Urology   • EXTRACORPOREAL SHOCK WAVE LITHOTRIPSY (ESWL)  2015   • KIDNEY STONE SURGERY     • NECK SURGERY     • SACROCOLPOPEXY N/A 01/15/2019    Procedure:  "ROBOTIC SACROCOLPOPEXY WITH MESH, TOTAL LAPAROSCOPICE HYSTERECTOMY BILATERAL SALPINGECTOMY WITH DAVINCI ;  Surgeon: James Holloway MD;  Location: [facility];  Service: [facility]       History reviewed. No pertinent family history.            Objective:  Physical Exam    Vital signs reviewed.   General: No acute distress.  Eyes: conjunctiva clear; pupils equally round and reactive  ENT: external ears and nose atraumatic; oropharynx clear  CV: no peripheral edema  Resp: normal respiratory effort  Skin: no rashes or wounds; normal turgor  Psych: mood and affect appropriate; recent and remote memory intact    Vitals:    11/29/22 0911   BP: 120/74   Pulse: 92   Weight: 96.3 kg (212 lb 6.4 oz)   Height: 161.3 cm (63.5\")         11/29/22 0911   Weight: 96.3 kg (212 lb 6.4 oz)     Body mass index is 37.03 kg/m².      Right Knee Exam     Tenderness   The patient is experiencing tenderness in the patella and lateral joint line.    Range of Motion   Extension: 0   Flexion: 120     Tests   Fe:  Medial - negative Lateral - positive  Varus: negative Valgus: negative  Lachman:  Anterior - 1+    Posterior - negative  Drawer:  Anterior - negative    Posterior - negative  Patellar apprehension: positive    Other   Erythema: absent  Sensation: normal  Pulse: present  Swelling: moderate    Comments:  Positive active patellar compression test  Positive crepitus throughout arc of motion      Left Knee Exam     Tenderness   The patient is experiencing tenderness in the patella and lateral joint line.    Range of Motion   Extension: 0   Flexion: 120     Tests   Fe:  Medial - positive Lateral - negative  Varus: negative Valgus: negative  Lachman:  Anterior - 1+    Posterior - negative  Drawer:  Anterior - negative     Posterior - negative  Patellar apprehension: positive    Other   Erythema: absent  Sensation: normal  Pulse: present  Swelling: moderate    Comments:  Positive active patellar compression test  Positive " crepitus throughout arc of motion               Imaging:  Bilateral Knee X-Ray  Indication: Pain    AP, Lateral, and Maybell views    Findings:  No fracture  No bony lesion  Normal soft tissues  Moderate lateral compartment narrowing with reactive osteophytes right knee, advanced patella femoral arthritis with patella laterally seated right knee with bone-on-bone articulation, reactive osteophytes noted left knee with mild lateral compartment narrowing reactive osteophytes appreciated moderate patellofemoral narrowing with reactive osteophytes also present    No prior studies were available for comparison.    Assessment:        1. Pain    2. Primary osteoarthritis of knees, bilateral           Plan:  1.  Discussed plan of care with patient.  Discussed treatment options including total knee arthroplasty versus corticosteroid injections on strengthening exercises.  At this time wishes to hold off on any total knee replacement surgery.  She does wish to proceed with corticosteroid injection superior lateral approach bilaterally.  Discussed application of ice at injection site this evening.  Discussed medication will last approximately 3 months and bilateral knees.  I do recommend she continue with meloxicam 1 tablet once daily.  Large Joint Arthrocentesis  Date/Time: 11/29/2022 9:49 AM  Consent given by: patient  Site marked: site marked  Timeout: Immediately prior to procedure a time out was called to verify the correct patient, procedure, equipment, support staff and site/side marked as required   Supporting Documentation  Indications: pain   Procedure Details  Location: knee - Knee joint: bilateral.  Needle size: 22 G  Approach: superior  Medications administered: 8 mL lidocaine PF 1% 1 %; 80 mg triamcinolone acetonide 40 MG/ML  Patient tolerance: patient tolerated the procedure well with no immediate complications              Orders:  Orders Placed This Encounter   Procedures   • Large Joint Arthrocentesis   • XR  Knee 3+ View With [place] Right   • XR Knee 3+ View With [place] Left     No orders of the defined types were placed in this encounter.          Dragon Dictation utilized.  I ordered and independently reviewed Fam.

## 2023-01-09 ENCOUNTER — OFFICE VISIT (OUTPATIENT)
Dept: ORTHOPEDIC SURGERY | Facility: CLINIC | Age: 58
End: 2023-01-09
Payer: MEDICARE

## 2023-01-09 VITALS — WEIGHT: 207.4 LBS | BODY MASS INDEX: 35.41 KG/M2 | HEIGHT: 64 IN

## 2023-01-09 DIAGNOSIS — M17.0 PRIMARY OSTEOARTHRITIS OF KNEES, BILATERAL: Primary | ICD-10-CM

## 2023-01-09 PROCEDURE — 99213 OFFICE O/P EST LOW 20 MIN: CPT | Performed by: NURSE PRACTITIONER

## 2023-01-09 RX ORDER — SEMAGLUTIDE 1.34 MG/ML
INJECTION, SOLUTION SUBCUTANEOUS
COMMUNITY
Start: 2023-01-05 | End: 2023-03-23

## 2023-01-09 RX ORDER — ATORVASTATIN CALCIUM 10 MG/1
TABLET, FILM COATED ORAL
COMMUNITY
Start: 2023-01-07

## 2023-01-09 RX ORDER — HYDROCODONE BITARTRATE AND ACETAMINOPHEN 5; 325 MG/1; MG/1
1 TABLET ORAL EVERY 6 HOURS PRN
COMMUNITY

## 2023-01-09 RX ORDER — TRAMADOL HYDROCHLORIDE 50 MG/1
TABLET ORAL
COMMUNITY
End: 2023-01-09 | Stop reason: RX

## 2023-01-09 RX ORDER — DULAGLUTIDE 0.75 MG/.5ML
0.5 INJECTION, SOLUTION SUBCUTANEOUS
COMMUNITY

## 2023-01-09 NOTE — PROGRESS NOTES
Subjective:     Patient ID: Sánchez Carbajal is a 57 y.o. female.    Chief Complaint:  Follow-up DJD bilateral knees  Corticosteroid injection bilateral knees 11/29/2020 Andrew  History of Present Illness  Sánchez Carbajal returns to clinic for follow-up bilateral knees.  Last visit received corticosteroid injection bilaterally with approximately 40% symptom relief.  She has noted improvement in regards to ability to ambulate long distances stand for extended periods of time and pain decreased with transitional activities.  She does continue to experience pain maximal tenderness along the medial compartment and anteriorly the right knee, positive tenderness to palpate.  Increased pain noted with full extension and full flexion of the right knee.  Pain is not rating to the groin or bilateral hips.  Tolerated the injectable steroid very well.  Continues to rate discomfort 6-7 out of a 10 throbbing aching in nature worse with palpating the medial compartment.  Denies any other concerns present.      Social History     Occupational History   • Not on file   Tobacco Use   • Smoking status: Never   • Smokeless tobacco: Never   Vaping Use   • Vaping Use: Never used   Substance and Sexual Activity   • Alcohol use: No   • Drug use: No   • Sexual activity: Yes     Partners: Male     Birth control/protection: Hysterectomy      Past Medical History:   Diagnosis Date   • Arthritis     RIGHT SHOULDER   • Arthritis of back     Not sure   • Arthritis of neck     Two years ago   • Cervical disc disorder     Two maybe three years ago   • Chronic back pain    • Diabetes mellitus (HCC)    • GERD (gastroesophageal reflux disease)    • H/O seasonal allergies    • History of anemia    • History of diverticulitis    • History of kidney stones    • Hypertension    • PONV (postoperative nausea and vomiting)    • Primary osteoarthritis of knees, bilateral 11/29/2022   • Sleep apnea with use of continuous positive airway pressure (CPAP)     • Uterine prolapse     UTERINE VAG PROLAPSE     Past Surgical History:   Procedure Laterality Date   • ANTERIOR AND POSTERIOR VAGINAL REPAIR N/A 01/15/2019    Procedure: /POSTERIOR REPAIR;  Surgeon: James Holloway MD;  Location: [facility] OR;  Service: Gynecology   • CHOLECYSTECTOMY     • COLONOSCOPY N/A 01/18/2017    Procedure: COLONOSCOPY;  Surgeon: Tian Henderson MD;  Location: [facility] OR;  Service:    • CYSTOSCOPY URETEROSCOPY LASER LITHOTRIPSY N/A 04/06/2016    Procedure: URETEROSCOPY  ;  Surgeon: Vimal Azul MD;  Location: [facility] OR;  Service:    • CYSTOSCOPY URETEROSCOPY LASER LITHOTRIPSY Left 09/01/2019    Procedure: CYSTOSCOPY left retrograde pyelogram;  Surgeon: Viaml Azul MD;  Location: [facility] OR;  Service: Urology   • CYSTOSCOPY W/ LASER LITHOTRIPSY Left 06/25/2018    Procedure: CYSTOSCOPY, LEFT URETEROSCOPY, left  STENT PLACEMENT, right retrograde;  Surgeon: Vimal Azul MD;  Location: [facility] OR;  Service: Urology   • EXTRACORPOREAL SHOCK WAVE LITHOTRIPSY (ESWL)  2015   • KIDNEY STONE SURGERY     • NECK SURGERY     • SACROCOLPOPEXY N/A 01/15/2019    Procedure: ROBOTIC SACROCOLPOPEXY WITH MESH, TOTAL LAPAROSCOPICE HYSTERECTOMY BILATERAL SALPINGECTOMY WITH DAVINCI ;  Surgeon: James Holloway MD;  Location: [facility] OR;  Service: DaVinci       History reviewed. No pertinent family history.          Objective:  Physical Exam  General: No acute distress.  Eyes: conjunctiva clear; pupils equally round and reactive  ENT: external ears and nose atraumatic; oropharynx clear  CV: no peripheral edema  Resp: normal respiratory effort  Skin: no rashes or wounds; normal turgor  Psych: mood and affect appropriate; recent and remote memory intact    Vitals:    01/09/23 1027   Weight: 94.1 kg (207 lb 6.4 oz)   Height: 161.3 cm (63.5\")         01/09/23  1027   Weight: 94.1 kg (207 lb 6.4 oz)     Body mass index is 36.16 kg/m².      Right Knee Exam     Tenderness   The  patient is experiencing tenderness in the medial joint line and patella.    Range of Motion   Extension: 0 (passive with pain medially )   Right knee flexion: 115.     Tests   Varus: negative Valgus: negative  Lachman:  Anterior - 1+      Drawer:  Anterior - negative      Patellar apprehension: positive    Other   Erythema: absent  Sensation: normal  Pulse: present    Comments:  Positive active patellar compression test  Positive crepitus throughout arc of motion  Moderate to severe swelling      Left Knee Exam     Tenderness   The patient is experiencing tenderness in the patella.    Range of Motion   Extension: 0   Flexion: 120     Tests   Varus: negative Valgus: negative  Lachman:  Anterior - 1+      Patellar apprehension: positive    Other   Erythema: absent  Sensation: normal  Pulse: present  Swelling: moderate    Comments:  Positive active patellar compression test  Positive crepitus throughout arc of motion                  Assessment:        1. Primary osteoarthritis of knees, bilateral           Plan:  1. Discussed plan of care with patient.  She does wish to proceed with referral to physical therapy for range of motion strengthening bilateral lower extremities.  Plan to see her back in clinic after 6 weeks for follow-up bilateral knees.  Encouraged to call with any questions or concerns she has between now and follow-up.  All questions answered.  Orders:  Orders Placed This Encounter   Procedures   • Ambulatory Referral to Physical Therapy     No orders of the defined types were placed in this encounter.        Dragon Dictation utilized.

## 2023-01-17 ENCOUNTER — OFFICE VISIT (OUTPATIENT)
Dept: SLEEP MEDICINE | Facility: HOSPITAL | Age: 58
End: 2023-01-17
Payer: MEDICARE

## 2023-01-17 VITALS
DIASTOLIC BLOOD PRESSURE: 87 MMHG | BODY MASS INDEX: 32.62 KG/M2 | SYSTOLIC BLOOD PRESSURE: 131 MMHG | WEIGHT: 203 LBS | HEIGHT: 66 IN | HEART RATE: 94 BPM

## 2023-01-17 DIAGNOSIS — G47.33 OBSTRUCTIVE SLEEP APNEA SYNDROME: ICD-10-CM

## 2023-01-17 DIAGNOSIS — F51.01 PRIMARY INSOMNIA: Primary | ICD-10-CM

## 2023-01-17 DIAGNOSIS — R06.81 GERD WITH APNEA: ICD-10-CM

## 2023-01-17 DIAGNOSIS — K21.9 GERD WITH APNEA: ICD-10-CM

## 2023-01-17 PROCEDURE — G0463 HOSPITAL OUTPT CLINIC VISIT: HCPCS

## 2023-01-17 NOTE — PROGRESS NOTES
SLEEP/PULMONARY  CLINIC NOTE      PATIENT IDENTIFICATION:  Name: Sánchez Carbajal  Age: 58 y.o.  Sex: female  :  1965  MRN: [id number]    DATE OF CONSULTATION:  2023                     CHIEF COMPLAINT: SAGAR    History of Present Illness:   Sánchez Carbajal is a 58 y.o. female Pt on CPAP feeling better more energy especially the night he use it more than 4 hours, no sleepiness no fatigue no tiredness, no mask irritation no dryness, compliance report reviewed with pt d discussed with the patient the download data and encouarged the patient to continue to use the CPAP. The residual AHI is acceptable.  Patient having some heaviness in his overall treatment continue mask due to the insurance coverage        Review of Systems:   Constitutional:  As above   Eyes: negative   ENT/oropharynx: negative   Cardiovascular: negative   Respiratory: negative   Gastrointestinal: negative   Genitourinary: negative   Neurological: negative   Musculoskeletal: negative   Integument/breast: negative   Endocrine: negative   Allergic/Immunologic: negative     Past Medical History:  Past Medical History:   Diagnosis Date   • Arthritis     RIGHT SHOULDER   • Arthritis of back     Not sure   • Arthritis of neck     Two years ago   • Cervical disc disorder     Two maybe three years ago   • Chronic back pain    • Diabetes mellitus (HCC)    • GERD (gastroesophageal reflux disease)    • H/O seasonal allergies    • History of anemia    • History of diverticulitis    • History of kidney stones    • Hypertension    • PONV (postoperative nausea and vomiting)    • Primary osteoarthritis of knees, bilateral 2022   • Sleep apnea with use of continuous positive airway pressure (CPAP)    • Uterine prolapse     UTERINE VAG PROLAPSE       Past Surgical History:  Past Surgical History:   Procedure Laterality Date   • ANTERIOR AND POSTERIOR VAGINAL REPAIR N/A 01/15/2019    Procedure: /POSTERIOR REPAIR;  Surgeon: James Holloway MD;   Location: [facility] OR;  Service: Gynecology   • CHOLECYSTECTOMY     • COLONOSCOPY N/A 01/18/2017    Procedure: COLONOSCOPY;  Surgeon: Tian Henderson MD;  Location: [facility] OR;  Service:    • CYSTOSCOPY URETEROSCOPY LASER LITHOTRIPSY N/A 04/06/2016    Procedure: URETEROSCOPY  ;  Surgeon: Vimal Azul MD;  Location: [facility] OR;  Service:    • CYSTOSCOPY URETEROSCOPY LASER LITHOTRIPSY Left 09/01/2019    Procedure: CYSTOSCOPY left retrograde pyelogram;  Surgeon: Vimal Azul MD;  Location: [facility] OR;  Service: Urology   • CYSTOSCOPY W/ LASER LITHOTRIPSY Left 06/25/2018    Procedure: CYSTOSCOPY, LEFT URETEROSCOPY, left  STENT PLACEMENT, right retrograde;  Surgeon: Vimal Azul MD;  Location: [facility] OR;  Service: Urology   • EXTRACORPOREAL SHOCK WAVE LITHOTRIPSY (ESWL)  2015   • KIDNEY STONE SURGERY     • NECK SURGERY     • SACROCOLPOPEXY N/A 01/15/2019    Procedure: ROBOTIC SACROCOLPOPEXY WITH MESH, TOTAL LAPAROSCOPICE HYSTERECTOMY BILATERAL SALPINGECTOMY WITH DAVINCI ;  Surgeon: James Holloway MD;  Location: [facility] OR;  Service: DaVinci        Family History:  No family history on file.     Social History:   Social History     Tobacco Use   • Smoking status: Never   • Smokeless tobacco: Never   Substance Use Topics   • Alcohol use: No        Allergies:  Allergies   Allergen Reactions   • Penicillins Rash   • Flagyl [Metronidazole] Hives   • Sulfa Antibiotics Rash       Home Meds:  (Not in a hospital admission)      Objective:    Vitals Ranges:   Heart Rate:  [94] 94  BP: (131)/(87) 131/87  Body mass index is 32.77 kg/m².     Exam:  General Appearance:  WDWN    HEENT:   without obvious abnormality,  Conjunctiva/corneas clear,  Normal external ear canals, no drainage    Clear orsalmucosa,  Mallampati score 3    Neck:  Supple, symmetrical, trachea midline. No JVD.  Lungs:   Bilateral basal rhonchi bilaterally, respirations unlabored symmetrical wall movement.    Chest wall:  No  tenderness or deformity.    Heart:  Regular rate and rhythm, S1 and S2 normal.  Extremities: Trace edema no clubbing or Cyanosis        Data Review:  All labs (24hrs): No results found for this or any previous visit (from the past 24 hour(s)).     Imaging:  XR Knee 3+ View With [address] Left  Ordering physician's impression is located in the Encounter Note dated 11/29/22.   XR Knee 3+ View With [address] Right  Ordering physician's impression is located in the Encounter Note dated 11/29/22.        ASSESSMENT:  Diagnoses and all orders for this visit:    Primary insomnia    Obstructive sleep apnea syndrome    GERD with apnea        PLAN:   •Set a bedtime that is early enough for you to get at least 7 hours of sleep.  •Don’t go to bed unless you are sleepy.  •If you don’t fall asleep after 20 minutes, get out of bed.  •Establish a relaxing bedtime routine.  •Use your bed only for sleep and sex.  •Make your bedroom quiet and relaxing. Keep the room at a comfortable, cool temperature.  •Limit exposure to bright light in the evenings.  •Turn off electronic devices at least 30 minutes before bedtime.  •Don’t eat a large meal before bedtime. If you are hungry at night, eat a light, healthy snack.  •Exercise regularly and maintain a healthy diet.  •Avoid consuming caffeine in the late afternoon or evening.  •Avoid consuming alcohol before bedtime.  •Reduce your fluid intake before bedtime.  •Avoid naps during the day time.       This patient with obstructive sleep apnea, compliance is improved. Encourage to use it more frequent, I re-emphasized on pt the long and short term benefit of treating SAGAR. The device is benefiting the patient.  The patient is also instructed to get the CPAP supplies from the [employer] and see me in 1 year for follow-up.    Treating SAGAR will improve GERD symptoms control    It is recommended to keep thyroid function optimized to improve quality of sleep    Follow-up 1 year    Seema Moser MD. D,  ABSM.  1/17/2023  11:18 EST

## 2023-02-20 ENCOUNTER — OFFICE VISIT (OUTPATIENT)
Dept: ORTHOPEDIC SURGERY | Facility: CLINIC | Age: 58
End: 2023-02-20
Payer: MEDICARE

## 2023-02-20 VITALS — BODY MASS INDEX: 32.62 KG/M2 | WEIGHT: 203 LBS | HEIGHT: 66 IN

## 2023-02-20 DIAGNOSIS — M25.561 MECHANICAL KNEE PAIN, RIGHT: Primary | ICD-10-CM

## 2023-02-20 DIAGNOSIS — M17.0 PRIMARY OSTEOARTHRITIS OF KNEES, BILATERAL: ICD-10-CM

## 2023-02-20 PROCEDURE — 99214 OFFICE O/P EST MOD 30 MIN: CPT | Performed by: NURSE PRACTITIONER

## 2023-02-20 NOTE — PROGRESS NOTES
Subjective:     Patient ID: Sánchez Carbajal is a 58 y.o. female.    Chief Complaint:  Follow-up DJD bilateral knees  Corticosteroid injection bilateral knees 11/29/2022  History of Present Illness  Sánchez Carbajal returns to clinic for follow-up bilateral knees.  She has completed physical therapy released to home strengthening exercises.  Maximal tenderness present along the medial and lateral joint line as well as is anterior aspect.  She does continue to experience popping grinding when navigating ascending and descending inclines including steps is also experiencing pain and feeling as if the knee is going to give way bilaterally greater left feeling as if it is going to give out on her.  She received approximately 40% symptom relief after corticosteroid injections.  Denies any recent bracing is experiencing swelling at the right knee. She does continue to take meloxicam as well as Tylenol again without any significant symptom relief.  Denies pain radiating into the groin or to the lateral aspect of the hip.  Denies any other concerns present.     Social History     Occupational History   • Not on file   Tobacco Use   • Smoking status: Never   • Smokeless tobacco: Never   Vaping Use   • Vaping Use: Never used   Substance and Sexual Activity   • Alcohol use: No   • Drug use: No   • Sexual activity: Yes     Partners: Male     Birth control/protection: Hysterectomy      Past Medical History:   Diagnosis Date   • Arthritis     RIGHT SHOULDER   • Arthritis of back     Not sure   • Arthritis of neck     Two years ago   • Cervical disc disorder     Two maybe three years ago   • Chronic back pain    • Diabetes mellitus (HCC)    • GERD (gastroesophageal reflux disease)    • H/O seasonal allergies    • History of anemia    • History of diverticulitis    • History of kidney stones    • Hypertension    • PONV (postoperative nausea and vomiting)    • Primary osteoarthritis of knees, bilateral 11/29/2022   • Sleep apnea  "with use of continuous positive airway pressure (CPAP)    • Uterine prolapse     UTERINE VAG PROLAPSE     Past Surgical History:   Procedure Laterality Date   • ANTERIOR AND POSTERIOR VAGINAL REPAIR N/A 01/15/2019    Procedure: /POSTERIOR REPAIR;  Surgeon: James Holloway MD;  Location: [facility];  Service: Gynecology   • CHOLECYSTECTOMY     • COLONOSCOPY N/A 01/18/2017    Procedure: COLONOSCOPY;  Surgeon: Tian Henderson MD;  Location: [facility] OR;  Service:    • CYSTOSCOPY URETEROSCOPY LASER LITHOTRIPSY N/A 04/06/2016    Procedure: URETEROSCOPY  ;  Surgeon: Vimal Azul MD;  Location: [facility] OR;  Service:    • CYSTOSCOPY URETEROSCOPY LASER LITHOTRIPSY Left 09/01/2019    Procedure: CYSTOSCOPY left retrograde pyelogram;  Surgeon: Vimal Azul MD;  Location: [facility] OR;  Service: Urology   • CYSTOSCOPY W/ LASER LITHOTRIPSY Left 06/25/2018    Procedure: CYSTOSCOPY, LEFT URETEROSCOPY, left  STENT PLACEMENT, right retrograde;  Surgeon: Vimal Azul MD;  Location: [facility];  Service: Urology   • EXTRACORPOREAL SHOCK WAVE LITHOTRIPSY (ESWL)  2015   • KIDNEY STONE SURGERY     • NECK SURGERY     • SACROCOLPOPEXY N/A 01/15/2019    Procedure: ROBOTIC SACROCOLPOPEXY WITH MESH, TOTAL LAPAROSCOPICE HYSTERECTOMY BILATERAL SALPINGECTOMY WITH DAVINCI ;  Surgeon: James Holloway MD;  Location: [facility];  Service: DaVinci       History reviewed. No pertinent family history.        Objective:  Physical Exam    General: No acute distress.  Eyes: conjunctiva clear; pupils equally round and reactive  ENT: external ears and nose atraumatic; oropharynx clear  CV: no peripheral edema  Resp: normal respiratory effort  Skin: no rashes or wounds; normal turgor  Psych: mood and affect appropriate; recent and remote memory intact    Vitals:    02/20/23 0944   Weight: 92.1 kg (203 lb)   Height: 167.6 cm (66\")         02/20/23 0944   Weight: 92.1 kg (203 lb)     Body mass index is 32.77 kg/m².      " Right Knee Exam     Tenderness   The patient is experiencing tenderness in the medial joint line, lateral joint line and patella.    Range of Motion   Extension: 0   Flexion: 120     Tests   Fe:  Medial - negative Lateral - positive  Varus: negative Valgus: negative  Lachman:  Anterior - 1+      Patellar apprehension: positive    Other   Erythema: absent  Sensation: normal  Pulse: present  Swelling: moderate    Comments:  Positive active patellar compression test  Positive crepitus throughout arc of motion      Left Knee Exam     Tenderness   The patient is experiencing tenderness in the medial joint line, lateral joint line and patella.    Range of Motion   Extension: 0   Left knee flexion: 125.     Tests   Fe:  Medial - positive Lateral - negative  Varus: negative Valgus: negative  Lachman:  Anterior - 1+      Patellar apprehension: positive    Other   Erythema: absent  Sensation: normal  Pulse: present  Swelling: mild    Comments:  Positive active patellar compression test  Positive crepitus throughout arc of motion               Assessment:        1. Mechanical knee pain, right    2. Primary osteoarthritis of knees, bilateral           Plan:  1. Discussed plan of care with patient.  She has failed to improve with conservative treatments.  We will proceed with MRI for further evaluation.  Plan to see her back in clinic to reevaluate.  We will proceed with Dry-Mervin hinged brace right knee.  We will hold off on bracing of the left knee at this time.  All questions answered.  Orders:  Orders Placed This Encounter   Procedures   • MRI Knee Right Without Contrast     No orders of the defined types were placed in this encounter.        Dragon Dictation utilized.

## 2023-03-15 ENCOUNTER — HOSPITAL ENCOUNTER (OUTPATIENT)
Dept: MRI IMAGING | Facility: HOSPITAL | Age: 58
Discharge: HOME | End: 2023-03-15
Admitting: NURSE PRACTITIONER
Payer: MEDICARE

## 2023-03-15 DIAGNOSIS — M25.561 MECHANICAL KNEE PAIN, RIGHT: ICD-10-CM

## 2023-03-15 DIAGNOSIS — M17.0 PRIMARY OSTEOARTHRITIS OF KNEES, BILATERAL: ICD-10-CM

## 2023-03-15 PROCEDURE — 73721 MRI JNT OF LWR EXTRE W/O DYE: CPT

## 2023-03-20 ENCOUNTER — OFFICE VISIT (OUTPATIENT)
Dept: ORTHOPEDIC SURGERY | Facility: CLINIC | Age: 58
End: 2023-03-20
Payer: MEDICARE

## 2023-03-20 VITALS — HEIGHT: 66 IN | BODY MASS INDEX: 32.62 KG/M2 | WEIGHT: 203 LBS

## 2023-03-20 DIAGNOSIS — M25.561 MECHANICAL KNEE PAIN, RIGHT: ICD-10-CM

## 2023-03-20 DIAGNOSIS — M17.0 PRIMARY OSTEOARTHRITIS OF KNEES, BILATERAL: ICD-10-CM

## 2023-03-20 DIAGNOSIS — M17.12 PRIMARY OSTEOARTHRITIS OF LEFT KNEE: Primary | ICD-10-CM

## 2023-03-20 PROCEDURE — 1159F MED LIST DOCD IN RCRD: CPT | Performed by: NURSE PRACTITIONER

## 2023-03-20 PROCEDURE — 20610 DRAIN/INJ JOINT/BURSA W/O US: CPT | Performed by: NURSE PRACTITIONER

## 2023-03-20 PROCEDURE — 99214 OFFICE O/P EST MOD 30 MIN: CPT | Performed by: NURSE PRACTITIONER

## 2023-03-20 PROCEDURE — 1160F RVW MEDS BY RX/DR IN RCRD: CPT | Performed by: NURSE PRACTITIONER

## 2023-03-20 RX ORDER — GABAPENTIN 600 MG/1
1 TABLET ORAL 3 TIMES DAILY
COMMUNITY
Start: 2023-03-15

## 2023-03-20 RX ORDER — HYDROXYZINE PAMOATE 25 MG/1
CAPSULE ORAL
COMMUNITY
Start: 2023-03-17

## 2023-03-20 RX ORDER — TRAZODONE HYDROCHLORIDE 150 MG/1
TABLET ORAL
COMMUNITY
Start: 2023-03-17

## 2023-03-20 RX ADMIN — LIDOCAINE HYDROCHLORIDE 8 ML: 10 INJECTION, SOLUTION EPIDURAL; INFILTRATION; INTRACAUDAL; PERINEURAL at 11:26

## 2023-03-20 RX ADMIN — TRIAMCINOLONE ACETONIDE 80 MG: 40 INJECTION, SUSPENSION INTRA-ARTICULAR; INTRAMUSCULAR at 11:26

## 2023-03-20 NOTE — PROGRESS NOTES
Subjective:     Patient ID: Sánchez Carbajal is a 58 y.o. female.    Chief Complaint:  Follow-up DJD bilateral knees  MRI completed right knee  Corticosteroid injection bilateral knees 11/29/2022  History of Present Illness  Sánchez Carbajal presents to clinic for evaluation of bilateral knees.  Has completed MRI of the right knee and presents to discuss results and further plan of care.  She has continued with Dry-Mervin hinged brace continues to experience pain along the anterior medial aspect of the knee.  Prior corticosteroid injection 11/29/2022 she is also completed physical therapy and has been released to home strengthening exercises.  She has continued to experience maximal tenderness along the medial and lateral joint line as well as the anterior aspect of the knee.  Popping grinding sensation with ascending and descending stairs, ambulating long distances and does feel as if the knee is getting give way.  She also experiencing greater pain at the left knee along the medial compartment and anterior aspect of the knee.  She has continued with meloxicam and Tylenol without any significant symptom relief.  Denies pain radiating to the groin or to the lateral aspect of either hip.  Denies any other concerns present.    Social History     Occupational History   • Not on file   Tobacco Use   • Smoking status: Never   • Smokeless tobacco: Never   Vaping Use   • Vaping Use: Never used   Substance and Sexual Activity   • Alcohol use: No   • Drug use: No   • Sexual activity: Yes     Partners: Male     Birth control/protection: Hysterectomy      Past Medical History:   Diagnosis Date   • Arthritis     RIGHT SHOULDER   • Arthritis of back     Not sure   • Arthritis of neck     Two years ago   • Cervical disc disorder     Two maybe three years ago   • Chronic back pain    • Diabetes mellitus (HCC)    • GERD (gastroesophageal reflux disease)    • H/O seasonal allergies    • History of anemia    • History of  diverticulitis    • History of kidney stones    • Hypertension    • PONV (postoperative nausea and vomiting)    • Primary osteoarthritis of knees, bilateral 11/29/2022   • Sleep apnea with use of continuous positive airway pressure (CPAP)    • Uterine prolapse     UTERINE VAG PROLAPSE     Past Surgical History:   Procedure Laterality Date   • ANTERIOR AND POSTERIOR VAGINAL REPAIR N/A 01/15/2019    Procedure: /POSTERIOR REPAIR;  Surgeon: James Holloway MD;  Location: [facility] OR;  Service: Gynecology   • CHOLECYSTECTOMY     • COLONOSCOPY N/A 01/18/2017    Procedure: COLONOSCOPY;  Surgeon: Tian Henderson MD;  Location: [facility] OR;  Service:    • CYSTOSCOPY URETEROSCOPY LASER LITHOTRIPSY N/A 04/06/2016    Procedure: URETEROSCOPY  ;  Surgeon: Vimal Azul MD;  Location: [facility] OR;  Service:    • CYSTOSCOPY URETEROSCOPY LASER LITHOTRIPSY Left 09/01/2019    Procedure: CYSTOSCOPY left retrograde pyelogram;  Surgeon: Vimal Azul MD;  Location: [facility] OR;  Service: Urology   • CYSTOSCOPY W/ LASER LITHOTRIPSY Left 06/25/2018    Procedure: CYSTOSCOPY, LEFT URETEROSCOPY, left  STENT PLACEMENT, right retrograde;  Surgeon: Vimal Azul MD;  Location: [facility] OR;  Service: Urology   • EXTRACORPOREAL SHOCK WAVE LITHOTRIPSY (ESWL)  2015   • KIDNEY STONE SURGERY     • NECK SURGERY     • SACROCOLPOPEXY N/A 01/15/2019    Procedure: ROBOTIC SACROCOLPOPEXY WITH MESH, TOTAL LAPAROSCOPICE HYSTERECTOMY BILATERAL SALPINGECTOMY WITH DAVINCI ;  Surgeon: James Holloway MD;  Location: [facility] OR;  Service: DaVinci       Family History   Problem Relation Age of Onset   • Cancer Maternal Grandmother         Kidney cancer               Objective:  Physical Exam    General: No acute distress.  Eyes: conjunctiva clear; pupils equally round and reactive  ENT: external ears and nose atraumatic; oropharynx clear  CV: no peripheral edema  Resp: normal respiratory effort  Skin: no rashes or wounds; normal  "turgor  Psych: mood and affect appropriate; recent and remote memory intact    Vitals:    03/20/23 1039   Weight: 92.1 kg (203 lb)   Height: 167.6 cm (66\")         03/20/23  1039   Weight: 92.1 kg (203 lb)     Body mass index is 32.77 kg/m².      Ortho Exam     Right Knee Exam      Tenderness   The patient is experiencing tenderness in the medial joint line, lateral joint line and patella.     Range of Motion   Extension: 0   Flexion: 120      Tests   Fe:  Medial - negative Lateral - positive  Varus: negative Valgus: negative  Lachman:  Anterior - 1+      Patellar apprehension: positive     Other   Erythema: absent  Sensation: normal  Pulse: present  Swelling: moderate     Comments:    Positive active patellar compression test  Positive crepitus throughout arc of motion        Left Knee Exam      Tenderness   The patient is experiencing tenderness in the medial joint line, lateral joint line and patella.     Range of Motion   Extension: 0   Left knee flexion: 125.      Tests   Fe:  Medial - positive Lateral - negative  Varus: negative Valgus: negative  Lachman:  Anterior - 1+      Patellar apprehension: positive     Other   Erythema: absent  Sensation: normal  Pulse: present  Swelling: mild     Comments:    Positive active patellar compression test  Positive crepitus throughout arc of motion    Imaging:    MRI Knee Right Without Contrast    Result Date: 3/16/2023  1. No evidence of meniscal tear or ligamentous injury. 2. Severe patellofemoral joint degenerative changes with osteophyte formation and full-thickness chondral loss in the apical and lateral facet of the patella and in the lateral trochlea. There are mild medial compartment degenerative changes. 3. Small region of marrow edema in the anteromedial tibial plateau. In addition, there is a high signal intensity lesion is infrapatellar fat pad just anterior to the medial tibial plateau, which likely represents a cyst. A more aggressive or solid " lesion is considered less likely however if clinically indicated this can be further evaluated with postcontrast imaging. 4. Mild to moderate edema in the anterior medial subcutaneous tissues. Signer Name: Shayla Andrade MD  Signed: 3/16/2023 4:29 PM  Workstation Name: [email or web address]  [facility]    Independently reviewed MRI right knee no evidence of meniscal tear or ligamentous injury.  Severe patellofemoral joint DJD with osteophyte formation full-thickness chondral loss in the apical and lateral facet of the patella and lateral trochlea.  There is mild medial compartment degenerative changes.  Small region of marrow edema in the anterior medial tibial plateau.  In addition there is a high signal intensity lesion of infrapatellar fat pad just anterior to the medial tibial plateau which likely represents a cyst.  More aggressive or solid lesion considered less likely however if clinical indication can be further evaluated postcontrast imaging.  Mild to moderate edema anterior medial subcu tissue.    Assessment:        1. Mechanical knee pain, right    2. Primary osteoarthritis of knees, bilateral           Plan:    Large Joint Arthrocentesis: L knee  Date/Time: 3/20/2023 11:26 AM  Consent given by: patient  Site marked: site marked  Timeout: Immediately prior to procedure a time out was called to verify the correct patient, procedure, equipment, support staff and site/side marked as required   Supporting Documentation  Indications: pain   Procedure Details  Location: knee - L knee  Preparation: Patient was prepped and draped in the usual sterile fashion  Needle size: 22 G  Approach: superior lateral.  Medications administered: 80 mg triamcinolone acetonide 40 MG/ML; 8 mL lidocaine PF 1% 1 %  Patient tolerance: patient tolerated the procedure well with no immediate complications          1. Discussed plan of care with patient.  We will proceed with MRI with contrast to evaluate lesion of the  infrapatellar fat pad just anterior to the medial tibial plateau.  We will plan to see her back in clinic after testing to discuss results and further plan of care.  She does wish to proceed with corticosteroid injection left knee.  Do recommend application of ice at injection site this evening.  All questions answered.  Orders:  Orders Placed This Encounter   Procedures   • Large Joint Arthrocentesis: L knee   • MRI Knee Right With Contrast     No orders of the defined types were placed in this encounter.        Dragon Dictation utilized.

## 2023-03-23 RX ORDER — LIDOCAINE HYDROCHLORIDE 10 MG/ML
8 INJECTION, SOLUTION EPIDURAL; INFILTRATION; INTRACAUDAL; PERINEURAL
Status: COMPLETED | OUTPATIENT
Start: 2023-03-20 | End: 2023-03-20

## 2023-03-23 RX ORDER — TRIAMCINOLONE ACETONIDE 40 MG/ML
80 INJECTION, SUSPENSION INTRA-ARTICULAR; INTRAMUSCULAR
Status: COMPLETED | OUTPATIENT
Start: 2023-03-20 | End: 2023-03-20

## 2023-04-10 ENCOUNTER — HOSPITAL ENCOUNTER (OUTPATIENT)
Dept: MRI IMAGING | Facility: HOSPITAL | Age: 58
Discharge: HOME | End: 2023-04-10
Admitting: NURSE PRACTITIONER
Payer: MEDICARE

## 2023-04-10 DIAGNOSIS — M25.561 MECHANICAL KNEE PAIN, RIGHT: ICD-10-CM

## 2023-04-10 LAB — CREAT BLDA-MCNC: 0.9 MG/DL (ref 0.6–1.3)

## 2023-04-10 PROCEDURE — A9577 INJ MULTIHANCE: HCPCS | Performed by: NURSE PRACTITIONER

## 2023-04-10 PROCEDURE — 0 GADOBENATE DIMEGLUMINE 529 MG/ML SOLUTION: Performed by: NURSE PRACTITIONER

## 2023-04-10 PROCEDURE — 82565 ASSAY OF CREATININE: CPT

## 2023-04-10 PROCEDURE — 73722 MRI JOINT OF LWR EXTR W/DYE: CPT

## 2023-04-10 RX ADMIN — GADOBENATE DIMEGLUMINE 18 ML: 529 INJECTION, SOLUTION INTRAVENOUS at 11:40

## 2023-04-21 ENCOUNTER — OFFICE VISIT (OUTPATIENT)
Dept: ORTHOPEDIC SURGERY | Facility: CLINIC | Age: 58
End: 2023-04-21
Payer: MEDICARE

## 2023-04-21 VITALS — HEIGHT: 66 IN | BODY MASS INDEX: 32.14 KG/M2 | WEIGHT: 200 LBS

## 2023-04-21 DIAGNOSIS — M17.11 PRIMARY OSTEOARTHRITIS OF RIGHT KNEE: ICD-10-CM

## 2023-04-21 DIAGNOSIS — M17.0 PRIMARY OSTEOARTHRITIS OF KNEES, BILATERAL: Primary | ICD-10-CM

## 2023-04-21 PROCEDURE — 99214 OFFICE O/P EST MOD 30 MIN: CPT | Performed by: NURSE PRACTITIONER

## 2023-04-21 PROCEDURE — 1159F MED LIST DOCD IN RCRD: CPT | Performed by: NURSE PRACTITIONER

## 2023-04-21 PROCEDURE — 1160F RVW MEDS BY RX/DR IN RCRD: CPT | Performed by: NURSE PRACTITIONER

## 2023-04-21 NOTE — PROGRESS NOTES
Subjective:     Patient ID: Sánchez Carbajal is a 58 y.o. female.    Chief Complaint:  Follow-up DJD bilateral knees MR arthrogram completed right knee corticosteroid injection left knee 3/20/2023    History of Present Illness  Sánchez Carbajal returns to clinic for follow-up of her right knee.  Has completed MR arthrogram returns to clinic for evaluation.  Continues to experience maximal tenderness along the anterior aspect, medial compartment increased pain noted transitional activities such in seated standing attempting to walk, ambulating long distances and standing for extended periods of time.  Last injection completed right knee 11/29/2022 with minimal symptom relief has completed physical therapy and released to home strengthening exercise program which she has continued.  She was fitted with Dry-Mervin hinged brace in November without any significant symptom relief.  Continues to experience maximal tenderness, severe in nature along the medial and lateral joint line as well as anterior aspect.  Has began noticing pain at the posterior joint line with activities involving deep flexion.  Pain present with transitional activities such as seated standing attempting to walk, ambulating long distances and stand for extended periods of time.  Experiencing pain also present along the lateral aspect of the hip denies pain radiating to her groin.  She has began noticing some discomfort in her ankle as well.  Has tried exercising however unable to exercise due to the pain she is experiencing at the right knee.  The left knee did receive corticosteroid injection last visit with significant symptom improvement.  Has continued meloxicam and Tylenol without any significant symptom relief.  Denies any other concerns present.     Social History     Occupational History   • Not on file   Tobacco Use   • Smoking status: Never   • Smokeless tobacco: Never   Vaping Use   • Vaping Use: Never used   Substance and Sexual Activity    • Alcohol use: No   • Drug use: No   • Sexual activity: Yes     Partners: Male     Birth control/protection: Hysterectomy      Past Medical History:   Diagnosis Date   • Arthritis     RIGHT SHOULDER   • Arthritis of back     Not sure   • Arthritis of neck     Two years ago   • Cervical disc disorder     Two maybe three years ago   • Chronic back pain    • Diabetes mellitus    • GERD (gastroesophageal reflux disease)    • H/O seasonal allergies    • History of anemia    • History of diverticulitis    • History of kidney stones    • Hypertension    • PONV (postoperative nausea and vomiting)    • Primary osteoarthritis of knees, bilateral 11/29/2022   • Sleep apnea with use of continuous positive airway pressure (CPAP)    • Uterine prolapse     UTERINE VAG PROLAPSE     Past Surgical History:   Procedure Laterality Date   • ANTERIOR AND POSTERIOR VAGINAL REPAIR N/A 01/15/2019    Procedure: /POSTERIOR REPAIR;  Surgeon: James Holloway MD;  Location: [facility] OR;  Service: Gynecology   • CHOLECYSTECTOMY     • COLONOSCOPY N/A 01/18/2017    Procedure: COLONOSCOPY;  Surgeon: Tian Henderson MD;  Location: [facility] OR;  Service:    • CYSTOSCOPY URETEROSCOPY LASER LITHOTRIPSY N/A 04/06/2016    Procedure: URETEROSCOPY  ;  Surgeon: Vimal Azul MD;  Location: [facility] OR;  Service:    • CYSTOSCOPY URETEROSCOPY LASER LITHOTRIPSY Left 09/01/2019    Procedure: CYSTOSCOPY left retrograde pyelogram;  Surgeon: Vimal Azul MD;  Location: [facility] OR;  Service: Urology   • CYSTOSCOPY W/ LASER LITHOTRIPSY Left 06/25/2018    Procedure: CYSTOSCOPY, LEFT URETEROSCOPY, left  STENT PLACEMENT, right retrograde;  Surgeon: Vimal Azul MD;  Location: [facility] OR;  Service: Urology   • EXTRACORPOREAL SHOCK WAVE LITHOTRIPSY (ESWL)  2015   • KIDNEY STONE SURGERY     • NECK SURGERY     • SACROCOLPOPEXY N/A 01/15/2019    Procedure: ROBOTIC SACROCOLPOPEXY WITH MESH, TOTAL LAPAROSCOPICE HYSTERECTOMY BILATERAL  "SALPINGECTOMY WITH DAVINCI ;  Surgeon: James Holloway MD;  Location: [facility];  Service: DaVinci       Family History   Problem Relation Age of Onset   • Cancer Maternal Grandmother         Kidney cancer               Objective:  Physical Exam  General: No acute distress.  Eyes: conjunctiva clear; pupils equally round and reactive  ENT: external ears and nose atraumatic; oropharynx clear  CV: no peripheral edema  Resp: normal respiratory effort  Skin: no rashes or wounds; normal turgor  Psych: mood and affect appropriate; recent and remote memory intact    Vitals:    04/21/23 0800   Weight: 90.7 kg (200 lb)   Height: 167.6 cm (66\")         04/21/23  0800   Weight: 90.7 kg (200 lb)     Body mass index is 32.28 kg/m².      Ortho Exam     Right Knee Exam      Tenderness   The patient is experiencing tenderness in the medial joint line, lateral joint line and patella.     Range of Motion   Extension: 0   Flexion: 115      Tests   Fe:  Medial - negative Lateral - negative  Varus: negative Valgus: negative  Lachman:  Anterior - 1+      Patellar apprehension: positive     Other   Erythema: absent  Sensation: normal  Pulse: present  Swelling: moderate     Comments:    Positive active patellar compression test  Positive crepitus throughout arc of motion     Left Knee Exam      Tenderness   The patient is experiencing tenderness in the medial joint line, lateral joint line and patella.     Range of Motion   Extension: 0   Left knee flexion: 125.      Tests   Fe:  Medial - positive Lateral - negative  Varus: negative Valgus: negative  Lachman:  Anterior - 1+      Patellar apprehension: positive     Other   Erythema: absent  Sensation: normal  Pulse: present  Swelling: mild     Comments:    Positive active patellar compression test  Positive crepitus throughout arc of motion    Imaging:  MRI Knee Right With Contrast    Result Date: 4/11/2023  1. Signal abnormality in the anterior medial tibial plateau " suggesting subchondral cystic changes and reactive edema, which appears increased in extent in comparison with the prior exam and enhances following contrast administration. There is an adjacent lesion in the infrapatellar fat pad that enhances and appears similar in size from the prior exam. The configuration suggests a degenerative or inflammatory process. A More aggressive process is considered less likely however follow-up exam noncontrast examination 3-6 months is recommended to assess for stability. 2. Severe patellofemoral joint degenerative changes. 3. Small joint effusion which enhances following contrast administration, suggesting the presence of inflammatory changes. Signer Name: Shayla Andrade MD  Signed: 4/11/2023 7:44 PM  Workstation Name: [email or web address]  [facility]    Independently reviewed MR arthrogram right knee completed 4/11/2023 signal abnormality anterior medial tibial plateau suggesting subchondral cystic change and reactive edema appears increased in extent in comparison with prior exam enhancement following contrast administration.  Adjacent lesion infrapatellar fat pad enhances and appears similar size from prior exam the configuration suggest degenerative or inflammatory process.  More aggressive process considered less likely.  Severe patellofemoral joint arthritis, small joint effusion Thinning of the medial compartment articular cartilage lateral compartment appears intact  Assessment:        1. Primary osteoarthritis of knees, bilateral    2. Primary osteoarthritis of right knee           Plan:  1. Discussed plan of care with patient.  We will have her follow-up with Dr. Brady next week to discuss surgical intervention.  X-ray imaging and MRI in chart available for review.  Discussed her left knee continues to do well we will hold off on any further treatment at the left lower extremity.  Encouraged quad strengthening hamstring strengthening exercises prior  to any surgical intervention.  We will gladly see her back in clinic as needed.  All questions answered.  Orders:  No orders of the defined types were placed in this encounter.    No orders of the defined types were placed in this encounter.        Dragon dictation utilized  We encourage all our patients to maintain a healthy weight - a Body Mass Index of 20-25. This, along with regular exercise and a balanced diet can lower your risk of many illnesses such as diabetes, heart disease, and stroke.

## 2023-04-27 ENCOUNTER — OFFICE VISIT (OUTPATIENT)
Dept: ORTHOPEDIC SURGERY | Facility: CLINIC | Age: 58
End: 2023-04-27
Payer: MEDICARE

## 2023-04-27 VITALS
HEART RATE: 97 BPM | WEIGHT: 200 LBS | BODY MASS INDEX: 32.14 KG/M2 | HEIGHT: 66 IN | SYSTOLIC BLOOD PRESSURE: 103 MMHG | DIASTOLIC BLOOD PRESSURE: 71 MMHG

## 2023-04-27 DIAGNOSIS — Z47.1 AFTERCARE FOLLOWING RIGHT KNEE JOINT REPLACEMENT SURGERY: ICD-10-CM

## 2023-04-27 DIAGNOSIS — Z86.39 H/O: OBESITY: ICD-10-CM

## 2023-04-27 DIAGNOSIS — M17.11 PRIMARY OSTEOARTHRITIS OF RIGHT KNEE: Primary | ICD-10-CM

## 2023-04-27 DIAGNOSIS — Z96.651 AFTERCARE FOLLOWING RIGHT KNEE JOINT REPLACEMENT SURGERY: ICD-10-CM

## 2023-04-27 RX ORDER — CHLORHEXIDINE GLUCONATE 500 MG/1
CLOTH TOPICAL ONCE
OUTPATIENT
Start: 2023-04-27

## 2023-04-27 RX ORDER — PREGABALIN 150 MG/1
150 CAPSULE ORAL ONCE
OUTPATIENT
Start: 2023-04-27 | End: 2023-04-27

## 2023-04-27 RX ORDER — MELOXICAM 7.5 MG/1
15 TABLET ORAL ONCE
OUTPATIENT
Start: 2023-04-27 | End: 2023-04-27

## 2023-04-27 NOTE — PROGRESS NOTES
Subjective:     Patient ID: Sánchez Carbajal is a 58 y.o. female.    Chief Complaint:    History of Present Illness  Sánchez Carbajal presents to clinic today for evaluation of longstanding history of right knee pain.  The patient is seen our nurse practitioner Franci Pettit for years and has had multiple injections.  Most recently she had an MRI which demonstrated severe patellofemoral osteoarthritis and multiple parameniscal cysts.  The patient states she has tried bracing countless intra-articular injections physical therapy and activity modification.  She states the knee pain is so severe now that it is significantly affecting her quality life and her ability to perform her activities of daily living.  She is tired of dealing with it and would like to have a knee replacement.     Social History     Occupational History   • Not on file   Tobacco Use   • Smoking status: Never     Passive exposure: Never   • Smokeless tobacco: Never   Vaping Use   • Vaping Use: Never used   Substance and Sexual Activity   • Alcohol use: No   • Drug use: No   • Sexual activity: Yes     Partners: Male     Birth control/protection: Hysterectomy      Past Medical History:   Diagnosis Date   • Arthritis     RIGHT SHOULDER   • Arthritis of back     Not sure   • Arthritis of neck     Two years ago   • Cervical disc disorder     Two maybe three years ago   • Chronic back pain    • Diabetes mellitus    • GERD (gastroesophageal reflux disease)    • H/O seasonal allergies    • History of anemia    • History of diverticulitis    • History of kidney stones    • Hypertension    • PONV (postoperative nausea and vomiting)    • Primary osteoarthritis of knees, bilateral 11/29/2022   • Sleep apnea with use of continuous positive airway pressure (CPAP)    • Uterine prolapse     UTERINE VAG PROLAPSE     Past Surgical History:   Procedure Laterality Date   • ANTERIOR AND POSTERIOR VAGINAL REPAIR N/A 01/15/2019    Procedure: /POSTERIOR REPAIR;   "Surgeon: James Holloway MD;  Location: [facility] OR;  Service: Gynecology   • CHOLECYSTECTOMY     • COLONOSCOPY N/A 01/18/2017    Procedure: COLONOSCOPY;  Surgeon: Tian Henderson MD;  Location: [facility] OR;  Service:    • CYSTOSCOPY URETEROSCOPY LASER LITHOTRIPSY N/A 04/06/2016    Procedure: URETEROSCOPY  ;  Surgeon: Vimal Azul MD;  Location: [facility] OR;  Service:    • CYSTOSCOPY URETEROSCOPY LASER LITHOTRIPSY Left 09/01/2019    Procedure: CYSTOSCOPY left retrograde pyelogram;  Surgeon: Vimal Azul MD;  Location: [facility] OR;  Service: Urology   • CYSTOSCOPY W/ LASER LITHOTRIPSY Left 06/25/2018    Procedure: CYSTOSCOPY, LEFT URETEROSCOPY, left  STENT PLACEMENT, right retrograde;  Surgeon: Vimal Azul MD;  Location: [facility] OR;  Service: Urology   • EXTRACORPOREAL SHOCK WAVE LITHOTRIPSY (ESWL)  2015   • HYSTERECTOMY  2018   • KIDNEY STONE SURGERY     • NECK SURGERY     • SACROCOLPOPEXY N/A 01/15/2019    Procedure: ROBOTIC SACROCOLPOPEXY WITH MESH, TOTAL LAPAROSCOPICE HYSTERECTOMY BILATERAL SALPINGECTOMY WITH DAVINCI ;  Surgeon: James Holloway MD;  Location: [facility];  Service: DaVinci       Family History   Problem Relation Age of Onset   • Cancer Maternal Grandmother         Kidney cancer                 Objective:  Vitals:    04/27/23 0906   BP: 103/71   Pulse: 97   Weight: 90.7 kg (200 lb)   Height: 167.6 cm (66\")         04/27/23  0906   Weight: 90.7 kg (200 lb)     Body mass index is 32.28 kg/m².        Right Knee Exam     Muscle Strength   The patient has normal right knee strength.    Tenderness   The patient is experiencing tenderness in the medial retinaculum, patella, medial joint line, lateral joint line and lateral retinaculum.    Range of Motion   Extension: 0   Flexion: 130     Tests   Fe:  Medial - positive Lateral - positive  Varus: negative Valgus: negative  Drawer:  Anterior - negative    Posterior - negative    Other   Erythema: absent  Scars: " absent  Sensation: normal  Pulse: present  Swelling: none  Effusion: no effusion present      Left Knee Exam     Muscle Strength   The patient has normal left knee strength.    Tenderness   The patient is experiencing tenderness in the medial joint line and lateral joint line.    Range of Motion   Extension: 0   Flexion: 130     Tests   Fe:  Medial - negative Lateral - negative  Varus: negative Valgus: negative  Drawer:  Anterior - negative     Posterior - negative    Other   Erythema: absent  Scars: absent  Sensation: normal  Pulse: present  Swelling: none  Effusion: no effusion present               Imagin standing views of the right knee were ordered and reviewed by myself in the office today  Indication: Right knee pain  Findings: X-rays demonstrate severe patellofemoral and moderate medial and lateral compartment osteoarthritis.  The patellofemoral joint there is incongruity with large lateral osteophytes and bone-on-bone articulation as well as subchondral sclerosis and cystic changes.  Medial lateral joint show significant narrowing with marginal osteophytes and subchondral sclerosis.  No signs of fracture dislocation or subluxation  Comparative studies: MRI in March    Assessment:        1. Primary osteoarthritis of right knee    2. Aftercare following right knee joint replacement surgery    3. H/O: obesity           Plan:        She has failed conservative management including countless intra-articular corticosteroid injections, formal physical therapy, brace wear, and anti-inflammatories..  I discussed further non operative management of knee pain and arthritis including but not limited to intra-articular corticosteroid injections, physical therapy, custom brace wear, activity modification, use of an assistive device, and low impact exercises.  Patient voiced understanding of nonoperative treatment options available but would like to proceed with knee replacement surgery.    I reviewed anatomy  and a model of a total knee arthroplasty, as well as typical postoperative recovery of 6-12 months before maximal recovery, and possible need for rehabilitation stay after hospitalization.  The goals, indications, risks, and complications of the procedure were discussed with the patient. Specifically, I discussed the expectations for lateral knee numbness or parasthesias, difficulty with kneeling, noise generation by the knee replacement, and occasional pain. I also explained that in some series of patients in the research literature, up to 20% of patients are dissatisfied with their total knee arthroplasty. I also discussed the risks of the procedure, including stiffness, fracture, implant loosening, implant failure, venous thromboembolism, infection, nerve palsy, vascular injury, need for more procedures and the risks of anesthesia. I also explained that she would meet with Anesthesiology preoperatively to discuss anesthetic risk.  All questions were answered.     Plan for right total knee arthroplasty.    Implants: [employer] press fit Legion TKS with [employer]  Anticoagulation: Asprin  Antibiotics: Cefazolin and vanc DM obesity  Admission Type: 23 hr obs  TXA: Yes      Sánchez Carbajal was in agreement with plan and had all questions answered.     Medications:  No orders of the defined types were placed in this encounter.      Followup:  No follow-ups on file.    Diagnoses and all orders for this visit:    1. Primary osteoarthritis of right knee (Primary)  -     XR Knee 3+ View With [place] Right  -     Case Request; Standing  -     CBC and Differential; Future  -     Basic metabolic panel; Future  -     MRSA Screen Culture (Outpatient) - Swab, Nares; Future  -     Hemoglobin A1c; Future  -     Type and screen; Future  -     Urinalysis With Culture If Indicated -; Future  -     Chlorhexidine Gluconate Cloth 2 % pads  -     Tranexamic Acid 1,000 mg in sodium chloride 0.9 % 100 mL  -     Tranexamic  Acid 1,000 mg in sodium chloride 0.9 % 100 mL  -     ethyl alcohol 62 % 2 each  -     lactated ringers bolus 500 mL  -     ceFAZolin (ANCEF) 2 g in sodium chloride 0.9 % 100 mL IVPB  -     meloxicam (MOBIC) tablet 15 mg  -     pregabalin (LYRICA) capsule 150 mg  -     Case Request    2. Aftercare following right knee joint replacement surgery    3. H/O: obesity    Other orders  -     Follow Anesthesia Guidelines / Protocol; Future  -     Provide instructions to patient regarding NPO status  -     Chlorhexidine Skin Prep - Educate and Review With Patient; Future  -     Care Order / Instruction for all Female Patients: Clean Catch Urinalysis with culture if indicated if patient symptomatic: dysuria, flank or lower abdominal pain, burning, urgency or frequency  -     Follow Anesthesia Guidelines / Protocol; Standing  -     Verify NPO Status; Standing  -     SCD (sequential compression device)- to be placed on patient in Pre-op; Standing  -     Clip operative site; Standing  -     Obtain informed consent (if not collected inpatient or PAT); Standing  -     Nerve Block; Standing          Dictated utilizing Dragon dictation

## 2023-05-09 ENCOUNTER — HOSPITAL ENCOUNTER (OUTPATIENT)
Dept: PHYSICAL THERAPY | Facility: HOSPITAL | Age: 58
Setting detail: THERAPIES SERIES
Discharge: HOME | End: 2023-05-09
Payer: MEDICARE

## 2023-05-09 ENCOUNTER — APPOINTMENT (OUTPATIENT)
Dept: LAB | Facility: HOSPITAL | Age: 58
End: 2023-05-09
Payer: MEDICARE

## 2023-05-09 ENCOUNTER — PRE-ADMISSION TESTING (OUTPATIENT)
Dept: PREADMISSION TESTING | Facility: HOSPITAL | Age: 58
End: 2023-05-09
Payer: MEDICARE

## 2023-05-09 VITALS
OXYGEN SATURATION: 98 % | RESPIRATION RATE: 16 BRPM | BODY MASS INDEX: 31.91 KG/M2 | WEIGHT: 198.52 LBS | DIASTOLIC BLOOD PRESSURE: 87 MMHG | HEIGHT: 66 IN | HEART RATE: 98 BPM | SYSTOLIC BLOOD PRESSURE: 133 MMHG

## 2023-05-09 DIAGNOSIS — M17.11 PRIMARY OSTEOARTHRITIS OF RIGHT KNEE: ICD-10-CM

## 2023-05-09 LAB
ABO GROUP BLD: NORMAL
ANION GAP SERPL CALCULATED.3IONS-SCNC: 11.4 MMOL/L (ref 5–15)
BACTERIA UR QL AUTO: ABNORMAL /HPF
BASOPHILS # BLD AUTO: 0.04 10*3/MM3 (ref 0–0.2)
BASOPHILS NFR BLD AUTO: 0.4 % (ref 0–1.5)
BILIRUB UR QL STRIP: NEGATIVE
BLD GP AB SCN SERPL QL: NEGATIVE
BUN SERPL-MCNC: 29 MG/DL (ref 6–20)
BUN/CREAT SERPL: 35.4 (ref 7–25)
CALCIUM SPEC-SCNC: 9.9 MG/DL (ref 8.6–10.5)
CHLORIDE SERPL-SCNC: 102 MMOL/L (ref 98–107)
CLARITY UR: CLEAR
CO2 SERPL-SCNC: 25.6 MMOL/L (ref 22–29)
COLOR UR: YELLOW
CREAT SERPL-MCNC: 0.82 MG/DL (ref 0.57–1)
DEPRECATED RDW RBC AUTO: 47 FL (ref 37–54)
EGFRCR SERPLBLD CKD-EPI 2021: 83 ML/MIN/1.73
EOSINOPHIL # BLD AUTO: 0.06 10*3/MM3 (ref 0–0.4)
EOSINOPHIL NFR BLD AUTO: 0.7 % (ref 0.3–6.2)
ERYTHROCYTE [DISTWIDTH] IN BLOOD BY AUTOMATED COUNT: 14.6 % (ref 12.3–15.4)
GLUCOSE SERPL-MCNC: 132 MG/DL (ref 65–99)
GLUCOSE UR STRIP-MCNC: ABNORMAL MG/DL
HBA1C MFR BLD: 6.3 % (ref 4.8–5.6)
HCT VFR BLD AUTO: 42.1 % (ref 34–46.6)
HGB BLD-MCNC: 13.4 G/DL (ref 12–15.9)
HGB UR QL STRIP.AUTO: NEGATIVE
HYALINE CASTS UR QL AUTO: ABNORMAL /LPF
IMM GRANULOCYTES # BLD AUTO: 0.02 10*3/MM3 (ref 0–0.05)
IMM GRANULOCYTES NFR BLD AUTO: 0.2 % (ref 0–0.5)
KETONES UR QL STRIP: NEGATIVE
LEUKOCYTE ESTERASE UR QL STRIP.AUTO: ABNORMAL
LYMPHOCYTES # BLD AUTO: 2.22 10*3/MM3 (ref 0.7–3.1)
LYMPHOCYTES NFR BLD AUTO: 24.7 % (ref 19.6–45.3)
MCH RBC QN AUTO: 28.4 PG (ref 26.6–33)
MCHC RBC AUTO-ENTMCNC: 31.8 G/DL (ref 31.5–35.7)
MCV RBC AUTO: 89.2 FL (ref 79–97)
MONOCYTES # BLD AUTO: 0.7 10*3/MM3 (ref 0.1–0.9)
MONOCYTES NFR BLD AUTO: 7.8 % (ref 5–12)
NEUTROPHILS NFR BLD AUTO: 5.94 10*3/MM3 (ref 1.7–7)
NEUTROPHILS NFR BLD AUTO: 66.2 % (ref 42.7–76)
NITRITE UR QL STRIP: NEGATIVE
NRBC BLD AUTO-RTO: 0 /100 WBC (ref 0–0.2)
PH UR STRIP.AUTO: 6 [PH] (ref 4.5–8)
PLATELET # BLD AUTO: 314 10*3/MM3 (ref 140–450)
PMV BLD AUTO: 9.7 FL (ref 6–12)
POTASSIUM SERPL-SCNC: 3.9 MMOL/L (ref 3.5–5.2)
PROT UR QL STRIP: NEGATIVE
QT INTERVAL: 360 MS
RBC # BLD AUTO: 4.72 10*6/MM3 (ref 3.77–5.28)
RBC # UR STRIP: ABNORMAL /HPF
REF LAB TEST METHOD: ABNORMAL
RH BLD: POSITIVE
SODIUM SERPL-SCNC: 139 MMOL/L (ref 136–145)
SP GR UR STRIP: 1.02 (ref 1–1.03)
SQUAMOUS #/AREA URNS HPF: ABNORMAL /HPF
STARCH GRANULES URNS QL MICRO: ABNORMAL /HPF
T&S EXPIRATION DATE: NORMAL
UROBILINOGEN UR QL STRIP: ABNORMAL
WBC # UR STRIP: ABNORMAL /HPF
WBC NRBC COR # BLD: 8.98 10*3/MM3 (ref 3.4–10.8)

## 2023-05-09 PROCEDURE — 36415 COLL VENOUS BLD VENIPUNCTURE: CPT

## 2023-05-09 PROCEDURE — 87086 URINE CULTURE/COLONY COUNT: CPT | Performed by: INTERNAL MEDICINE

## 2023-05-09 PROCEDURE — 81001 URINALYSIS AUTO W/SCOPE: CPT | Performed by: INTERNAL MEDICINE

## 2023-05-09 PROCEDURE — 85025 COMPLETE CBC W/AUTO DIFF WBC: CPT | Performed by: INTERNAL MEDICINE

## 2023-05-09 PROCEDURE — 86901 BLOOD TYPING SEROLOGIC RH(D): CPT | Performed by: INTERNAL MEDICINE

## 2023-05-09 PROCEDURE — 87081 CULTURE SCREEN ONLY: CPT | Performed by: INTERNAL MEDICINE

## 2023-05-09 PROCEDURE — 83036 HEMOGLOBIN GLYCOSYLATED A1C: CPT | Performed by: INTERNAL MEDICINE

## 2023-05-09 PROCEDURE — 86850 RBC ANTIBODY SCREEN: CPT | Performed by: INTERNAL MEDICINE

## 2023-05-09 PROCEDURE — 86900 BLOOD TYPING SEROLOGIC ABO: CPT | Performed by: INTERNAL MEDICINE

## 2023-05-09 PROCEDURE — 93005 ELECTROCARDIOGRAM TRACING: CPT

## 2023-05-09 PROCEDURE — 80048 BASIC METABOLIC PNL TOTAL CA: CPT | Performed by: INTERNAL MEDICINE

## 2023-05-09 RX ORDER — VITAMIN B COMPLEX
1 CAPSULE ORAL DAILY
COMMUNITY

## 2023-05-09 RX ORDER — AMPICILLIN TRIHYDRATE 250 MG
1 CAPSULE ORAL DAILY
COMMUNITY
End: 2023-05-09

## 2023-05-09 NOTE — DISCHARGE INSTRUCTIONS
PRE-ADMISSION TESTING INSTRUCTIONS FOR TOTAL JOINT PATIENTS    Take these medications the morning of surgery with a small sip of water:   famotidine, abilify gabapentin, and hydrocodone if needed      No aspirin, advil, aleve, ibuprofen, naproxen, diet pills, decongestants, or vitamin/herbal supplements for a week prior to your surgery.  Stop oxaprozin, red yeast rice, multivitamin, vitamin c and b comnplex 7 days prior to surgery    Tylenol/Acetaminophen ok to take if needed.    Do not take any insulin or diabetes medications the morning of surgery.    Your surgeon may give you Bactroban to use prior to surgery. This will be started 5 days prior to surgery, follow the directions on your prescription from your surgeon for use.      General Instructions:    DO NOT EAT SOLID FOOD AFTER MIDNIGHT THE NIGHT BEFORE SURGERY. No gum, mints, or hard candy after midnight the night before surgery.  You may drink clear liquids the day of surgery up until 2 hours before your arrival time.  Clear liquids are liquids you can see through. Nothing RED in color.    Plain water    Sports drinks      Gelatin (Jell-O)  Fruit juices without pulp such as white grape juice and apple juice  Popsicles that contain no fruit or yogurt  Tea or coffee (no cream or milk added)    It is beneficial for you to have a clear drink that contains carbohydrates 2 hours prior to your arrival time.  DRINK A BOTTLE OF SPORTS DRINK 2 HOURS BEFORE YOUR ARRIVAL TIME. IF YOU ARE DIABETIC, DRINK A LOW OR NO SUGAR SPORTS DRINK. ANY COLOR EXCEPT RED.    Patients who avoid smoking, chewing tobacco and alcohol for 4 weeks prior to surgery have a reduced risk of post-operative complications.  If at all possible, quit smoking as many days before surgery as you can.    Do not smoke, use chewing tobacco or drink alcohol after midnight the day of surgery.    Bring your C-PAP/ BI-PAP machine if you use one.  Wear clean comfortable clothes.  Do not wear contact lenses,  lotion, deodorant, or make-up.  Bring a case for your glasses if applicable.   You may brush your teeth the morning of surgery.  You may wear dentures/partials, do not put adhesive/glue on them.  Leave all other jewelry and valuables at home.  NOTIFY YOUR SURGEON IF YOU BECOME ILL, HAVE A FEVER, PRODUCTIVE COUGH, OR CANNOT BE HERE THE DAY OF SURGERY.      Preventing a Surgical Site Infection:    Shower the night before and on the morning of surgery using the chlorhexidine soap you were given.  Use a clean washcloth with the soap.  Place clean sheets on your bed after showering the night before surgery. Do not use the CHG soap on your hair, face, or private areas. Wash your body gently for five (5) minutes. Do not scrub your skin.  Dry with a clean towel and dress in clean clothing.    Do not shave the surgical area for 10 days-2 weeks prior to surgery  because the razor can irritate skin and make it easier to develop an infection.  Make sure you, your family, and all healthcare providers clean their hands with soap and water or an alcohol based hand  before caring for you or your wound.      Day of surgery:    Your surgeon’s office will advise you of your arrival time for the day of surgery.    Upon arrival, a Pre-op nurse and Anesthesia provider will review your health history, obtain vital signs, and answer questions you may have. The anesthesia provider will also discuss the type of anesthesia that will be needed for your procedure, which may include general anesthesia. The only belongings needed at this time will be your home medications and if applicable your C-PAP/BI-PAP machine.  If you are staying overnight your family can leave the rest of your belongings in the car and bring them to your room later.  A Pre-op nurse will start an IV and you may receive medication in preparation for surgery, including something to help you relax.  Your family will be able to see you in the Pre-op area.  While you  are in surgery your family should notify the waiting room  if they leave the waiting room area and provide a contact phone number.    If you have any questions, you can call the Pre-Admission Department at [phone] or your surgeon's office.    Please be aware that surgery does come with discomfort.  We want to make every effort to control your discomfort so please discuss any uncontrolled symptoms with your nurse.   Your doctor will most likely have prescribed pain medications.     You may have bruising or discomfort from the tourniquet used in surgery.     Please leave all luggage in the car the morning of surgery.  You will be transported to your hospital room following the recovery period.  Your family can get your luggage at that time.      You may receive a survey regarding the care you received. Your feedback is very important and will be used to collect the necessary data to help us to continue to provide excellent care.

## 2023-05-09 NOTE — PAT
Pt here for PAT visit.  Pre-op tests completed, chg soap given, and instructions reviewed.  Instructed clears until 2 hrs prior to arrival time, voiced understanding. Pt denies issues w/metals/jewelry/etc. Arrow pain pump brochure given and use reviewed, voiced understanding.

## 2023-05-10 LAB
BACTERIA SPEC AEROBE CULT: NORMAL
MRSA SPEC QL CULT: NORMAL

## 2023-05-11 ENCOUNTER — PREP FOR SURGERY (OUTPATIENT)
Dept: OTHER | Facility: HOSPITAL | Age: 58
End: 2023-05-11
Payer: MEDICARE

## 2023-05-11 DIAGNOSIS — M17.11 PRIMARY OSTEOARTHRITIS OF RIGHT KNEE: Primary | ICD-10-CM

## 2023-05-15 ENCOUNTER — TELEPHONE (OUTPATIENT)
Dept: ORTHOPEDIC SURGERY | Facility: CLINIC | Age: 58
End: 2023-05-15

## 2023-05-15 NOTE — TELEPHONE ENCOUNTER
Provider: DR ALEJANDRO    Caller: VIDHYA CARLIN    Relationship to Patient: SELF    Phone Number: [phone]    Reason for Call: PT SAW HER PCP DR GONZALEZ TODAY, BUT WAS TOLD THAT THEY HADN'T RECEIVED ANY PAPERWORK FROM DR ALEJANDRO'S OFFICE TO SIGN OFF FOR UPCOMING SURGERY. NEED TO HAVE THAT FAXED OVER TO [facility]. FAX #[phone].

## 2023-05-22 ENCOUNTER — OFFICE VISIT (OUTPATIENT)
Dept: ORTHOPEDIC SURGERY | Facility: CLINIC | Age: 58
End: 2023-05-22
Payer: MEDICARE

## 2023-05-22 VITALS — BODY MASS INDEX: 31.53 KG/M2 | HEIGHT: 66 IN | WEIGHT: 196.2 LBS

## 2023-05-22 DIAGNOSIS — M17.11 PRIMARY OSTEOARTHRITIS OF RIGHT KNEE: Primary | ICD-10-CM

## 2023-05-22 NOTE — PROGRESS NOTES
Subjective:     Patient ID: Sánchez Carbajal is a 58 y.o. female.    Chief Complaint:    History of Present Illness  Sánchez Carbajal {presents/returns:[id number]} to clinic today for evaluation of ***     Social History     Occupational History   • Not on file   Tobacco Use   • Smoking status: Never     Passive exposure: Never   • Smokeless tobacco: Never   Vaping Use   • Vaping Use: Never used   Substance and Sexual Activity   • Alcohol use: No   • Drug use: No   • Sexual activity: Yes     Partners: Male     Birth control/protection: Hysterectomy      Past Medical History:   Diagnosis Date   • Arthritis     RIGHT SHOULDER   • Arthritis of back     Not sure   • Arthritis of neck     Two years ago   • Cervical disc disorder     Two maybe three years ago   • Chronic back pain    • Diabetes mellitus    • Elevated cholesterol    • GERD (gastroesophageal reflux disease)    • H/O seasonal allergies    • History of anemia    • History of diverticulitis    • History of heart murmur in childhood    • History of kidney stones    • Hypertension    • Migraines    • PONV (postoperative nausea and vomiting)    • Primary osteoarthritis of knees, bilateral 11/29/2022   • Sleep apnea with use of continuous positive airway pressure (CPAP)    • Uterine prolapse     UTERINE VAG PROLAPSE     Past Surgical History:   Procedure Laterality Date   • ANTERIOR AND POSTERIOR VAGINAL REPAIR N/A 01/15/2019    Procedure: /POSTERIOR REPAIR;  Surgeon: James Holloway MD;  Location: [facility] OR;  Service: Gynecology   • CERVICAL FUSION     • CHOLECYSTECTOMY     • COLONOSCOPY N/A 01/18/2017    Procedure: COLONOSCOPY;  Surgeon: Tian Henderson MD;  Location: [facility] OR;  Service:    • CYSTOSCOPY URETEROSCOPY LASER LITHOTRIPSY N/A 04/06/2016    Procedure: URETEROSCOPY  ;  Surgeon: Vimal Azul MD;  Location: [facility] OR;  Service:    • CYSTOSCOPY URETEROSCOPY LASER LITHOTRIPSY Left 09/01/2019    Procedure: CYSTOSCOPY left retrograde  "pyelogram;  Surgeon: Vimal Azul MD;  Location: [facility] OR;  Service: Urology   • CYSTOSCOPY W/ LASER LITHOTRIPSY Left 06/25/2018    Procedure: CYSTOSCOPY, LEFT URETEROSCOPY, left  STENT PLACEMENT, right retrograde;  Surgeon: Vimal zAul MD;  Location: [facility] OR;  Service: Urology   • EXTRACORPOREAL SHOCK WAVE LITHOTRIPSY (ESWL)  2015   • SACROCOLPOPEXY N/A 01/15/2019    Procedure: ROBOTIC SACROCOLPOPEXY WITH MESH, TOTAL LAPAROSCOPICE HYSTERECTOMY BILATERAL SALPINGECTOMY WITH DAVINCI ;  Surgeon: James Holloway MD;  Location: [facility] OR;  Service: DaVinci       Family History   Problem Relation Age of Onset   • Cancer Maternal Grandmother         Kidney cancer   • Malig Hyperthermia Neg Hx                  Objective:  Vitals:    05/22/23 1039   Height: 167.6 cm (66\")     There were no vitals filed for this visit.  Body mass index is 32.04 kg/m².  ***      Ortho Exam     ***  Imaging: ***  Indication: ***  Findings: ***  Comparative studies: ***    Assessment:      No diagnosis found.       Plan:          1. Discussed treatment options at length with patient at today's visit. ***  2. Follow up: ***      Sánchez Carbajal was in agreement with plan and had all questions answered.     Medications:  No orders of the defined types were placed in this encounter.      Followup:  No follow-ups on file.    There are no diagnoses linked to this encounter.      Dictated utilizing Dragon dictation   "

## 2023-05-22 NOTE — H&P (VIEW-ONLY)
[facility]   HISTORY AND PHYSICAL    Patient Name:Sánchez Carbajal  : 1965  MRN: [id number]  Primary Care Physician: Aayush Mckeon MD  Date of admission: (Not on file)    Subjective   Subjective     Chief Complaint: Right knee pain and osteoarthritis    History of Present Illness   Sánchez Carbajal is a 58 y.o. female with longstanding history of right knee pain.  The patient is seen our nurse practitioner Franci Pettit in the past for years and has multiple corticosteroid injections.  She recently had an MRI which demonstrated severe patellofemoral arthritis.  The patient has tried bracing physical therapy countless intra-articular injections and activity modification.  She states that the right knee pain is now to the point that it is so severe and limiting that it is affecting her quality life and activities of daily living.  She is tired of dealing with it and the injections and other non operative interventions have become less effective in alleviating her symptoms.  She was seen by myself on 2023 and the decision was made to proceed with right total knee arthroplasty.  She presents today for last preoperative visit.    Review of Systems      Personal History     Past Medical History:   Diagnosis Date   • Arthritis     RIGHT SHOULDER   • Arthritis of back     Not sure   • Arthritis of neck     Two years ago   • Cervical disc disorder     Two maybe three years ago   • Chronic back pain    • Diabetes mellitus    • Elevated cholesterol    • GERD (gastroesophageal reflux disease)    • H/O seasonal allergies    • History of anemia    • History of diverticulitis    • History of heart murmur in childhood    • History of kidney stones    • Hypertension    • Migraines    • PONV (postoperative nausea and vomiting)    • Primary osteoarthritis of knees, bilateral 2022   • Sleep apnea with use of continuous positive airway pressure (CPAP)    • Uterine prolapse     UTERINE VAG PROLAPSE        Past Surgical History:   Procedure Laterality Date   • ANTERIOR AND POSTERIOR VAGINAL REPAIR N/A 01/15/2019    Procedure: /POSTERIOR REPAIR;  Surgeon: James Holloway MD;  Location: [facility] OR;  Service: Gynecology   • CERVICAL FUSION     • CHOLECYSTECTOMY     • COLONOSCOPY N/A 01/18/2017    Procedure: COLONOSCOPY;  Surgeon: Tian Henderson MD;  Location: [facility] OR;  Service:    • CYSTOSCOPY URETEROSCOPY LASER LITHOTRIPSY N/A 04/06/2016    Procedure: URETEROSCOPY  ;  Surgeon: Vimal Azul MD;  Location: [facility] OR;  Service:    • CYSTOSCOPY URETEROSCOPY LASER LITHOTRIPSY Left 09/01/2019    Procedure: CYSTOSCOPY left retrograde pyelogram;  Surgeon: Vimal Azul MD;  Location: [facility] OR;  Service: Urology   • CYSTOSCOPY W/ LASER LITHOTRIPSY Left 06/25/2018    Procedure: CYSTOSCOPY, LEFT URETEROSCOPY, left  STENT PLACEMENT, right retrograde;  Surgeon: Vimal Azul MD;  Location: [facility] OR;  Service: Urology   • EXTRACORPOREAL SHOCK WAVE LITHOTRIPSY (ESWL)  2015   • SACROCOLPOPEXY N/A 01/15/2019    Procedure: ROBOTIC SACROCOLPOPEXY WITH MESH, TOTAL LAPAROSCOPICE HYSTERECTOMY BILATERAL SALPINGECTOMY WITH DAVINCI ;  Surgeon: James Holloway MD;  Location: [facility] OR;  Service: DaVinci       Family History: Her family history includes Cancer in her maternal grandmother.     Social History: She  reports that she has never smoked. She has never been exposed to tobacco smoke. She has never used smokeless tobacco. She reports that she does not drink alcohol and does not use drugs.    Home Medications:  ARIPiprazole, DULoxetine, Dulaglutide, HYDROcodone-acetaminophen, Loratadine, aspirin, atorvastatin, baclofen, famotidine, gabapentin, hydrOXYzine pamoate, lisinopril, metFORMIN HCl, multivitamin, oxaprozin, traZODone, vitamin C, and vitamin b complex    Allergies:  She is allergic to penicillins, flagyl [metronidazole], and sulfa antibiotics.    Objective    Objective     Vitals:          Physical Exam   Right Knee Exam      Muscle Strength   The patient has normal right knee strength.     Tenderness   The patient is experiencing tenderness in the medial retinaculum, patella, medial joint line, lateral joint line and lateral retinaculum.     Range of Motion   Extension: 0   Flexion: 130      Tests   Fe:  Medial - positive Lateral - positive  Varus: negative Valgus: negative  Drawer:  Anterior - negative    Posterior - negative     Other   Erythema: absent  Scars: absent  Sensation: normal  Pulse: present  Swelling: none  Effusion: no effusion present        Left Knee Exam      Muscle Strength   The patient has normal left knee strength.     Tenderness   The patient is experiencing tenderness in the medial joint line and lateral joint line.     Range of Motion   Extension: 0   Flexion: 130      Tests   Fe:  Medial - negative Lateral - negative  Varus: negative Valgus: negative  Drawer:  Anterior - negative     Posterior - negative     Other   Erythema: absent  Scars: absent  Sensation: normal  Pulse: present  Swelling: none  Effusion: no effusion present    Result Review    Imagin standing views of the right knee  Indication: Right knee pain  Findings: X-rays demonstrate severe patellofemoral and moderate medial and lateral compartment osteoarthritis.  The patellofemoral joint there is incongruity with large lateral osteophytes and bone-on-bone articulation as well as subchondral sclerosis and cystic changes.  Medial lateral joint show significant narrowing with marginal osteophytes and subchondral sclerosis.  No signs of fracture dislocation or subluxation  Comparative studies: MRI in March    Assessment & Plan   Assessment / Plan     Brief Patient Summary:  Sánchez Carbajal is a 58 y.o. female with severe right knee osteoarthritis    Active Hospital Problems:  There are no active hospital problems to display for this patient.    Plan:   Cc: f/u right knee pain,  DJD    Patient presented to clinic today for preoperative history and physical visit in anticipation of upcoming scheduled right total knee arthroplasty.    I reviewed anatomy and a model of a total knee arthroplasty, as well as typical postoperative recovery of 6-12 months before maximal recovery, and possible need for rehabilitation stay after hospitalization. We also discussed risks, benefits, and alternatives of procedure with risks including but not limited to neurovascular damage, bleeding, infection, malalignment, chronic pian, failure of implants, osteolysis, loosening of implants, loss of motion, weakness, stiffness, instability, DVT, pulmonary embolus, death, stroke, complex regional pain syndrome, myocardial infarction, and need for additional procedures. Patient understood all these and had all questions answered before consenting to the procedure. No guarantees were given in regards to results from the surgery.  Patient has been medically optimize by his primary care physician.    Plan for right total knee arthroplasty.    Implants: [employer] press fit Legion TKS with [employer]  Anticoagulation: Asprin  Antibiotics: Cefazolin and Vanc obesity  Admission Type: 23 HR Obs  Post op ABX: Yes  TXA: Yes    All remaining questions answered today.      DVT prophylaxis:  No DVT prophylaxis order currently exists.    Lambert Brady MD

## 2023-05-22 NOTE — H&P
[facility]   HISTORY AND PHYSICAL    Patient Name:Sánchez Carbajal  : 1965  MRN: [id number]  Primary Care Physician: Aayush Mckeon MD  Date of admission: (Not on file)    Subjective   Subjective     Chief Complaint: Right knee pain and osteoarthritis    History of Present Illness   Sánchez Carbajal is a 58 y.o. female with longstanding history of right knee pain.  The patient is seen our nurse practitioner Franci Pettit in the past for years and has multiple corticosteroid injections.  She recently had an MRI which demonstrated severe patellofemoral arthritis.  The patient has tried bracing physical therapy countless intra-articular injections and activity modification.  She states that the right knee pain is now to the point that it is so severe and limiting that it is affecting her quality life and activities of daily living.  She is tired of dealing with it and the injections and other non operative interventions have become less effective in alleviating her symptoms.  She was seen by myself on 2023 and the decision was made to proceed with right total knee arthroplasty.  She presents today for last preoperative visit.    Review of Systems      Personal History     Past Medical History:   Diagnosis Date   • Arthritis     RIGHT SHOULDER   • Arthritis of back     Not sure   • Arthritis of neck     Two years ago   • Cervical disc disorder     Two maybe three years ago   • Chronic back pain    • Diabetes mellitus    • Elevated cholesterol    • GERD (gastroesophageal reflux disease)    • H/O seasonal allergies    • History of anemia    • History of diverticulitis    • History of heart murmur in childhood    • History of kidney stones    • Hypertension    • Migraines    • PONV (postoperative nausea and vomiting)    • Primary osteoarthritis of knees, bilateral 2022   • Sleep apnea with use of continuous positive airway pressure (CPAP)    • Uterine prolapse     UTERINE VAG PROLAPSE        Past Surgical History:   Procedure Laterality Date   • ANTERIOR AND POSTERIOR VAGINAL REPAIR N/A 01/15/2019    Procedure: /POSTERIOR REPAIR;  Surgeon: James Holloway MD;  Location: [facility] OR;  Service: Gynecology   • CERVICAL FUSION     • CHOLECYSTECTOMY     • COLONOSCOPY N/A 01/18/2017    Procedure: COLONOSCOPY;  Surgeon: Tian Henderson MD;  Location: [facility] OR;  Service:    • CYSTOSCOPY URETEROSCOPY LASER LITHOTRIPSY N/A 04/06/2016    Procedure: URETEROSCOPY  ;  Surgeon: Vimal Azul MD;  Location: [facility] OR;  Service:    • CYSTOSCOPY URETEROSCOPY LASER LITHOTRIPSY Left 09/01/2019    Procedure: CYSTOSCOPY left retrograde pyelogram;  Surgeon: Vimal Azul MD;  Location: [facility] OR;  Service: Urology   • CYSTOSCOPY W/ LASER LITHOTRIPSY Left 06/25/2018    Procedure: CYSTOSCOPY, LEFT URETEROSCOPY, left  STENT PLACEMENT, right retrograde;  Surgeon: Vimal Azul MD;  Location: [facility] OR;  Service: Urology   • EXTRACORPOREAL SHOCK WAVE LITHOTRIPSY (ESWL)  2015   • SACROCOLPOPEXY N/A 01/15/2019    Procedure: ROBOTIC SACROCOLPOPEXY WITH MESH, TOTAL LAPAROSCOPICE HYSTERECTOMY BILATERAL SALPINGECTOMY WITH DAVINCI ;  Surgeon: James Holloway MD;  Location: [facility] OR;  Service: DaVinci       Family History: Her family history includes Cancer in her maternal grandmother.     Social History: She  reports that she has never smoked. She has never been exposed to tobacco smoke. She has never used smokeless tobacco. She reports that she does not drink alcohol and does not use drugs.    Home Medications:  ARIPiprazole, DULoxetine, Dulaglutide, HYDROcodone-acetaminophen, Loratadine, aspirin, atorvastatin, baclofen, famotidine, gabapentin, hydrOXYzine pamoate, lisinopril, metFORMIN HCl, multivitamin, oxaprozin, traZODone, vitamin C, and vitamin b complex    Allergies:  She is allergic to penicillins, flagyl [metronidazole], and sulfa antibiotics.    Objective    Objective     Vitals:          Physical Exam   Right Knee Exam      Muscle Strength   The patient has normal right knee strength.     Tenderness   The patient is experiencing tenderness in the medial retinaculum, patella, medial joint line, lateral joint line and lateral retinaculum.     Range of Motion   Extension: 0   Flexion: 130      Tests   Fe:  Medial - positive Lateral - positive  Varus: negative Valgus: negative  Drawer:  Anterior - negative    Posterior - negative     Other   Erythema: absent  Scars: absent  Sensation: normal  Pulse: present  Swelling: none  Effusion: no effusion present        Left Knee Exam      Muscle Strength   The patient has normal left knee strength.     Tenderness   The patient is experiencing tenderness in the medial joint line and lateral joint line.     Range of Motion   Extension: 0   Flexion: 130      Tests   Fe:  Medial - negative Lateral - negative  Varus: negative Valgus: negative  Drawer:  Anterior - negative     Posterior - negative     Other   Erythema: absent  Scars: absent  Sensation: normal  Pulse: present  Swelling: none  Effusion: no effusion present    Result Review    Imagin standing views of the right knee  Indication: Right knee pain  Findings: X-rays demonstrate severe patellofemoral and moderate medial and lateral compartment osteoarthritis.  The patellofemoral joint there is incongruity with large lateral osteophytes and bone-on-bone articulation as well as subchondral sclerosis and cystic changes.  Medial lateral joint show significant narrowing with marginal osteophytes and subchondral sclerosis.  No signs of fracture dislocation or subluxation  Comparative studies: MRI in March    Assessment & Plan   Assessment / Plan     Brief Patient Summary:  Sánchez Carbajal is a 58 y.o. female with severe right knee osteoarthritis    Active Hospital Problems:  There are no active hospital problems to display for this patient.    Plan:   Cc: f/u right knee pain,  DJD    Patient presented to clinic today for preoperative history and physical visit in anticipation of upcoming scheduled right total knee arthroplasty.    I reviewed anatomy and a model of a total knee arthroplasty, as well as typical postoperative recovery of 6-12 months before maximal recovery, and possible need for rehabilitation stay after hospitalization. We also discussed risks, benefits, and alternatives of procedure with risks including but not limited to neurovascular damage, bleeding, infection, malalignment, chronic pian, failure of implants, osteolysis, loosening of implants, loss of motion, weakness, stiffness, instability, DVT, pulmonary embolus, death, stroke, complex regional pain syndrome, myocardial infarction, and need for additional procedures. Patient understood all these and had all questions answered before consenting to the procedure. No guarantees were given in regards to results from the surgery.  Patient has been medically optimize by his primary care physician.    Plan for right total knee arthroplasty.    Implants: [employer] press fit Legion TKS with [employer]  Anticoagulation: Asprin  Antibiotics: Cefazolin and Vanc obesity  Admission Type: 23 HR Obs  Post op ABX: Yes  TXA: Yes    All remaining questions answered today.      DVT prophylaxis:  No DVT prophylaxis order currently exists.    Lambert Brady MD

## 2023-05-23 ENCOUNTER — ANESTHESIA EVENT (OUTPATIENT)
Dept: PERIOP | Facility: HOSPITAL | Age: 58
End: 2023-05-23
Payer: MEDICARE

## 2023-05-24 ENCOUNTER — ANESTHESIA (OUTPATIENT)
Dept: PERIOP | Facility: HOSPITAL | Age: 58
End: 2023-05-24
Payer: MEDICARE

## 2023-05-24 ENCOUNTER — HOSPITAL ENCOUNTER (OUTPATIENT)
Facility: HOSPITAL | Age: 58
Discharge: HOME | End: 2023-05-24
Attending: INTERNAL MEDICINE | Admitting: INTERNAL MEDICINE
Payer: MEDICARE

## 2023-05-24 ENCOUNTER — APPOINTMENT (OUTPATIENT)
Dept: GENERAL RADIOLOGY | Facility: HOSPITAL | Age: 58
End: 2023-05-24
Payer: MEDICARE

## 2023-05-24 VITALS
TEMPERATURE: 98.2 F | OXYGEN SATURATION: 95 % | HEART RATE: 92 BPM | RESPIRATION RATE: 15 BRPM | WEIGHT: 196 LBS | BODY MASS INDEX: 31.64 KG/M2 | DIASTOLIC BLOOD PRESSURE: 72 MMHG | SYSTOLIC BLOOD PRESSURE: 120 MMHG

## 2023-05-24 DIAGNOSIS — M17.11 PRIMARY OSTEOARTHRITIS OF RIGHT KNEE: ICD-10-CM

## 2023-05-24 LAB — GLUCOSE BLDC GLUCOMTR-MCNC: 109 MG/DL (ref 70–130)

## 2023-05-24 PROCEDURE — 97161 PT EVAL LOW COMPLEX 20 MIN: CPT

## 2023-05-24 PROCEDURE — 20985 CPTR-ASST DIR MS PX: CPT | Performed by: INTERNAL MEDICINE

## 2023-05-24 PROCEDURE — 25010000002 ROPIVACAINE PER 1 MG: Performed by: INTERNAL MEDICINE

## 2023-05-24 PROCEDURE — 27447 TOTAL KNEE ARTHROPLASTY: CPT | Performed by: INTERNAL MEDICINE

## 2023-05-24 PROCEDURE — 25010000002 ONDANSETRON PER 1 MG: Performed by: NURSE ANESTHETIST, CERTIFIED REGISTERED

## 2023-05-24 PROCEDURE — 73560 X-RAY EXAM OF KNEE 1 OR 2: CPT

## 2023-05-24 PROCEDURE — 82948 REAGENT STRIP/BLOOD GLUCOSE: CPT

## 2023-05-24 PROCEDURE — 25010000002 EPINEPHRINE 1 MG/ML SOLUTION 1 ML VIAL: Performed by: INTERNAL MEDICINE

## 2023-05-24 PROCEDURE — 25010000002 PHENYLEPHRINE 10 MG/ML SOLUTION: Performed by: NURSE ANESTHETIST, CERTIFIED REGISTERED

## 2023-05-24 PROCEDURE — C1776 JOINT DEVICE (IMPLANTABLE): HCPCS | Performed by: INTERNAL MEDICINE

## 2023-05-24 PROCEDURE — 0 CEFAZOLIN SODIUM-DEXTROSE 2-3 GM-%(50ML) RECONSTITUTED SOLUTION: Performed by: INTERNAL MEDICINE

## 2023-05-24 PROCEDURE — 25010000002 KETOROLAC TROMETHAMINE PER 15 MG: Performed by: INTERNAL MEDICINE

## 2023-05-24 PROCEDURE — 25010000002 VANCOMYCIN 1.5 G RECONSTITUTED SOLUTION 1 EACH VIAL: Performed by: INTERNAL MEDICINE

## 2023-05-24 PROCEDURE — 25010000002 MIDAZOLAM PER 1MG: Performed by: NURSE ANESTHETIST, CERTIFIED REGISTERED

## 2023-05-24 PROCEDURE — C1713 ANCHOR/SCREW BN/BN,TIS/BN: HCPCS | Performed by: INTERNAL MEDICINE

## 2023-05-24 PROCEDURE — 25010000002 PROPOFOL 10 MG/ML EMULSION: Performed by: NURSE ANESTHETIST, CERTIFIED REGISTERED

## 2023-05-24 PROCEDURE — G0378 HOSPITAL OBSERVATION PER HR: HCPCS

## 2023-05-24 PROCEDURE — 25010000002 DEXAMETHASONE PER 1 MG: Performed by: NURSE ANESTHETIST, CERTIFIED REGISTERED

## 2023-05-24 DEVICE — LEGION NARROW POSTERIOR STABILIZED                                    OXINIUM SIZE 4N RIGHT
Type: IMPLANTABLE DEVICE | Site: KNEE | Status: FUNCTIONAL
Brand: LEGION

## 2023-05-24 DEVICE — DEV CONTRL TISS STRATAFIX SPIRAL PDO BIDIR 1 36X36CM: Type: IMPLANTABLE DEVICE | Site: KNEE | Status: FUNCTIONAL

## 2023-05-24 DEVICE — DEV CONTRL TISS STRATAFIX SPIRAL MNCRYL UD 3/0 PLS 45CM: Type: IMPLANTABLE DEVICE | Site: KNEE | Status: FUNCTIONAL

## 2023-05-24 DEVICE — GENESIS II NON-POROUS TIBIAL                                    BASEPLATE SIZE 2 RIGHT
Type: IMPLANTABLE DEVICE | Site: KNEE | Status: FUNCTIONAL
Brand: GENESIS II

## 2023-05-24 DEVICE — IMPLANTABLE DEVICE: Type: IMPLANTABLE DEVICE | Site: KNEE | Status: FUNCTIONAL

## 2023-05-24 DEVICE — CMT BONE PALACOS/MV MD/VISC 40GM: Type: IMPLANTABLE DEVICE | Site: KNEE | Status: FUNCTIONAL

## 2023-05-24 DEVICE — LEGION PS HIGH FLEX XLPE SZ 1-2 11MM
Type: IMPLANTABLE DEVICE | Site: KNEE | Status: FUNCTIONAL
Brand: LEGION

## 2023-05-24 RX ORDER — SENNA PLUS 8.6 MG/1
1 TABLET ORAL DAILY
Qty: 30 TABLET | Refills: 0 | Status: SHIPPED | OUTPATIENT
Start: 2023-05-24

## 2023-05-24 RX ORDER — DEXAMETHASONE SODIUM PHOSPHATE 4 MG/ML
8 INJECTION, SOLUTION INTRA-ARTICULAR; INTRALESIONAL; INTRAMUSCULAR; INTRAVENOUS; SOFT TISSUE ONCE AS NEEDED
Status: COMPLETED | OUTPATIENT
Start: 2023-05-24 | End: 2023-05-24

## 2023-05-24 RX ORDER — PROPOFOL 10 MG/ML
VIAL (ML) INTRAVENOUS AS NEEDED
Status: DISCONTINUED | OUTPATIENT
Start: 2023-05-24 | End: 2023-05-24 | Stop reason: SURG

## 2023-05-24 RX ORDER — NALOXONE HYDROCHLORIDE 4 MG/.1ML
1 SPRAY NASAL SEE ADMIN INSTRUCTIONS
Qty: 2 EACH | Refills: 0 | Status: SHIPPED | OUTPATIENT
Start: 2023-05-24

## 2023-05-24 RX ORDER — ONDANSETRON 4 MG/1
4 TABLET, FILM COATED ORAL EVERY 8 HOURS PRN
Qty: 15 TABLET | Refills: 0 | Status: SHIPPED | OUTPATIENT
Start: 2023-05-24

## 2023-05-24 RX ORDER — DOXYCYCLINE HYCLATE 100 MG/1
100 CAPSULE ORAL 2 TIMES DAILY
Qty: 28 CAPSULE | Refills: 0 | Status: SHIPPED | OUTPATIENT
Start: 2023-05-24 | End: 2023-06-07

## 2023-05-24 RX ORDER — PREGABALIN 75 MG/1
150 CAPSULE ORAL ONCE
Status: COMPLETED | OUTPATIENT
Start: 2023-05-24 | End: 2023-05-24

## 2023-05-24 RX ORDER — SODIUM CHLORIDE 0.9 % (FLUSH) 0.9 %
10 SYRINGE (ML) INJECTION AS NEEDED
Status: DISCONTINUED | OUTPATIENT
Start: 2023-05-24 | End: 2023-05-24 | Stop reason: HOSPADM

## 2023-05-24 RX ORDER — BUPIVACAINE HYDROCHLORIDE 5 MG/ML
INJECTION, SOLUTION PERINEURAL
Status: COMPLETED | OUTPATIENT
Start: 2023-05-24 | End: 2023-05-24

## 2023-05-24 RX ORDER — LIDOCAINE HYDROCHLORIDE 20 MG/ML
INJECTION, SOLUTION INFILTRATION; PERINEURAL AS NEEDED
Status: DISCONTINUED | OUTPATIENT
Start: 2023-05-24 | End: 2023-05-24 | Stop reason: SURG

## 2023-05-24 RX ORDER — FENTANYL CITRATE 50 UG/ML
25 INJECTION, SOLUTION INTRAMUSCULAR; INTRAVENOUS
Status: DISCONTINUED | OUTPATIENT
Start: 2023-05-24 | End: 2023-05-24 | Stop reason: HOSPADM

## 2023-05-24 RX ORDER — MELOXICAM 7.5 MG/1
15 TABLET ORAL ONCE
Status: COMPLETED | OUTPATIENT
Start: 2023-05-24 | End: 2023-05-24

## 2023-05-24 RX ORDER — DEXMEDETOMIDINE HYDROCHLORIDE 100 UG/ML
INJECTION, SOLUTION INTRAVENOUS AS NEEDED
Status: DISCONTINUED | OUTPATIENT
Start: 2023-05-24 | End: 2023-05-24 | Stop reason: SURG

## 2023-05-24 RX ORDER — TRANEXAMIC ACID 10 MG/ML
1000 INJECTION, SOLUTION INTRAVENOUS ONCE
Status: COMPLETED | OUTPATIENT
Start: 2023-05-24 | End: 2023-05-24

## 2023-05-24 RX ORDER — OXYCODONE AND ACETAMINOPHEN 7.5; 325 MG/1; MG/1
1 TABLET ORAL EVERY 4 HOURS PRN
Qty: 45 TABLET | Refills: 0 | Status: SHIPPED | OUTPATIENT
Start: 2023-05-24

## 2023-05-24 RX ORDER — BUPIVACAINE HYDROCHLORIDE 2.5 MG/ML
INJECTION, SOLUTION EPIDURAL; INFILTRATION; INTRACAUDAL
Status: COMPLETED | OUTPATIENT
Start: 2023-05-24 | End: 2023-05-24

## 2023-05-24 RX ORDER — FAMOTIDINE 10 MG/ML
20 INJECTION, SOLUTION INTRAVENOUS
Status: COMPLETED | OUTPATIENT
Start: 2023-05-24 | End: 2023-05-24

## 2023-05-24 RX ORDER — EPHEDRINE SULFATE 50 MG/ML
INJECTION, SOLUTION INTRAVENOUS AS NEEDED
Status: DISCONTINUED | OUTPATIENT
Start: 2023-05-24 | End: 2023-05-24 | Stop reason: SURG

## 2023-05-24 RX ORDER — SODIUM CHLORIDE, SODIUM LACTATE, POTASSIUM CHLORIDE, CALCIUM CHLORIDE 600; 310; 30; 20 MG/100ML; MG/100ML; MG/100ML; MG/100ML
9 INJECTION, SOLUTION INTRAVENOUS CONTINUOUS PRN
Status: DISCONTINUED | OUTPATIENT
Start: 2023-05-24 | End: 2023-05-24 | Stop reason: HOSPADM

## 2023-05-24 RX ORDER — ASPIRIN 81 MG/1
81 TABLET ORAL 2 TIMES DAILY
Qty: 60 TABLET | Refills: 0 | Status: SHIPPED | OUTPATIENT
Start: 2023-05-24

## 2023-05-24 RX ORDER — CHLORHEXIDINE GLUCONATE 500 MG/1
CLOTH TOPICAL ONCE
Status: DISCONTINUED | OUTPATIENT
Start: 2023-05-24 | End: 2023-05-24 | Stop reason: HOSPADM

## 2023-05-24 RX ORDER — MIDAZOLAM HYDROCHLORIDE 2 MG/2ML
0.5 INJECTION, SOLUTION INTRAMUSCULAR; INTRAVENOUS
Status: DISCONTINUED | OUTPATIENT
Start: 2023-05-24 | End: 2023-05-24 | Stop reason: HOSPADM

## 2023-05-24 RX ORDER — LIDOCAINE HYDROCHLORIDE 20 MG/ML
INJECTION, SOLUTION EPIDURAL; INFILTRATION; INTRACAUDAL; PERINEURAL
Status: COMPLETED | OUTPATIENT
Start: 2023-05-24 | End: 2023-05-24

## 2023-05-24 RX ORDER — CEFAZOLIN SODIUM 2 G/50ML
2 SOLUTION INTRAVENOUS ONCE
Status: COMPLETED | OUTPATIENT
Start: 2023-05-24 | End: 2023-05-24

## 2023-05-24 RX ORDER — MAGNESIUM HYDROXIDE 1200 MG/15ML
LIQUID ORAL AS NEEDED
Status: DISCONTINUED | OUTPATIENT
Start: 2023-05-24 | End: 2023-05-24 | Stop reason: HOSPADM

## 2023-05-24 RX ORDER — KETAMINE HYDROCHLORIDE 10 MG/ML
INJECTION INTRAMUSCULAR; INTRAVENOUS AS NEEDED
Status: DISCONTINUED | OUTPATIENT
Start: 2023-05-24 | End: 2023-05-24 | Stop reason: SURG

## 2023-05-24 RX ORDER — LIDOCAINE HYDROCHLORIDE 10 MG/ML
0.5 INJECTION, SOLUTION EPIDURAL; INFILTRATION; INTRACAUDAL; PERINEURAL ONCE AS NEEDED
Status: DISCONTINUED | OUTPATIENT
Start: 2023-05-24 | End: 2023-05-24 | Stop reason: HOSPADM

## 2023-05-24 RX ORDER — CEFAZOLIN SODIUM 2 G/50ML
2 SOLUTION INTRAVENOUS EVERY 8 HOURS
Status: DISCONTINUED | OUTPATIENT
Start: 2023-05-24 | End: 2023-05-24 | Stop reason: HOSPADM

## 2023-05-24 RX ORDER — TRANEXAMIC ACID 10 MG/ML
1000 INJECTION, SOLUTION INTRAVENOUS ONCE
Status: DISCONTINUED | OUTPATIENT
Start: 2023-05-24 | End: 2023-05-24 | Stop reason: HOSPADM

## 2023-05-24 RX ORDER — SODIUM CHLORIDE 0.9 % (FLUSH) 0.9 %
10 SYRINGE (ML) INJECTION EVERY 12 HOURS SCHEDULED
Status: DISCONTINUED | OUTPATIENT
Start: 2023-05-24 | End: 2023-05-24 | Stop reason: HOSPADM

## 2023-05-24 RX ORDER — PHENYLEPHRINE HYDROCHLORIDE 10 MG/ML
INJECTION INTRAVENOUS AS NEEDED
Status: DISCONTINUED | OUTPATIENT
Start: 2023-05-24 | End: 2023-05-24 | Stop reason: SURG

## 2023-05-24 RX ORDER — SODIUM CHLORIDE, SODIUM LACTATE, POTASSIUM CHLORIDE, CALCIUM CHLORIDE 600; 310; 30; 20 MG/100ML; MG/100ML; MG/100ML; MG/100ML
100 INJECTION, SOLUTION INTRAVENOUS CONTINUOUS
Status: DISCONTINUED | OUTPATIENT
Start: 2023-05-24 | End: 2023-05-24 | Stop reason: HOSPADM

## 2023-05-24 RX ORDER — ONDANSETRON 2 MG/ML
4 INJECTION INTRAMUSCULAR; INTRAVENOUS ONCE AS NEEDED
Status: COMPLETED | OUTPATIENT
Start: 2023-05-24 | End: 2023-05-24

## 2023-05-24 RX ORDER — ONDANSETRON 2 MG/ML
4 INJECTION INTRAMUSCULAR; INTRAVENOUS ONCE AS NEEDED
Status: DISCONTINUED | OUTPATIENT
Start: 2023-05-24 | End: 2023-05-24 | Stop reason: HOSPADM

## 2023-05-24 RX ORDER — ASPIRIN 81 MG/1
81 TABLET ORAL EVERY 12 HOURS SCHEDULED
Status: DISCONTINUED | OUTPATIENT
Start: 2023-05-25 | End: 2023-05-24 | Stop reason: HOSPADM

## 2023-05-24 RX ORDER — SODIUM CHLORIDE 9 MG/ML
40 INJECTION, SOLUTION INTRAVENOUS AS NEEDED
Status: DISCONTINUED | OUTPATIENT
Start: 2023-05-24 | End: 2023-05-24 | Stop reason: HOSPADM

## 2023-05-24 RX ADMIN — EPHEDRINE SULFATE 5 MG: 50 INJECTION, SOLUTION INTRAVENOUS at 08:47

## 2023-05-24 RX ADMIN — MIDAZOLAM HYDROCHLORIDE 0.5 MG: 1 INJECTION, SOLUTION INTRAMUSCULAR; INTRAVENOUS at 06:53

## 2023-05-24 RX ADMIN — KETAMINE HYDROCHLORIDE 10 MG: 10 INJECTION INTRAMUSCULAR; INTRAVENOUS at 09:16

## 2023-05-24 RX ADMIN — PHENYLEPHRINE HYDROCHLORIDE 100 MCG: 10 INJECTION INTRAVENOUS at 09:06

## 2023-05-24 RX ADMIN — TRANEXAMIC ACID 1000 MG: 10 INJECTION, SOLUTION INTRAVENOUS at 07:47

## 2023-05-24 RX ADMIN — DEXMEDETOMIDINE 20 MCG: 100 INJECTION, SOLUTION, CONCENTRATE INTRAVENOUS at 07:07

## 2023-05-24 RX ADMIN — PROPOFOL 100 MG: 10 INJECTION, EMULSION INTRAVENOUS at 07:35

## 2023-05-24 RX ADMIN — BUPIVACAINE HYDROCHLORIDE 1.8 ML: 5 INJECTION, SOLUTION PERINEURAL at 07:16

## 2023-05-24 RX ADMIN — MELOXICAM 15 MG: 7.5 TABLET ORAL at 06:25

## 2023-05-24 RX ADMIN — DEXMEDETOMIDINE 9 MCG: 100 INJECTION, SOLUTION, CONCENTRATE INTRAVENOUS at 07:42

## 2023-05-24 RX ADMIN — ONDANSETRON 4 MG: 2 INJECTION INTRAMUSCULAR; INTRAVENOUS at 06:27

## 2023-05-24 RX ADMIN — PROPOFOL 75 MCG/KG/MIN: 10 INJECTION, EMULSION INTRAVENOUS at 07:38

## 2023-05-24 RX ADMIN — CEFAZOLIN SODIUM 2 G: 2 SOLUTION INTRAVENOUS at 07:32

## 2023-05-24 RX ADMIN — DEXAMETHASONE SODIUM PHOSPHATE 8 MG: 4 INJECTION, SOLUTION INTRAMUSCULAR; INTRAVENOUS at 06:27

## 2023-05-24 RX ADMIN — LIDOCAINE HYDROCHLORIDE 3 ML: 20 INJECTION, SOLUTION EPIDURAL; INFILTRATION; INTRACAUDAL; PERINEURAL at 07:04

## 2023-05-24 RX ADMIN — KETAMINE HYDROCHLORIDE 20 MG: 10 INJECTION INTRAMUSCULAR; INTRAVENOUS at 09:00

## 2023-05-24 RX ADMIN — BUPIVACAINE HYDROCHLORIDE 15 ML: 2.5 INJECTION, SOLUTION EPIDURAL; INFILTRATION; INTRACAUDAL at 07:07

## 2023-05-24 RX ADMIN — FAMOTIDINE 20 MG: 10 INJECTION, SOLUTION INTRAVENOUS at 06:28

## 2023-05-24 RX ADMIN — EPHEDRINE SULFATE 5 MG: 50 INJECTION, SOLUTION INTRAVENOUS at 08:56

## 2023-05-24 RX ADMIN — SODIUM CHLORIDE, POTASSIUM CHLORIDE, SODIUM LACTATE AND CALCIUM CHLORIDE 9 ML/HR: 600; 310; 30; 20 INJECTION, SOLUTION INTRAVENOUS at 06:00

## 2023-05-24 RX ADMIN — TRANEXAMIC ACID 1000 MG: 10 INJECTION, SOLUTION INTRAVENOUS at 09:10

## 2023-05-24 RX ADMIN — DEXMEDETOMIDINE 30 MCG: 100 INJECTION, SOLUTION, CONCENTRATE INTRAVENOUS at 07:02

## 2023-05-24 RX ADMIN — LIDOCAINE HYDROCHLORIDE 3 ML: 20 INJECTION, SOLUTION EPIDURAL; INFILTRATION; INTRACAUDAL; PERINEURAL at 06:56

## 2023-05-24 RX ADMIN — BUPIVACAINE HYDROCHLORIDE 20 ML: 2.5 INJECTION, SOLUTION EPIDURAL; INFILTRATION; INTRACAUDAL; PERINEURAL at 07:02

## 2023-05-24 RX ADMIN — LIDOCAINE HYDROCHLORIDE 100 MG: 20 INJECTION, SOLUTION INFILTRATION; PERINEURAL at 07:35

## 2023-05-24 RX ADMIN — PREGABALIN 150 MG: 75 CAPSULE ORAL at 06:25

## 2023-05-24 RX ADMIN — VANCOMYCIN HYDROCHLORIDE 1500 MG: 1.5 INJECTION, POWDER, LYOPHILIZED, FOR SOLUTION INTRAVENOUS at 06:00

## 2023-05-24 NOTE — DISCHARGE INSTRUCTIONS
Total Knee Joint Replacement Discharge Instructions:    I. ACTIVITIES:  1. Exercises:  Complete exercise program as taught by the hospital physical therapist 2 times per day  Exercise program will be advanced by the physical therapist  During the day be up ambulating every 2 hours (while awake) for short distances  Complete the ankle pump exercises at least 10 times per hour (while awake)  Elevate legs most of the day the first week post operatively and thereafter elevate legs when in bed and for at least 30 minutes during the day. Caution must be taken to avoid pillow placement under the bend of the knee as this can led to flexion contractures of the knee.  Use cold packs 20-30 minutes approximately 5 times per day. This should be done before and after completing your exercises and at any time you are experiencing pain/ stiffness in your operative extremity.      2. Activities of Daily Living:  No tub baths, hot tubs, or swimming pools for 4 weeks  May shower and let water run over the incision on post-operative day #5 if no drainage. Do not scrub or rub the incision. Simply let the water run over the incision and pat dry.    II. Restrictions  Do not cross legs or kneel  Your surgeon will discuss with you when you will be able to drive again.  Weight bearing is as tolerated  First week stay inside on even terrain. May go up and down stairs one stair at a time utilizing the hand rail  After one week, you may venture outside.    III. Precautions:  Everyone that comes near you should wash their hands  No elective dental, genital-urinary, or colon procedures or surgical procedures for 12 weeks after surgery unless absolutely necessary.   If dental work or surgical procedure is deemed absolutely necessary, you will need to contact your surgeon as you will need to take antibiotics 1 hour prior to any dental work (including teeth cleanings).  Please discuss with your surgeon prophylactic antibiotics as the length of time  this intervention will be necessary for you varies with each patient’s health history and situation.  Avoid sick people. If you must be around someone who is ill, they should wear a mask.  Avoid visits to the Emergency Room or Urgent Care unless you are having a life threatening event.   If ordered stockings are to be placed on in the morning and removed at night. Monitor the stockings to ensure that any swelling is not causing the stockings to become too tight. In this case, remove stockings immediately.    IV. INCISION CARE:  May remove the Ace wrap 2 days following surgery  The negative pressure dressing with the battery pack is waterproof and should remain in place for 7 days.  You may shower with the negative pressure dressing as it is waterproof  Following removal of the dressing on day 7 you may shower and allow water to run over the incision  No submersion of incision in water such as tubs pools hot tubs etc.  No creams or ointments to the incision  Do not touch or pick at the incision  Check incision/dressing every day and notify surgeon immediately if any of the following signs or symptoms are noted:  Increase in redness  Increase in swelling around the incision and of the entire extremity  Increase in pain  Drainage oozing from the incision  Pulling apart of the edges of the incision  Increase in overall body temperature (greater than 100.5 degrees)  Your surgeon will instruct you regarding suture or staple removal    V. Medications:   1. Anticoagulants: You will be discharged on an anticoagulant. This is a prophylactic medication that helps prevent blood clots during your post-operative period. The type and length of dosage varies based on your individual needs, procedure performed, and surgeon’s preference.  While taking the anticoagulant, you should avoid taking any additional aspirin, ibuprofen (Advil or Motrin), Aleve (Naprosyn) or other non-steroidal anti-inflammatory medications.   Notify surgeon  immediately if any herminia bleeding is noted in the urine, stool, emesis, or from the nose or the incision. Blood in the stool will often appear as black rather than red. Blood in urine may appear as pink. Blood in emesis may appear as brown/black like coffee grounds.  You will need to apply pressure for longer periods of time to any cuts or abrasions to stop bleeding  Avoid alcohol while taking anticoagulants    2. Stool Softeners: You will be at greater risk of constipation after surgery due to being less mobile and the pain medications.   Take stool softeners as instructed by your surgeon while on pain medications. Over the counter Colace 100 mg 1-2 capsules twice daily.   If stools become too loose or too frequent, please decreases the dosage or stop the stool softener.  If constipation occurs despite use of stool softeners, you are to continue the stool softeners and add a laxative (Milk of Magnesia 1 ounce daily as needed)  Drink plenty of fluids, and eat fruits and vegetables during your recovery time    3. Pain Medications utilized after surgery are narcotics and the law requires that the following information be given to all patients that are prescribed narcotics:  CLASSIFICATION: Pain medications are called Opioids and are narcotics  LEGALITIES: It is illegal to share narcotics with others and to drive within 24 hours of taking narcotics  POTENTIAL SIDE EFFECTS: Potential side effects of opioids include: nausea, vomiting, itching, dizziness, drowsiness, dry mouth, constipation, and difficulty urinating.  POTENTIAL ADVERSE EFFECTS:   Opioid tolerance can develop with use of pain medications and this simply means that it requires more and more of the medication to control pain; however, this is seen more in patients that use opioids for longer periods of time.  Opioid dependence can develop with use of Opioids and this simply means that to stop the medication can cause withdrawal symptoms; however, this is  seen with patients that use Opioids for longer periods of time.  Opioid addiction can develop with use of Opioids and the incidence of this is very unlikely in patients who take the medications as ordered and stop the medications as instructed.  Opioid overdose can be dangerous, but is unlikely when the medication is taken as ordered and stopped when ordered. It is important not to mix opioids with alcohol or with and type of sedative such as Benadryl as this can lead to over sedation and respiratory difficulty.  DOSAGE:   Pain medications will need to be taken consistently for the first week to decrease pain and promote adequate pain relief and participation in physical therapy.  After the initial surgical pain begins to resolve, you may begin to decrease the pain medication. By the end of 6 weeks, you should be off of pain medications.  Refills will not be given by the office during evening hours, on weekends, or after 6 weeks post-op.  To seek refills on pain medications during the initial 6 week post-operative period, you must call the office 48 hours in advance to request the refill. The office will then notify you when to  the prescription. DO NOT wait until you are out of the medication to request a refill.    V. FOLLOW-UP VISITS:  You will need to follow up in the office with your surgeon in 2 weeks. Please call this number [phone] to schedule this appointment.  If you have any concerns or suspected complications prior to your follow up visit, please call your surgeons office. Do not wait until your appointment time if you suspect complications. These will need to be addressed in the office promptly.

## 2023-05-24 NOTE — ANESTHESIA PROCEDURE NOTES
IPack Block    Pre-sedation assessment completed: 5/24/2023 6:52 AM    Patient reassessed immediately prior to procedure    Patient location during procedure: pre-op  Start time: 5/24/2023 7:04 AM  Stop time: 5/24/2023 7:07 AM  Reason for block: at surgeon's request and post-op pain management  Performed by  CRNA/CAA: Adriana France, SERENESRNA: Ford Bang SRNA  Preanesthetic Checklist  Completed: patient identified, IV checked, site marked, risks and benefits discussed, surgical consent, monitors and equipment checked, pre-op evaluation and timeout performed  Prep:  Pt Position: supine  Sterile barriers:alcohol skin prep, cap, gloves, mask and washed/disinfected hands  Prep: ChloraPrep  Patient monitoring: blood pressure monitoring, continuous pulse oximetry and EKG  Procedure    Sedation: yes  Performed under: local infiltration  Guidance:ultrasound guided    ULTRASOUND INTERPRETATION.  Using ultrasound guidance a 21 G gauge needle was placed in close proximity to the femoral nerve, at which point, under ultrasound guidance anesthetic was injected in the area of the nerve and spread of the anesthesia was seen on ultrasound in close proximity thereto.  There were no abnormalities seen on ultrasound; a digital image was taken; and the patient tolerated the procedure with no complications. Images:still images obtained, printed/placed on chart    Laterality:right  Block Type:iPack  Injection Technique:single-shot  Needle Type:echogenic  Needle Gauge:21 G  Resistance on Injection: none    Medications Used: lidocaine PF 2% (XYLOCAINE) injection - Infiltration, Knee Right   3 mL - 5/24/2023 7:04:00 AM  bupivacaine PF (MARCAINE) injection 0.25% - Peripheral Nerve, Knee Right   15 mL - 5/24/2023 7:07:00 AM      Post Assessment  Injection Assessment: negative aspiration for heme, no paresthesia on injection and incremental injection  Patient Tolerance:comfortable throughout block  Complications:no  Additional  Notes  20mcg Precedex added to iPack Block.

## 2023-05-24 NOTE — PLAN OF CARE
Goal Outcome Evaluation:  Plan of Care Reviewed With: patient           Outcome Evaluation: PT Evaluation Complete: Patient performs supine to/from sit transfers with supervision, sit to/from stand transfers with CGA, and gait x 70 feet with CGA/min A with use of FWW. Patient with persistent B LE tingling and saddle numbness, knee buckling with two episodes of LOB due to decreased sensation. Patient manages device safely and is able to compensate without assist after first episode of LOB. Patient plans to return home with assist from spouse and will follow up with outpatient PT on 5/26. Anticipate discharge home today. No further inpatient skilled PT needs at this time.

## 2023-05-24 NOTE — THERAPY DISCHARGE NOTE
Patient Name: Sánchez Carbajal  : 1965    MRN: [id number]                              Today's Date: 2023       Admit Date: 2023    Visit Dx:     ICD-10-CM ICD-9-CM   1. Primary osteoarthritis of right knee  M17.11 715.16     Patient Active Problem List   Diagnosis   • Left ureteral stone   • Obstructive sleep apnea syndrome   • Seasonal allergic rhinitis   • Uterovaginal prolapse   • Ureteral stone with hydronephrosis   • Upper respiratory tract infection   • GERD with apnea   • Insomnia   • Cervical radiculitis   • Primary osteoarthritis of knees, bilateral   • Mechanical knee pain, right   • Primary osteoarthritis of right knee     Past Medical History:   Diagnosis Date   • Arthritis     RIGHT SHOULDER   • Arthritis of back     Not sure   • Arthritis of neck     Two years ago   • Cervical disc disorder     Two maybe three years ago   • Chronic back pain    • Diabetes mellitus    • Elevated cholesterol    • GERD (gastroesophageal reflux disease)    • H/O seasonal allergies    • History of anemia    • History of diverticulitis    • History of heart murmur in childhood    • History of kidney stones    • Hypertension    • Migraines    • PONV (postoperative nausea and vomiting)    • Primary osteoarthritis of knees, bilateral 2022   • Sleep apnea with use of continuous positive airway pressure (CPAP)    • Uterine prolapse     UTERINE VAG PROLAPSE     Past Surgical History:   Procedure Laterality Date   • ANTERIOR AND POSTERIOR VAGINAL REPAIR N/A 01/15/2019    Procedure: /POSTERIOR REPAIR;  Surgeon: James Holloway MD;  Location: [facility];  Service: Gynecology   • CERVICAL FUSION     • CHOLECYSTECTOMY     • COLONOSCOPY N/A 2017    Procedure: COLONOSCOPY;  Surgeon: Tian Henderson MD;  Location: [facility] OR;  Service:    • CYSTOSCOPY URETEROSCOPY LASER LITHOTRIPSY N/A 2016    Procedure: URETEROSCOPY  ;  Surgeon: Vimal Azul MD;  Location: [facility] OR;  Service:     • CYSTOSCOPY URETEROSCOPY LASER LITHOTRIPSY Left 09/01/2019    Procedure: CYSTOSCOPY left retrograde pyelogram;  Surgeon: Vimal Azul MD;  Location: [facility] OR;  Service: Urology   • CYSTOSCOPY W/ LASER LITHOTRIPSY Left 06/25/2018    Procedure: CYSTOSCOPY, LEFT URETEROSCOPY, left  STENT PLACEMENT, right retrograde;  Surgeon: Vimal Azul MD;  Location: [facility] OR;  Service: Urology   • EXTRACORPOREAL SHOCK WAVE LITHOTRIPSY (ESWL)  2015   • SACROCOLPOPEXY N/A 01/15/2019    Procedure: ROBOTIC SACROCOLPOPEXY WITH MESH, TOTAL LAPAROSCOPICE HYSTERECTOMY BILATERAL SALPINGECTOMY WITH DAVINCI ;  Surgeon: James Holloway MD;  Location: [facility] OR;  Service: DaVinci      General Information     Row Name 05/24/23 [facility]9          Physical Therapy Time and Intention    Document Type discharge evaluation/summary  -BP     Mode of Treatment physical therapy  -BP     Row Name 05/24/23 [facility]          General Information    Patient Profile Reviewed yes  s/p right TKA.  -BP     Prior Level of Function independent:;all household mobility;community mobility;gait;transfer;bed mobility;ADL's  without use of an AD.  -BP     Existing Precautions/Restrictions fall  -BP     Barriers to Rehab none identified  -BP     Row Name 05/24/23 1455          Living Environment    People in Home spouse  -BP     Row Name 05/24/23 [facility]7          Home Main Entrance    Number of Stairs, Main Entrance two  -BP     Stair Railings, Main Entrance none  -BP     Row Name 05/24/23 1354          Stairs Within Home, Primary    Stairs, Within Home, Primary one story home  -BP     Row Name 05/24/23 7875          Cognition    Orientation Status (Cognition) oriented x 3  -BP     Row Name 05/24/23 0963          Safety Issues, Functional Mobility    Impairments Affecting Function (Mobility) --  patient with persistent tingling with saddle numbness  -BP           User Key  (r) = Recorded By, (t) = Taken By, (c) = Cosigned By    Initials Name Provider Type     Jonathan Diaz, PT Physical Therapist               Mobility     Row Name 05/24/23 2251          Bed Mobility    Bed Mobility supine-sit;sit-supine  -BP     Supine-Sit Alger (Bed Mobility) supervision  -BP     Sit-Supine Alger (Bed Mobility) supervision  -BP     Assistive Device (Bed Mobility) head of bed elevated  -BP     Row Name 05/24/23 1357          Transfers    Comment, (Transfers) verbal cues for hand placement  -BP     Row Name 05/24/23 6752          Sit-Stand Transfer    Sit-Stand Alger (Transfers) contact guard;verbal cues  -BP     Assistive Device (Sit-Stand Transfers) walker, front-wheeled  -BP     Row Name 05/24/23 4784          Gait/Stairs (Locomotion)    Alger Level (Gait) contact guard;verbal cues;minimum assist (75% patient effort)  -BP     Assistive Device (Gait) walker, front-wheeled  -BP     Ambulated day of surgery or within 4 hours of PACU discharge yes  -BP     Distance in Feet (Gait) 70  -BP     Deviations/Abnormal Patterns (Gait) gait speed decreased;stride length decreased  -BP     Bilateral Gait Deviations forward flexed posture  -BP     Right Sided Gait Deviations knee buckling, right side  -BP     Comment, (Gait/Stairs) patient with two episodes of knee buckling, able to correct without assist after first episode, requires min A on the second episode.  -BP           User Key  (r) = Recorded By, (t) = Taken By, (c) = Cosigned By    Initials Name Provider Type    Jonathan Diaz, PT Physical Therapist               Obj/Interventions     Row Name 05/24/23 6377          Range of Motion Comprehensive    Comment, General Range of Motion L LE AROM WFL. R LE not formerly assessed however full knee extension noted. R hip and ankle AROM WFL.  -BP     Row Name 05/24/23 9449          Strength Comprehensive (MMT)    Comment, General Manual Muscle Testing (MMT) Assessment L LE gross MMT 5/5. R LE gross MMT 4/5.  -BP     Row Name 05/24/23 4925          Balance     Comment, Balance sitting balance-supervision. Standing balance-SBA with device  -BP     Row Name 05/24/23 3568          Sensory Assessment (Somatosensory)    Sensory Assessment (Somatosensory) --  persistent numbness and saddle numbess from surgical block  -BP           User Key  (r) = Recorded By, (t) = Taken By, (c) = Cosigned By    Initials Name Provider Type    BP Jonathan Scott, PT Physical Therapist               Goals/Plan    No documentation.                Clinical Impression     Row Name 05/24/23 1400          Pain    Pretreatment Pain Rating 0/10 - no pain  -BP     Posttreatment Pain Rating 0/10 - no pain  -BP     Additional Documentation Pain Scale: Numbers Pre/Post-Treatment (Group)  -BP     Row Name 05/24/23 1400          Plan of Care Review    Plan of Care Reviewed With patient  -BP     Outcome Evaluation PT Evaluation Complete: Patient performs supine to/from sit transfers with supervision, sit to/from stand transfers with CGA, and gait x 70 feet with CGA/min A with use of FWW. Patient with persistent B LE tingling and saddle numbness, knee buckling with two episodes of LOB due to decreased sensation. Patient manages device safely and is able to compensate without assist after first episode of LOB. Patient plans to return home with assist from spouse and will follow up with outpatient PT on 5/26. Anticipate discharge home today. No further inpatient skilled PT needs at this time.  -BP     Row Name 05/24/23 1400          Therapy Assessment/Plan (PT)    Criteria for Skilled Interventions Met (PT) no problems identified which require skilled intervention  -BP     Therapy Frequency (PT) evaluation only  -BP     Row Name 05/24/23 1400          Positioning and Restraints    Pre-Treatment Position in bed  -BP     Post Treatment Position bed  -BP     In Bed notified nsg;supine;call light within reach;encouraged to call for assist;with family/caregiver  -BP           User Key  (r) = Recorded By, (t) =  Taken By, (c) = Cosigned By    Initials Name Provider Type    BP Jonathan Scott, PT Physical Therapist               Outcome Measures     Row Name 05/24/23 1407          How much help from another person do you currently need...    Turning from your back to your side while in flat bed without using bedrails? 3  -BP     Moving from lying on back to sitting on the side of a flat bed without bedrails? 3  -BP     Moving to and from a bed to a chair (including a wheelchair)? 3  -BP     Standing up from a chair using your arms (e.g., wheelchair, bedside chair)? 3  -BP     Climbing 3-5 steps with a railing? 3  -BP     To walk in hospital room? 3  -BP     AM-PAC 6 Clicks Score (PT) 18  -BP     Highest level of mobility 6 --> Walked 10 steps or more  -BP     Row Name 05/24/23 1407          Functional Assessment    Outcome Measure Options AM-PAC 6 Clicks Basic Mobility (PT)  -BP           User Key  (r) = Recorded By, (t) = Taken By, (c) = Cosigned By    Initials Name Provider Type    Jonathan Diza, WILLIE Physical Therapist              Physical Therapy Education     Title: PT OT SLP Therapies (Resolved)     Topic: Physical Therapy (Resolved)     Point: Mobility training (Resolved)     Learning Progress Summary           Patient Acceptance, E,TB, VU by  at 5/24/2023 1408                   Point: Home exercise program (Resolved)     Learning Progress Summary           Patient Acceptance, E,TB, VU by  at 5/24/2023 1408                               User Key     Initials Effective Dates Name Provider Type Discipline     06/16/21 -  Jonathan Scott, WILLIE Physical Therapist PT              PT Recommendation and Plan     Plan of Care Reviewed With: patient  Outcome Evaluation: PT Evaluation Complete: Patient performs supine to/from sit transfers with supervision, sit to/from stand transfers with CGA, and gait x 70 feet with CGA/min A with use of FWW. Patient with persistent B LE tingling and saddle numbness, knee  buckling with two episodes of LOB due to decreased sensation. Patient manages device safely and is able to compensate without assist after first episode of LOB. Patient plans to return home with assist from spouse and will follow up with outpatient PT on 5/26. Anticipate discharge home today. No further inpatient skilled PT needs at this time.     Time Calculation:    PT Charges     Row Name 05/24/23 1408             Time Calculation    Start Time 1315  -BP      Stop Time 1330  -BP      Time Calculation (min) 15 min  -BP      PT Received On 05/24/23  -BP            User Key  (r) = Recorded By, (t) = Taken By, (c) = Cosigned By    Initials Name Provider Type    BP Jonathan Scott, PT Physical Therapist              Therapy Charges for Today     Code Description Service Date Service Provider Modifiers Qty    [id number] HC PT EVAL LOW COMPLEXITY 1 5/24/2023 Jonathan Scott, PT GP 1          PT G-Codes  Outcome Measure Options: AM-PAC 6 Clicks Basic Mobility (PT)  AM-PAC 6 Clicks Score (PT): 18    PT Discharge Summary  Anticipated Discharge Disposition (PT): home with outpatient therapy services  Reason for Discharge: Discharge from facility  Discharge Destination: Home with outpatient services    Jonathan Scott, WILLIE  5/24/2023

## 2023-05-24 NOTE — ANESTHESIA POSTPROCEDURE EVALUATION
Patient: Sánchez Carbajal    Procedure Summary     Date: 05/24/23 Room / Location:  [facility] /  [facility]    Anesthesia Start: 0730 Anesthesia Stop: 0938    Procedure: TOTAL KNEE ARTHROPLASTY WITH CORI ROBOT (Right: Knee) Diagnosis:       Primary osteoarthritis of right knee      (Primary osteoarthritis of right knee [M17.11])    Surgeons: Lambert Brady MD Provider: Adriana France CRNA    Anesthesia Type: regional, spinal, MAC ASA Status: 2          Anesthesia Type: regional, spinal, MAC    Vitals  Vitals Value Taken Time   /72 05/24/23 1310   Temp 98.2 °F (36.8 °C) 05/24/23 0940   Pulse 92 05/24/23 1310   Resp 15 05/24/23 1310   SpO2 95 % 05/24/23 1310           Post Anesthesia Care and Evaluation      Comments: Pt discharged prior to anesthesia evaluation

## 2023-05-24 NOTE — ANESTHESIA PROCEDURE NOTES
Spinal Block    Pre-sedation assessment completed: 5/24/2023 6:52 AM    Patient reassessed immediately prior to procedure    Patient location during procedure: pre-op  Start Time: 5/24/2023 7:14 AM  Stop Time: 5/24/2023 7:16 AM  Indication:at surgeon's request  Performed By  CRNA/CAA: Adriana France, SERENESRNA: Ford Bang SRNA  Preanesthetic Checklist  Completed: patient identified, IV checked, site marked, risks and benefits discussed, surgical consent, monitors and equipment checked, pre-op evaluation and timeout performed  Spinal Block Prep:  Patient Position:sitting  Sterile Tech:cap, gloves, mask and sterile barriers  Prep:Chloraprep  Patient Monitoring:blood pressure monitoring, continuous pulse oximetry and EKG    Spinal Block Procedure  Approach:midline  Guidance:palpation technique  Location:L4-L5  Needle Type:Sprotte  Needle Gauge:25 G  Placement of Spinal needle event:cerebrospinal fluid aspirated  Paresthesia: no  Fluid Appearance:clear  Medications: bupivacaine (MARCAINE) injection 0.5% - Injection, Back   1.8 mL - 5/24/2023 7:16:00 AM   Post Assessment  Patient Tolerance:patient tolerated the procedure well with no apparent complications  Complications no

## 2023-05-24 NOTE — OP NOTE
OPERATIVE REPORT    Date of Surgery:   05/24/23    Attending Surgeon:   Lambert Brady MD, MSE    ASSISTANTS:   Vern Sam was responsible for performing the following activities: Retraction, Suction, Closing and Placing Dressing and their skilled assistance was necessary for the success of this case.     PREOPERATIVE DIAGNOSIS:   Right knee osteoarthritis    POSTOPERATIVE DIAGNOSIS:   Same as above     PROCEDURE:   1. Right primary total knee arthroplasty, with robotic assistance (CORI)    Surgical Approach:  Surgical Approach: Knee Medial Parapatellar        IMPLANTS:   : [employer]  Brand: Legion TKS  Tibial component size: 2  Femoral component size: 4 N PS  Patellar Button: none  Bearing type: PS  Insert Thickness: 11 mm    SURGICAL DETAILS:  Preoperative Passive Range of Motion: -3 to 133 degrees  Postoperative Passive Range of Motion: -4 to 133 degrees  Pre Op 2 degrees Valgus,  Post Op 3 degrees Valgus   Incision/arthrotomy type: Medial parapatellar  Posterior Cruciate Ligament: Sacrificed  Lateral release: No  Estimated blood loss: 150 cc  Anesthesia type: Spinal and Regional  Tourniquet: Yes: 250 mmHG 6 mins    INDICATIONS FOR PROCEDURE:  This patient presents today with end stage degenerative joint disease of the knee. The patient has failed non-operative treatment and presents for total knee replacement. Risks, complications, and benefits of the procedure have been discussed and all questions have been answered preoperatively.    PROCEDURE:  The patient was brought to the operating room and placed on the operating room table in the supine position. After anesthesia was established, the patient was positioned supine. Bony prominences were padded. The patient was given prophylactic antibiotics within one hour of skin incision. The involved lower extremity was prepped and draped in usual sterile fashion.  Surgical timeout was taken to confirm the operative side and planned  procedure.    A straight incision was used medial to the midline carried through the subcutaneous tissue to the underlying extensor mechanism. A medial parapatellar approach was utilized. The tibial and femoral reference arrays were assembled first. 2 drill tip threaded pins were placed medial to the tibial tubercle within the surgical field. The pins were inserted under power using the drill guide included in the CORI set for their relative positions. Similarly, the 2 femoral pins were placed medially in the femur just proxmial to the medial epicondyle. The sensor arrays were assembled to the pins    The patella was treated with a lateral facetectomy. It was everted, a lateral facetectomy was performed, marginal osteophytes trimmed,  and the synovial margin was cauterized, clearing excess synovium.    The ACL was resected. Osteophytes were removed from the tibia and femur. A small medial capsular peel was performed.    The hip center was registered. The medial and lateral malleoli were registered. The femur and tibia were registered.    The knee motion and balance was assessed with standard poses. Pre-operative cut planning was adjusted to provide knee balance throughout the range of motion    We then turned out attention to the distal femur and using the CORI bur the distal femur resection was performed.  2 bur holes were then placed and the distal femoral punch was introduced for the  holes for the 4 in 1 femoral cutting block.     We then turned to the tibia and using the probe with a flat disc attached to it the tibial cutting block was floated into place using robotic navication. It was held in place with 2 a to p pins and one cross pin.  Depth resection, varus/valgus, and tibial slope were again confirmed. We then performed our tibial resection with a Z retractor medially, bent homman laterally, and PCL retractor posteriorly.  The tibia resection was then removed.    Attention was then turned back to the  femur and the appropriately sized 4 in 1 femoral cutting block was malleted into place and secured with 2 convergent suck down pins.     A laminar  was placed and the medial and lateral menisci were excised. Posterior femoral osteophytes were removed with an osteotome. The bovie was used to cauterize the posterior knee capsule and meniscal remnants.     The tibial surface was then sized using the trial baseplate and secured with 2 pins.  Careful attention was taken to ensure it was aligned with the medial third of the tibial tubercle. The trial femur was then impacted into place and the appropriate sized trail poly was inserted.  Knee motion and balance were checked manually and with the robotic assistance.  The patella measured 19 mm medially and 8 mm laterally.  Due to concern for patellar thinness and possible fracture The patella was not resurfaced. The patella tracked well. The femoral lug drills were performed. The trial femoral components and poly were removed. The tracker pins and arrays were removed.    Attention was then turned to the tibial prep.  The keel drill and punch introduced.     The trial tibial implant was removed. All bone was then prepared with lavage and drying for preparation prior to final component placement. The final tibial component was cemented into position and excess cement was removed. The final femoral component was cemented into position and excess cement was removed. . The trial poly was placed. After cement curing, motion and stability was again tested. The final tibial insert was exchanged to the final tibial insert which was impacted into position.  Surgical site was irrigated with dilute Betadine solution and then normal saline with pulsatile lavage.  The medial and lateral capsular flaps as well as tibial and femoral periosteum was injected with a mixture consisting of 30 mL of 0.5% ropivacaine, 30 mg of ketorolac, and epinephrine 0.2 mg solution diluted in 30 mL of  normal saline.    The arthrotomy was closed with #1 PDS stratafix. The subcutaneous tissue was closed with 2-0 monocryl. The skin was closed with running 3-0 monocryl stratafix and dermabond a sterile FLORENTINO dressing was placed.      The patient tolerated the procedure well and was taken to the recovery room in stable condition.    POSTOPERATIVE PLAN:   1. Weightbearing as tolerated on operative extremity  2. DVT prophylaxis    WOUND CLOSURE:  #1 PDS Stratfix  2-0 monocryl subcuticular   3-0 monocryl stratafix skin  dermabond  FLORENTINO dressing      SPONGE/INSTRUMENT/NEEDLE COUNTS:   Correct x 2    CONDITION ON DISCHARGE:   Stable    COMPLICATIONS:   None    Lambert Brady MD, MSE

## 2023-05-24 NOTE — NURSING NOTE
Patient is able to dorsi/plantar flex bilateral lower extremities at this time. Physical therapy at bedside to ambulate patient and assess readiness for discharge.

## 2023-05-24 NOTE — ANESTHESIA PROCEDURE NOTES
Adductor Canal Block    Pre-sedation assessment completed: 5/24/2023 6:52 AM    Patient reassessed immediately prior to procedure    Patient location during procedure: pre-op  Start time: 5/24/2023 6:56 AM  Stop time: 5/24/2023 7:02 AM  Reason for block: at surgeon's request and post-op pain management  Performed by  SERENE/CAA: Adriana France, SERENESRNA: Ford Bang SRNA  Preanesthetic Checklist  Completed: patient identified, IV checked, site marked, risks and benefits discussed, surgical consent, monitors and equipment checked, pre-op evaluation and timeout performed  Prep:  Pt Position: supine  Sterile barriers:alcohol skin prep, gloves, cap, mask and washed/disinfected hands  Prep: ChloraPrep  Patient monitoring: blood pressure monitoring, continuous pulse oximetry and EKG  Procedure    Sedation: yes  Performed under: local infiltration  Guidance:ultrasound guided    ULTRASOUND INTERPRETATION.  Using ultrasound guidance a 21 G gauge needle was placed in close proximity to the femoral nerve, at which point, under ultrasound guidance anesthetic was injected in the area of the nerve and spread of the anesthesia was seen on ultrasound in close proximity thereto.  There were no abnormalities seen on ultrasound; a digital image was taken; and the patient tolerated the procedure with no complications. Images:still images obtained, printed/placed on chart    Laterality:right  Block Type:adductor canal block  Injection Technique:single-shot  Needle Type:echogenic  Needle Gauge:21 G  Resistance on Injection: none    Medications Used: lidocaine PF 2% (XYLOCAINE) injection - Infiltration, Leg Upper Right   3 mL - 5/24/2023 6:56:00 AM  bupivacaine PF (MARCAINE) injection 0.25% - Peripheral Nerve, Leg Upper Right   20 mL - 5/24/2023 7:02:00 AM      Post Assessment  Injection Assessment: negative aspiration for heme, no paresthesia on injection and incremental injection  Patient Tolerance:comfortable throughout  block  Complications:no  Additional Notes  30mcg Precedex added to ACB.

## 2023-05-24 NOTE — ANESTHESIA PREPROCEDURE EVALUATION
Anesthesia Evaluation     Patient summary reviewed and Nursing notes reviewed   history of anesthetic complications: PONV  NPO Solid Status: > 8 hours  NPO Liquid Status: > 4 hours           Airway   Mallampati: III  TM distance: >3 FB  Neck ROM: full  No difficulty expected  Dental    (+) lower dentures and upper dentures    Comment: Dentures removed (Upper & Lower)    Pulmonary - normal exam    breath sounds clear to auscultation  (+) sleep apnea on CPAP,   (-) pneumonia, recent URI  Cardiovascular - normal exam  Exercise tolerance: good (4-7 METS)    ECG reviewed  Rhythm: regular  Rate: normal    (+) hypertension well controlled less than 2 medications, hyperlipidemia,       Neuro/Psych  (+) headaches,    (-) numbness  GI/Hepatic/Renal/Endo    (+)  GERD well controlled,  renal disease (Hx of Kidney Stones (a couple of years ago)) stones, diabetes mellitus ( today) type 2 well controlled,     Musculoskeletal     (+) back pain (Chronic),   Abdominal  - normal exam    Abdomen: soft.  Bowel sounds: normal.   Substance History      OB/GYN negative ob/gyn ROS         Other   arthritis (Right Shoulder),               Past Surgical History:   Procedure Laterality Date   • ANTERIOR AND POSTERIOR VAGINAL REPAIR N/A 01/15/2019    Procedure: /POSTERIOR REPAIR;  Surgeon: James Holloway MD;  Location: [facility] OR;  Service: Gynecology   • CERVICAL FUSION     • CHOLECYSTECTOMY     • COLONOSCOPY N/A 01/18/2017    Procedure: COLONOSCOPY;  Surgeon: Tian Henderson MD;  Location: [facility] OR;  Service:    • CYSTOSCOPY URETEROSCOPY LASER LITHOTRIPSY N/A 04/06/2016    Procedure: URETEROSCOPY  ;  Surgeon: Vimal Azul MD;  Location: [facility] OR;  Service:    • CYSTOSCOPY URETEROSCOPY LASER LITHOTRIPSY Left 09/01/2019    Procedure: CYSTOSCOPY left retrograde pyelogram;  Surgeon: Vimal Azul MD;  Location: [facility] OR;  Service: Urology   • CYSTOSCOPY W/ LASER LITHOTRIPSY Left 06/25/2018    Procedure:  CYSTOSCOPY, LEFT URETEROSCOPY, left  STENT PLACEMENT, right retrograde;  Surgeon: Vimal Azul MD;  Location: [facility] OR;  Service: Urology   • EXTRACORPOREAL SHOCK WAVE LITHOTRIPSY (ESWL)  2015   • SACROCOLPOPEXY N/A 01/15/2019    Procedure: ROBOTIC SACROCOLPOPEXY WITH MESH, TOTAL LAPAROSCOPICE HYSTERECTOMY BILATERAL SALPINGECTOMY WITH DAVINCI ;  Surgeon: James Holloway MD;  [facility];  [facility]     Past Medical History:   Diagnosis Date   • Arthritis     RIGHT SHOULDER   • Arthritis of back     Not sure   • Arthritis of neck     Two years ago   • Cervical disc disorder     Two maybe three years ago   • Chronic back pain    • Diabetes mellitus    • Elevated cholesterol    • GERD (gastroesophageal reflux disease)    • H/O seasonal allergies    • History of anemia    • History of diverticulitis    • History of heart murmur in childhood    • History of kidney stones    • Hypertension    • Migraines    • PONV (postoperative nausea and vomiting)    • Primary osteoarthritis of knees, bilateral 11/29/2022   • Sleep apnea with use of continuous positive airway pressure (CPAP)    • Uterine prolapse     UTERINE VAG PROLAPSE     Lab Results   Component Value Date    WBC 8.98 05/09/2023    HGB 13.4 05/09/2023    HCT 42.1 05/09/2023    MCV 89.2 05/09/2023     05/09/2023     Lab Results   Component Value Date    GLUCOSE 132 (H) 05/09/2023    BUN 29 (H) 05/09/2023    CREATININE 0.82 05/09/2023    EGFR 83.0 05/09/2023    BCR 35.4 (H) 05/09/2023    K 3.9 05/09/2023    CO2 25.6 05/09/2023    CALCIUM 9.9 05/09/2023    ALBUMIN 4.4 08/20/2021    BILITOT 0.3 08/20/2021    AST 16 08/20/2021    ALT 23 08/20/2021            Anesthesia Plan    ASA 2     regional, spinal and MAC     intravenous induction     Anesthetic plan, risks, benefits, and alternatives have been provided, discussed and informed consent has been obtained with: patient and spouse/significant other.    Use of blood products discussed  with patient and spouse/significant other .   Plan discussed with CRNA and Surgeon.        CODE STATUS:

## 2023-05-24 NOTE — NURSING NOTE
Dariana BRYAN reviewed discharge instructions with patient and friend at bedside. Stable vital signs at this time. Patient ambulated with physical therapy without issue. Patient states pain is well controlled and she is ready to go home. Patient transported by Dariana BRYAN to personal car without complication.

## 2023-05-25 ENCOUNTER — READMISSION MANAGEMENT (OUTPATIENT)
Dept: CALL CENTER | Facility: HOSPITAL | Age: 58
End: 2023-05-25
Payer: MEDICARE

## 2023-05-25 NOTE — OUTREACH NOTE
Prep Survey      Flowsheet Row Responses   [facility] facility patient discharged from? [place]   Is LACE score < 7 ? Yes   Eligibility Readm Mgmt   Discharge diagnosis primary osteoarthritis of right knee   Does the patient have one of the following disease processes/diagnoses(primary or secondary)? Other   Does the patient have Home health ordered? No   Is there a DME ordered? No   Prep survey completed? Yes            Ambika DEJESUS - Registered Nurse

## 2023-05-26 ENCOUNTER — HOSPITAL ENCOUNTER (OUTPATIENT)
Dept: PHYSICAL THERAPY | Facility: HOSPITAL | Age: 58
Setting detail: THERAPIES SERIES
Discharge: HOME | End: 2023-05-26
Payer: MEDICARE

## 2023-05-26 ENCOUNTER — READMISSION MANAGEMENT (OUTPATIENT)
Dept: CALL CENTER | Facility: HOSPITAL | Age: 58
End: 2023-05-26
Payer: MEDICARE

## 2023-05-26 DIAGNOSIS — Z96.651 STATUS POST TOTAL RIGHT KNEE REPLACEMENT: Primary | ICD-10-CM

## 2023-05-26 PROCEDURE — 97161 PT EVAL LOW COMPLEX 20 MIN: CPT | Performed by: PHYSICAL THERAPIST

## 2023-05-26 NOTE — OUTREACH NOTE
[facility] < 7 Survey    Flowsheet Row Responses   [facility] patient discharged from? [place]   Does the patient have one of the following disease processes/diagnoses(primary or secondary)? Other   [facility] <7 Attempt successful? Yes   Call start time 1239   Unsuccessful attempts Attempt 1   Call end time 1242   Discharge diagnosis primary osteoarthritis of right knee   Person spoke with today (if not patient) and relationship Patient   Meds reviewed with patient/caregiver? Yes   Is the patient taking all medications as directed (includes completed medication regime)? Yes   Does the patient have a primary care provider?  Yes   Has home health visited the patient within 72 hours of discharge? N/A   Psychosocial issues? No   Did the patient receive a copy of their discharge instructions? Yes   Nursing interventions Reviewed instructions with patient   What is the patient's perception of their health status since discharge? Improving   Is the patient/caregiver able to teach back signs and symptoms related to disease process for when to call PCP? Yes   Is the patient/caregiver able to teach back signs and symptoms related to disease process for when to call 911? Yes   Is the patient/caregiver able to teach back the hierarchy of who to call/visit for symptoms/problems? PCP, Specialist, Home health nurse, Urgent Care, ED, 911 Yes   Graduated Yes   Wrap up additional comments Patient states she is doing well, however is still in some pain with exercises. No s/s pf infection noted. no concerns or questions.          Swapna MIR - Registered Nurse

## 2023-05-26 NOTE — CASE MANAGEMENT/SOCIAL WORK
Case Management Discharge Note      Final Note: Discharged home.         Selected Continued Care - Discharged on 5/24/2023 Admission date: 5/24/2023 - Discharge disposition: Home or Self Care    Destination    No services have been selected for the patient.              Durable Medical Equipment     Service Provider Selected Services Address Phone Fax Patient Preferred    [facility] [address], [place] [id number] [phone] --          Dialysis/Infusion    No services have been selected for the patient.              Home Medical Care    No services have been selected for the patient.              Therapy    No services have been selected for the patient.              Community Resources    No services have been selected for the patient.              Community & DME    No services have been selected for the patient.                       Final Discharge Disposition Code: 01 - home or self-care

## 2023-05-26 NOTE — THERAPY EVALUATION
Outpatient Physical Therapy Ortho Initial Evaluation   Ramandeep Pratt     Patient Name: Sánchez Carbajal  : 1965  MRN: [id number]  Today's Date: 2023      Visit Date: 2023    Patient Active Problem List   Diagnosis   • Left ureteral stone   • Obstructive sleep apnea syndrome   • Seasonal allergic rhinitis   • Uterovaginal prolapse   • Ureteral stone with hydronephrosis   • Upper respiratory tract infection   • GERD with apnea   • Insomnia   • Cervical radiculitis   • Primary osteoarthritis of knees, bilateral   • Mechanical knee pain, right   • Primary osteoarthritis of right knee        Past Medical History:   Diagnosis Date   • Arthritis     RIGHT SHOULDER   • Arthritis of back     Not sure   • Arthritis of neck     Two years ago   • Cervical disc disorder     Two maybe three years ago   • Chronic back pain    • Diabetes mellitus    • Elevated cholesterol    • GERD (gastroesophageal reflux disease)    • H/O seasonal allergies    • History of anemia    • History of diverticulitis    • History of heart murmur in childhood    • History of kidney stones    • Hypertension    • Migraines    • PONV (postoperative nausea and vomiting)    • Primary osteoarthritis of knees, bilateral 2022   • Sleep apnea with use of continuous positive airway pressure (CPAP)    • Uterine prolapse     UTERINE VAG PROLAPSE        Past Surgical History:   Procedure Laterality Date   • ANTERIOR AND POSTERIOR VAGINAL REPAIR N/A 01/15/2019    Procedure: /POSTERIOR REPAIR;  Surgeon: James Holloway MD;  Location: [facility];  Service: Gynecology   • CERVICAL FUSION     • CHOLECYSTECTOMY     • COLONOSCOPY N/A 2017    Procedure: COLONOSCOPY;  Surgeon: Tian Henderson MD;  Location: [facility] OR;  Service:    • CYSTOSCOPY URETEROSCOPY LASER LITHOTRIPSY N/A 2016    Procedure: URETEROSCOPY  ;  Surgeon: Vimal Azul MD;  Location: [facility] OR;  Service:    • CYSTOSCOPY URETEROSCOPY LASER  LITHOTRIPSY Left 09/01/2019    Procedure: CYSTOSCOPY left retrograde pyelogram;  Surgeon: Vimal Azul MD;  Location: [facility] OR;  Service: Urology   • CYSTOSCOPY W/ LASER LITHOTRIPSY Left 06/25/2018    Procedure: CYSTOSCOPY, LEFT URETEROSCOPY, left  STENT PLACEMENT, right retrograde;  Surgeon: Vimal Azul MD;  Location:  [facility] OR;  Service: Urology   • EXTRACORPOREAL SHOCK WAVE LITHOTRIPSY (ESWL)  2015   • SACROCOLPOPEXY N/A 01/15/2019    Procedure: ROBOTIC SACROCOLPOPEXY WITH MESH, TOTAL LAPAROSCOPICE HYSTERECTOMY BILATERAL SALPINGECTOMY WITH DAVINCI ;  Surgeon: James Holloway MD;  Location: [facility] OR;  Service: [facility]   • TOTAL KNEE ARTHROPLASTY Right 5/24/2023    Procedure: TOTAL KNEE ARTHROPLASTY WITH CORI ROBOT;  Surgeon: Lambert Brady MD;  Location:  [facility] OR;  Service: [facility] - Ortho;  Laterality: Right;       Visit Dx:     ICD-10-CM ICD-9-CM   1. Status post total right knee replacement  Z96.651 V43.65          Patient History     Row Name 05/26/23 0700             History    Chief Complaint Difficulty Walking;Difficulty with daily activities;Pain  -      Type of Pain Knee pain  right  -      Date Current Problem(s) Began 05/24/23  -      Brief Description of Current Complaint Pt reports an approximate 3 year history of right knee pain that did not respond to conserviative treatment. She was having difficulty with walking due to the pain. Sheunderwent a TKA on 5/24. She was discharged home that same day. She is now referred for therapy.  -      Patient/Caregiver Goals Relieve pain;Return to prior level of function;Improve mobility;Improve strength  -      Patient's Rating of General Health Good  -      Hand Dominance right-handed  -      Occupation/sports/leisure activities retired, enjoys walking  -      What clinical tests have you had for this problem? X-ray  -      Results of Clinical Tests end stage OA  -GC         Pain     Pain Location Knee  right   -GC      Pain at Present 7  -GC      Pain at Best 7  -GC      Pain at Worst 10  -GC      Pain Frequency Constant/continuous  -GC      Pain Description Aching;Discomfort;Sore;Sharp;Tender  -GC      What Performance Factors Make the Current Problem(s) WORSE? Pt c/o pain with walking and attempts at bending and straightening her knee  -GC      What Performance Factors Make the Current Problem(s) BETTER? Pt feels best if she gets off her feet and rests  -      Difficulties with ADL's? Pt has difficulty don/doff shoes and socks, walking  -         Daily Activities    Primary Language English  -GC      Are you able to read Yes  -GC      Are you able to write Yes  -GC      How does patient learn best? Reading  -      Teaching needs identified Home Exercise Program;Management of Condition  -      Patient is concerned about/has problems with Climbing Stairs;Difficulty with self care (i.e. bathing, dressing, toileting:;Performing home management (household chores, shopping, care of dependents);Standing;Transfers (getting out of a chair, bed);Walking  -GC      Does patient have problems with the following? Depression;Anxiety  -      Barriers to learning None  -GC      Functional Status bathing;dressing;grooming;mobility issues preventing performance of daily activities  -GC      Pt Participated in POC and Goals Yes  -         Safety    Are you being hurt, hit, or frightened by anyone at home or in your life? No  -GC      Are you being neglected by a caregiver No  -GC            User Key  (r) = Recorded By, (t) = Taken By, (c) = Cosigned By    Initials Name Provider Type     Leo Bee, PT Physical Therapist                 PT Ortho     Row Name 05/26/23 0700       Posture/Observations    Posture/Observations Comments Pt initially seen with ACE wrap on right knee. FLORENTINO dressing inplace. Moderate edema noted wihtmild ecchymosis  -       Knee Palpation    Medial Joint Line Right:;Tender  -    Lateral Joint  Line Right:;Tender  -GC       Patellar Accessory Motions    Superior glide Right:;WNL  -GC    Inferior glide Right:;WNL  -GC    Medial glide Right:;WNL  -GC    Lateral glide Right:;WNL  -GC       Knee Special Tests    Srinivasa’s sign (DVT) Right:;Negative  -GC       Right Lower Ext    Rt Knee Extension/Flexion AROM 0-12-58 degrees  -GC       MMT Right Lower Ext    Rt Hip Flexion MMT, Gross Movement (3/5) fair  -GC    Rt Hip Extension MMT, Gross Movement (3+/5) fair plus  -GC    Rt Hip ABduction MMT, Gross Movement (3+/5) fair plus  -GC    Rt Hip ADduction MMT, Gross Movement (3/5) fair  -GC    Rt Knee Extension MMT, Gross Movement (3/5) fair  -GC    Rt Knee Flexion MMT, Gross Movement (3+/5) fair plus  -GC    Rt Ankle Plantarflexion MMT, Gross Movement (4+/5) good plus  -GC    Rt Ankle Dorsiflexion MMT, Gross Movement (5/5) normal  -GC       Sensation    Light Touch No apparent deficits  -GC       Lower Extremity Flexibility    Hamstrings Right:;Moderately limited  -GC    Quadriceps Right:;Moderately limited  -GC    Gastrocnemius Right:;Mildly limited  -GC       Gait/Stairs (Locomotion)    Comment, (Gait/Stairs) Pt ambulates with significant antalgic gait right LE using a front wheeled walker  -GC          User Key  (r) = Recorded By, (t) = Taken By, (c) = Cosigned By    Initials Name Provider Type    Leo Rubio, PT Physical Therapist                            Therapy Education  Given: HEP, Symptoms/condition management, Pain management  Program: New  How Provided: Verbal, Demonstration, Written  Provided to: Patient  Level of Understanding: Teach back education performed, Verbalized, Demonstrated      PT OP Goals     Row Name 05/26/23 0700          PT Short Term Goals    STG Date to Achieve 06/23/23  -GC     STG 1 Decrease right knee pain to 3-4/10 with activity.  -GC     STG 2 Increae right knee AROM to 0-5-105 degrees with testing.  -GC     STG 3 Increase right LE strength to at least 4/5 all planes with  testing.  -     STG 4 Pt will be independent with sit to/from supine.  -     STG 5 Pt will be independent with her HEP issued by this therapist.  -        Long Term Goals    LTG Date to Achieve 07/21/23  -     LTG 1 Decrease right knee pain to 0-1/10 with activity.  -     LTG 2 Increae right knee AROM to 0-125 degrees with testing.  -     LTG 3 Increase right LE strength to 5/5 all planes with testing.  -     LTG 4 Pt will ambulate normally on levels and stairs without assistive device.  -     LTG 5 Pt will be independent with all ADLs and have a LEFS score > 60.  -        Time Calculation    PT Goal Re-Cert Due Date 06/23/23  -           User Key  (r) = Recorded By, (t) = Taken By, (c) = Cosigned By    Initials Name Provider Type     Leo Bee, PT Physical Therapist                 PT Assessment/Plan     Row Name 05/26/23 0700          PT Assessment    Functional Limitations Impaired gait;Limitation in home management;Limitations in community activities;Limitations in functional capacity and performance;Performance in leisure activities;Performance in self-care ADL  -     Impairments Range of motion;Pain;Muscle strength;Impaired flexibility;Gait;Edema  -     Assessment Comments Pt presents 2 days s/p right TKA. She rates her painup to 10/10 with activity. Shehas decreased right knee ROM, decreased right LE strength, decreased ambulatory status, and decreased function secondary to the above.  -     Rehab Potential Good  -     Patient/caregiver participated in establishment of treatment plan and goals Yes  -     Patient would benefit from skilled therapy intervention Yes  -        PT Plan    PT Frequency 2x/week;3x/week  -     Predicted Duration of Therapy Intervention (PT) 8 weeks  -     Planned CPT's? PT EVAL LOW COMPLEXITY: 51169;PT THER PROC EA 15 MIN: 72066;PT HOT OR COLD PACK TREAT [facility];PT ELECTRICAL STIM UNATTEND: ;PT MANUAL THERAPY EA 15 MIN: 98480  -     PT  Plan Comments Pt is to continue her HEP 2x daily  -GC           User Key  (r) = Recorded By, (t) = Taken By, (c) = Cosigned By    Initials Name Provider Type    Leo Rubio PT Physical Therapist                   OP Exercises     Row Name 05/26/23 0700             Exercise 1    Exercise Name 1 Heel slides  -GC      Cueing 1 Verbal;Tactile  -GC      Time 1 10 min  -GC         Exercise 2    Exercise Name 2 Wall slides  -GC         Exercise 3    Exercise Name 3 Passive knee FLEX stretch  -GC      Cueing 3 Verbal;Tactile  -GC      Reps 3 10  -GC      Time 3 10 secs  -GC         Exercise 4    Exercise Name 4 Hamstring/gastroc stretch  -GC      Cueing 4 Verbal;Tactile  -GC      Reps 4 15  -GC      Time 4 10 secs  -GC         Exercise 5    Exercise Name 5 Supine knee EXT on roll  -GC      Cueing 5 Verbal;Tactile  -GC      Time 5 3 min  -GC         Exercise 6    Exercise Name 6 Passive knee EXT stretch  -GC      Cueing 6 Verbal;Tactile  -GC      Reps 6 10  -GC      Time 6 10 secs  -GC         Exercise 7    Exercise Name 7 quad sets  -GC      Cueing 7 Verbal;Tactile  -GC      Reps 7 20  -GC      Time 7 5 secs  -GC         Exercise 8    Exercise Name 8 SLR  -GC      Cueing 8 Verbal;Tactile  -GC      Reps 8 20  -GC         Exercise 9    Exercise Name 9 PF vs theraband  -GC      Cueing 9 Verbal;Tactile;Demo  -GC      Reps 9 20  -GC      Time 9 black  -GC            User Key  (r) = Recorded By, (t) = Taken By, (c) = Cosigned By    Initials Name Provider Type    Leo Rubio PT Physical Therapist                              Outcome Measure Options: Lower Extremity Functional Scale (LEFS)  Lower Extremity Functional Index  Any of your usual work, housework or school activities: Extreme difficulty or unable to perform activity  Your usual hobbies, recreational or sporting activities: Extreme difficulty or unable to perform activity  Getting into or out of the bath: Extreme difficulty or unable to perform  activity  Walking between rooms: Extreme difficulty or unable to perform activity  Putting on your shoes or socks: Extreme difficulty or unable to perform activity  Squatting: Extreme difficulty or unable to perform activity  Lifting an object, like a bag of groceries from the floor: Extreme difficulty or unable to perform activity  Performing light activities around your home: Extreme difficulty or unable to perform activity  Performing heavy activities around your home: Extreme difficulty or unable to perform activity  Getting into or out of a car: Extreme difficulty or unable to perform activity  Walking 2 blocks: Extreme difficulty or unable to perform activity  Walking a mile: Extreme difficulty or unable to perform activity  Going up or down 10 stairs (about 1 flight of stairs): Extreme difficulty or unable to perform activity  Standing for 1 hour: Extreme difficulty or unable to perform activity  Sitting for 1 hour: Moderate difficulty  Running on even ground: Extreme difficulty or unable to perform activity  Running on uneven ground: Extreme difficulty or unable to perform activity  Making sharp turns while running fast: Extreme difficulty or unable to perform activity  Hopping: Extreme difficulty or unable to perform activity  Rolling over in bed: Extreme difficulty or unable to perform activity  Total: 2      Time Calculation:     Start Time: 0715  Stop Time: 0820  Time Calculation (min): 65 min     Therapy Charges for Today     Code Description Service Date Service Provider Modifiers Qty    [id number] HC PT EVAL LOW COMPLEXITY 3 5/26/2023 Leo Bee, PT GP 1          PT G-Codes  Outcome Measure Options: Lower Extremity Functional Scale (LEFS)  Total: 2         Leo Bee PT  5/26/2023

## 2023-05-30 ENCOUNTER — HOSPITAL ENCOUNTER (OUTPATIENT)
Dept: PHYSICAL THERAPY | Facility: HOSPITAL | Age: 58
Setting detail: THERAPIES SERIES
Discharge: HOME | End: 2023-05-30
Payer: MEDICARE

## 2023-05-30 DIAGNOSIS — Z96.651 STATUS POST TOTAL RIGHT KNEE REPLACEMENT: Primary | ICD-10-CM

## 2023-05-30 PROCEDURE — 97110 THERAPEUTIC EXERCISES: CPT

## 2023-05-30 NOTE — THERAPY TREATMENT NOTE
Outpatient Physical Therapy Ortho Treatment Note   Ramandeep Pratt     Patient Name: Sánchez Carbajal  : 1965  MRN: [id number]  Today's Date: 2023      Visit Date: 2023    Visit Dx:    ICD-10-CM ICD-9-CM   1. Status post total right knee replacement  Z96.651 V43.65       Patient Active Problem List   Diagnosis   • Left ureteral stone   • Obstructive sleep apnea syndrome   • Seasonal allergic rhinitis   • Uterovaginal prolapse   • Ureteral stone with hydronephrosis   • Upper respiratory tract infection   • GERD with apnea   • Insomnia   • Cervical radiculitis   • Primary osteoarthritis of knees, bilateral   • Mechanical knee pain, right   • Primary osteoarthritis of right knee        Past Medical History:   Diagnosis Date   • Arthritis     RIGHT SHOULDER   • Arthritis of back     Not sure   • Arthritis of neck     Two years ago   • Cervical disc disorder     Two maybe three years ago   • Chronic back pain    • Diabetes mellitus    • Elevated cholesterol    • GERD (gastroesophageal reflux disease)    • H/O seasonal allergies    • History of anemia    • History of diverticulitis    • History of heart murmur in childhood    • History of kidney stones    • Hypertension    • Migraines    • PONV (postoperative nausea and vomiting)    • Primary osteoarthritis of knees, bilateral 2022   • Sleep apnea with use of continuous positive airway pressure (CPAP)    • Uterine prolapse     UTERINE VAG PROLAPSE        Past Surgical History:   Procedure Laterality Date   • ANTERIOR AND POSTERIOR VAGINAL REPAIR N/A 01/15/2019    Procedure: /POSTERIOR REPAIR;  Surgeon: James Holloway MD;  Location: [facility] OR;  Service: Gynecology   • CERVICAL FUSION     • CHOLECYSTECTOMY     • COLONOSCOPY N/A 2017    Procedure: COLONOSCOPY;  Surgeon: Tian Henderson MD;  Location: [facility] OR;  Service:    • CYSTOSCOPY URETEROSCOPY LASER LITHOTRIPSY N/A 2016    Procedure: URETEROSCOPY  ;  Surgeon:  Vimal Azul MD;  Location: [facility] OR;  Service:    • CYSTOSCOPY URETEROSCOPY LASER LITHOTRIPSY Left 09/01/2019    Procedure: CYSTOSCOPY left retrograde pyelogram;  Surgeon: Vimal Azul MD;  Location: [facility] OR;  Service: Urology   • CYSTOSCOPY W/ LASER LITHOTRIPSY Left 06/25/2018    Procedure: CYSTOSCOPY, LEFT URETEROSCOPY, left  STENT PLACEMENT, right retrograde;  Surgeon: Vimal Azul MD;  Location: [facility] OR;  Service: Urology   • EXTRACORPOREAL SHOCK WAVE LITHOTRIPSY (ESWL)  2015   • SACROCOLPOPEXY N/A 01/15/2019    Procedure: ROBOTIC SACROCOLPOPEXY WITH MESH, TOTAL LAPAROSCOPICE HYSTERECTOMY BILATERAL SALPINGECTOMY WITH DAVINCI ;  Surgeon: James Holloway MD;  Location: [facility] OR;  Service: DaVinci   • TOTAL KNEE ARTHROPLASTY Right 5/24/2023    Procedure: TOTAL KNEE ARTHROPLASTY WITH CORI ROBOT;  Surgeon: Lambert Brady MD;  Location: [facility] OR;  Service: Robotics - Ortho;  Laterality: Right;        PT Ortho     Row Name 05/30/23 0735       Right Lower Ext    Rt Knee Extension/Flexion AROM 0-7-73 degrees post stretch  -KM          User Key  (r) = Recorded By, (t) = Taken By, (c) = Cosigned By    Initials Name Provider Type    Radha Bullard PTA Physical Therapist Assistant                             PT Assessment/Plan     Row Name 05/30/23 0735          PT Assessment    Assessment Comments Pt ambulates well with FWW. Slightly improved AROM measured post stretch and tolerated progression of ther ex well.  -KM        PT Plan    PT Plan Comments Continue per POC  -KM           User Key  (r) = Recorded By, (t) = Taken By, (c) = Cosigned By    Initials Name Provider Type    Radha Bullard PTA Physical Therapist Assistant                   OP Exercises     Row Name 05/30/23 0795             Subjective Comments    Subjective Comments Pt states her knee hurts this morning. States she has been compliant with HEP.  -KM         Exercise 1    Exercise Name 1 Heel slides  -KM       Cueing 1 Verbal;Tactile  -KM      Time 1 10 min  -KM         Exercise 2    Exercise Name 2 Wall slides  -KM         Exercise 3    Exercise Name 3 Passive knee FLEX stretch  -KM      Cueing 3 Verbal;Tactile  -KM      Reps 3 10  -KM      Time 3 10 secs  -KM         Exercise 4    Exercise Name 4 Hamstring/gastroc stretch  -KM      Cueing 4 Verbal;Tactile  -KM      Reps 4 15  -KM      Time 4 10 secs  -KM         Exercise 5    Exercise Name 5 Supine knee EXT on roll  -KM      Cueing 5 Verbal;Tactile  -KM      Time 5 3 min  -KM         Exercise 6    Exercise Name 6 Passive knee EXT stretch  -KM      Cueing 6 Verbal;Tactile  -KM      Reps 6 10  -KM      Time 6 10 secs  -KM         Exercise 7    Exercise Name 7 quad sets  -KM      Cueing 7 Verbal;Tactile  -KM      Reps 7 20  -KM      Time 7 5 secs  -KM         Exercise 8    Exercise Name 8 SLR & ABD  -KM      Cueing 8 Verbal;Tactile  -KM      Reps 8 20 each  -KM         Exercise 9    Exercise Name 9 SAQ  -KM      Cueing 9 Verbal;Tactile;Demo  -KM      Reps 9 20  -KM      Time 9 --  -KM         Exercise 10    Exercise Name 10 LAQ  -KM         Exercise 11    Exercise Name 11 PF vs Theraband.  -KM      Reps 11 20  -KM      Time 11 Silver  -KM            User Key  (r) = Recorded By, (t) = Taken By, (c) = Cosigned By    Initials Name Provider Type    Radha Bullard PTA Physical Therapist Assistant                                                Time Calculation:   Start Time: 0735  Stop Time: 0825  Time Calculation (min): 50 min  Therapy Charges for Today     Code Description Service Date Service Provider Modifiers Qty    29847073083 HC PT THER PROC EA 15 MIN 5/30/2023 Radha De La Cruz PTA GP 2                    Radha De La Cruz PTA  5/30/2023

## 2023-06-01 ENCOUNTER — TELEPHONE (OUTPATIENT)
Dept: ORTHOPEDIC SURGERY | Facility: CLINIC | Age: 58
End: 2023-06-01

## 2023-06-01 NOTE — TELEPHONE ENCOUNTER
Patient calling her keshawn's dressing has turned off she is greater than 7 days post op advised the patient she can remove it and leave it open to air.     Status post total right knee 05.24.2023.    Patient advised to call the office with any further questions or concerns.     Thanks.

## 2023-06-02 ENCOUNTER — HOSPITAL ENCOUNTER (OUTPATIENT)
Dept: PHYSICAL THERAPY | Facility: HOSPITAL | Age: 58
Setting detail: THERAPIES SERIES
Discharge: HOME | End: 2023-06-02
Payer: MEDICARE

## 2023-06-02 DIAGNOSIS — Z96.651 STATUS POST TOTAL RIGHT KNEE REPLACEMENT: Primary | ICD-10-CM

## 2023-06-02 PROCEDURE — 97110 THERAPEUTIC EXERCISES: CPT

## 2023-06-02 NOTE — THERAPY TREATMENT NOTE
Outpatient Physical Therapy Ortho Treatment Note   Ramandeep Pratt     Patient Name: Sánchez Carbajal  : 1965  MRN: [id number]  Today's Date: 2023      Visit Date: 2023    Visit Dx:    ICD-10-CM ICD-9-CM   1. Status post total right knee replacement  Z96.651 V43.65       Patient Active Problem List   Diagnosis   • Left ureteral stone   • Obstructive sleep apnea syndrome   • Seasonal allergic rhinitis   • Uterovaginal prolapse   • Ureteral stone with hydronephrosis   • Upper respiratory tract infection   • GERD with apnea   • Insomnia   • Cervical radiculitis   • Primary osteoarthritis of knees, bilateral   • Mechanical knee pain, right   • Primary osteoarthritis of right knee        Past Medical History:   Diagnosis Date   • Arthritis     RIGHT SHOULDER   • Arthritis of back     Not sure   • Arthritis of neck     Two years ago   • Cervical disc disorder     Two maybe three years ago   • Chronic back pain    • Diabetes mellitus    • Elevated cholesterol    • GERD (gastroesophageal reflux disease)    • H/O seasonal allergies    • History of anemia    • History of diverticulitis    • History of heart murmur in childhood    • History of kidney stones    • Hypertension    • Migraines    • PONV (postoperative nausea and vomiting)    • Primary osteoarthritis of knees, bilateral 2022   • Sleep apnea with use of continuous positive airway pressure (CPAP)    • Uterine prolapse     UTERINE VAG PROLAPSE        Past Surgical History:   Procedure Laterality Date   • ANTERIOR AND POSTERIOR VAGINAL REPAIR N/A 01/15/2019    Procedure: /POSTERIOR REPAIR;  Surgeon: James Holloway MD;  Location: [facility] OR;  Service: Gynecology   • CERVICAL FUSION     • CHOLECYSTECTOMY     • COLONOSCOPY N/A 2017    Procedure: COLONOSCOPY;  Surgeon: Tian Henderson MD;  Location: [facility] OR;  Service:    • CYSTOSCOPY URETEROSCOPY LASER LITHOTRIPSY N/A 2016    Procedure: URETEROSCOPY  ;  Surgeon:  Vimal Azul MD;  Location: [facility] OR;  Service:    • CYSTOSCOPY URETEROSCOPY LASER LITHOTRIPSY Left 09/01/2019    Procedure: CYSTOSCOPY left retrograde pyelogram;  Surgeon: Vimal Azul MD;  Location: [facility] OR;  Service: Urology   • CYSTOSCOPY W/ LASER LITHOTRIPSY Left 06/25/2018    Procedure: CYSTOSCOPY, LEFT URETEROSCOPY, left  STENT PLACEMENT, right retrograde;  Surgeon: Vimal Azul MD;  Location: [facility] OR;  Service: Urology   • EXTRACORPOREAL SHOCK WAVE LITHOTRIPSY (ESWL)  2015   • SACROCOLPOPEXY N/A 01/15/2019    Procedure: ROBOTIC SACROCOLPOPEXY WITH MESH, TOTAL LAPAROSCOPICE HYSTERECTOMY BILATERAL SALPINGECTOMY WITH DAVINCI ;  Surgeon: James Holloway MD;  Location: [facility] OR;  Service: DaVinci   • TOTAL KNEE ARTHROPLASTY Right 5/24/2023    Procedure: TOTAL KNEE ARTHROPLASTY WITH CORI ROBOT;  Surgeon: Lambert Brady MD;  Location: [facility] OR;  Service: Robotics - Ortho;  Laterality: Right;        PT Ortho     Row Name 06/02/23 0700       Right Lower Ext    Rt Knee Extension/Flexion PROM 0-5-90 degrees AA  -KM          User Key  (r) = Recorded By, (t) = Taken By, (c) = Cosigned By    Initials Name Provider Type    Radha Bullard PTA Physical Therapist Assistant                             PT Assessment/Plan     Row Name 06/02/23 0740          PT Assessment    Assessment Comments Pt with increased passive motion and improved tolerance to ther ex.  -KM        PT Plan    PT Plan Comments Continue per POC  -KM           User Key  (r) = Recorded By, (t) = Taken By, (c) = Cosigned By    Initials Name Provider Type    Radha Bullard PTA Physical Therapist Assistant                   OP Exercises     Row Name 06/02/23 0740             Subjective Comments    Subjective Comments Pt states her knee is sore and tight primarily posterior knee.  -KM         Exercise 1    Exercise Name 1 Heel slides  -KM      Cueing 1 Verbal;Tactile  -KM      Time 1 10 min  -KM         Exercise 2     Exercise Name 2 Wall slides  -KM         Exercise 3    Exercise Name 3 Passive knee FLEX stretch  -KM      Cueing 3 Verbal;Tactile  -KM      Reps 3 10  -KM      Time 3 10 secs  -KM         Exercise 4    Exercise Name 4 Hamstring/gastroc stretch  -KM      Cueing 4 Verbal;Tactile  -KM      Reps 4 10 sec hold  -KM      Time 4 3 min  -KM         Exercise 5    Exercise Name 5 Supine knee EXT on roll  -KM      Cueing 5 Verbal;Tactile  -KM      Time 5 3 min  -KM         Exercise 6    Exercise Name 6 Passive knee EXT stretch  -KM      Cueing 6 Verbal;Tactile  -KM      Reps 6 10  -KM      Time 6 10 secs  -KM         Exercise 7    Exercise Name 7 quad sets  -KM      Cueing 7 Verbal;Tactile  -KM      Reps 7 5 sec hold  -KM      Time 7 3 min  -KM         Exercise 8    Exercise Name 8 SLR & ABD  -KM      Cueing 8 Verbal;Tactile  -KM      Reps 8 25 each  -KM         Exercise 9    Exercise Name 9 SAQ  -KM      Cueing 9 Verbal;Tactile;Demo  -KM      Reps 9 25  -KM         Exercise 10    Exercise Name 10 LAQ  -KM      Reps 10 25  -KM         Exercise 11    Exercise Name 11 TKE  -KM         Exercise 12    Exercise Name 12 Heel Raises  -KM      Reps 12 25  -KM            User Key  (r) = Recorded By, (t) = Taken By, (c) = Cosigned By    Initials Name Provider Type    Radha Bullard PTA Physical Therapist Assistant                                                Time Calculation:   Start Time: 0740  Stop Time: 0840  Time Calculation (min): 60 min  Therapy Charges for Today     Code Description Service Date Service Provider Modifiers Qty    11709531314 HC PT THER PROC EA 15 MIN 6/2/2023 Radha De La Cruz PTA GP 2                    Radha De La Cruz PTA  6/2/2023

## 2023-06-06 ENCOUNTER — HOSPITAL ENCOUNTER (OUTPATIENT)
Dept: PHYSICAL THERAPY | Facility: HOSPITAL | Age: 58
Setting detail: THERAPIES SERIES
Discharge: HOME | End: 2023-06-06
Payer: MEDICARE

## 2023-06-06 DIAGNOSIS — Z96.651 STATUS POST TOTAL RIGHT KNEE REPLACEMENT: Primary | ICD-10-CM

## 2023-06-06 PROCEDURE — 97110 THERAPEUTIC EXERCISES: CPT

## 2023-06-06 PROCEDURE — 97140 MANUAL THERAPY 1/> REGIONS: CPT

## 2023-06-06 NOTE — THERAPY TREATMENT NOTE
Outpatient Physical Therapy Ortho Treatment Note   Ramandeep Pratt     Patient Name: Sánchez Carbajal  : 1965  MRN: [id number]  Today's Date: 2023      Visit Date: 2023    Visit Dx:    ICD-10-CM ICD-9-CM   1. Status post total right knee replacement  Z96.651 V43.65       Patient Active Problem List   Diagnosis    Left ureteral stone    Obstructive sleep apnea syndrome    Seasonal allergic rhinitis    Uterovaginal prolapse    Ureteral stone with hydronephrosis    Upper respiratory tract infection    GERD with apnea    Insomnia    Cervical radiculitis    Primary osteoarthritis of knees, bilateral    Mechanical knee pain, right    Primary osteoarthritis of right knee        Past Medical History:   Diagnosis Date    Arthritis     RIGHT SHOULDER    Arthritis of back     Not sure    Arthritis of neck     Two years ago    Cervical disc disorder     Two maybe three years ago    Chronic back pain     Diabetes mellitus     Elevated cholesterol     GERD (gastroesophageal reflux disease)     H/O seasonal allergies     History of anemia     History of diverticulitis     History of heart murmur in childhood     History of kidney stones     Hypertension     Migraines     PONV (postoperative nausea and vomiting)     Primary osteoarthritis of knees, bilateral 2022    Sleep apnea with use of continuous positive airway pressure (CPAP)     Uterine prolapse     UTERINE VAG PROLAPSE        Past Surgical History:   Procedure Laterality Date    ANTERIOR AND POSTERIOR VAGINAL REPAIR N/A 01/15/2019    Procedure: /POSTERIOR REPAIR;  Surgeon: James Holloway MD;  Location: [facility] OR;  Service: Gynecology    CERVICAL FUSION      CHOLECYSTECTOMY      COLONOSCOPY N/A 2017    Procedure: COLONOSCOPY;  Surgeon: Tian Hnederson MD;  Location: [facility] OR;  Service:     CYSTOSCOPY URETEROSCOPY LASER LITHOTRIPSY N/A 2016    Procedure: URETEROSCOPY  ;  Surgeon: Vimal Azul MD;  Location: [facility]  OR;  Service:     CYSTOSCOPY URETEROSCOPY LASER LITHOTRIPSY Left 09/01/2019    Procedure: CYSTOSCOPY left retrograde pyelogram;  Surgeon: Vimal Azul MD;  Location: [facility] OR;  Service: Urology    CYSTOSCOPY W/ LASER LITHOTRIPSY Left 06/25/2018    Procedure: CYSTOSCOPY, LEFT URETEROSCOPY, left  STENT PLACEMENT, right retrograde;  Surgeon: Vimal Azul MD;  Location: [facility] OR;  Service: Urology    EXTRACORPOREAL SHOCK WAVE LITHOTRIPSY (ESWL)  2015    SACROCOLPOPEXY N/A 01/15/2019    Procedure: ROBOTIC SACROCOLPOPEXY WITH MESH, TOTAL LAPAROSCOPICE HYSTERECTOMY BILATERAL SALPINGECTOMY WITH DAVINCI ;  Surgeon: James Holloway MD;  Location: [facility] OR;  Service: DaVinci    TOTAL KNEE ARTHROPLASTY Right 5/24/2023    Procedure: TOTAL KNEE ARTHROPLASTY WITH CORI ROBOT;  Surgeon: Lambert Brady MD;  Location: [facility] OR;  Service: Robotics - Ortho;  Laterality: Right;        PT Ortho       Row Name 06/06/23 0745       Right Lower Ext    Rt Knee Extension/Flexion AROM 0-95 degrees post stretch  -KM              User Key  (r) = Recorded By, (t) = Taken By, (c) = Cosigned By      Initials Name Provider Type    Radha Bullard PTA Physical Therapist Assistant                                 PT Assessment/Plan       Row Name 06/06/23 5943          PT Assessment    Assessment Comments Pt is progressing well at this time. Pt is slowly increasing AROM each visit and shows improved mobility  -KM        PT Plan    PT Plan Comments Continue per POC  -KM               User Key  (r) = Recorded By, (t) = Taken By, (c) = Cosigned By      Initials Name Provider Type    Radha Bullard PTA Physical Therapist Assistant                       OP Exercises       Row Name 06/06/23 9231             Subjective Comments    Subjective Comments Pt states she has experience increased pain anterior knee. States she has been compliant with HEP and trying to be more mobile.  -KM         Exercise 1    Exercise Name 1 Heel  "slides  -KM      Cueing 1 Verbal;Tactile  -KM      Time 1 10 min  -KM         Exercise 2    Exercise Name 2 Bike (seat 1)  -KM      Time 2 5 min  -KM         Exercise 3    Exercise Name 3 Passive knee FLEX stretch  -KM      Cueing 3 Verbal;Tactile  -KM      Reps 3 10  -KM      Time 3 10 secs  -KM         Exercise 4    Exercise Name 4 Hamstring/gastroc stretch  -KM      Cueing 4 Verbal;Tactile  -KM      Reps 4 10 sec hold  -KM      Time 4 3 min  -KM         Exercise 5    Exercise Name 5 Supine knee EXT on roll  -KM      Cueing 5 Verbal;Tactile  -KM      Time 5 5 min  -KM         Exercise 6    Exercise Name 6 Passive knee EXT stretch  -KM      Cueing 6 Verbal;Tactile  -KM      Reps 6 10  -KM      Time 6 10 secs  -KM         Exercise 7    Exercise Name 7 quad sets  -KM      Cueing 7 Verbal;Tactile  -KM      Reps 7 5 sec hold  -KM      Time 7 3 min  -KM         Exercise 8    Exercise Name 8 SLR & ABD  -KM      Cueing 8 Verbal;Tactile  -KM      Reps 8 25 each  -KM         Exercise 9    Exercise Name 9 SAQ  -KM      Cueing 9 Verbal;Tactile;Demo  -KM      Reps 9 25  -KM         Exercise 10    Exercise Name 10 LAQ  -KM      Reps 10 25  -KM         Exercise 11    Exercise Name 11 TKE  -KM         Exercise 12    Exercise Name 12 Heel Raises  -KM      Reps 12 25  -KM         Exercise 13    Exercise Name 13 Mini Squats  -KM         Exercise 14    Exercise Name 14 4\" Fwd Step Ups  -KM      Reps 14 15 each  -KM         Exercise 15    Exercise Name 15 4\" Lat Step Overs  -KM                User Key  (r) = Recorded By, (t) = Taken By, (c) = Cosigned By      Initials Name Provider Type    Radha Bullard, PTA Physical Therapist Assistant                                                    Time Calculation:   Start Time: 0745  Stop Time: 0850  Time Calculation (min): 65 min  Therapy Charges for Today       Code Description Service Date Service Provider Modifiers Qty    99582025246 HC PT MANUAL THERAPY EA 15 MIN 6/6/2023 Jono" Radha, JOSUE GP 1    [id number]  PT THER PROC EA 15 MIN 6/6/2023 Radha De La Cruz PTA GP 1                      Radha De La Cruz PTA  6/6/2023

## 2023-06-08 ENCOUNTER — OFFICE VISIT (OUTPATIENT)
Dept: ORTHOPEDIC SURGERY | Facility: CLINIC | Age: 58
End: 2023-06-08
Payer: MEDICARE

## 2023-06-08 VITALS — WEIGHT: 196 LBS | BODY MASS INDEX: 31.5 KG/M2 | HEIGHT: 66 IN

## 2023-06-08 DIAGNOSIS — Z96.651 AFTERCARE FOLLOWING RIGHT KNEE JOINT REPLACEMENT SURGERY: Primary | ICD-10-CM

## 2023-06-08 DIAGNOSIS — M17.11 PRIMARY OSTEOARTHRITIS OF RIGHT KNEE: ICD-10-CM

## 2023-06-08 DIAGNOSIS — Z47.1 AFTERCARE FOLLOWING RIGHT KNEE JOINT REPLACEMENT SURGERY: Primary | ICD-10-CM

## 2023-06-08 RX ORDER — METHYLPREDNISOLONE 4 MG/1
TABLET ORAL
Qty: 21 TABLET | Refills: 0 | Status: SHIPPED | OUTPATIENT
Start: 2023-06-08

## 2023-06-08 RX ORDER — OXYCODONE AND ACETAMINOPHEN 7.5; 325 MG/1; MG/1
1 TABLET ORAL EVERY 4 HOURS PRN
Qty: 18 TABLET | Refills: 0 | Status: SHIPPED | OUTPATIENT
Start: 2023-06-08

## 2023-06-08 NOTE — PROGRESS NOTES
CC: s/p right total knee arthroplasty, DOS 05/24/2023  Interval History: Sánchez Carbajal returns for 2 week postoperative visit.  She is doing well. Pain is controlled with pain medication and is  improving. She denies any wound problem, fevers, or chills. Patient is continuing to work with outpatient PT. Ambulating with cane. Continuing DVT prophylaxis with use of aspirin.     Physical Examination: Right knee was examined   Incision clean and dry   ROM 0-95,  4/5 strength   Stable to varus and valgus stress   Flex/extend ankle and toes   Positive sensation right foot   No calf pain, negative Homans sign bilaterally  Imaging:  Radiographic review: Radiographs of the right  knee 2 views, AP and lateral compared to immediate postop films, indication s/p TKA, shows that the implant is in good position. There is no evidence of implant loosening or osteolysis, no dislocation or periprosthetic fractures. Overall alignment acceptable at this time.     Assessment/Plan:  Sánchez Carbajal is recovering from surgery as expected.  We will continue outpatient therapy for range of motion, strengthening, and gait normalization.    She is to follow up in clinic in 4 weeks.Patient had all question answered today. Will continue DVT prophylaxis for an additional 1-2 weeks. Discussed with patient to avoid immersing incision for 4 weeks total after surgery.     Medications:  New Medications Ordered This Visit   Medications    methylPREDNISolone (MEDROL) 4 MG dose pack     Sig: Use as directed by package instructions     Dispense:  21 tablet     Refill:  0    oxyCODONE-acetaminophen (PERCOCET) 7.5-325 MG per tablet     Sig: Take 1 tablet by mouth Every 4 (Four) Hours As Needed for Severe Pain.     Dispense:  18 tablet     Refill:  0       JAYDON Sanchez

## 2023-06-09 ENCOUNTER — HOSPITAL ENCOUNTER (OUTPATIENT)
Dept: PHYSICAL THERAPY | Facility: HOSPITAL | Age: 58
Setting detail: THERAPIES SERIES
Discharge: HOME | End: 2023-06-09
Payer: MEDICARE

## 2023-06-09 DIAGNOSIS — Z96.651 STATUS POST TOTAL RIGHT KNEE REPLACEMENT: Primary | ICD-10-CM

## 2023-06-09 PROCEDURE — 97110 THERAPEUTIC EXERCISES: CPT

## 2023-06-09 PROCEDURE — 97140 MANUAL THERAPY 1/> REGIONS: CPT

## 2023-06-12 ENCOUNTER — HOSPITAL ENCOUNTER (OUTPATIENT)
Dept: PHYSICAL THERAPY | Facility: HOSPITAL | Age: 58
Setting detail: THERAPIES SERIES
Discharge: HOME | End: 2023-06-12
Payer: MEDICARE

## 2023-06-12 DIAGNOSIS — Z96.651 STATUS POST TOTAL RIGHT KNEE REPLACEMENT: Primary | ICD-10-CM

## 2023-06-12 PROCEDURE — 97140 MANUAL THERAPY 1/> REGIONS: CPT

## 2023-06-12 PROCEDURE — 97110 THERAPEUTIC EXERCISES: CPT

## 2023-06-12 NOTE — THERAPY TREATMENT NOTE
Outpatient Physical Therapy Ortho Treatment Note   Ramandeep Pratt     Patient Name: Sánchez Carbajal  : 1965  MRN: [id number]  Today's Date: 2023      Visit Date: 2023    Visit Dx:    ICD-10-CM ICD-9-CM   1. Status post total right knee replacement  Z96.651 V43.65       Patient Active Problem List   Diagnosis    Left ureteral stone    Obstructive sleep apnea syndrome    Seasonal allergic rhinitis    Uterovaginal prolapse    Ureteral stone with hydronephrosis    Upper respiratory tract infection    GERD with apnea    Insomnia    Cervical radiculitis    Primary osteoarthritis of knees, bilateral    Mechanical knee pain, right    Primary osteoarthritis of right knee        Past Medical History:   Diagnosis Date    Arthritis     RIGHT SHOULDER    Arthritis of back     Not sure    Arthritis of neck     Two years ago    Cervical disc disorder     Two maybe three years ago    Chronic back pain     Diabetes mellitus     Elevated cholesterol     GERD (gastroesophageal reflux disease)     H/O seasonal allergies     History of anemia     History of diverticulitis     History of heart murmur in childhood     History of kidney stones     Hypertension     Migraines     PONV (postoperative nausea and vomiting)     Primary osteoarthritis of knees, bilateral 2022    Sleep apnea with use of continuous positive airway pressure (CPAP)     Uterine prolapse     UTERINE VAG PROLAPSE        Past Surgical History:   Procedure Laterality Date    ANTERIOR AND POSTERIOR VAGINAL REPAIR N/A 01/15/2019    Procedure: /POSTERIOR REPAIR;  Surgeon: James Holloway MD;  Location: [facility] OR;  Service: Gynecology    CERVICAL FUSION      CHOLECYSTECTOMY      COLONOSCOPY N/A 2017    Procedure: COLONOSCOPY;  Surgeon: Tian Henderson MD;  Location: [facility] OR;  Service:     CYSTOSCOPY URETEROSCOPY LASER LITHOTRIPSY N/A 2016    Procedure: URETEROSCOPY  ;  Surgeon: Vimal Azul MD;  Location: [facility]  OR;  Service:     CYSTOSCOPY URETEROSCOPY LASER LITHOTRIPSY Left 09/01/2019    Procedure: CYSTOSCOPY left retrograde pyelogram;  Surgeon: Vimal Azul MD;  Location: [facility] OR;  Service: Urology    CYSTOSCOPY W/ LASER LITHOTRIPSY Left 06/25/2018    Procedure: CYSTOSCOPY, LEFT URETEROSCOPY, left  STENT PLACEMENT, right retrograde;  Surgeon: Vimal Azul MD;  Location: [facility] OR;  Service: Urology    EXTRACORPOREAL SHOCK WAVE LITHOTRIPSY (ESWL)  2015    KNEE SURGERY      SACROCOLPOPEXY N/A 01/15/2019    Procedure: ROBOTIC SACROCOLPOPEXY WITH MESH, TOTAL LAPAROSCOPICE HYSTERECTOMY BILATERAL SALPINGECTOMY WITH DAVINCI ;  Surgeon: James Holloway MD;  Location: [facility] OR;  Service: DaVinci    TOTAL KNEE ARTHROPLASTY Right 05/24/2023    Procedure: TOTAL KNEE ARTHROPLASTY WITH CORI ROBOT;  Surgeon: Lambert Brady MD;  Location: [facility] OR;  Service: Robotics - Ortho;  Laterality: Right;        PT Ortho       Row Name 06/12/23 0745       Right Lower Ext    Rt Knee Extension/Flexion AROM 0-105 degrees post stretch  -KM              User Key  (r) = Recorded By, (t) = Taken By, (c) = Cosigned By      Initials Name Provider Type    Radha Bullard PTA Physical Therapist Assistant                                 PT Assessment/Plan       Row Name 06/12/23 6516          PT Assessment    Assessment Comments Pt presents with decreased edema and tolerated progression of ther ex well. Pt measures improved AROM post stretch  -KM        PT Plan    PT Plan Comments Continue per POC  -KM               User Key  (r) = Recorded By, (t) = Taken By, (c) = Cosigned By      Initials Name Provider Type    Radha Bullard PTA Physical Therapist Assistant                       OP Exercises       Row Name 06/12/23 3468             Subjective Comments    Subjective Comments Pt states her knee hurts.  -KM         Exercise 1    Exercise Name 1 Heel slides  -KM      Cueing 1 Verbal;Tactile  -KM      Time 1 10 min  -KM    "      Exercise 2    Exercise Name 2 Bike (seat 1)  -KM      Time 2 5 min  -KM         Exercise 3    Exercise Name 3 Passive knee FLEX stretch  -KM      Cueing 3 Verbal;Tactile  -KM      Reps 3 10  -KM      Time 3 10 secs  -KM         Exercise 4    Exercise Name 4 Hamstring/gastroc stretch  -KM      Cueing 4 Verbal;Tactile  -KM      Reps 4 10 sec hold  -KM      Time 4 3 min  -KM         Exercise 5    Exercise Name 5 Supine knee EXT on roll  -KM      Cueing 5 Verbal;Tactile  -KM      Time 5 5 min  -KM         Exercise 6    Exercise Name 6 Passive knee EXT stretch  -KM      Cueing 6 Verbal;Tactile  -KM      Reps 6 10  -KM      Time 6 10 secs  -KM         Exercise 7    Exercise Name 7 quad sets  -KM      Cueing 7 Verbal;Tactile  -KM      Reps 7 5 sec hold  -KM      Time 7 3 min  -KM         Exercise 8    Exercise Name 8 SLR & ABD  -KM      Cueing 8 Verbal;Tactile  -KM      Reps 8 25 each  -KM         Exercise 9    Exercise Name 9 SAQ  -KM      Cueing 9 Verbal;Tactile;Demo  -KM         Exercise 10    Exercise Name 10 LAQ  -KM      Reps 10 25  -KM         Exercise 11    Exercise Name 11 TKE  -KM      Reps 11 25  -KM      Time 11 Gold  -KM         Exercise 12    Exercise Name 12 Heel Raises  -KM      Reps 12 25  -KM         Exercise 13    Exercise Name 13 Mini Squats  -KM      Reps 13 25  -KM         Exercise 14    Exercise Name 14 6\" Fwd Step Ups  -KM      Reps 14 20 each  -KM         Exercise 15    Exercise Name 15 4\" Lat Step Overs  -KM                User Key  (r) = Recorded By, (t) = Taken By, (c) = Cosigned By      Initials Name Provider Type     Radha De La Cruz PTA Physical Therapist Assistant                                                    Time Calculation:   Start Time: 0745  Stop Time: 0840  Time Calculation (min): 55 min  Therapy Charges for Today       Code Description Service Date Service Provider Modifiers Qty    05956802646 HC PT MANUAL THERAPY EA 15 MIN 6/12/2023 Radha De La Cruz PTA GP 1    [id number] " HC PT THER PROC EA 15 MIN 6/12/2023 Radha De La Cruz, PTA GP 1                      Radha De La Cruz, JOSUE  6/12/2023

## 2023-06-16 ENCOUNTER — HOSPITAL ENCOUNTER (OUTPATIENT)
Dept: PHYSICAL THERAPY | Facility: HOSPITAL | Age: 58
Setting detail: THERAPIES SERIES
Discharge: HOME | End: 2023-06-16
Payer: MEDICARE

## 2023-06-16 DIAGNOSIS — Z96.651 STATUS POST TOTAL RIGHT KNEE REPLACEMENT: Primary | ICD-10-CM

## 2023-06-16 PROCEDURE — 97110 THERAPEUTIC EXERCISES: CPT

## 2023-06-16 PROCEDURE — 97140 MANUAL THERAPY 1/> REGIONS: CPT

## 2023-06-16 NOTE — THERAPY TREATMENT NOTE
Outpatient Physical Therapy Ortho Treatment Note   Ramandeep Pratt     Patient Name: Sánchez Carbajal  : 1965  MRN: [id number]  Today's Date: 2023      Visit Date: 2023    Visit Dx:    ICD-10-CM ICD-9-CM   1. Status post total right knee replacement  Z96.651 V43.65       Patient Active Problem List   Diagnosis    Left ureteral stone    Obstructive sleep apnea syndrome    Seasonal allergic rhinitis    Uterovaginal prolapse    Ureteral stone with hydronephrosis    Upper respiratory tract infection    GERD with apnea    Insomnia    Cervical radiculitis    Primary osteoarthritis of knees, bilateral    Mechanical knee pain, right    Primary osteoarthritis of right knee        Past Medical History:   Diagnosis Date    Arthritis     RIGHT SHOULDER    Arthritis of back     Not sure    Arthritis of neck     Two years ago    Cervical disc disorder     Two maybe three years ago    Chronic back pain     Diabetes mellitus     Elevated cholesterol     GERD (gastroesophageal reflux disease)     H/O seasonal allergies     History of anemia     History of diverticulitis     History of heart murmur in childhood     History of kidney stones     Hypertension     Migraines     PONV (postoperative nausea and vomiting)     Primary osteoarthritis of knees, bilateral 2022    Sleep apnea with use of continuous positive airway pressure (CPAP)     Uterine prolapse     UTERINE VAG PROLAPSE        Past Surgical History:   Procedure Laterality Date    ANTERIOR AND POSTERIOR VAGINAL REPAIR N/A 01/15/2019    Procedure: /POSTERIOR REPAIR;  Surgeon: James Holloway MD;  Location: [facility] OR;  Service: Gynecology    CERVICAL FUSION      CHOLECYSTECTOMY      COLONOSCOPY N/A 2017    Procedure: COLONOSCOPY;  Surgeon: Tian Henderson MD;  Location: [facility] OR;  Service:     CYSTOSCOPY URETEROSCOPY LASER LITHOTRIPSY N/A 2016    Procedure: URETEROSCOPY  ;  Surgeon: Vimal Azul MD;  Location: [facility]  OR;  Service:     CYSTOSCOPY URETEROSCOPY LASER LITHOTRIPSY Left 09/01/2019    Procedure: CYSTOSCOPY left retrograde pyelogram;  Surgeon: Vimal Azul MD;  Location: [facility] OR;  Service: Urology    CYSTOSCOPY W/ LASER LITHOTRIPSY Left 06/25/2018    Procedure: CYSTOSCOPY, LEFT URETEROSCOPY, left  STENT PLACEMENT, right retrograde;  Surgeon: Vimal Azul MD;  Location:  [facility] OR;  Service: Urology    EXTRACORPOREAL SHOCK WAVE LITHOTRIPSY (ESWL)  2015    KNEE SURGERY      SACROCOLPOPEXY N/A 01/15/2019    Procedure: ROBOTIC SACROCOLPOPEXY WITH MESH, TOTAL LAPAROSCOPICE HYSTERECTOMY BILATERAL SALPINGECTOMY WITH DAVINCI ;  Surgeon: James Holloway MD;  Location: [facility] MAIN OR;  Service: DaVinci    TOTAL KNEE ARTHROPLASTY Right 05/24/2023    Procedure: TOTAL KNEE ARTHROPLASTY WITH CORI ROBOT;  Surgeon: Lambert Brady MD;  Location: [facility] OR;  Service: Robotics - Ortho;  Laterality: Right;        PT Ortho       Row Name 06/16/23 0800       Subjective Comments    Subjective Comments pt states her knee is more sore today but not sure why - pt later states she has been trying to walk around more at home without the cane and took a short walk with her son  -       Subjective Pain    Able to rate subjective pain? yes  -    Pre-Treatment Pain Level 8  -              User Key  (r) = Recorded By, (t) = Taken By, (c) = Cosigned By      Initials Name Provider Type     Syed Andres, PTA Physical Therapist Assistant                                 PT Assessment/Plan       Row Name 06/16/23 0902          PT Assessment    Assessment Comments pt reporting increased pain/soreness (R) knee today - fair tolerance to exercises but pt experienced weakness and not feeling well during standing ex's (held step ups); pt took seated break and given water and a banana  -        PT Plan    PT Plan Comments Cont per POC  -               User Key  (r) = Recorded By, (t) = Taken By, (c) = Cosigned By      Initials  Name Provider Type     Mckenna Syed Lee, PTA Physical Therapist Assistant                       OP Exercises       Row Name 06/16/23 1000 06/16/23 0800          Subjective Comments    Subjective Comments -- pt states her knee is more sore today but not sure why - pt later states she has been trying to walk around more at home without the cane and took a short walk with her son  -        Subjective Pain    Able to rate subjective pain? -- yes  -     Pre-Treatment Pain Level -- 8  -        Total Minutes    87811 - PT Therapeutic Exercise Minutes 30  -MH --     87269 - PT Manual Therapy Minutes 15  -MH --        Exercise 1    Exercise Name 1 -- Heel slides  -     Cueing 1 -- Verbal;Tactile  -     Time 1 -- 10 min  -        Exercise 2    Exercise Name 2 -- Bike (seat 1)  -     Time 2 -- 5 min  -        Exercise 3    Exercise Name 3 -- Passive knee FLEX stretch  -     Cueing 3 -- Verbal;Tactile  -     Reps 3 -- 10  -     Time 3 -- 10 secs  -        Exercise 4    Exercise Name 4 -- Hamstring/gastroc stretch  -     Cueing 4 -- Verbal;Tactile  -     Reps 4 -- 10 sec hold  -     Time 4 -- 3 min  -        Exercise 5    Exercise Name 5 -- Supine knee EXT on roll  -     Cueing 5 -- Verbal;Tactile  -     Time 5 -- 5 min  -        Exercise 6    Exercise Name 6 -- Passive knee EXT stretch  -     Cueing 6 -- Verbal;Tactile  -     Reps 6 -- 10  -     Time 6 -- 10 secs  -        Exercise 7    Exercise Name 7 -- quad sets  -     Cueing 7 -- Verbal;Tactile  -     Reps 7 -- 5 sec hold  -     Time 7 -- 3 min  -        Exercise 8    Exercise Name 8 -- SLR & ABD  -     Cueing 8 -- Verbal;Tactile  -     Reps 8 -- 25 each  -        Exercise 9    Exercise Name 9 -- SAQ  -     Cueing 9 -- --  -        Exercise 10    Exercise Name 10 -- LAQ  -     Reps 10 -- 25  -        Exercise 11    Exercise Name 11 -- TKE  -     Reps 11 -- 25  -     Time 11 -- Gold  -        Exercise  "12    Exercise Name 12 -- Heel Raises  -     Reps 12 -- 25  -        Exercise 13    Exercise Name 13 -- Mini Squats  -     Reps 13 -- 25  -        Exercise 14    Exercise Name 14 -- 6\" Fwd Step Ups  -     Reps 14 -- Held due to pt not feeling well  -        Exercise 15    Exercise Name 15 -- 4\" Lat Step Overs  -               User Key  (r) = Recorded By, (t) = Taken By, (c) = Cosigned By      Initials Name Provider Type    Syed Collier PTA Physical Therapist Assistant                             Manual Rx (last 36 hours)       Manual Treatments       Row Name 06/16/23 1000             Total Minutes    30314 - PT Manual Therapy Minutes 15  -                User Key  (r) = Recorded By, (t) = Taken By, (c) = Cosigned By      Initials Name Provider Type    Syed Collier PTA Physical Therapist Assistant                        Therapy Education  Given: HEP, Symptoms/condition management  Program: Reinforced  How Provided: Verbal, Demonstration  Provided to: Patient  Level of Understanding: Teach back education performed, Verbalized, Demonstrated              Time Calculation:   Start Time: 0759  Stop Time: 0911  Time Calculation (min): 72 min  Timed Charges  95858 - PT Therapeutic Exercise Minutes: 30  15436 - PT Manual Therapy Minutes: 15  Total Minutes  Timed Charges Total Minutes: 45   Total Minutes: 45  Therapy Charges for Today       Code Description Service Date Service Provider Modifiers Qty    72655276597 HC PT THER PROC EA 15 MIN 6/16/2023 Syed Andres PTA GP 2    [id number] HC PT MANUAL THERAPY EA 15 MIN 6/16/2023 Syed Andres PTA GP 1                      Syed Andres PTA  6/16/2023     "

## 2023-07-24 ENCOUNTER — HOSPITAL ENCOUNTER (OUTPATIENT)
Dept: PHYSICAL THERAPY | Facility: HOSPITAL | Age: 58
Setting detail: THERAPIES SERIES
Discharge: HOME | End: 2023-07-24
Payer: MEDICARE

## 2023-07-24 DIAGNOSIS — Z96.651 STATUS POST TOTAL RIGHT KNEE REPLACEMENT: Primary | ICD-10-CM

## 2023-07-24 PROCEDURE — 97110 THERAPEUTIC EXERCISES: CPT

## 2023-07-24 NOTE — THERAPY TREATMENT NOTE
Outpatient Physical Therapy Ortho Treatment Note   Ramandeep Pratt     Patient Name: Sánchez Carbajal  : 1965  MRN: [id number]  Today's Date: 2023      Visit Date: 2023    Visit Dx:    ICD-10-CM ICD-9-CM   1. Status post total right knee replacement  Z96.651 V43.65       Patient Active Problem List   Diagnosis    Left ureteral stone    Obstructive sleep apnea syndrome    Seasonal allergic rhinitis    Uterovaginal prolapse    Ureteral stone with hydronephrosis    Upper respiratory tract infection    GERD with apnea    Insomnia    Cervical radiculitis    Primary osteoarthritis of knees, bilateral    Mechanical knee pain, right    Primary osteoarthritis of right knee        Past Medical History:   Diagnosis Date    Arthritis     RIGHT SHOULDER    Arthritis of back     Not sure    Arthritis of neck     Two years ago    Cervical disc disorder     Two maybe three years ago    Chronic back pain     Diabetes mellitus     Elevated cholesterol     GERD (gastroesophageal reflux disease)     H/O seasonal allergies     History of anemia     History of diverticulitis     History of heart murmur in childhood     History of kidney stones     Hypertension     Migraines     PONV (postoperative nausea and vomiting)     Primary osteoarthritis of knees, bilateral 2022    Sleep apnea with use of continuous positive airway pressure (CPAP)     Uterine prolapse     UTERINE VAG PROLAPSE        Past Surgical History:   Procedure Laterality Date    ANTERIOR AND POSTERIOR VAGINAL REPAIR N/A 01/15/2019    Procedure: /POSTERIOR REPAIR;  Surgeon: James Holloway MD;  Location: [facility] OR;  Service: Gynecology    CERVICAL FUSION      CHOLECYSTECTOMY      COLONOSCOPY N/A 2017    Procedure: COLONOSCOPY;  Surgeon: Tian Henderson MD;  Location: [facility] OR;  Service:     CYSTOSCOPY URETEROSCOPY LASER LITHOTRIPSY N/A 2016    Procedure: URETEROSCOPY  ;  Surgeon: Vimal Azul MD;  Location: [facility]  OR;  Service:     CYSTOSCOPY URETEROSCOPY LASER LITHOTRIPSY Left 09/01/2019    Procedure: CYSTOSCOPY left retrograde pyelogram;  Surgeon: Vimal Azul MD;  Location: [facility] OR;  Service: Urology    CYSTOSCOPY W/ LASER LITHOTRIPSY Left 06/25/2018    Procedure: CYSTOSCOPY, LEFT URETEROSCOPY, left  STENT PLACEMENT, right retrograde;  Surgeon: Vimal Azul MD;  Location:  [facility] OR;  Service: Urology    EXTRACORPOREAL SHOCK WAVE LITHOTRIPSY (ESWL)  2015    KNEE SURGERY      SACROCOLPOPEXY N/A 01/15/2019    Procedure: ROBOTIC SACROCOLPOPEXY WITH MESH, TOTAL LAPAROSCOPICE HYSTERECTOMY BILATERAL SALPINGECTOMY WITH DAVINCI ;  Surgeon: James Holloway MD;  Location: [facility] OR;  Service: DaVinci    TOTAL KNEE ARTHROPLASTY Right 05/24/2023    Procedure: TOTAL KNEE ARTHROPLASTY WITH CORI ROBOT;  Surgeon: Lambert Brady MD;  Location: [facility] OR;  Service: Robotics - Ortho;  Laterality: Right;                        PT Assessment/Plan       Row Name 07/24/23 0662          PT Assessment    Assessment Comments Pt tolerated progression of ther ex well. Pt maitains current AROM post stretch; push passive range.  -KM        PT Plan    PT Plan Comments Continue per POC  -KM               User Key  (r) = Recorded By, (t) = Taken By, (c) = Cosigned By      Initials Name Provider Type    Radha Bullard, PTA Physical Therapist Assistant                       OP Exercises       Row Name 07/24/23 0791             Subjective Comments    Subjective Comments Pt states her knee is sore and stiff this morning.  -KM         Exercise 1    Exercise Name 1 Heel slides  -KM      Cueing 1 Verbal;Tactile  -KM      Time 1 10 min  -KM         Exercise 2    Exercise Name 2 Bike (seat 1)  -KM      Time 2 5 min  -KM         Exercise 3    Exercise Name 3 Passive knee FLEX stretch  -KM      Cueing 3 Verbal;Tactile  -KM      Reps 3 10  -KM      Time 3 10 secs  -KM         Exercise 4    Exercise Name 4 Hamstring/gastroc stretch  -KM   "    Cueing 4 Verbal;Tactile  -KM      Reps 4 10 sec hold  -KM      Time 4 4 min  -KM         Exercise 5    Exercise Name 5 Supine knee EXT on roll  -KM      Cueing 5 Verbal;Tactile  -KM      Time 5 5 min  -KM         Exercise 6    Exercise Name 6 Passive knee EXT stretch  -KM      Cueing 6 Verbal;Tactile  -KM      Reps 6 10  -KM      Time 6 10 secs  -KM         Exercise 7    Exercise Name 7 quad sets  -KM      Cueing 7 Verbal;Tactile  -KM      Reps 7 5 sec hold  -KM      Time 7 4 min  -KM         Exercise 8    Exercise Name 8 SLR & ABD  -KM      Cueing 8 Verbal;Tactile  -KM      Reps 8 25 each  -KM      Time 8 2#  -KM         Exercise 9    Exercise Name 9 SAQ  -KM         Exercise 10    Exercise Name 10 LAQ  -KM      Reps 10 25  -KM      Time 10 2#  -KM         Exercise 11    Exercise Name 11 TKE  -KM      Reps 11 25  -KM      Time 11 Gold  -KM         Exercise 12    Exercise Name 12 Heel Raises  -KM      Reps 12 25  -KM         Exercise 13    Exercise Name 13 Mini Squats  -KM      Reps 13 25  -KM         Exercise 14    Exercise Name 14 6\" Fwd Step Ups  -KM      Reps 14 25 each  -KM         Exercise 15    Exercise Name 15 6\" Lat Step Overs  -KM      Reps 15 15  -KM                User Key  (r) = Recorded By, (t) = Taken By, (c) = Cosigned By      Initials Name Provider Type    Radha Bullard PTA Physical Therapist Assistant                                                    Time Calculation:   Start Time: 0745  Stop Time: 0843  Time Calculation (min): 58 min  Therapy Charges for Today       Code Description Service Date Service Provider Modifiers Qty    67778507947 HC PT THER PROC EA 15 MIN 7/24/2023 Radha De La Cruz PTA GP 2                      Radha De La Cruz PTA  7/24/2023     "

## 2023-07-28 ENCOUNTER — HOSPITAL ENCOUNTER (OUTPATIENT)
Dept: PHYSICAL THERAPY | Facility: HOSPITAL | Age: 58
Setting detail: THERAPIES SERIES
Discharge: HOME | End: 2023-07-28
Payer: MEDICARE

## 2023-07-28 DIAGNOSIS — Z96.651 STATUS POST TOTAL RIGHT KNEE REPLACEMENT: Primary | ICD-10-CM

## 2023-07-28 PROCEDURE — 97110 THERAPEUTIC EXERCISES: CPT

## 2023-07-28 NOTE — THERAPY TREATMENT NOTE
Outpatient Physical Therapy Ortho Treatment Note   Ramandeep Pratt     Patient Name: Sánchez Carbajal  : 1965  MRN: [id number]  Today's Date: 2023      Visit Date: 2023    Visit Dx:    ICD-10-CM ICD-9-CM   1. Status post total right knee replacement  Z96.651 V43.65       Patient Active Problem List   Diagnosis    Left ureteral stone    Obstructive sleep apnea syndrome    Seasonal allergic rhinitis    Uterovaginal prolapse    Ureteral stone with hydronephrosis    Upper respiratory tract infection    GERD with apnea    Insomnia    Cervical radiculitis    Primary osteoarthritis of knees, bilateral    Mechanical knee pain, right    Primary osteoarthritis of right knee        Past Medical History:   Diagnosis Date    Arthritis     RIGHT SHOULDER    Arthritis of back     Not sure    Arthritis of neck     Two years ago    Cervical disc disorder     Two maybe three years ago    Chronic back pain     Diabetes mellitus     Elevated cholesterol     GERD (gastroesophageal reflux disease)     H/O seasonal allergies     History of anemia     History of diverticulitis     History of heart murmur in childhood     History of kidney stones     Hypertension     Migraines     PONV (postoperative nausea and vomiting)     Primary osteoarthritis of knees, bilateral 2022    Sleep apnea with use of continuous positive airway pressure (CPAP)     Uterine prolapse     UTERINE VAG PROLAPSE        Past Surgical History:   Procedure Laterality Date    ANTERIOR AND POSTERIOR VAGINAL REPAIR N/A 01/15/2019    Procedure: /POSTERIOR REPAIR;  Surgeon: James Holloway MD;  Location: [facility] OR;  Service: Gynecology    CERVICAL FUSION      CHOLECYSTECTOMY      COLONOSCOPY N/A 2017    Procedure: COLONOSCOPY;  Surgeon: Tian Henderson MD;  Location: [facility] OR;  Service:     CYSTOSCOPY URETEROSCOPY LASER LITHOTRIPSY N/A 2016    Procedure: URETEROSCOPY  ;  Surgeon: Vimal Azul MD;  Location: [facility]  OR;  Service:     CYSTOSCOPY URETEROSCOPY LASER LITHOTRIPSY Left 09/01/2019    Procedure: CYSTOSCOPY left retrograde pyelogram;  Surgeon: Vimal Azul MD;  Location: [facility] OR;  Service: Urology    CYSTOSCOPY W/ LASER LITHOTRIPSY Left 06/25/2018    Procedure: CYSTOSCOPY, LEFT URETEROSCOPY, left  STENT PLACEMENT, right retrograde;  Surgeon: Vimal Azul MD;  Location: [facility] OR;  Service: Urology    EXTRACORPOREAL SHOCK WAVE LITHOTRIPSY (ESWL)  2015    KNEE SURGERY      SACROCOLPOPEXY N/A 01/15/2019    Procedure: ROBOTIC SACROCOLPOPEXY WITH MESH, TOTAL LAPAROSCOPICE HYSTERECTOMY BILATERAL SALPINGECTOMY WITH DAVINCI ;  Surgeon: James Holloway MD;  Location: [facility] OR;  Service: DaVinci    TOTAL KNEE ARTHROPLASTY Right 05/24/2023    Procedure: TOTAL KNEE ARTHROPLASTY WITH CORI ROBOT;  Surgeon: Lambert Brady MD;  Location: [facility] OR;  Service: Robotics - Ortho;  Laterality: Right;        PT Ortho       Row Name 07/28/23 0776       Right Lower Ext    Rt Knee Extension/Flexion AROM 0-120 degrees post stretch  -KM              User Key  (r) = Recorded By, (t) = Taken By, (c) = Cosigned By      Initials Name Provider Type    Radha Bullard PTA Physical Therapist Assistant                                 PT Assessment/Plan       Row Name 07/28/23 5135          PT Assessment    Assessment Comments Pt has made really good progress within the past week. Pt is able to make full revolutions on the bike and measures increased AROM post stretch. Pt shows improved function and mobility  -KM        PT Plan    PT Plan Comments Withs pts improved status, plan to decrease PT frequency to 1x/week. Pt to continue with HEP.  -KM               User Key  (r) = Recorded By, (t) = Taken By, (c) = Cosigned By      Initials Name Provider Type    Radha Bullard PTA Physical Therapist Assistant                       OP Exercises       Row Name 07/28/23 2819             Subjective Comments    Subjective Comments  "Pt states her knee is sore and continues to complete HEP 2x/daily.  -KM         Exercise 1    Exercise Name 1 Heel slides  -KM      Cueing 1 Verbal;Tactile  -KM      Time 1 10 min  -KM         Exercise 2    Exercise Name 2 Bike (seat 1)  -KM      Time 2 5 min  -KM         Exercise 3    Exercise Name 3 Passive knee FLEX stretch  -KM      Cueing 3 Verbal;Tactile  -KM      Reps 3 10  -KM      Time 3 10 secs  -KM         Exercise 4    Exercise Name 4 Hamstring/gastroc stretch  -KM      Cueing 4 Verbal;Tactile  -KM      Reps 4 10 sec hold  -KM      Time 4 4 min  -KM         Exercise 5    Exercise Name 5 Supine knee EXT on roll  -KM      Cueing 5 Verbal;Tactile  -KM      Time 5 5 min  -KM         Exercise 6    Exercise Name 6 Passive knee EXT stretch  -KM      Cueing 6 Verbal;Tactile  -KM      Reps 6 10  -KM      Time 6 10 secs  -KM         Exercise 7    Exercise Name 7 quad sets  -KM      Cueing 7 Verbal;Tactile  -KM      Reps 7 5 sec hold  -KM      Time 7 4 min  -KM         Exercise 8    Exercise Name 8 SLR & ABD  -KM      Cueing 8 Verbal;Tactile  -KM      Reps 8 25 each  -KM      Time 8 2#  -KM         Exercise 9    Exercise Name 9 SAQ  -KM         Exercise 10    Exercise Name 10 LAQ  -KM      Reps 10 25  -KM      Time 10 2#  -KM         Exercise 11    Exercise Name 11 TKE  -KM      Reps 11 25  -KM      Time 11 Gold  -KM         Exercise 12    Exercise Name 12 Heel Raises  -KM      Reps 12 25  -KM         Exercise 13    Exercise Name 13 Mini Squats  -KM      Reps 13 25  -KM         Exercise 14    Exercise Name 14 6\" Fwd Step Ups  -KM      Reps 14 25 each  -KM         Exercise 15    Exercise Name 15 6\" Lat Step Overs  -KM      Reps 15 15  -KM                User Key  (r) = Recorded By, (t) = Taken By, (c) = Cosigned By      Initials Name Provider Type    Radha Bullard, PTA Physical Therapist Assistant                                                    Time Calculation:   Start Time: 0740  Stop Time: 0835  Time " Calculation (min): 55 min  Therapy Charges for Today       Code Description Service Date Service Provider Modifiers Qty    51169192932 HC PT THER PROC EA 15 MIN 7/28/2023 Radha De La Cruz, PTA GP 2                      Radha De La Cruz, JOSUE  7/28/2023

## 2023-07-31 ENCOUNTER — HOSPITAL ENCOUNTER (OUTPATIENT)
Dept: PHYSICAL THERAPY | Facility: HOSPITAL | Age: 58
Setting detail: THERAPIES SERIES
Discharge: HOME | End: 2023-07-31
Payer: MEDICARE

## 2023-07-31 DIAGNOSIS — Z96.651 STATUS POST TOTAL RIGHT KNEE REPLACEMENT: Primary | ICD-10-CM

## 2023-07-31 PROCEDURE — 97110 THERAPEUTIC EXERCISES: CPT

## 2023-08-03 ENCOUNTER — TELEPHONE (OUTPATIENT)
Dept: URGENT CARE | Facility: CLINIC | Age: 58
End: 2023-08-03
Payer: MEDICARE

## 2023-08-03 DIAGNOSIS — N39.0 ACUTE UTI: Primary | ICD-10-CM

## 2023-08-03 RX ORDER — CIPROFLOXACIN 500 MG/1
500 TABLET, FILM COATED ORAL 2 TIMES DAILY
Qty: 14 TABLET | Refills: 0 | Status: SHIPPED | OUTPATIENT
Start: 2023-08-03

## 2023-08-14 ENCOUNTER — HOSPITAL ENCOUNTER (OUTPATIENT)
Dept: PHYSICAL THERAPY | Facility: HOSPITAL | Age: 58
Setting detail: THERAPIES SERIES
Discharge: HOME | End: 2023-08-14
Payer: MEDICARE

## 2023-08-14 DIAGNOSIS — Z96.651 STATUS POST TOTAL RIGHT KNEE REPLACEMENT: Primary | ICD-10-CM

## 2023-08-14 NOTE — THERAPY TREATMENT NOTE
Outpatient Physical Therapy Ortho Treatment Note   Ramandeep Pratt     Patient Name: Sánchez Carbajal  : 1965  MRN: [id number]  Today's Date: 2023      Visit Date: 2023    Visit Dx:    ICD-10-CM ICD-9-CM   1. Status post total right knee replacement  Z96.651 V43.65       Patient Active Problem List   Diagnosis    Left ureteral stone    Obstructive sleep apnea syndrome    Seasonal allergic rhinitis    Uterovaginal prolapse    Ureteral stone with hydronephrosis    Upper respiratory tract infection    GERD with apnea    Insomnia    Cervical radiculitis    Primary osteoarthritis of knees, bilateral    Mechanical knee pain, right    Primary osteoarthritis of right knee        Past Medical History:   Diagnosis Date    Arthritis     RIGHT SHOULDER    Arthritis of back     Not sure    Arthritis of neck     Two years ago    Cervical disc disorder     Two maybe three years ago    Chronic back pain     Diabetes mellitus     Elevated cholesterol     GERD (gastroesophageal reflux disease)     H/O seasonal allergies     History of anemia     History of diverticulitis     History of heart murmur in childhood     History of kidney stones     Hypertension     Migraines     PONV (postoperative nausea and vomiting)     Primary osteoarthritis of knees, bilateral 2022    Sleep apnea with use of continuous positive airway pressure (CPAP)     Uterine prolapse     UTERINE VAG PROLAPSE        Past Surgical History:   Procedure Laterality Date    ANTERIOR AND POSTERIOR VAGINAL REPAIR N/A 01/15/2019    Procedure: /POSTERIOR REPAIR;  Surgeon: James Holloway MD;  Location: [facility] OR;  Service: Gynecology    CERVICAL FUSION      CHOLECYSTECTOMY      COLONOSCOPY N/A 2017    Procedure: COLONOSCOPY;  Surgeon: Tian Henderson MD;  Location: [facility] OR;  Service:     CYSTOSCOPY URETEROSCOPY LASER LITHOTRIPSY N/A 2016    Procedure: URETEROSCOPY  ;  Surgeon: Vimal Azul MD;  Location: [facility]  OR;  Service:     CYSTOSCOPY URETEROSCOPY LASER LITHOTRIPSY Left 09/01/2019    Procedure: CYSTOSCOPY left retrograde pyelogram;  Surgeon: Vimal Azul MD;  Location: [facility] OR;  Service: Urology    CYSTOSCOPY W/ LASER LITHOTRIPSY Left 06/25/2018    Procedure: CYSTOSCOPY, LEFT URETEROSCOPY, left  STENT PLACEMENT, right retrograde;  Surgeon: Vimal Azul MD;  Location: [facility] OR;  Service: Urology    EXTRACORPOREAL SHOCK WAVE LITHOTRIPSY (ESWL)  2015    KNEE SURGERY      SACROCOLPOPEXY N/A 01/15/2019    Procedure: ROBOTIC SACROCOLPOPEXY WITH MESH, TOTAL LAPAROSCOPICE HYSTERECTOMY BILATERAL SALPINGECTOMY WITH DAVINCI ;  Surgeon: James Holloway MD;  Location: [facility] OR;  Service: DaVinci    TOTAL KNEE ARTHROPLASTY Right 05/24/2023    Procedure: TOTAL KNEE ARTHROPLASTY WITH CORI ROBOT;  Surgeon: Lambert Brady MD;  Location: [facility] OR;  Service: Robotics - Ortho;  Laterality: Right;        PT Ortho       Row Name 08/14/23 0745       Right Lower Ext    Rt Knee Extension/Flexion AROM 0-120 degrees post stretch  -KM              User Key  (r) = Recorded By, (t) = Taken By, (c) = Cosigned By      Initials Name Provider Type    Radha Bullard PTA Physical Therapist Assistant                                 PT Assessment/Plan       Row Name 08/14/23 5837          PT Assessment    Assessment Comments Pt has made good progress toward goals. Pt maintains good AROM and shows improved mobility and function  -KM        PT Plan    PT Plan Comments Continue POC per MD  -KM               User Key  (r) = Recorded By, (t) = Taken By, (c) = Cosigned By      Initials Name Provider Type    Radha Bullard PTA Physical Therapist Assistant                       OP Exercises       Row Name 08/14/23 6717             Subjective Comments    Subjective Comments Pt states her knee is doing well and continues to progress back to her daily routine. Pt to see MD Friday.  -KM         Exercise 1    Exercise Name 1 Heel  "slides  -KM      Cueing 1 Verbal;Tactile  -KM      Time 1 10 min  -KM         Exercise 2    Exercise Name 2 Bike (seat 1)  -KM      Time 2 5 min  -KM         Exercise 3    Exercise Name 3 Passive knee FLEX stretch  -KM      Cueing 3 Verbal;Tactile  -KM      Reps 3 10  -KM      Time 3 10 secs  -KM         Exercise 4    Exercise Name 4 Hamstring/gastroc stretch  -KM      Cueing 4 Verbal;Tactile  -KM      Reps 4 10 sec hold  -KM      Time 4 4 min  -KM         Exercise 5    Exercise Name 5 Supine knee EXT on roll  -KM      Cueing 5 Verbal;Tactile  -KM      Time 5 5 min  -KM         Exercise 6    Exercise Name 6 Passive knee EXT stretch  -KM      Cueing 6 Verbal;Tactile  -KM      Reps 6 10  -KM      Time 6 10 secs  -KM         Exercise 7    Exercise Name 7 quad sets  -KM      Cueing 7 Verbal;Tactile  -KM      Reps 7 5 sec hold  -KM      Time 7 4 min  -KM         Exercise 8    Exercise Name 8 SLR & ABD  -KM      Cueing 8 Verbal;Tactile  -KM      Reps 8 25 each  -KM      Time 8 2#  -KM         Exercise 9    Exercise Name 9 SAQ  -KM         Exercise 10    Exercise Name 10 LAQ  -KM      Reps 10 25  -KM      Time 10 2#  -KM         Exercise 11    Exercise Name 11 TKE  -KM      Reps 11 25  -KM      Time 11 Gold  -KM         Exercise 12    Exercise Name 12 Heel Raises  -KM      Reps 12 25  -KM         Exercise 13    Exercise Name 13 Mini Squats  -KM      Reps 13 25  -KM         Exercise 14    Exercise Name 14 6\" Fwd Step Ups  -KM      Reps 14 25 each  -KM         Exercise 15    Exercise Name 15 6\" Lat Step Overs  -KM      Reps 15 15  -KM                User Key  (r) = Recorded By, (t) = Taken By, (c) = Cosigned By      Initials Name Provider Type    Radha Bullard, PTA Physical Therapist Assistant                                  PT OP Goals       Row Name 08/14/23 0745          PT Short Term Goals    STG Date to Achieve 06/23/23  -KM     STG 1 Decrease right knee pain to 3-4/10 with activity.  -KM     STG 1 Progress Met  " -KM     STG 2 Increae right knee AROM to 0-5-105 degrees with testing.  -KM     STG 2 Progress Met  -KM     STG 3 Increase right LE strength to at least 4/5 all planes with testing.  -KM     STG 3 Progress Met  -KM     STG 4 Pt will be independent with sit to/from supine.  -KM     STG 4 Progress Met  -KM     STG 5 Pt will be independent with her HEP issued by this therapist.  -KM     STG 5 Progress Met  -KM        Long Term Goals    LTG Date to Achieve 07/21/23  -KM     LTG 1 Decrease right knee pain to 0-1/10 with activity.  -KM     LTG 1 Progress Partially Met  -KM     LTG 2 Increae right knee AROM to 0-125 degrees with testing.  -KM     LTG 2 Progress Partially Met  -KM     LTG 3 Increase right LE strength to 5/5 all planes with testing.  -KM     LTG 3 Progress Partially Met  -KM     LTG 4 Pt will ambulate normally on levels and stairs without assistive device.  -KM     LTG 4 Progress Met  -KM     LTG 5 Pt will be independent with all ADLs and have a LEFS score > 60.  -KM               User Key  (r) = Recorded By, (t) = Taken By, (c) = Cosigned By      Initials Name Provider Type    Radha Bullard PTA Physical Therapist Assistant                                   Time Calculation:   Start Time: 0745  Stop Time: 0833  Time Calculation (min): 48 min                Radha De La Cruz PTA  8/14/2023

## 2023-08-18 ENCOUNTER — OFFICE VISIT (OUTPATIENT)
Dept: ORTHOPEDIC SURGERY | Facility: CLINIC | Age: 58
End: 2023-08-18
Payer: MEDICARE

## 2023-08-18 VITALS — BODY MASS INDEX: 31.5 KG/M2 | WEIGHT: 196 LBS | HEIGHT: 66 IN

## 2023-08-18 DIAGNOSIS — Z47.1 AFTERCARE FOLLOWING RIGHT KNEE JOINT REPLACEMENT SURGERY: ICD-10-CM

## 2023-08-18 DIAGNOSIS — M17.11 PRIMARY OSTEOARTHRITIS OF RIGHT KNEE: Primary | ICD-10-CM

## 2023-08-18 DIAGNOSIS — Z96.651 AFTERCARE FOLLOWING RIGHT KNEE JOINT REPLACEMENT SURGERY: ICD-10-CM

## 2023-08-18 NOTE — ED NOTES
PT C/O LEFT FLANK PAIN AND LOWER ABD PAIN     David Vargas RN  02/19/17 9577     What Type Of Note Output Would You Prefer (Optional)?: Standard Output How Severe Is Your Rash?: moderate Is This A New Presentation, Or A Follow-Up?: Rash

## 2023-10-26 ENCOUNTER — OFFICE VISIT (OUTPATIENT)
Dept: ORTHOPEDIC SURGERY | Facility: CLINIC | Age: 58
End: 2023-10-26
Payer: MEDICARE

## 2023-10-26 VITALS — WEIGHT: 196 LBS | HEIGHT: 66 IN | BODY MASS INDEX: 31.5 KG/M2

## 2023-10-26 DIAGNOSIS — Z47.1 AFTERCARE FOLLOWING RIGHT KNEE JOINT REPLACEMENT SURGERY: ICD-10-CM

## 2023-10-26 DIAGNOSIS — Z96.651 AFTERCARE FOLLOWING RIGHT KNEE JOINT REPLACEMENT SURGERY: ICD-10-CM

## 2023-10-26 DIAGNOSIS — M70.61 TROCHANTERIC BURSITIS OF RIGHT HIP: Primary | ICD-10-CM

## 2023-10-26 RX ORDER — TRIAMCINOLONE ACETONIDE 40 MG/ML
80 INJECTION, SUSPENSION INTRA-ARTICULAR; INTRAMUSCULAR
Status: COMPLETED | OUTPATIENT
Start: 2023-10-26 | End: 2023-10-26

## 2023-10-26 RX ORDER — LIDOCAINE HYDROCHLORIDE 10 MG/ML
4 INJECTION, SOLUTION EPIDURAL; INFILTRATION; INTRACAUDAL; PERINEURAL
Status: COMPLETED | OUTPATIENT
Start: 2023-10-26 | End: 2023-10-26

## 2023-10-26 RX ORDER — MECLIZINE HYDROCHLORIDE 25 MG/1
TABLET ORAL
COMMUNITY

## 2023-10-26 RX ORDER — HYDROCODONE BITARTRATE AND ACETAMINOPHEN 5; 325 MG/1; MG/1
1 TABLET ORAL EVERY 12 HOURS SCHEDULED
COMMUNITY
Start: 2023-09-26

## 2023-10-26 RX ADMIN — TRIAMCINOLONE ACETONIDE 80 MG: 40 INJECTION, SUSPENSION INTRA-ARTICULAR; INTRAMUSCULAR at 11:40

## 2023-10-26 RX ADMIN — LIDOCAINE HYDROCHLORIDE 4 ML: 10 INJECTION, SOLUTION EPIDURAL; INFILTRATION; INTRACAUDAL; PERINEURAL at 11:40

## 2023-10-26 NOTE — PROGRESS NOTES
Subjective:     Patient ID: Sánchez Carbajal is a 58 y.o. female.    Chief Complaint:    History of Present Illness  Sánchez Carbajal returns to clinic today for evaluation of right lateral hip/knee pain.  Patient underwent a right total knee arthroplasty by me about 5 months ago on 5/24/2023.  She was doing well up until about 3 weeks ago when she started to notice some lateral hip and knee pain.  She states she is unable to sleep on her right side and the pain radiates down the lateral aspect of her leg from the bony part of her hip to the lateral aspect of her knee.  She was set to come see me back next May for her 1 year checkup but came in sooner because something just does not feel right.  She denies any specific injury fevers chills or swelling in her knee.  She denies any falls or trauma.  She has never had anything like this before and is hopeful that it will get better.     Social History     Occupational History    Not on file   Tobacco Use    Smoking status: Never     Passive exposure: Never    Smokeless tobacco: Never   Vaping Use    Vaping Use: Never used   Substance and Sexual Activity    Alcohol use: No    Drug use: No    Sexual activity: Yes     Partners: Male     Birth control/protection: Hysterectomy      Past Medical History:   Diagnosis Date    Arthritis     RIGHT SHOULDER    Arthritis of back     Not sure    Arthritis of neck     Two years ago    Cervical disc disorder     Two maybe three years ago    Chronic back pain     Diabetes mellitus     Elevated cholesterol     GERD (gastroesophageal reflux disease)     H/O seasonal allergies     History of anemia     History of diverticulitis     History of heart murmur in childhood     History of kidney stones     Hypertension     Migraines     PONV (postoperative nausea and vomiting)     Primary osteoarthritis of knees, bilateral 11/29/2022    Sleep apnea with use of continuous positive airway pressure (CPAP)     Uterine prolapse     UTERINE VAG  "PROLAPSE     Past Surgical History:   Procedure Laterality Date    ANTERIOR AND POSTERIOR VAGINAL REPAIR N/A 01/15/2019    Procedure: /POSTERIOR REPAIR;  Surgeon: James Holloway MD;  Location: [facility] OR;  Service: Gynecology    CERVICAL FUSION      CHOLECYSTECTOMY      COLONOSCOPY N/A 01/18/2017    Procedure: COLONOSCOPY;  Surgeon: Tian Henderson MD;  Location: [facility] OR;  Service:     CYSTOSCOPY URETEROSCOPY LASER LITHOTRIPSY N/A 04/06/2016    Procedure: URETEROSCOPY  ;  Surgeon: Vimal Azul MD;  Location:  [facility] OR;  Service:     CYSTOSCOPY URETEROSCOPY LASER LITHOTRIPSY Left 09/01/2019    Procedure: CYSTOSCOPY left retrograde pyelogram;  Surgeon: Vimal Azul MD;  Location: [facility] MAIN OR;  Service: Urology    CYSTOSCOPY W/ LASER LITHOTRIPSY Left 06/25/2018    Procedure: CYSTOSCOPY, LEFT URETEROSCOPY, left  STENT PLACEMENT, right retrograde;  Surgeon: Vimal Azul MD;  Location: [facility] OR;  Service: Urology    EXTRACORPOREAL SHOCK WAVE LITHOTRIPSY (ESWL)  2015    KNEE SURGERY      SACROCOLPOPEXY N/A 01/15/2019    Procedure: ROBOTIC SACROCOLPOPEXY WITH MESH, TOTAL LAPAROSCOPICE HYSTERECTOMY BILATERAL SALPINGECTOMY WITH DAVINCI ;  Surgeon: James Holloway MD;  Location: [facility] MAIN OR;  Service: DaVinci    TOTAL KNEE ARTHROPLASTY Right 05/24/2023    Procedure: TOTAL KNEE ARTHROPLASTY WITH CORI ROBOT;  Surgeon: Lambert Brady MD;  Location: [facility] OR;  Service: Robotics - Ortho;  Laterality: Right;       Family History   Problem Relation Age of Onset    Cancer Maternal Grandmother         Kidney cancer    Malig Hyperthermia Neg Hx                  Objective:  Vitals:    10/26/23 1059   Weight: 88.9 kg (196 lb)   Height: 167.6 cm (66\")         10/26/23  1059   Weight: 88.9 kg (196 lb)     Body mass index is 31.64 kg/m².        Right Knee Exam     Tenderness   The patient is experiencing tenderness in the lateral joint line and lateral retinaculum (gerdys).    Range of Motion "   Extension:  0   Flexion:  120     Tests   Varus: negative Valgus: negative  Lachman:  Anterior - trace    Posterior - negative  Drawer:  Anterior - trace    Posterior - negative    Other   Erythema: absent  Scars: present  Sensation: normal  Pulse: present  Swelling: none  Effusion: no effusion present      Right Hip Exam     Tenderness   The patient is experiencing tenderness in the greater trochanter.    Range of Motion   Flexion:  110   External rotation:  10   Internal rotation:  30     Muscle Strength   Flexion: 4/5     Tests   KHUSHBU: negative  Fadir:  Positive FADIR test    Other   Erythema: absent  Scars: absent  Sensation: normal  Pulse: present               Imagin views of the right knee were ordered and reviewed by myself in the office today  Indication: right knee replacement  Findings: X-rays demonstrate a cemented right total knee arthroplasty with an unresurfaced patella with implants in expected position no signs of loosening fracture, dislocation, subluxation, wear or subsidence.  No new acute osseous abnormality.  Comparative studies: last visit radiographs    Imaging: AP pelvis was ordered and reviewed by myself in the office today  Indication: right hip pain  Findings: X-rays demonstrate no acute osseous abnormality and no signs of fracture dislocation or subluxation.  X-rays demonstrate no significant joint space narrowing, femoral head flattening, subchondral sclerosis, cystic changes, or periarticular osteophytes.  Comparative studies: None        Assessment:        1. Trochanteric bursitis of right hip    2. Aftercare following right knee joint replacement surgery           Plan:      Large Joint Arthrocentesis: R greater trochanteric bursa  Date/Time: 10/26/2023 11:40 AM  Consent given by: patient  Site marked: site marked  Timeout: Immediately prior to procedure a time out was called to verify the correct patient, procedure, equipment, support staff and site/side marked as required    Supporting Documentation  Indications: pain   Procedure Details  Location: hip - R greater trochanteric bursa  Preparation: Patient was prepped and draped in the usual sterile fashion  Needle gauge: 22g spinal.  Approach: lateral  Medications administered: 4 mL lidocaine PF 1% 1 %; 80 mg triamcinolone acetonide 40 MG/ML  Patient tolerance: patient tolerated the procedure well with no immediate complications          Discussed treatment options at length with patient at today's visit.  I discussed with the patient that at this point time I do not see any radiographic signs of loosening malalignment or any changes from her prior radiographs of her right knee.  I think majority of her issues are stemming from trochanteric bursitis of the right, patient radiates down her lateral thigh to her lateral knee.  She is exquisitely tender over her greater trochanter.  Her knee exam does not show any warning signs for infection.  I discussed with her that I think the best course of action would be a trial of an intra-bursal injection into her right hip and physical therapy to work on stretching strengthening mobility and gait training.  I do not think an infection work-up or loosening work-up for her right knee is warranted at this time based off physical exam findings and radiographs.  I would like to see the patient back in 6 to 8 weeks for reevaluation  The risks of a steroid injection including but not limited to infection, skin discoloration, skin and fatty tissue atrophy, blood sugar elevation and possible joint damage were discussed. Patient understands these risks and wishes to proceed. The patient was placed in a supine position and an injection was performed into the right hip bursa under sterile conditions using 2 CC of Kenalog (40 mg/CC) and 4 CC of 1% lidocaine. The patient was informed that the numbing agent in the injection should last approximately 2-3 hours, but the cortisone may not begin to work for 3-4  days.   Follow up: 6 to 8 weeks without x-rays      Sánchez Carbajal was in agreement with plan and had all questions answered.     Medications:  No orders of the defined types were placed in this encounter.      Followup:  No follow-ups on file.    Diagnoses and all orders for this visit:    1. Trochanteric bursitis of right hip (Primary)  -     XR Pelvis 1 or 2 View  -     Ambulatory Referral to Physical Therapy Evaluate and treat    2. Aftercare following right knee joint replacement surgery  -     XR Knee 1 or 2 View Right  -     Ambulatory Referral to Physical Therapy Evaluate and treat    Other orders  -     Large Joint Arthrocentesis: R greater trochanteric bursa          Dictated utilizing Dragon dictation

## 2023-11-10 ENCOUNTER — HOSPITAL ENCOUNTER (OUTPATIENT)
Dept: PHYSICAL THERAPY | Facility: HOSPITAL | Age: 58
Setting detail: THERAPIES SERIES
Discharge: HOME | End: 2023-11-10
Payer: MEDICARE

## 2023-11-10 DIAGNOSIS — M70.61 TROCHANTERIC BURSITIS OF RIGHT HIP: ICD-10-CM

## 2023-11-10 DIAGNOSIS — Z47.1 AFTERCARE FOLLOWING RIGHT KNEE JOINT REPLACEMENT SURGERY: Primary | ICD-10-CM

## 2023-11-10 DIAGNOSIS — Z96.651 AFTERCARE FOLLOWING RIGHT KNEE JOINT REPLACEMENT SURGERY: Primary | ICD-10-CM

## 2023-11-10 PROCEDURE — 97161 PT EVAL LOW COMPLEX 20 MIN: CPT | Performed by: PHYSICAL THERAPIST

## 2023-11-10 NOTE — THERAPY EVALUATION
Outpatient Physical Therapy Ortho Initial Evaluation   Ramandeep Pratt     Patient Name: Sánchez Carbajal  : 1965  MRN: [id number]  Today's Date: 11/10/2023      Visit Date: 11/10/2023    Patient Active Problem List   Diagnosis    Left ureteral stone    Obstructive sleep apnea syndrome    Seasonal allergic rhinitis    Uterovaginal prolapse    Ureteral stone with hydronephrosis    Upper respiratory tract infection    GERD with apnea    Insomnia    Cervical radiculitis    Primary osteoarthritis of knees, bilateral    Mechanical knee pain, right    Primary osteoarthritis of right knee        Past Medical History:   Diagnosis Date    Arthritis     RIGHT SHOULDER    Arthritis of back     Not sure    Arthritis of neck     Two years ago    Cervical disc disorder     Two maybe three years ago    Chronic back pain     Diabetes mellitus     Elevated cholesterol     GERD (gastroesophageal reflux disease)     H/O seasonal allergies     History of anemia     History of diverticulitis     History of heart murmur in childhood     History of kidney stones     Hypertension     Migraines     PONV (postoperative nausea and vomiting)     Primary osteoarthritis of knees, bilateral 2022    Sleep apnea with use of continuous positive airway pressure (CPAP)     Uterine prolapse     UTERINE VAG PROLAPSE        Past Surgical History:   Procedure Laterality Date    ANTERIOR AND POSTERIOR VAGINAL REPAIR N/A 01/15/2019    Procedure: /POSTERIOR REPAIR;  Surgeon: James Holloway MD;  Location: [facility] OR;  Service: Gynecology    CERVICAL FUSION      CHOLECYSTECTOMY      COLONOSCOPY N/A 2017    Procedure: COLONOSCOPY;  Surgeon: Tian Henderson MD;  Location: [facility] OR;  Service:     CYSTOSCOPY URETEROSCOPY LASER LITHOTRIPSY N/A 2016    Procedure: URETEROSCOPY  ;  Surgeon: Vimal Azul MD;  Location: [facility] OR;  Service:     CYSTOSCOPY URETEROSCOPY LASER LITHOTRIPSY Left 2019    Procedure:  CYSTOSCOPY left retrograde pyelogram;  Surgeon: Vimal Azul MD;  Location: [facility] MAIN OR;  Service: Urology    CYSTOSCOPY W/ LASER LITHOTRIPSY Left 06/25/2018    Procedure: CYSTOSCOPY, LEFT URETEROSCOPY, left  STENT PLACEMENT, right retrograde;  Surgeon: Vimal Azul MD;  Location:  [facility] OR;  Service: Urology    EXTRACORPOREAL SHOCK WAVE LITHOTRIPSY (ESWL)  2015    KNEE SURGERY      SACROCOLPOPEXY N/A 01/15/2019    Procedure: ROBOTIC SACROCOLPOPEXY WITH MESH, TOTAL LAPAROSCOPICE HYSTERECTOMY BILATERAL SALPINGECTOMY WITH DAVINCI ;  Surgeon: James Holloway MD;  Location: [facility] MAIN OR;  Service: DaVinci    TOTAL KNEE ARTHROPLASTY Right 05/24/2023    Procedure: TOTAL KNEE ARTHROPLASTY WITH CORI ROBOT;  Surgeon: Lambert Brady MD;  Location:  [facility] OR;  Service: Robotics - Ortho;  Laterality: Right;       Visit Dx:     ICD-10-CM ICD-9-CM   1. Aftercare following right knee joint replacement surgery  Z47.1 V54.81    Z96.651 V43.65   2. Trochanteric bursitis of right hip  M70.61 726.5          Patient History       Row Name 11/10/23 0830             History    Chief Complaint Difficulty Walking;Difficulty with daily activities;Pain  -GC      Type of Pain Knee pain;Hip pain  -GC      Brief Description of Current Complaint Pt reports a 4-5 week history of rightr hip and knee pain. She states it started as righ thip pain but has spread down her leg to ther knee. She says most of the pain is in the back and side of her knee and on the bone of her hip. She was seen by Dr. Brady who did x-rays and injected her hip. She did not get much relief from the injection. She is now referred for therapy.  -GC      Patient/Caregiver Goals Relieve pain;Return to prior level of function;Improve mobility  -      Patient's Rating of General Health Good  -GC      Hand Dominance right-handed  -GC      Occupation/sports/leisure activities retired, enjoys walking  -      What clinical tests have you had for this  problem? X-ray  -      Results of Clinical Tests negative  -      History of Previous Related Injuries right TKA 5/29/2023  -         Pain     Pain Location Hip;Knee  right  -GC      Pain at Present 8  -GC      Pain at Best 8  -GC      Pain at Worst 9;10  -GC      Pain Frequency Constant/continuous  -      Pain Description Aching;Dull;Sore;Tender  -GC      What Performance Factors Make the Current Problem(s) WORSE? Pt c/o pain with walking, being on her feet  -GC      What Performance Factors Make the Current Problem(s) BETTER? Pt feels best if she gets off her feet and rests  -      Difficulties with ADL's? Pt has pain with being on her feet  -         Daily Activities    Primary Language English  -GC      Are you able to read Yes  -GC      Are you able to write Yes  -GC      How does patient learn best? Reading  -GC      Teaching needs identified Home Exercise Program;Management of Condition  -      Patient is concerned about/has problems with Climbing Stairs;Difficulty with self care (i.e. bathing, dressing, toileting:;Performing home management (household chores, shopping, care of dependents);Standing;Transfers (getting out of a chair, bed);Walking  -GC      Does patient have problems with the following? Depression;Anxiety  -      Barriers to learning None  -      Functional Status bathing;dressing;grooming;mobility issues preventing performance of daily activities  -GC      Pt Participated in POC and Goals Yes  -         Safety    Are you being hurt, hit, or frightened by anyone at home or in your life? No  -GC      Are you being neglected by a caregiver No  -GC                User Key  (r) = Recorded By, (t) = Taken By, (c) = Cosigned By      Initials Name Provider Type     Leo Bee, PT Physical Therapist                     PT Ortho       Row Name 11/10/23 8525       Hip/Thigh Palpation    Greater Trochanter Right:;Tender  -       Hip Special Tests    Shima’s test (tightness of  ITB) Right:;Positive  -GC       Knee Palpation    Patella Tendon Right:;Tender  -GC    Medial Joint Line Right:;Tender  -GC    Lateral Joint Line Right:;Tender  -GC    Biceps Femoris Right:;Tender  -GC       Patellar Accessory Motions    Superior glide Right:;WNL  -GC    Inferior glide Right:;WNL  -GC    Medial glide Right:;WNL  -GC    Lateral glide Right:;WNL  -GC       Knee Special Tests    Srinivasa’s sign (DVT) Right:;Negative  -GC       Right Lower Ext    Rt Knee Extension/Flexion AROM 0-4-116 degrees  -GC       MMT Right Lower Ext    Rt Hip Flexion MMT, Gross Movement (4/5) good  -GC    Rt Hip ABduction MMT, Gross Movement (4/5) good  -GC    Rt Knee Extension MMT, Gross Movement (5/5) normal  -GC    Rt Knee Flexion MMT, Gross Movement (4/5) good  -GC       Lower Extremity Flexibility    Hamstrings Right:;Moderately limited  -GC    Quadriceps Right:;Mildly limited  -GC    ITB Right:;Moderately limited  -GC    Hip External Rotators Right:;Moderately limited  -GC    Hip Internal Rotators Right:;Moderately limited  -GC    Gastrocnemius Right:;WNL  -GC       Transfers    Comment, (Transfers) Pt is independent with all bed mobility and transfers  -       Gait/Stairs (Locomotion)    Comment, (Gait/Stairs) Pt ambulates with mild antalgic gait right LE demonstrating a decreased stride length on the right compared to the left  -              User Key  (r) = Recorded By, (t) = Taken By, (c) = Cosigned By      Initials Name Provider Type    GC Leo Bee, PT Physical Therapist                                Therapy Education  Given: HEP, Symptoms/condition management, Pain management  Program: New  How Provided: Verbal, Demonstration, Written  Provided to: Patient  Level of Understanding: Teach back education performed, Verbalized, Demonstrated      PT OP Goals       Row Name 11/10/23 0563          PT Short Term Goals    STG Date to Achieve 11/24/23  -     STG 1 Decrease right hip and knee pain to 4-5/10 with  activity.  -     STG 2 Increase right hamstring, ITB, and piriformis flexibility to only a minimal restriction with testing.  -GC     STG 3 Increase right LE strength to at least 4+/5 all planes with testing.  -GC     STG 4 Increase right knee AROM to 0-125 degrees with testing.  -     STG 5 Pt will be independent with her HEP issued by this therapist.  -        Long Term Goals    LTG Date to Achieve 12/08/23  -     LTG 1 Decrease right hip and knee pain to 1-2/10 with activity.  -GC     LTG 2 Increase right hamstring, ITB, and piriformis flexibility to WFL with testing.  -GC     LTG 3 Increase right LE strength to 5/5 all planes with testing.  -     LTG 4 Pt will be independent with all ADLs without increased pain and have a LEFS score > 50.  -        Time Calculation    PT Goal Re-Cert Due Date 12/08/23  -               User Key  (r) = Recorded By, (t) = Taken By, (c) = Cosigned By      Initials Name Provider Type     Leo Bee, PT Physical Therapist                     PT Assessment/Plan       Row Name 11/10/23 0830          PT Assessment    Functional Limitations Impaired gait;Limitation in home management;Limitations in community activities;Limitations in functional capacity and performance;Performance in leisure activities;Performance in self-care ADL  -     Impairments Gait;Impaired flexibility;Range of motion;Pain;Muscle strength  -     Assessment Comments Pt presents with a 4-5 week history of right hip and knee pain that she rates up to 9-10/10 with activity. she has decreased right knee ROM, decreased right LE flexibility, decreased right LE strength, decreased ambulatory status, and decreased function secondary to the above.  -     Rehab Potential Good  -     Patient/caregiver participated in establishment of treatment plan and goals Yes  -     Patient would benefit from skilled therapy intervention Yes  -        PT Plan    PT Frequency 1x/week;2x/week  -     Predicted  Duration of Therapy Intervention (PT) 4 weeks  -GC     Planned CPT's? PT EVAL LOW COMPLEXITY: 48677;PT THER PROC EA 15 MIN: 67745;PT IONTOPHORESIS EA 15 MIN: 71069;PT ULTRASOUND EA 15 MIN: 59231  -GC     PT Plan Comments Pt is to continue her HEP 2x daily  -GC               User Key  (r) = Recorded By, (t) = Taken By, (c) = Cosigned By      Initials Name Provider Type    GC Leo Bee, PT Physical Therapist                     Modalities       Row Name 11/10/23 0830             Iontophoresis 40420    Milliamps 3  -GC      MA/Min 40  -GC      Dexamethasone used Yes  -GC      Patch Type Medium  right greater trochanter  -GC      58622 - PT Iontophoresis Minutes 13  -GC                User Key  (r) = Recorded By, (t) = Taken By, (c) = Cosigned By      Initials Name Provider Type    Leo Rubio, PT Physical Therapist                   OP Exercises       Row Name 11/10/23 0830             Exercise 1    Exercise Name 1 Hamstring stretch with ADD  -GC      Reps 1 10  -GC      Time 1 10 secs  -GC         Exercise 2    Exercise Name 2 Piriformis stretch  -GC      Reps 2 10  -GC      Time 2 10 secs  -GC         Exercise 3    Exercise Name 3 Bridge vs theraband  -GC      Reps 3 25  -GC      Time 3 green  -GC         Exercise 4    Exercise Name 4 Supine clam shells vs theraband  -GC      Reps 4 25  -GC      Time 4 green  -GC         Exercise 5    Exercise Name 5 hip ER vs theraband  -GC      Reps 5 25  -GC      Time 5 green  -GC         Exercise 6    Exercise Name 6 hip IR vs theraband  -GC      Reps 6 25  -GC      Time 6 green  -GC                User Key  (r) = Recorded By, (t) = Taken By, (c) = Cosigned By      Initials Name Provider Type    GC Leo Bee, PT Physical Therapist                                  Outcome Measure Options: Lower Extremity Functional Scale (LEFS)  Lower Extremity Functional Index  Any of your usual work, housework or school activities: Quite a bit of difficulty  Your usual hobbies,  recreational or sporting activities: Quite a bit of difficulty  Getting into or out of the bath: Quite a bit of difficulty  Walking between rooms: Quite a bit of difficulty  Putting on your shoes or socks: Quite a bit of difficulty  Squatting: Quite a bit of difficulty  Lifting an object, like a bag of groceries from the floor: Quite a bit of difficulty  Performing light activities around your home: Quite a bit of difficulty  Performing heavy activities around your home: Quite a bit of difficulty  Getting into or out of a car: Quite a bit of difficulty  Walking 2 blocks: Quite a bit of difficulty  Walking a mile: Quite a bit of difficulty  Going up or down 10 stairs (about 1 flight of stairs): Quite a bit of difficulty  Standing for 1 hour: Quite a bit of difficulty  Sitting for 1 hour: Quite a bit of difficulty  Running on even ground: Quite a bit of difficulty  Running on uneven ground: Quite a bit of difficulty  Making sharp turns while running fast: Quite a bit of difficulty  Hopping: Quite a bit of difficulty  Rolling over in bed: A little bit of difficulty  Total: 22  Lower Extremity Functional Index  Any of your usual work, housework or school activities: Quite a bit of difficulty  Your usual hobbies, recreational or sporting activities: Quite a bit of difficulty  Getting into or out of the bath: Quite a bit of difficulty  Walking between rooms: Quite a bit of difficulty  Putting on your shoes or socks: Quite a bit of difficulty  Squatting: Quite a bit of difficulty  Lifting an object, like a bag of groceries from the floor: Quite a bit of difficulty  Performing light activities around your home: Quite a bit of difficulty  Performing heavy activities around your home: Quite a bit of difficulty  Getting into or out of a car: Quite a bit of difficulty  Walking 2 blocks: Quite a bit of difficulty  Walking a mile: Quite a bit of difficulty  Going up or down 10 stairs (about 1 flight of stairs): Quite a bit of  difficulty  Standing for 1 hour: Quite a bit of difficulty  Sitting for 1 hour: Quite a bit of difficulty  Running on even ground: Quite a bit of difficulty  Running on uneven ground: Quite a bit of difficulty  Making sharp turns while running fast: Quite a bit of difficulty  Hopping: Quite a bit of difficulty  Rolling over in bed: A little bit of difficulty  Total: 22      Time Calculation:     Start Time: 0830  Stop Time: 0932  Time Calculation (min): 62 min  Timed Charges  50881 - PT Iontophoresis Minutes: 13  Total Minutes  Timed Charges Total Minutes: 13   Total Minutes: 13     Therapy Charges for Today       Code Description Service Date Service Provider Modifiers Qty    74705591299 HC PT EVAL LOW COMPLEXITY 3 11/10/2023 Leo Bee, PT GP 1            PT G-Codes  Outcome Measure Options: Lower Extremity Functional Scale (LEFS)  Total: 22         Leo Bee PT  11/10/2023

## 2023-11-13 ENCOUNTER — HOSPITAL ENCOUNTER (OUTPATIENT)
Dept: PHYSICAL THERAPY | Facility: HOSPITAL | Age: 58
Setting detail: THERAPIES SERIES
Discharge: HOME | End: 2023-11-13
Payer: MEDICARE

## 2023-11-13 DIAGNOSIS — Z47.1 AFTERCARE FOLLOWING RIGHT KNEE JOINT REPLACEMENT SURGERY: Primary | ICD-10-CM

## 2023-11-13 DIAGNOSIS — Z96.651 AFTERCARE FOLLOWING RIGHT KNEE JOINT REPLACEMENT SURGERY: Primary | ICD-10-CM

## 2023-11-13 DIAGNOSIS — M70.61 TROCHANTERIC BURSITIS OF RIGHT HIP: ICD-10-CM

## 2023-11-13 PROCEDURE — 97110 THERAPEUTIC EXERCISES: CPT

## 2023-11-13 NOTE — THERAPY TREATMENT NOTE
Outpatient Physical Therapy Ortho Treatment Note   Ramandeep Pratt     Patient Name: Sánchez Carbajal  : 1965  MRN: [id number]  Today's Date: 2023      Visit Date: 2023    Visit Dx:    ICD-10-CM ICD-9-CM   1. Aftercare following right knee joint replacement surgery  Z47.1 V54.81    Z96.651 V43.65   2. Trochanteric bursitis of right hip  M70.61 726.5       Patient Active Problem List   Diagnosis    Left ureteral stone    Obstructive sleep apnea syndrome    Seasonal allergic rhinitis    Uterovaginal prolapse    Ureteral stone with hydronephrosis    Upper respiratory tract infection    GERD with apnea    Insomnia    Cervical radiculitis    Primary osteoarthritis of knees, bilateral    Mechanical knee pain, right    Primary osteoarthritis of right knee        Past Medical History:   Diagnosis Date    Arthritis     RIGHT SHOULDER    Arthritis of back     Not sure    Arthritis of neck     Two years ago    Cervical disc disorder     Two maybe three years ago    Chronic back pain     Diabetes mellitus     Elevated cholesterol     GERD (gastroesophageal reflux disease)     H/O seasonal allergies     History of anemia     History of diverticulitis     History of heart murmur in childhood     History of kidney stones     Hypertension     Migraines     PONV (postoperative nausea and vomiting)     Primary osteoarthritis of knees, bilateral 2022    Sleep apnea with use of continuous positive airway pressure (CPAP)     Uterine prolapse     UTERINE VAG PROLAPSE        Past Surgical History:   Procedure Laterality Date    ANTERIOR AND POSTERIOR VAGINAL REPAIR N/A 01/15/2019    Procedure: /POSTERIOR REPAIR;  Surgeon: James Holloway MD;  Location: [facility] OR;  Service: Gynecology    CERVICAL FUSION      CHOLECYSTECTOMY      COLONOSCOPY N/A 2017    Procedure: COLONOSCOPY;  Surgeon: Tian Henderson MD;  Location: [facility] OR;  Service:     CYSTOSCOPY URETEROSCOPY LASER LITHOTRIPSY N/A  04/06/2016    Procedure: URETEROSCOPY  ;  Surgeon: Vimal Azul MD;  Location: [facility] OR;  Service:     CYSTOSCOPY URETEROSCOPY LASER LITHOTRIPSY Left 09/01/2019    Procedure: CYSTOSCOPY left retrograde pyelogram;  Surgeon: Vimal Azul MD;  Location: [facility] OR;  Service: Urology    CYSTOSCOPY W/ LASER LITHOTRIPSY Left 06/25/2018    Procedure: CYSTOSCOPY, LEFT URETEROSCOPY, left  STENT PLACEMENT, right retrograde;  Surgeon: Vimal Azul MD;  Location: [facility] OR;  Service: Urology    EXTRACORPOREAL SHOCK WAVE LITHOTRIPSY (ESWL)  2015    KNEE SURGERY      SACROCOLPOPEXY N/A 01/15/2019    Procedure: ROBOTIC SACROCOLPOPEXY WITH MESH, TOTAL LAPAROSCOPICE HYSTERECTOMY BILATERAL SALPINGECTOMY WITH DAVINCI ;  Surgeon: James Holloway MD;  Location: [facility];  Service: DaVinci    TOTAL KNEE ARTHROPLASTY Right 05/24/2023    Procedure: TOTAL KNEE ARTHROPLASTY WITH CORI ROBOT;  Surgeon: Lambert Brady MD;  Location: [facility];  Service: Robotics - Ortho;  Laterality: Right;                        PT Assessment/Plan       Row Name 11/13/23 0930          PT Assessment    Assessment Comments Pt tolerated current treatment plan well.  -KM        PT Plan    PT Plan Comments Continue per POC  -KM               User Key  (r) = Recorded By, (t) = Taken By, (c) = Cosigned By      Initials Name Provider Type    Radha Bullard PTA Physical Therapist Assistant                     Modalities       Row Name 11/13/23 0930             Iontophoresis 16518    Milliamps 3  -KM      MA/Min 40  -KM      Dexamethasone used Yes  -KM      Patch Type Medium  right greater trochanter  -KM         Functional Testing    Outcome Measure Options Lower Extremity Functional Scale (LEFS)  -KM                User Key  (r) = Recorded By, (t) = Taken By, (c) = Cosigned By      Initials Name Provider Type    Radha Bullard PTA Physical Therapist Assistant                   OP Exercises       Row Name 11/13/23 0930              Subjective    Subjective Comments Pt states her hip is doing better.  -KM         Exercise 1    Exercise Name 1 Hamstring stretch with ADD  -KM      Reps 1 10  -KM      Time 1 10 secs  -KM         Exercise 2    Exercise Name 2 Piriformis stretch  -KM      Reps 2 10  -KM      Time 2 10 secs  -KM         Exercise 3    Exercise Name 3 Bridge vs theraband  -KM      Reps 3 25  -KM      Time 3 green  -KM         Exercise 4    Exercise Name 4 Supine clam shells vs theraband  -KM      Reps 4 25  -KM      Time 4 green  -KM         Exercise 5    Exercise Name 5 hip ER vs theraband  -KM      Reps 5 25  -KM      Time 5 green  -KM         Exercise 6    Exercise Name 6 hip IR vs theraband  -KM      Reps 6 25  -KM      Time 6 green  -KM                User Key  (r) = Recorded By, (t) = Taken By, (c) = Cosigned By      Initials Name Provider Type    Radha Bullard PTA Physical Therapist Assistant                                          Outcome Measure Options: Lower Extremity Functional Scale (LEFS)         Time Calculation:   Start Time: 0930  Stop Time: 1006  Time Calculation (min): 36 min  Therapy Charges for Today       Code Description Service Date Service Provider Modifiers Qty    16133487065 HC PT THER PROC EA 15 MIN 11/13/2023 Radha De La Cruz PTA GP 1            PT G-Codes  Outcome Measure Options: Lower Extremity Functional Scale (LEFS)         Radha De La Cruz PTA  11/13/2023

## 2023-11-20 ENCOUNTER — HOSPITAL ENCOUNTER (OUTPATIENT)
Dept: PHYSICAL THERAPY | Facility: HOSPITAL | Age: 58
Setting detail: THERAPIES SERIES
Discharge: HOME | End: 2023-11-20
Payer: MEDICARE

## 2023-11-20 DIAGNOSIS — M70.61 TROCHANTERIC BURSITIS OF RIGHT HIP: Primary | ICD-10-CM

## 2023-11-20 PROCEDURE — 97110 THERAPEUTIC EXERCISES: CPT

## 2023-11-20 NOTE — THERAPY TREATMENT NOTE
Outpatient Physical Therapy Ortho Treatment Note   Ramandeep Pratt     Patient Name: Sánchez Carbajal  : 1965  MRN: [id number]  Today's Date: 2023      Visit Date: 2023    Visit Dx:    ICD-10-CM ICD-9-CM   1. Trochanteric bursitis of right hip  M70.61 726.5       Patient Active Problem List   Diagnosis    Left ureteral stone    Obstructive sleep apnea syndrome    Seasonal allergic rhinitis    Uterovaginal prolapse    Ureteral stone with hydronephrosis    Upper respiratory tract infection    GERD with apnea    Insomnia    Cervical radiculitis    Primary osteoarthritis of knees, bilateral    Mechanical knee pain, right    Primary osteoarthritis of right knee        Past Medical History:   Diagnosis Date    Arthritis     RIGHT SHOULDER    Arthritis of back     Not sure    Arthritis of neck     Two years ago    Cervical disc disorder     Two maybe three years ago    Chronic back pain     Diabetes mellitus     Elevated cholesterol     GERD (gastroesophageal reflux disease)     H/O seasonal allergies     History of anemia     History of diverticulitis     History of heart murmur in childhood     History of kidney stones     Hypertension     Migraines     PONV (postoperative nausea and vomiting)     Primary osteoarthritis of knees, bilateral 2022    Sleep apnea with use of continuous positive airway pressure (CPAP)     Uterine prolapse     UTERINE VAG PROLAPSE        Past Surgical History:   Procedure Laterality Date    ANTERIOR AND POSTERIOR VAGINAL REPAIR N/A 01/15/2019    Procedure: /POSTERIOR REPAIR;  Surgeon: James Holloway MD;  Location: [facility] OR;  Service: Gynecology    CERVICAL FUSION      CHOLECYSTECTOMY      COLONOSCOPY N/A 2017    Procedure: COLONOSCOPY;  Surgeon: Tian Henderson MD;  Location: [facility] OR;  Service:     CYSTOSCOPY URETEROSCOPY LASER LITHOTRIPSY N/A 2016    Procedure: URETEROSCOPY  ;  Surgeon: Vimal Azul MD;  Location: [facility] OR;   Service:     CYSTOSCOPY URETEROSCOPY LASER LITHOTRIPSY Left 09/01/2019    Procedure: CYSTOSCOPY left retrograde pyelogram;  Surgeon: Vimal Azul MD;  Location: [facility] OR;  Service: Urology    CYSTOSCOPY W/ LASER LITHOTRIPSY Left 06/25/2018    Procedure: CYSTOSCOPY, LEFT URETEROSCOPY, left  STENT PLACEMENT, right retrograde;  Surgeon: Vimal Azul MD;  Location: [facility] OR;  Service: Urology    EXTRACORPOREAL SHOCK WAVE LITHOTRIPSY (ESWL)  2015    KNEE SURGERY      SACROCOLPOPEXY N/A 01/15/2019    Procedure: ROBOTIC SACROCOLPOPEXY WITH MESH, TOTAL LAPAROSCOPICE HYSTERECTOMY BILATERAL SALPINGECTOMY WITH DAVINCI ;  Surgeon: James Holloway MD;  Location: [facility] OR;  Service: DaVinci    TOTAL KNEE ARTHROPLASTY Right 05/24/2023    Procedure: TOTAL KNEE ARTHROPLASTY WITH CORI ROBOT;  Surgeon: Lambert Brady MD;  Location: [facility] OR;  Service: Robotics - Ortho;  Laterality: Right;                        PT Assessment/Plan       Row Name 11/20/23 4056          PT Assessment    Assessment Comments Pt tolerated progression of ther ex well.  -KM        PT Plan    PT Plan Comments Continue per POC  -KM               User Key  (r) = Recorded By, (t) = Taken By, (c) = Cosigned By      Initials Name Provider Type    Radha Bullard PTA Physical Therapist Assistant                     Modalities       Row Name 11/20/23 2650             Iontophoresis 64832    Milliamps 2.7  -KM      MA/Min 40  -KM      Dexamethasone used Yes  -KM      Patch Type Medium  right greater trochanter  -KM         Functional Testing    Outcome Measure Options Lower Extremity Functional Scale (LEFS)  -KM                User Key  (r) = Recorded By, (t) = Taken By, (c) = Cosigned By      Initials Name Provider Type    Radha Bullard PTA Physical Therapist Assistant                   OP Exercises       Row Name 11/20/23 0651             Subjective    Subjective Comments Pt states her knee is sore, but her hip is doing better.   -KM         Exercise 1    Exercise Name 1 Hamstring stretch with ADD  -KM      Reps 1 10  -KM      Time 1 10 secs  -KM         Exercise 2    Exercise Name 2 Piriformis stretch  -KM      Reps 2 10  -KM      Time 2 10 secs  -KM         Exercise 3    Exercise Name 3 Bridge vs theraband  -KM      Reps 3 25  -KM      Time 3 blue  -KM         Exercise 4    Exercise Name 4 Supine clam shells vs theraband  -KM      Reps 4 25  -KM      Time 4 blue  -KM         Exercise 5    Exercise Name 5 hip ER vs theraband  -KM      Reps 5 25  -KM      Time 5 blue  -KM         Exercise 6    Exercise Name 6 hip IR vs theraband  -KM      Reps 6 25  -KM      Time 6 blue  -KM                User Key  (r) = Recorded By, (t) = Taken By, (c) = Cosigned By      Initials Name Provider Type    Radha Bullard PTA Physical Therapist Assistant                                          Outcome Measure Options: Lower Extremity Functional Scale (LEFS)         Time Calculation:   Start Time: 0650  Stop Time: 0725  Time Calculation (min): 35 min  Therapy Charges for Today       Code Description Service Date Service Provider Modifiers Qty    94431182902 HC PT THER PROC EA 15 MIN 11/20/2023 Radha De La Cruz PTA GP 1            PT G-Codes  Outcome Measure Options: Lower Extremity Functional Scale (LEFS)         Radha De La Cruz PTA  11/20/2023

## 2023-11-27 ENCOUNTER — APPOINTMENT (OUTPATIENT)
Dept: PHYSICAL THERAPY | Facility: HOSPITAL | Age: 58
End: 2023-11-27
Payer: MEDICARE

## 2023-11-28 ENCOUNTER — HOSPITAL ENCOUNTER (OUTPATIENT)
Dept: PHYSICAL THERAPY | Facility: HOSPITAL | Age: 58
Setting detail: THERAPIES SERIES
Discharge: HOME | End: 2023-11-28
Payer: MEDICARE

## 2023-11-28 DIAGNOSIS — M70.61 TROCHANTERIC BURSITIS OF RIGHT HIP: Primary | ICD-10-CM

## 2023-11-28 PROCEDURE — 97110 THERAPEUTIC EXERCISES: CPT

## 2023-11-28 NOTE — THERAPY TREATMENT NOTE
Outpatient Physical Therapy Ortho Treatment Note   Ramandeep Pratt     Patient Name: Sánchez Carbajal  : 1965  MRN: [id number]  Today's Date: 2023      Visit Date: 2023    Visit Dx:    ICD-10-CM ICD-9-CM   1. Trochanteric bursitis of right hip  M70.61 726.5       Patient Active Problem List   Diagnosis    Left ureteral stone    Obstructive sleep apnea syndrome    Seasonal allergic rhinitis    Uterovaginal prolapse    Ureteral stone with hydronephrosis    Upper respiratory tract infection    GERD with apnea    Insomnia    Cervical radiculitis    Primary osteoarthritis of knees, bilateral    Mechanical knee pain, right    Primary osteoarthritis of right knee        Past Medical History:   Diagnosis Date    Arthritis     RIGHT SHOULDER    Arthritis of back     Not sure    Arthritis of neck     Two years ago    Cervical disc disorder     Two maybe three years ago    Chronic back pain     Diabetes mellitus     Elevated cholesterol     GERD (gastroesophageal reflux disease)     H/O seasonal allergies     History of anemia     History of diverticulitis     History of heart murmur in childhood     History of kidney stones     Hypertension     Migraines     PONV (postoperative nausea and vomiting)     Primary osteoarthritis of knees, bilateral 2022    Sleep apnea with use of continuous positive airway pressure (CPAP)     Uterine prolapse     UTERINE VAG PROLAPSE        Past Surgical History:   Procedure Laterality Date    ANTERIOR AND POSTERIOR VAGINAL REPAIR N/A 01/15/2019    Procedure: /POSTERIOR REPAIR;  Surgeon: James Holloway MD;  Location: [facility] OR;  Service: Gynecology    CERVICAL FUSION      CHOLECYSTECTOMY      COLONOSCOPY N/A 2017    Procedure: COLONOSCOPY;  Surgeon: Tian Henderson MD;  Location: [facility] OR;  Service:     CYSTOSCOPY URETEROSCOPY LASER LITHOTRIPSY N/A 2016    Procedure: URETEROSCOPY  ;  Surgeon: Vimal Azul MD;  Location: [facility] OR;   Service:     CYSTOSCOPY URETEROSCOPY LASER LITHOTRIPSY Left 09/01/2019    Procedure: CYSTOSCOPY left retrograde pyelogram;  Surgeon: Vimal Azul MD;  Location: [facility] MAIN OR;  Service: Urology    CYSTOSCOPY W/ LASER LITHOTRIPSY Left 06/25/2018    Procedure: CYSTOSCOPY, LEFT URETEROSCOPY, left  STENT PLACEMENT, right retrograde;  Surgeon: Vimal Azul MD;  Location:  [facility] OR;  Service: Urology    EXTRACORPOREAL SHOCK WAVE LITHOTRIPSY (ESWL)  2015    KNEE SURGERY      SACROCOLPOPEXY N/A 01/15/2019    Procedure: ROBOTIC SACROCOLPOPEXY WITH MESH, TOTAL LAPAROSCOPICE HYSTERECTOMY BILATERAL SALPINGECTOMY WITH DAVINCI ;  Surgeon: James Holloway MD;  Location: [facility] MAIN OR;  Service: DaVinci    TOTAL KNEE ARTHROPLASTY Right 05/24/2023    Procedure: TOTAL KNEE ARTHROPLASTY WITH CORI ROBOT;  Surgeon: Lambetr Brady MD;  Location: [facility] OR;  Service: Robotics - Ortho;  Laterality: Right;                        PT Assessment/Plan       Row Name 11/28/23 0840          PT Assessment    Assessment Comments Pt has made good progress toward goals with overall improved function and decreased pain.  -KM        PT Plan    PT Plan Comments With pts improved status, plan to transition to HEP. Pt to see MD in two weeks  -KM               User Key  (r) = Recorded By, (t) = Taken By, (c) = Cosigned By      Initials Name Provider Type    Radha Bullard PTA Physical Therapist Assistant                     Modalities       Row Name 11/28/23 0840             Iontophoresis 77582    Milliamps 3  -KM      MA/Min 40  -KM      Dexamethasone used Yes  -KM      Patch Type Medium  right greater trochanter  -KM         Functional Testing    Outcome Measure Options Lower Extremity Functional Scale (LEFS)  -KM                User Key  (r) = Recorded By, (t) = Taken By, (c) = Cosigned By      Initials Name Provider Type    Radha Bullard PTA Physical Therapist Assistant                   OP Exercises       Row Name 11/28/23  0840             Subjective    Subjective Comments Pt reports soreness after increased standing/walking the past few days but overall doing better. Pt reports improved symptoms and able to complete daily acitivties without limitations  -KM         Exercise 1    Exercise Name 1 Hamstring stretch with ADD  -KM      Reps 1 10  -KM      Time 1 10 secs  -KM         Exercise 2    Exercise Name 2 Piriformis stretch  -KM      Reps 2 10  -KM      Time 2 10 secs  -KM         Exercise 3    Exercise Name 3 Bridge vs theraband  -KM      Reps 3 25  -KM      Time 3 blue  -KM         Exercise 4    Exercise Name 4 Supine clam shells vs theraband  -KM      Reps 4 25  -KM      Time 4 blue  -KM         Exercise 5    Exercise Name 5 hip ER vs theraband  -KM      Reps 5 25  -KM      Time 5 blue  -KM         Exercise 6    Exercise Name 6 hip IR vs theraband  -KM      Reps 6 25  -KM      Time 6 blue  -KM                User Key  (r) = Recorded By, (t) = Taken By, (c) = Cosigned By      Initials Name Provider Type    Radha Bullard PTA Physical Therapist Assistant                                          Outcome Measure Options: Lower Extremity Functional Scale (LEFS)         Time Calculation:   Start Time: 0840  Stop Time: 0915  Time Calculation (min): 35 min  Therapy Charges for Today       Code Description Service Date Service Provider Modifiers Qty    28485509084 HC PT THER PROC EA 15 MIN 11/28/2023 Radha De La Cruz PTA GP 1            PT G-Codes  Outcome Measure Options: Lower Extremity Functional Scale (LEFS)         Radha De La Cruz PTA  11/28/2023

## 2023-12-12 ENCOUNTER — OFFICE VISIT (OUTPATIENT)
Dept: ORTHOPEDIC SURGERY | Facility: CLINIC | Age: 58
End: 2023-12-12
Payer: MEDICARE

## 2023-12-12 VITALS — HEIGHT: 66 IN | BODY MASS INDEX: 31.5 KG/M2 | WEIGHT: 196 LBS

## 2023-12-12 DIAGNOSIS — Z47.1 AFTERCARE FOLLOWING RIGHT KNEE JOINT REPLACEMENT SURGERY: Primary | ICD-10-CM

## 2023-12-12 DIAGNOSIS — M70.61 TROCHANTERIC BURSITIS, RIGHT HIP: ICD-10-CM

## 2023-12-12 DIAGNOSIS — Z96.651 AFTERCARE FOLLOWING RIGHT KNEE JOINT REPLACEMENT SURGERY: Primary | ICD-10-CM

## 2023-12-12 NOTE — PROGRESS NOTES
Subjective:     Patient ID: Sánchez Carbajal is a 58 y.o. female.    Chief Complaint:    History of Present Illness  Sánchez Carbajal returns to clinic today for evaluation of right hip trochanteric bursitis and lateral knee pain.  Patient was seen by me in October and at that time underwent a trial bursal injection.  She states that it is still little bit tender but overall she is doing much better from a hip standpoint.  She states that she still gets pain that radiates down the lateral aspect of her leg to the lateral aspect of her knee.  She denies any new injury or any fevers chills or increasing knee swelling.  She states that she thinks it might just be the weather and it might just be her.     Social History     Occupational History    Not on file   Tobacco Use    Smoking status: Never     Passive exposure: Never    Smokeless tobacco: Never   Vaping Use    Vaping Use: Never used   Substance and Sexual Activity    Alcohol use: No    Drug use: No    Sexual activity: Yes     Partners: Male     Birth control/protection: Hysterectomy      Past Medical History:   Diagnosis Date    Arthritis     RIGHT SHOULDER    Arthritis of back     Not sure    Arthritis of neck     Two years ago    Bursitis of hip     Cervical disc disorder     Two maybe three years ago    Chronic back pain     Diabetes mellitus     Elevated cholesterol     GERD (gastroesophageal reflux disease)     H/O seasonal allergies     History of anemia     History of diverticulitis     History of heart murmur in childhood     History of kidney stones     Hypertension     Migraines     PONV (postoperative nausea and vomiting)     Primary osteoarthritis of knees, bilateral 11/29/2022    Sleep apnea with use of continuous positive airway pressure (CPAP)     Uterine prolapse     UTERINE VAG PROLAPSE     Past Surgical History:   Procedure Laterality Date    ANTERIOR AND POSTERIOR VAGINAL REPAIR N/A 01/15/2019    Procedure: /POSTERIOR REPAIR;  Surgeon:  "James Holloway MD;  Location: [facility] OR;  Service: Gynecology    CERVICAL FUSION      CHOLECYSTECTOMY      COLONOSCOPY N/A 01/18/2017    Procedure: COLONOSCOPY;  Surgeon: Tian Henderson MD;  Location: [facility] OR;  Service:     CYSTOSCOPY URETEROSCOPY LASER LITHOTRIPSY N/A 04/06/2016    Procedure: URETEROSCOPY  ;  Surgeon: Vimal Azul MD;  Location: [facility] OR;  Service:     CYSTOSCOPY URETEROSCOPY LASER LITHOTRIPSY Left 09/01/2019    Procedure: CYSTOSCOPY left retrograde pyelogram;  Surgeon: Vimal Azul MD;  Location: [facility] OR;  Service: Urology    CYSTOSCOPY W/ LASER LITHOTRIPSY Left 06/25/2018    Procedure: CYSTOSCOPY, LEFT URETEROSCOPY, left  STENT PLACEMENT, right retrograde;  Surgeon: Vimal Azul MD;  Location: [facility] OR;  Service: Urology    EXTRACORPOREAL SHOCK WAVE LITHOTRIPSY (ESWL)  2015    JOINT REPLACEMENT  May 20th 2023    KNEE SURGERY      NECK SURGERY      Can't remember date    SACROCOLPOPEXY N/A 01/15/2019    Procedure: ROBOTIC SACROCOLPOPEXY WITH MESH, TOTAL LAPAROSCOPICE HYSTERECTOMY BILATERAL SALPINGECTOMY WITH DAVINCI ;  Surgeon: James Holloway MD;  Location: [facility] OR;  Service: DaVinci    TOTAL KNEE ARTHROPLASTY Right 05/24/2023    Procedure: TOTAL KNEE ARTHROPLASTY WITH CORI ROBOT;  Surgeon: Lambert Brady MD;  Location: [facility];  Service: Robotics - Ortho;  Laterality: Right;       Family History   Problem Relation Age of Onset    Cancer Maternal Grandmother         Kidney cancer    Malig Hyperthermia Neg Hx                  Objective:  Vitals:    12/12/23 1059   Weight: 88.9 kg (196 lb)   Height: 167.6 cm (66\")         12/12/23  1059   Weight: 88.9 kg (196 lb)     Body mass index is 31.64 kg/m².        Right Knee Exam     Tenderness   The patient is experiencing tenderness in the lateral retinaculum and lateral joint line.    Range of Motion   Extension:  0   Flexion:  120     Tests   Varus: negative Valgus: negative  Lachman:  " Anterior - negative    Posterior - negative  Drawer:  Anterior - negative    Posterior - negative    Other   Erythema: absent  Scars: present  Sensation: normal  Pulse: present  Swelling: mild  Effusion: no effusion present      Right Hip Exam     Tenderness   The patient is experiencing tenderness in the lateral and greater trochanter.    Range of Motion   Flexion:  normal   External rotation:  normal   Internal rotation:  normal     Muscle Strength   Flexion: 5/5     Tests   KHUSHBU: negative  Fadir:  Negative FADIR test    Other   Erythema: absent  Scars: absent  Sensation: normal  Pulse: present               Imaging: no new    Assessment:        1. Aftercare following right knee joint replacement surgery    2. Trochanteric bursitis, right hip           Plan:          Discussed treatment options at length with patient at today's visit.  I discussed with the patient that at this point in time I think the majority of her issues are still stemming from her hip bursitis and causing pain down her IT band to the lateral aspect of her knee.  She does have some painful popping in the lateral aspect of the knee but x-rays demonstrate no signs of osteophyte formation or loose body.  I did discuss with her that I think this is mostly scar tissue.  I recommended formal physical therapy to address both of these issues but she states right now she is not able to afford it.  She is hopeful to get another hip injection.  I discussed with her that we cannot do that until January.  She like to make an appointment to see me in January for another hip injection.  I think that is a reasonable option.  She would like to hold off on further treatments for now.  Follow up: January for hip trochanteric bursal injection      Sánchez Carbajal was in agreement with plan and had all questions answered.     Medications:  No orders of the defined types were placed in this encounter.      Followup:  No follow-ups on file.    Diagnoses and all  orders for this visit:    1. Aftercare following right knee joint replacement surgery (Primary)    2. Trochanteric bursitis, right hip          Dictated utilizing Dragon dictation

## 2024-01-24 ENCOUNTER — TELEPHONE (OUTPATIENT)
Dept: ORTHOPEDIC SURGERY | Facility: CLINIC | Age: 59
End: 2024-01-24

## 2024-01-24 NOTE — TELEPHONE ENCOUNTER
Caller: Sánchez Carbajal    Relationship to patient: Self    Best call back number: [phone] (home) [phone] (work)    Patient is needing: PATIENT RETURNED CALL TO VERIFY THAT HER APPT ON 1/30/24 IS FOR THE RIGHT HIP AND NOT THE RIGHT KNEE.

## 2024-01-30 ENCOUNTER — CLINICAL SUPPORT (OUTPATIENT)
Dept: ORTHOPEDIC SURGERY | Facility: CLINIC | Age: 59
End: 2024-01-30
Payer: MEDICARE

## 2024-01-30 VITALS — HEIGHT: 66 IN | BODY MASS INDEX: 32.14 KG/M2 | WEIGHT: 200 LBS

## 2024-01-30 DIAGNOSIS — M70.61 TROCHANTERIC BURSITIS, RIGHT HIP: Primary | ICD-10-CM

## 2024-01-30 PROCEDURE — 20610 DRAIN/INJ JOINT/BURSA W/O US: CPT | Performed by: INTERNAL MEDICINE

## 2024-01-30 RX ORDER — LIDOCAINE HYDROCHLORIDE 10 MG/ML
4 INJECTION, SOLUTION EPIDURAL; INFILTRATION; INTRACAUDAL; PERINEURAL
Status: COMPLETED | OUTPATIENT
Start: 2024-01-30 | End: 2024-01-30

## 2024-01-30 RX ORDER — TIRZEPATIDE 2.5 MG/.5ML
INJECTION, SOLUTION SUBCUTANEOUS
COMMUNITY
Start: 2024-01-17

## 2024-01-30 RX ORDER — TRIAMCINOLONE ACETONIDE 40 MG/ML
80 INJECTION, SUSPENSION INTRA-ARTICULAR; INTRAMUSCULAR
Status: COMPLETED | OUTPATIENT
Start: 2024-01-30 | End: 2024-01-30

## 2024-01-30 RX ADMIN — TRIAMCINOLONE ACETONIDE 80 MG: 40 INJECTION, SUSPENSION INTRA-ARTICULAR; INTRAMUSCULAR at 08:07

## 2024-01-30 RX ADMIN — LIDOCAINE HYDROCHLORIDE 4 ML: 10 INJECTION, SOLUTION EPIDURAL; INFILTRATION; INTRACAUDAL; PERINEURAL at 08:07

## 2024-01-30 NOTE — PROGRESS NOTES
Encounter Diagnosis   Name Primary?   • Trochanteric bursitis, right hip Yes       Patient presents to clinic today for right trochanteric bursal injection(s). I explained details of injections as well as risks, benefits and alternatives with the patient today, had all questions answered, wished to proceed with injection.  I will see patient back as needed for follow up on injections. Patient was instructed to watch for signs or symptoms of infection including redness, swelling, warmth to the touch, or significant increased pain and to contact our office immediately if any of these issues were noted.      Follow up: 6 weeks    - Large Joint Arthrocentesis: R greater trochanteric bursa on 1/30/2024 8:07 AM  Indications: pain  Details: 22 G (Spinal needle) needle, lateral approach  Medications: 80 mg triamcinolone acetonide 40 MG/ML; 4 mL lidocaine PF 1% 1 %  Outcome: tolerated well, no immediate complications  Procedure, treatment alternatives, risks and benefits explained, specific risks discussed. Consent was given by the patient. Immediately prior to procedure a time out was called to verify the correct patient, procedure, equipment, support staff and site/side marked as required. Patient was prepped and draped in the usual sterile fashion.

## 2024-02-06 ENCOUNTER — OFFICE VISIT (OUTPATIENT)
Dept: SLEEP MEDICINE | Facility: HOSPITAL | Age: 59
End: 2024-02-06
Payer: MEDICARE

## 2024-02-06 VITALS
HEIGHT: 64 IN | BODY MASS INDEX: 33.63 KG/M2 | DIASTOLIC BLOOD PRESSURE: 83 MMHG | HEART RATE: 86 BPM | SYSTOLIC BLOOD PRESSURE: 132 MMHG | OXYGEN SATURATION: 98 % | WEIGHT: 197 LBS

## 2024-02-06 DIAGNOSIS — J30.2 SEASONAL ALLERGIC RHINITIS, UNSPECIFIED TRIGGER: ICD-10-CM

## 2024-02-06 DIAGNOSIS — F51.01 PRIMARY INSOMNIA: ICD-10-CM

## 2024-02-06 DIAGNOSIS — K21.9 GERD WITH APNEA: ICD-10-CM

## 2024-02-06 DIAGNOSIS — R06.81 GERD WITH APNEA: ICD-10-CM

## 2024-02-06 DIAGNOSIS — G47.33 OBSTRUCTIVE SLEEP APNEA SYNDROME: Primary | ICD-10-CM

## 2024-02-06 PROCEDURE — G0463 HOSPITAL OUTPT CLINIC VISIT: HCPCS

## 2024-02-06 NOTE — PROGRESS NOTES
SLEEP/PULMONARY  CLINIC NOTE      PATIENT IDENTIFICATION:  Name: Sánchez Carbajal  Age: 59 y.o.  Sex: female  :  1965  MRN: [id number]    DATE OF CONSULTATION:  2024                     CHIEF COMPLAINT: SAGAR    History of Present Illness:   Sánchez Carbajal is a 59 y.o. female Pt on CPAP feeling better more energy especially the night he use it more than 4 hours, no sleepiness no fatigue no tiredness, no mask irritation no dryness, compliance report reviewed with pt d discussed with the patient the download data and encouarged the patient to continue to use the CPAP. The residual AHI is acceptable and have significant air leak and it is bothering her at night.         Review of Systems:   Constitutional: negative   Eyes: negative   ENT/oropharynx: negative   Cardiovascular: negative   Respiratory: negative   Gastrointestinal: negative   Genitourinary: negative   Neurological: negative   Musculoskeletal: negative   Integument/breast: negative   Endocrine: negative   Allergic/Immunologic: negative     Past Medical History:  Past Medical History:   Diagnosis Date    Arthritis     RIGHT SHOULDER    Arthritis of back     Not sure    Arthritis of neck     Two years ago    Bursitis of hip     Cervical disc disorder     Two maybe three years ago    Chronic back pain     Diabetes mellitus     Elevated cholesterol     GERD (gastroesophageal reflux disease)     H/O seasonal allergies     History of anemia     History of diverticulitis     History of heart murmur in childhood     History of kidney stones     Hypertension     Migraines     PONV (postoperative nausea and vomiting)     Primary osteoarthritis of knees, bilateral 2022    Sleep apnea with use of continuous positive airway pressure (CPAP)     Uterine prolapse     UTERINE VAG PROLAPSE       Past Surgical History:  Past Surgical History:   Procedure Laterality Date    ANTERIOR AND POSTERIOR VAGINAL REPAIR N/A 01/15/2019    Procedure: /POSTERIOR  REPAIR;  Surgeon: James Holloway MD;  Location: [facility] MAIN OR;  Service: Gynecology    CERVICAL FUSION      CHOLECYSTECTOMY      COLONOSCOPY N/A 01/18/2017    Procedure: COLONOSCOPY;  Surgeon: Tian Henderson MD;  Location: [facility] OR;  Service:     CYSTOSCOPY URETEROSCOPY LASER LITHOTRIPSY N/A 04/06/2016    Procedure: URETEROSCOPY  ;  Surgeon: Vimal Azul MD;  Location: [facility] OR;  Service:     CYSTOSCOPY URETEROSCOPY LASER LITHOTRIPSY Left 09/01/2019    Procedure: CYSTOSCOPY left retrograde pyelogram;  Surgeon: Vimal Azul MD;  Location: [facility] MAIN OR;  Service: Urology    CYSTOSCOPY W/ LASER LITHOTRIPSY Left 06/25/2018    Procedure: CYSTOSCOPY, LEFT URETEROSCOPY, left  STENT PLACEMENT, right retrograde;  Surgeon: Vimal Azul MD;  Location: [facility] OR;  Service: Urology    EXTRACORPOREAL SHOCK WAVE LITHOTRIPSY (ESWL)  2015    JOINT REPLACEMENT  May 20th 2023    KNEE SURGERY      NECK SURGERY      Can't remember date    SACROCOLPOPEXY N/A 01/15/2019    Procedure: ROBOTIC SACROCOLPOPEXY WITH MESH, TOTAL LAPAROSCOPICE HYSTERECTOMY BILATERAL SALPINGECTOMY WITH DAVINCI ;  Surgeon: James Holloway MD;  Location: [facility] OR;  Service: DaVinci    TOTAL KNEE ARTHROPLASTY Right 05/24/2023    Procedure: TOTAL KNEE ARTHROPLASTY WITH CORI ROBOT;  Surgeon: Lambert Brady MD;  Location: [facility] OR;  Service: Robotics - Ortho;  Laterality: Right;        Family History:  Family History   Problem Relation Age of Onset    Cancer Maternal Grandmother         Kidney cancer    Malig Hyperthermia Neg Hx         Social History:   Social History     Tobacco Use    Smoking status: Never     Passive exposure: Never    Smokeless tobacco: Never   Substance Use Topics    Alcohol use: No        Allergies:  Allergies   Allergen Reactions    Penicillins Rash    Flagyl [Metronidazole] Hives    Sulfa Antibiotics Rash       Home Meds:  (Not in a hospital admission)      Objective:    Vitals Ranges:   Heart  Rate:  [86] 86  BP: (132)/(83) 132/83  Body mass index is 34.35 kg/m².     Exam:  General Appearance:  WDWN    HEENT:   without obvious abnormality,  Conjunctiva/corneas clear,  Normal external ear canals, no drainage    Clear orsalmucosa,  Mallampati score 3    Neck:  Supple, symmetrical, trachea midline. No JVD.  Lungs:   Bilateral basal rhonchi bilaterally, respirations unlabored symmetrical wall movement.    Chest wall:  No tenderness or deformity.    Heart:  Regular rate and rhythm, S1 and S2 normal.  Extremities: Trace edema no clubbing or Cyanosis        Data Review:  All labs (24hrs): No results found for this or any previous visit (from the past 24 hour(s)).     Imaging:  XR Pelvis 1 or 2 View  Ordering physician's impression is located in the Encounter Note dated 10/26/23.   XR Knee 1 or 2 View Right  Ordering physician's impression is located in the Encounter Note dated 10/26/23.        ASSESSMENT:  Diagnoses and all orders for this visit:    Obstructive sleep apnea syndrome    Primary insomnia    Seasonal allergic rhinitis, unspecified trigger    GERD with apnea        PLAN:  Continue fitting mask  This patient with obstructive sleep apnea, compliance is improved. Encourage to use it more frequent, I re-emphasized on pt the long and short term benefit of treating SAGAR. The device is benefiting the patient.  The patient is also instructed to get the CPAP supplies from the [employer] and see me in 1 year for follow-up.  Insomnia symptoms resolved  Treating SAGAR will improve GERD symptoms control    It is recommended to keep current for allergic rhinitis optimized to improve quality of sleep    Follow-up 1 year    Seema Moser MD. D, ABSM.  2/6/2024  11:20 EST

## 2024-02-14 ENCOUNTER — HOSPITAL ENCOUNTER (OUTPATIENT)
Dept: PHYSICAL THERAPY | Facility: HOSPITAL | Age: 59
Setting detail: THERAPIES SERIES
Discharge: HOME | End: 2024-02-14
Payer: MEDICARE

## 2024-02-14 DIAGNOSIS — M70.61 TROCHANTERIC BURSITIS OF RIGHT HIP: Primary | ICD-10-CM

## 2024-02-14 PROCEDURE — 97161 PT EVAL LOW COMPLEX 20 MIN: CPT | Performed by: PHYSICAL THERAPIST

## 2024-02-14 PROCEDURE — 97110 THERAPEUTIC EXERCISES: CPT | Performed by: PHYSICAL THERAPIST

## 2024-02-19 ENCOUNTER — HOSPITAL ENCOUNTER (OUTPATIENT)
Dept: PHYSICAL THERAPY | Facility: HOSPITAL | Age: 59
Setting detail: THERAPIES SERIES
Discharge: HOME | End: 2024-02-19
Payer: MEDICARE

## 2024-02-19 DIAGNOSIS — M70.61 TROCHANTERIC BURSITIS OF RIGHT HIP: Primary | ICD-10-CM

## 2024-02-19 PROCEDURE — 97033 APP MDLTY 1+IONTPHRSIS EA 15: CPT | Performed by: PHYSICAL THERAPIST

## 2024-02-19 PROCEDURE — 97110 THERAPEUTIC EXERCISES: CPT | Performed by: PHYSICAL THERAPIST

## 2024-02-19 NOTE — THERAPY TREATMENT NOTE
Outpatient Physical Therapy Ortho Treatment Note   Ramandeep Pratt     Patient Name: Sánchez Carbajal  : 1965  MRN: [id number]  Today's Date: 2024      Visit Date: 2024    Visit Dx:    ICD-10-CM ICD-9-CM   1. Trochanteric bursitis of right hip  M70.61 726.5       Patient Active Problem List   Diagnosis    Left ureteral stone    Obstructive sleep apnea syndrome    Seasonal allergic rhinitis    Uterovaginal prolapse    Ureteral stone with hydronephrosis    Upper respiratory tract infection    GERD with apnea    Insomnia    Cervical radiculitis    Primary osteoarthritis of knees, bilateral    Mechanical knee pain, right    Primary osteoarthritis of right knee        Past Medical History:   Diagnosis Date    Arthritis     RIGHT SHOULDER    Arthritis of back     Not sure    Arthritis of neck     Two years ago    Bursitis of hip     Cervical disc disorder     Two maybe three years ago    Chronic back pain     Diabetes mellitus     Elevated cholesterol     GERD (gastroesophageal reflux disease)     H/O seasonal allergies     History of anemia     History of diverticulitis     History of heart murmur in childhood     History of kidney stones     Hypertension     Migraines     PONV (postoperative nausea and vomiting)     Primary osteoarthritis of knees, bilateral 2022    Sleep apnea with use of continuous positive airway pressure (CPAP)     Uterine prolapse     UTERINE VAG PROLAPSE        Past Surgical History:   Procedure Laterality Date    ANTERIOR AND POSTERIOR VAGINAL REPAIR N/A 01/15/2019    Procedure: /POSTERIOR REPAIR;  Surgeon: James Holloway MD;  Location: [facility] OR;  Service: Gynecology    CERVICAL FUSION      CHOLECYSTECTOMY      COLONOSCOPY N/A 2017    Procedure: COLONOSCOPY;  Surgeon: Tian Henderson MD;  Location: [facility] OR;  Service:     CYSTOSCOPY URETEROSCOPY LASER LITHOTRIPSY N/A 2016    Procedure: URETEROSCOPY  ;  Surgeon: Vimal Azul MD;   Location: [facility] OR;  Service:     CYSTOSCOPY URETEROSCOPY LASER LITHOTRIPSY Left 09/01/2019    Procedure: CYSTOSCOPY left retrograde pyelogram;  Surgeon: Vimal Azul MD;  Location: [facility] OR;  Service: Urology    CYSTOSCOPY W/ LASER LITHOTRIPSY Left 06/25/2018    Procedure: CYSTOSCOPY, LEFT URETEROSCOPY, left  STENT PLACEMENT, right retrograde;  Surgeon: Vimal Azul MD;  Location:  [facility] OR;  Service: Urology    EXTRACORPOREAL SHOCK WAVE LITHOTRIPSY (ESWL)  2015    JOINT REPLACEMENT  May 20th 2023    KNEE SURGERY      NECK SURGERY      Can't remember date    SACROCOLPOPEXY N/A 01/15/2019    Procedure: ROBOTIC SACROCOLPOPEXY WITH MESH, TOTAL LAPAROSCOPICE HYSTERECTOMY BILATERAL SALPINGECTOMY WITH DAVINCI ;  Surgeon: James Holloway MD;  Location: [facility] OR;  Service: DaVinci    TOTAL KNEE ARTHROPLASTY Right 05/24/2023    Procedure: TOTAL KNEE ARTHROPLASTY WITH CORI ROBOT;  Surgeon: Lambert Brady MD;  Location: [facility] OR;  Service: Robotics - Ortho;  Laterality: Right;                        PT Assessment/Plan       Row Name 02/19/24 0850          PT Assessment    Assessment Comments Pt tolerated her exercise progression well.  -GC        PT Plan    PT Plan Comments Pt is to continue her HEP daily. Will re-ck again next week.  -GC               User Key  (r) = Recorded By, (t) = Taken By, (c) = Cosigned By      Initials Name Provider Type    Leo Rubio, PT Physical Therapist                       OP Exercises       Row Name 02/19/24 0850             Subjective    Subjective Comments Pt states her knee is pretty sore.  -GC         Exercise 1    Exercise Name 1 Heel slides  -GC      Reps 1 25  -GC      Time 1 5 secs  -GC         Exercise 2    Exercise Name 2 Hamstring stretch  -GC      Reps 2 15  -GC      Time 2 10 secs  -GC         Exercise 3    Exercise Name 3 Heel prop  -GC      Time 3 3 min  -GC         Exercise 4    Exercise Name 4 Piriformis stretch  -GC      Reps 4 15  -GC       Time 4 10 secs  -GC         Exercise 5    Exercise Name 5 Bridge vs theraband  -GC      Reps 5 25  -GC      Time 5 yellow  -GC         Exercise 6    Exercise Name 6 Supine clam shell vs theraband  -GC      Reps 6 25  -GC      Time 6 yellow  -GC         Exercise 7    Exercise Name 7 SAQ  -GC      Reps 7 50  -GC      Time 7 1#  -GC         Exercise 8    Exercise Name 8 LAQ  -GC      Reps 8 50  -GC      Time 8 1#  -GC                User Key  (r) = Recorded By, (t) = Taken By, (c) = Cosigned By      Initials Name Provider Type    GC Leo Bee, PT Physical Therapist                                                    Time Calculation:   Start Time: 0850  Stop Time: 0940  Time Calculation (min): 50 min  Therapy Charges for Today       Code Description Service Date Service Provider Modifiers Qty    76257659245 HC PT THER PROC EA 15 MIN 2/19/2024 Leo Bee, PT GP 1    84399784875 HC PT IONTOPHORESIS EA 15 MIN 2/19/2024 Leo Bee, PT GP 1                      Leo Bee PT  2/19/2024

## 2024-02-26 ENCOUNTER — HOSPITAL ENCOUNTER (OUTPATIENT)
Dept: PHYSICAL THERAPY | Facility: HOSPITAL | Age: 59
Setting detail: THERAPIES SERIES
Discharge: HOME | End: 2024-02-26
Payer: MEDICARE

## 2024-02-26 DIAGNOSIS — M70.61 TROCHANTERIC BURSITIS OF RIGHT HIP: Primary | ICD-10-CM

## 2024-02-26 PROCEDURE — 97110 THERAPEUTIC EXERCISES: CPT | Performed by: PHYSICAL THERAPIST

## 2024-02-26 NOTE — THERAPY TREATMENT NOTE
Outpatient Physical Therapy Ortho Treatment Note   Ramandeep Pratt     Patient Name: Sánchez Carbajal  : 1965  MRN: [id number]  Today's Date: 2024      Visit Date: 2024    Visit Dx:    ICD-10-CM ICD-9-CM   1. Trochanteric bursitis of right hip  M70.61 726.5       Patient Active Problem List   Diagnosis    Left ureteral stone    Obstructive sleep apnea syndrome    Seasonal allergic rhinitis    Uterovaginal prolapse    Ureteral stone with hydronephrosis    Upper respiratory tract infection    GERD with apnea    Insomnia    Cervical radiculitis    Primary osteoarthritis of knees, bilateral    Mechanical knee pain, right    Primary osteoarthritis of right knee        Past Medical History:   Diagnosis Date    Arthritis     RIGHT SHOULDER    Arthritis of back     Not sure    Arthritis of neck     Two years ago    Bursitis of hip     Cervical disc disorder     Two maybe three years ago    Chronic back pain     Diabetes mellitus     Elevated cholesterol     GERD (gastroesophageal reflux disease)     H/O seasonal allergies     History of anemia     History of diverticulitis     History of heart murmur in childhood     History of kidney stones     Hypertension     Migraines     PONV (postoperative nausea and vomiting)     Primary osteoarthritis of knees, bilateral 2022    Sleep apnea with use of continuous positive airway pressure (CPAP)     Uterine prolapse     UTERINE VAG PROLAPSE        Past Surgical History:   Procedure Laterality Date    ANTERIOR AND POSTERIOR VAGINAL REPAIR N/A 01/15/2019    Procedure: /POSTERIOR REPAIR;  Surgeon: James Holloway MD;  Location: [facility] OR;  Service: Gynecology    CERVICAL FUSION      CHOLECYSTECTOMY      COLONOSCOPY N/A 2017    Procedure: COLONOSCOPY;  Surgeon: Tian Henderson MD;  Location: [facility] OR;  Service:     CYSTOSCOPY URETEROSCOPY LASER LITHOTRIPSY N/A 2016    Procedure: URETEROSCOPY  ;  Surgeon: Vimal Azul MD;   Location: [facility] OR;  Service:     CYSTOSCOPY URETEROSCOPY LASER LITHOTRIPSY Left 09/01/2019    Procedure: CYSTOSCOPY left retrograde pyelogram;  Surgeon: Vimal Azul MD;  Location: [facility] OR;  Service: Urology    CYSTOSCOPY W/ LASER LITHOTRIPSY Left 06/25/2018    Procedure: CYSTOSCOPY, LEFT URETEROSCOPY, left  STENT PLACEMENT, right retrograde;  Surgeon: Vimal Azul MD;  Location: [facility] OR;  Service: Urology    EXTRACORPOREAL SHOCK WAVE LITHOTRIPSY (ESWL)  2015    JOINT REPLACEMENT  May 20th 2023    KNEE SURGERY      NECK SURGERY      Can't remember date    SACROCOLPOPEXY N/A 01/15/2019    Procedure: ROBOTIC SACROCOLPOPEXY WITH MESH, TOTAL LAPAROSCOPICE HYSTERECTOMY BILATERAL SALPINGECTOMY WITH DAVINCI ;  Surgeon: James Holloway MD;  Location: [facility] OR;  Service: DaVinci    TOTAL KNEE ARTHROPLASTY Right 05/24/2023    Procedure: TOTAL KNEE ARTHROPLASTY WITH CORI ROBOT;  Surgeon: Lambert Brady MD;  Location: [facility] OR;  Service: Robotics - Ortho;  Laterality: Right;                        PT Assessment/Plan       Row Name 02/26/24 0848          PT Assessment    Assessment Comments Pt toelrated her exercise progression well.  -GC        PT Plan    PT Plan Comments Pt is to continue her HEP daily.  -GC               User Key  (r) = Recorded By, (t) = Taken By, (c) = Cosigned By      Initials Name Provider Type    GC Leo Bee, PT Physical Therapist                     Modalities       Row Name 02/26/24 1052             Iontophoresis [id number]    Milliamps 3  -GC      MA/Min 40  -GC      Dexamethasone used Yes  -GC      Patch Type Medium  right greater trochanter  -GC         Functional Testing    Outcome Measure Options Lower Extremity Functional Scale (LEFS)  -GC                User Key  (r) = Recorded By, (t) = Taken By, (c) = Cosigned By      Initials Name Provider Type    Leo Rubio, PT Physical Therapist                   OP Exercises       Row Name 02/26/24 1161              Subjective    Subjective Comments Pt c/o increased knee pain today, but says her hip is better.  -GC         Exercise 1    Exercise Name 1 Heel slides  -GC      Reps 1 25  -GC      Time 1 5 secs  -GC         Exercise 2    Exercise Name 2 Hamstring stretch  -GC      Reps 2 15  -GC      Time 2 10 secs  -GC         Exercise 3    Exercise Name 3 Heel prop  -GC      Time 3 3 min  -GC         Exercise 4    Exercise Name 4 Piriformis stretch  -GC      Reps 4 15  -GC      Time 4 10 secs  -GC         Exercise 5    Exercise Name 5 Bridge vs theraband  -GC      Reps 5 25  -GC      Time 5 yellow  -GC         Exercise 6    Exercise Name 6 Supine clam shell vs theraband  -GC      Reps 6 25  -GC      Time 6 yellow  -GC         Exercise 7    Exercise Name 7 SAQ  -GC      Reps 7 50  -GC      Time 7 3#  -GC         Exercise 8    Exercise Name 8 LAQ  -GC      Reps 8 50  -GC      Time 8 3#  -GC         Exercise 9    Exercise Name 9 Hip IR vs theraband  -GC      Reps 9 25  -GC      Time 9 yellow  -GC         Exercise 10    Exercise Name 10 Hip ER vs theraband  -GC      Reps 10 25  -GC      Time 10 yellow  -GC                User Key  (r) = Recorded By, (t) = Taken By, (c) = Cosigned By      Initials Name Provider Type    GC Leo Bee, PT Physical Therapist                                          Outcome Measure Options: Lower Extremity Functional Scale (LEFS)         Time Calculation:   Start Time: 0855  Stop Time: 0942  Time Calculation (min): 47 min  Therapy Charges for Today       Code Description Service Date Service Provider Modifiers Qty    31145276297 HC PT THER PROC EA 15 MIN 2/26/2024 Leo Bee, PT GP 1            PT G-Codes  Outcome Measure Options: Lower Extremity Functional Scale (LEFS)         Leo Bee PT  2/26/2024

## 2024-03-12 ENCOUNTER — OFFICE VISIT (OUTPATIENT)
Dept: ORTHOPEDIC SURGERY | Facility: CLINIC | Age: 59
End: 2024-03-12
Payer: MEDICARE

## 2024-03-12 VITALS — WEIGHT: 197 LBS | HEIGHT: 64 IN | BODY MASS INDEX: 33.63 KG/M2

## 2024-03-12 DIAGNOSIS — M70.61 TROCHANTERIC BURSITIS, RIGHT HIP: ICD-10-CM

## 2024-03-12 DIAGNOSIS — Z96.651 AFTERCARE FOLLOWING RIGHT KNEE JOINT REPLACEMENT SURGERY: Primary | ICD-10-CM

## 2024-03-12 DIAGNOSIS — Z47.1 AFTERCARE FOLLOWING RIGHT KNEE JOINT REPLACEMENT SURGERY: Primary | ICD-10-CM

## 2024-03-12 PROCEDURE — 99213 OFFICE O/P EST LOW 20 MIN: CPT | Performed by: INTERNAL MEDICINE

## 2024-03-12 RX ORDER — MELOXICAM 15 MG/1
15 TABLET ORAL DAILY
Qty: 30 TABLET | Refills: 2 | Status: SHIPPED | OUTPATIENT
Start: 2024-03-12

## 2024-03-12 NOTE — PROGRESS NOTES
Subjective:     Patient ID: Sánchez Carbajal is a 59 y.o. female.    Chief Complaint:    History of Present Illness  Sánchez Carbajal returns to clinic today for evaluation of right knee pain following total knee arthroplasty last May and right hip trochanteric bursitis.  The patient has been attending physical therapy and thinks it has been helping her hip and her knee.  She states that she still has some knee pain and she feels some grinding in her knee when moving from flexion to extension.  States mostly located anteriorly in the knee.  She denies any fevers chills or drainage from surgical incision.  She states that she thinks it is getting better but it is getting better slowly.  She would like.  She is hopeful to continue to make improvements and to avoid any surgery or infection.     Social History     Occupational History    Not on file   Tobacco Use    Smoking status: Never     Passive exposure: Never    Smokeless tobacco: Never   Vaping Use    Vaping status: Never Used   Substance and Sexual Activity    Alcohol use: No    Drug use: No    Sexual activity: Yes     Partners: Male     Birth control/protection: Hysterectomy      Past Medical History:   Diagnosis Date    Arthritis     RIGHT SHOULDER    Arthritis of back     Not sure    Arthritis of neck     Two years ago    Bursitis of hip     Cervical disc disorder     Two maybe three years ago    Chronic back pain     Diabetes mellitus     Elevated cholesterol     GERD (gastroesophageal reflux disease)     H/O seasonal allergies     History of anemia     History of diverticulitis     History of heart murmur in childhood     History of kidney stones     Hypertension     Migraines     PONV (postoperative nausea and vomiting)     Primary osteoarthritis of knees, bilateral 11/29/2022    Sleep apnea with use of continuous positive airway pressure (CPAP)     Uterine prolapse     UTERINE VAG PROLAPSE     Past Surgical History:   Procedure Laterality Date     "ANTERIOR AND POSTERIOR VAGINAL REPAIR N/A 01/15/2019    Procedure: /POSTERIOR REPAIR;  Surgeon: James Holloway MD;  Location: [facility] MAIN OR;  Service: Gynecology    CERVICAL FUSION      CHOLECYSTECTOMY      COLONOSCOPY N/A 01/18/2017    Procedure: COLONOSCOPY;  Surgeon: Tian Henderson MD;  Location:  [facility] OR;  Service:     CYSTOSCOPY URETEROSCOPY LASER LITHOTRIPSY N/A 04/06/2016    Procedure: URETEROSCOPY  ;  Surgeon: Vimal Azul MD;  Location:  [facility] OR;  Service:     CYSTOSCOPY URETEROSCOPY LASER LITHOTRIPSY Left 09/01/2019    Procedure: CYSTOSCOPY left retrograde pyelogram;  Surgeon: Vimal Azul MD;  Location: [facility] MAIN OR;  Service: Urology    CYSTOSCOPY W/ LASER LITHOTRIPSY Left 06/25/2018    Procedure: CYSTOSCOPY, LEFT URETEROSCOPY, left  STENT PLACEMENT, right retrograde;  Surgeon: Vimal Azul MD;  Location: [facility] OR;  Service: Urology    EXTRACORPOREAL SHOCK WAVE LITHOTRIPSY (ESWL)  2015    JOINT REPLACEMENT  May 20th 2023    KNEE SURGERY      NECK SURGERY      Can't remember date    SACROCOLPOPEXY N/A 01/15/2019    Procedure: ROBOTIC SACROCOLPOPEXY WITH MESH, TOTAL LAPAROSCOPICE HYSTERECTOMY BILATERAL SALPINGECTOMY WITH DAVINCI ;  Surgeon: James Holloway MD;  Location: [facility] MAIN OR;  Service: DaVinci    TOTAL KNEE ARTHROPLASTY Right 05/24/2023    Procedure: TOTAL KNEE ARTHROPLASTY WITH CORI ROBOT;  Surgeon: Lambert Brady MD;  Location: [facility] OR;  Service: Robotics - Ortho;  Laterality: Right;       Family History   Problem Relation Age of Onset    Cancer Maternal Grandmother         Kidney cancer    Malig Hyperthermia Neg Hx                  Objective:  Vitals:    03/12/24 0800   Weight: 89.4 kg (197 lb)   Height: 161.3 cm (63.5\")         03/12/24  0800   Weight: 89.4 kg (197 lb)     Body mass index is 34.35 kg/m².        Right Knee Exam     Tenderness   The patient is experiencing tenderness in the patella.    Range of Motion   Extension:  0   Flexion:  120 "     Tests   Varus: negative Valgus: negative  Lachman:  Anterior - negative    Posterior - negative  Drawer:  Anterior - negative    Posterior - negative    Other   Erythema: absent  Scars: present  Sensation: normal  Pulse: present  Swelling: none  Effusion: no effusion present    Comments:  Mild patellofemoral crepitus      Right Hip Exam     Tenderness   The patient is experiencing tenderness in the greater trochanter.    Range of Motion   Flexion:  normal   External rotation:  normal   Internal rotation:  normal     Muscle Strength   Flexion: 5/5     Tests   KHUSHBU: negative  Fadir:  Negative FADIR test    Other   Erythema: absent  Scars: absent  Sensation: normal  Pulse: present               Imaging: no new    Assessment:        1. Aftercare following right knee joint replacement surgery    2. Trochanteric bursitis, right hip           Plan:          Discussed treatment options at length with patient at today's visit.  I discussed with the patient that I believe she is making improvements.  Every time I see her she appears to be doing better however I think she is having some residual issues from her unresurfaced patella.  I discussed with her that initially trochanteric bursitis and IT band syndrome are not helping her cause.  I would like the patient to continue to attend physical therapy.  I called in a prescription for meloxicam 15 mg p.o. daily and a topical compounding cream.  I would like her to continue to work on motion exercises.  I offered the patient a corticosteroid injection but discussed with him that there is a risk of developing an infection of the knee replacement when doing so.  She will follow-up from her plan to see the patient back in 2 months for her 1 year follow-up visit.  Follow up: May with 3 views of the right knee      Sánchez Carbajal was in agreement with plan and had all questions answered.     Medications:  New Medications Ordered This Visit   Medications    meloxicam (MOBIC)  15 MG tablet     Sig: Take 1 tablet by mouth Daily.     Dispense:  30 tablet     Refill:  2       Followup:  No follow-ups on file.    Diagnoses and all orders for this visit:    1. Aftercare following right knee joint replacement surgery (Primary)    2. Trochanteric bursitis, right hip    Other orders  -     meloxicam (MOBIC) 15 MG tablet; Take 1 tablet by mouth Daily.  Dispense: 30 tablet; Refill: 2          Dictated utilizing Dragon dictation

## 2024-05-28 ENCOUNTER — OFFICE VISIT (OUTPATIENT)
Dept: ORTHOPEDIC SURGERY | Facility: CLINIC | Age: 59
End: 2024-05-28
Payer: MEDICARE

## 2024-05-28 VITALS — BODY MASS INDEX: 33.63 KG/M2 | HEIGHT: 64 IN | WEIGHT: 197 LBS

## 2024-05-28 DIAGNOSIS — Z47.1 AFTERCARE FOLLOWING RIGHT KNEE JOINT REPLACEMENT SURGERY: Primary | ICD-10-CM

## 2024-05-28 DIAGNOSIS — Z96.651 AFTERCARE FOLLOWING RIGHT KNEE JOINT REPLACEMENT SURGERY: Primary | ICD-10-CM

## 2024-05-28 DIAGNOSIS — M70.61 TROCHANTERIC BURSITIS, RIGHT HIP: ICD-10-CM

## 2024-05-28 PROCEDURE — 20610 DRAIN/INJ JOINT/BURSA W/O US: CPT | Performed by: INTERNAL MEDICINE

## 2024-05-28 PROCEDURE — 99213 OFFICE O/P EST LOW 20 MIN: CPT | Performed by: INTERNAL MEDICINE

## 2024-05-28 PROCEDURE — 73562 X-RAY EXAM OF KNEE 3: CPT | Performed by: INTERNAL MEDICINE

## 2024-05-28 RX ORDER — FLUTICASONE PROPIONATE 50 MCG
SPRAY, SUSPENSION (ML) NASAL
COMMUNITY
Start: 2024-05-13

## 2024-05-28 RX ORDER — ARIPIPRAZOLE 10 MG/1
TABLET ORAL
COMMUNITY
Start: 2024-05-10

## 2024-05-28 RX ORDER — ARIPIPRAZOLE 15 MG/1
TABLET ORAL
COMMUNITY
Start: 2024-03-15

## 2024-05-28 RX ORDER — TRIAMCINOLONE ACETONIDE 40 MG/ML
80 INJECTION, SUSPENSION INTRA-ARTICULAR; INTRAMUSCULAR
Status: COMPLETED | OUTPATIENT
Start: 2024-05-28 | End: 2024-05-28

## 2024-05-28 RX ORDER — HYDROXYZINE PAMOATE 100 MG
CAPSULE ORAL
COMMUNITY
Start: 2024-05-10

## 2024-05-28 RX ORDER — LIDOCAINE HYDROCHLORIDE 10 MG/ML
4 INJECTION, SOLUTION EPIDURAL; INFILTRATION; INTRACAUDAL; PERINEURAL
Status: COMPLETED | OUTPATIENT
Start: 2024-05-28 | End: 2024-05-28

## 2024-05-28 RX ADMIN — LIDOCAINE HYDROCHLORIDE 4 ML: 10 INJECTION, SOLUTION EPIDURAL; INFILTRATION; INTRACAUDAL; PERINEURAL at 08:30

## 2024-05-28 RX ADMIN — TRIAMCINOLONE ACETONIDE 80 MG: 40 INJECTION, SUSPENSION INTRA-ARTICULAR; INTRAMUSCULAR at 08:30

## 2024-05-28 NOTE — PROGRESS NOTES
Subjective:     Patient ID: Sánchez Carbajal is a 59 y.o. female.    Chief Complaint:    History of Present Illness  Sánchez Carbajal returns to clinic today for evaluation of right knee status post total knee arthroplasty 1 year ago on 5/24/2023.  The patient's postoperative course has been complicated by persistent knee pain as well as trochanteric bursitis on the right side.  She saw me on 3/12/2024 and at that time was sent to physical therapy.  She states that her right lateral hip still hurts significantly and she is also having some anterior knee pain and grinding.     Social History     Occupational History    Not on file   Tobacco Use    Smoking status: Never     Passive exposure: Never    Smokeless tobacco: Never   Vaping Use    Vaping status: Never Used   Substance and Sexual Activity    Alcohol use: No    Drug use: No    Sexual activity: Yes     Partners: Male     Birth control/protection: Hysterectomy      Past Medical History:   Diagnosis Date    Arthritis     RIGHT SHOULDER    Arthritis of back     Not sure    Arthritis of neck     Two years ago    Bursitis of hip     Cervical disc disorder     Two maybe three years ago    Chronic back pain     Diabetes mellitus     Elevated cholesterol     GERD (gastroesophageal reflux disease)     H/O seasonal allergies     History of anemia     History of diverticulitis     History of heart murmur in childhood     History of kidney stones     Hypertension     Migraines     PONV (postoperative nausea and vomiting)     Primary osteoarthritis of knees, bilateral 11/29/2022    Sleep apnea with use of continuous positive airway pressure (CPAP)     Uterine prolapse     UTERINE VAG PROLAPSE     Past Surgical History:   Procedure Laterality Date    ANTERIOR AND POSTERIOR VAGINAL REPAIR N/A 01/15/2019    Procedure: /POSTERIOR REPAIR;  Surgeon: James Holloway MD;  Location: [facility];  Service: Gynecology    CERVICAL FUSION      CHOLECYSTECTOMY      COLONOSCOPY  "N/A 01/18/2017    Procedure: COLONOSCOPY;  Surgeon: Tian Henderson MD;  Location: [facility] OR;  Service:     CYSTOSCOPY URETEROSCOPY LASER LITHOTRIPSY N/A 04/06/2016    Procedure: URETEROSCOPY  ;  Surgeon: Vimal Azul MD;  Location: [facility] OR;  Service:     CYSTOSCOPY URETEROSCOPY LASER LITHOTRIPSY Left 09/01/2019    Procedure: CYSTOSCOPY left retrograde pyelogram;  Surgeon: Vimal Azul MD;  Location: [facility] OR;  Service: Urology    CYSTOSCOPY W/ LASER LITHOTRIPSY Left 06/25/2018    Procedure: CYSTOSCOPY, LEFT URETEROSCOPY, left  STENT PLACEMENT, right retrograde;  Surgeon: Vimal Azul MD;  Location: [facility] OR;  Service: Urology    EXTRACORPOREAL SHOCK WAVE LITHOTRIPSY (ESWL)  2015    JOINT REPLACEMENT  May 20th 2023    KNEE SURGERY      NECK SURGERY      Can't remember date    SACROCOLPOPEXY N/A 01/15/2019    Procedure: ROBOTIC SACROCOLPOPEXY WITH MESH, TOTAL LAPAROSCOPICE HYSTERECTOMY BILATERAL SALPINGECTOMY WITH DAVINCI ;  Surgeon: James Holloway MD;  Location: [facility] MAIN OR;  Service: DaVinci    TOTAL KNEE ARTHROPLASTY Right 05/24/2023    Procedure: TOTAL KNEE ARTHROPLASTY WITH CORI ROBOT;  Surgeon: Lambert Brady MD;  Location: [facility] OR;  Service: Robotics - Ortho;  Laterality: Right;       Family History   Problem Relation Age of Onset    Cancer Maternal Grandmother         Kidney cancer    Malig Hyperthermia Neg Hx                  Objective:  Vitals:    05/28/24 0809   Weight: 89.4 kg (197 lb)   Height: 162.6 cm (64\")         05/28/24  0809   Weight: 89.4 kg (197 lb)     Body mass index is 33.81 kg/m².        Right Knee Exam     Tenderness   The patient is experiencing tenderness in the patella.    Range of Motion   Extension:  0   Flexion:  130     Tests   Varus: negative Valgus: negative  Lachman:  Anterior - trace    Posterior - negative  Drawer:  Anterior - trace    Posterior - negative    Other   Erythema: absent  Scars: present  Sensation: normal  Pulse: " present  Swelling: none  Effusion: no effusion present      Right Hip Exam     Tenderness   The patient is experiencing tenderness in the lateral and greater trochanter.    Range of Motion   Flexion:  normal   External rotation:  normal   Internal rotation:  normal     Muscle Strength   Flexion: 5/5     Tests   KHUSHBU: negative  Fadir:  Negative FADIR test    Other   Erythema: absent  Scars: absent  Sensation: normal  Pulse: present               Imaging: 3 views of the right knee were ordered and reviewed by myself in the office today  Indication: right knee replacement  Findings: X-rays demonstrate a cemented right total knee arthroplasty with an unresurfaced patella with implants in expected position no signs of loosening fracture, dislocation, subluxation, wear or subsidence.  No new acute osseous abnormality.  There are some patellofemoral osteoarthritis with lateral facet overhang.  Comparative studies: last visit radiographs      Assessment:        1. Aftercare following right knee joint replacement surgery    2. Trochanteric bursitis, right hip           Plan:      Large Joint Arthrocentesis: R greater trochanteric bursa  Date/Time: 5/28/2024 8:30 AM  Consent given by: patient  Site marked: site marked  Timeout: Immediately prior to procedure a time out was called to verify the correct patient, procedure, equipment, support staff and site/side marked as required   Supporting Documentation  Indications: pain   Procedure Details  Location: hip - R greater trochanteric bursa  Preparation: Patient was prepped and draped in the usual sterile fashion  Needle gauge: 22g spinal.  Approach: lateral  Medications administered: 4 mL lidocaine PF 1% 1 %; 80 mg triamcinolone acetonide 40 MG/ML  Patient tolerance: patient tolerated the procedure well with no immediate complications          Discussed treatment options at length with patient at today's visit. I discussed with the patient that they are doing well from my  standpoint.  I am happy with the progress that they have made.  They are ambulatory today with no assistive device and no limp or residual deficits.  I would like them to continue to work on range of motion and strengthening exercises.   The patient has no restrictions from my standpoint.  The patient's surgical incision is healed without signs of infection.  I would like the patient to notify our office immediately if they note any increasing redness, pain, fevers, chills, or drainage of any kind from their surgical incision as this would be abnormal at this point in time.  I discussed with the patient that at this point in time we do not need to see them back for another year for a 2 year follow-up appointment.  I did discuss however that I am happy to see the patient sooner if issues questions or concerns arise.  The patient voiced understanding and agreement with the plan.   I discussed with her that I think the majority of her residual issues are coming from her patellofemoral joint.  Unfortunate the time of the index procedure her patella was less than 13 mm thick so we did a circumferential Bovie denervation and did not resurface it for fear of patellar complications mainly fracture.  I discussed with her that her knee symptoms could be alleviated with referral to pain management for genicular nerve block, intra-articular corticosteroid injection, or revision and patellar resurfacing with known potential risk of patellar fracture.  She states she would like to hold off for the time being.   I discussed with the patient that the mainstays of conservative treatment for troch bursitis include physical therapy, nonsteroidal anti-inflammatories, injections, activity modification, and home exercises.  The patient states that they  would like to try intrabursal corticosteroid injection.  At this point time the patient does not need to schedule follow-up with me today.  I am happy to see the patient back at any point  time however if issues arise or they fail to make a full recovery.  Follow up: May 2025      Sánchez Carbajal was in agreement with plan and had all questions answered.     Medications:  No orders of the defined types were placed in this encounter.      Followup:  No follow-ups on file.    Diagnoses and all orders for this visit:    1. Aftercare following right knee joint replacement surgery (Primary)  -     XR Knee 3+ View With Esmond Right    2. Trochanteric bursitis, right hip  -     Large Joint Arthrocentesis: R greater trochanteric bursa          Dictated utilizing Dragon dictation

## 2024-09-15 NOTE — THERAPY TREATMENT NOTE
Physical Therapy Daily Treatment Note    Patient: Ariana Michel   : 1957  Diagnosis/ICD-10 Code:  Left knee pain, unspecified chronicity [M25.562]  Referring practitioner: Alexis Hernandez*  Date of Initial Visit: Type: THERAPY  Noted: 2023  Today's Date: 2023  Patient seen for 5 sessions             Subjective Patient reports she is doing well. She has returned to driving and was able to walk ~500' outside, pretty tired afterward. Usually is limited by back pain or R leg pain.  Her ROM machine will be returned 23.      Objective   See Exercise, Manual, and Modality Logs for complete treatment. Ambulates without AD and normal gait pattern.  AAROM L knee flexion = 95 deg. SLS = 15 sec L, 15 sec R. Pitting edema noted L ankle today. Able to perform repeated sit to stand on elevated surface. Encouraged patient to begin longer walks at home outside. Still needs assistance donning sock, able to don/doff shoe independently. Resumed ice today with elevation 10 min. Initial [place] knee score indicated 37% initially, will reassess next visit and send MD note for 23 appointment.           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 Status post total right knee replacement  Z96.651 V43.65       Patient Active Problem List   Diagnosis    Left ureteral stone    Obstructive sleep apnea syndrome    Seasonal allergic rhinitis    Uterovaginal prolapse    Ureteral stone with hydronephrosis    Upper respiratory tract infection    GERD with apnea    Insomnia    Cervical radiculitis    Primary osteoarthritis of knees, bilateral    Mechanical knee pain, right    Primary osteoarthritis of right knee        Past Medical History:   Diagnosis Date    Arthritis     RIGHT SHOULDER    Arthritis of back     Not sure    Arthritis of neck     Two years ago    Cervical disc disorder     Two maybe three years ago    Chronic back pain     Diabetes mellitus     Elevated cholesterol     GERD (gastroesophageal reflux disease)     H/O seasonal allergies     History of anemia     History of diverticulitis     History of heart murmur in childhood     History of kidney stones     Hypertension     Migraines     PONV (postoperative nausea and vomiting)     Primary osteoarthritis of knees, bilateral 2022    Sleep apnea with use of continuous positive airway pressure (CPAP)     Uterine prolapse     UTERINE VAG PROLAPSE        Past Surgical History:   Procedure Laterality Date    ANTERIOR AND POSTERIOR VAGINAL REPAIR N/A 01/15/2019    Procedure: /POSTERIOR REPAIR;  Surgeon: James Holloway MD;  Location: [facility] OR;  Service: Gynecology    CERVICAL FUSION      CHOLECYSTECTOMY      COLONOSCOPY N/A 2017    Procedure: COLONOSCOPY;  Surgeon: Tian Henderson MD;  Location: [facility] OR;  Service:     CYSTOSCOPY URETEROSCOPY LASER LITHOTRIPSY N/A 2016    Procedure: URETEROSCOPY  ;  Surgeon: Vimal Azul MD;  Location: [facility]  MET     Progress per Plan of Care exp 10/16/23           Timed:         Manual Therapy:    15     mins  56496;     Therapeutic Exercise:    15     mins  35823;     Neuromuscular Max:        mins  44366;    Therapeutic Activity:     10     mins  07205;     Gait Training:           mins  50052;     Ultrasound:          mins  39334;    Ionto                                   mins   03786  Self Care                            mins   48848      Un-Timed:  Electrical Stimulation:         mins  31642 ( );  Dry Needling          mins self-pay  Traction          mins 77683  Low Eval          Mins  51179  Mod Eval          Mins  16414  High Eval                            Mins  26199  Canalith Repos                   mins  37266    Timed Treatment:   40   mins   Total Treatment:     40   mins    Robin A Sprigler, PT  Physical Therapist     OR;  Service:     CYSTOSCOPY URETEROSCOPY LASER LITHOTRIPSY Left 09/01/2019    Procedure: CYSTOSCOPY left retrograde pyelogram;  Surgeon: Vimal Azul MD;  Location: [facility] OR;  Service: Urology    CYSTOSCOPY W/ LASER LITHOTRIPSY Left 06/25/2018    Procedure: CYSTOSCOPY, LEFT URETEROSCOPY, left  STENT PLACEMENT, right retrograde;  Surgeon: Vimal Azul MD;  Location: [facility] OR;  Service: Urology    EXTRACORPOREAL SHOCK WAVE LITHOTRIPSY (ESWL)  2015    KNEE SURGERY      SACROCOLPOPEXY N/A 01/15/2019    Procedure: ROBOTIC SACROCOLPOPEXY WITH MESH, TOTAL LAPAROSCOPICE HYSTERECTOMY BILATERAL SALPINGECTOMY WITH DAVINCI ;  Surgeon: James Holloway MD;  Location: [facility] OR;  Service: DaVinci    TOTAL KNEE ARTHROPLASTY Right 05/24/2023    Procedure: TOTAL KNEE ARTHROPLASTY WITH CORI ROBOT;  Surgeon: Lambert Brady MD;  Location: [facility] OR;  Service: Robotics - Ortho;  Laterality: Right;        PT Ortho       Row Name 06/09/23 0800       Right Lower Ext    Rt Knee Extension/Flexion AROM 0-100 degrees post stretch  -KM              User Key  (r) = Recorded By, (t) = Taken By, (c) = Cosigned By      Initials Name Provider Type    Radha Bullard PTA Physical Therapist Assistant                                 PT Assessment/Plan       Row Name 06/09/23 0800          PT Assessment    Assessment Comments Pt ambulates well with use of quad cane. Pt with improved tolerance to progression of ther ex.  -KM        PT Plan    PT Plan Comments Continue per POC  -KM               User Key  (r) = Recorded By, (t) = Taken By, (c) = Cosigned By      Initials Name Provider Type    Radha Bullard PTA Physical Therapist Assistant                       OP Exercises       Row Name 06/09/23 0800             Subjective Comments    Subjective Comments Pt states her f/u appointment went well and was pleased with her progress. States her knee is doing well.  -KM         Exercise 1    Exercise Name 1 Heel slides  -KM   "    Cueing 1 Verbal;Tactile  -KM      Time 1 10 min  -KM         Exercise 2    Exercise Name 2 Bike (seat 1)  -KM      Time 2 5 min  -KM         Exercise 3    Exercise Name 3 Passive knee FLEX stretch  -KM      Cueing 3 Verbal;Tactile  -KM      Reps 3 10  -KM      Time 3 10 secs  -KM         Exercise 4    Exercise Name 4 Hamstring/gastroc stretch  -KM      Cueing 4 Verbal;Tactile  -KM      Reps 4 10 sec hold  -KM      Time 4 3 min  -KM         Exercise 5    Exercise Name 5 Supine knee EXT on roll  -KM      Cueing 5 Verbal;Tactile  -KM      Time 5 5 min  -KM         Exercise 6    Exercise Name 6 Passive knee EXT stretch  -KM      Cueing 6 Verbal;Tactile  -KM      Reps 6 10  -KM      Time 6 10 secs  -KM         Exercise 7    Exercise Name 7 quad sets  -KM      Cueing 7 Verbal;Tactile  -KM      Reps 7 5 sec hold  -KM      Time 7 3 min  -KM         Exercise 8    Exercise Name 8 SLR & ABD  -KM      Cueing 8 Verbal;Tactile  -KM      Reps 8 25 each  -KM         Exercise 9    Exercise Name 9 SAQ  -KM      Cueing 9 Verbal;Tactile;Demo  -KM      Reps 9 --  -KM         Exercise 10    Exercise Name 10 LAQ  -KM      Reps 10 25  -KM         Exercise 11    Exercise Name 11 TKE  -KM      Reps 11 25  -KM      Time 11 Gold  -KM         Exercise 12    Exercise Name 12 Heel Raises  -KM      Reps 12 25  -KM         Exercise 13    Exercise Name 13 Mini Squats  -KM      Reps 13 10  -KM         Exercise 14    Exercise Name 14 4\" Fwd Step Ups  -KM      Reps 14 20 each  -KM         Exercise 15    Exercise Name 15 4\" Lat Step Overs  -KM                User Key  (r) = Recorded By, (t) = Taken By, (c) = Cosigned By      Initials Name Provider Type    Radha Bullard, PTA Physical Therapist Assistant                                                    Time Calculation:   Start Time: 0800  Stop Time: 0905  Time Calculation (min): 65 min  Therapy Charges for Today       Code Description Service Date Service Provider Modifiers Qty    [id number] " HC PT MANUAL THERAPY EA 15 MIN 6/9/2023 Radha De La Cruz, PTA GP 1    [id number] HC PT THER PROC EA 15 MIN 6/9/2023 Radha De La Cruz PTA GP 1                      Radha De La Cruz PTA  6/9/2023      Opt out

## 2024-09-28 ENCOUNTER — TRANSCRIBE ORDERS (OUTPATIENT)
Dept: ADMINISTRATIVE | Facility: HOSPITAL | Age: 59
End: 2024-09-28
Payer: MEDICARE

## 2024-09-28 ENCOUNTER — HOSPITAL ENCOUNTER (OUTPATIENT)
Dept: GENERAL RADIOLOGY | Facility: HOSPITAL | Age: 59
Discharge: HOME | End: 2024-09-28
Payer: MEDICARE

## 2024-09-28 DIAGNOSIS — M54.41 LOW BACK PAIN WITH RIGHT-SIDED SCIATICA, UNSPECIFIED BACK PAIN LATERALITY, UNSPECIFIED CHRONICITY: Primary | ICD-10-CM

## 2024-09-28 DIAGNOSIS — M54.41 LOW BACK PAIN WITH RIGHT-SIDED SCIATICA, UNSPECIFIED BACK PAIN LATERALITY, UNSPECIFIED CHRONICITY: ICD-10-CM

## 2024-09-28 PROCEDURE — 72100 X-RAY EXAM L-S SPINE 2/3 VWS: CPT

## 2024-09-30 ENCOUNTER — TRANSCRIBE ORDERS (OUTPATIENT)
Dept: MRI IMAGING | Facility: HOSPITAL | Age: 59
End: 2024-09-30
Payer: MEDICARE

## 2024-09-30 DIAGNOSIS — M54.41 LOW BACK PAIN WITH RIGHT-SIDED SCIATICA, UNSPECIFIED BACK PAIN LATERALITY, UNSPECIFIED CHRONICITY: Primary | ICD-10-CM

## 2024-10-07 ENCOUNTER — HOSPITAL ENCOUNTER (EMERGENCY)
Facility: HOSPITAL | Age: 59
Discharge: HOME | End: 2024-10-07
Payer: MEDICARE

## 2024-10-07 ENCOUNTER — APPOINTMENT (OUTPATIENT)
Dept: CT IMAGING | Facility: HOSPITAL | Age: 59
End: 2024-10-07
Payer: MEDICARE

## 2024-10-07 VITALS
HEIGHT: 64 IN | RESPIRATION RATE: 20 BRPM | HEART RATE: 87 BPM | TEMPERATURE: 97.9 F | SYSTOLIC BLOOD PRESSURE: 137 MMHG | WEIGHT: 198.3 LBS | OXYGEN SATURATION: 94 % | BODY MASS INDEX: 33.86 KG/M2 | DIASTOLIC BLOOD PRESSURE: 77 MMHG

## 2024-10-07 DIAGNOSIS — N13.30 HYDRONEPHROSIS, UNSPECIFIED HYDRONEPHROSIS TYPE: ICD-10-CM

## 2024-10-07 DIAGNOSIS — N20.0 KIDNEY STONE: Primary | ICD-10-CM

## 2024-10-07 LAB
ALBUMIN SERPL-MCNC: 4.1 G/DL (ref 3.5–5.2)
ALBUMIN/GLOB SERPL: 1.5 G/DL
ALP SERPL-CCNC: 89 U/L (ref 39–117)
ALT SERPL W P-5'-P-CCNC: 18 U/L (ref 1–33)
ANION GAP SERPL CALCULATED.3IONS-SCNC: 11.7 MMOL/L (ref 5–15)
AST SERPL-CCNC: 18 U/L (ref 1–32)
BACTERIA UR QL AUTO: ABNORMAL /HPF
BASOPHILS # BLD AUTO: 0.04 10*3/MM3 (ref 0–0.2)
BASOPHILS NFR BLD AUTO: 0.7 % (ref 0–1.5)
BILIRUB SERPL-MCNC: 0.3 MG/DL (ref 0–1.2)
BILIRUB UR QL STRIP: NEGATIVE
BUN SERPL-MCNC: 21 MG/DL (ref 6–20)
BUN/CREAT SERPL: 25.6 (ref 7–25)
CALCIUM SPEC-SCNC: 9.5 MG/DL (ref 8.6–10.5)
CHLORIDE SERPL-SCNC: 105 MMOL/L (ref 98–107)
CLARITY UR: ABNORMAL
CO2 SERPL-SCNC: 24.3 MMOL/L (ref 22–29)
COLOR UR: YELLOW
CREAT SERPL-MCNC: 0.82 MG/DL (ref 0.57–1)
DEPRECATED RDW RBC AUTO: 42 FL (ref 37–54)
EGFRCR SERPLBLD CKD-EPI 2021: 82.5 ML/MIN/1.73
EOSINOPHIL # BLD AUTO: 0.19 10*3/MM3 (ref 0–0.4)
EOSINOPHIL NFR BLD AUTO: 3.3 % (ref 0.3–6.2)
ERYTHROCYTE [DISTWIDTH] IN BLOOD BY AUTOMATED COUNT: 13.4 % (ref 12.3–15.4)
GLOBULIN UR ELPH-MCNC: 2.8 GM/DL
GLUCOSE SERPL-MCNC: 172 MG/DL (ref 65–99)
GLUCOSE UR STRIP-MCNC: NEGATIVE MG/DL
HCT VFR BLD AUTO: 38.5 % (ref 34–46.6)
HGB BLD-MCNC: 12.4 G/DL (ref 12–15.9)
HGB UR QL STRIP.AUTO: ABNORMAL
HYALINE CASTS UR QL AUTO: ABNORMAL /LPF
IMM GRANULOCYTES # BLD AUTO: 0.02 10*3/MM3 (ref 0–0.05)
IMM GRANULOCYTES NFR BLD AUTO: 0.3 % (ref 0–0.5)
KETONES UR QL STRIP: NEGATIVE
LEUKOCYTE ESTERASE UR QL STRIP.AUTO: ABNORMAL
LIPASE SERPL-CCNC: 15 U/L (ref 13–60)
LYMPHOCYTES # BLD AUTO: 1.82 10*3/MM3 (ref 0.7–3.1)
LYMPHOCYTES NFR BLD AUTO: 31.8 % (ref 19.6–45.3)
MCH RBC QN AUTO: 27.7 PG (ref 26.6–33)
MCHC RBC AUTO-ENTMCNC: 32.2 G/DL (ref 31.5–35.7)
MCV RBC AUTO: 86.1 FL (ref 79–97)
MONOCYTES # BLD AUTO: 0.4 10*3/MM3 (ref 0.1–0.9)
MONOCYTES NFR BLD AUTO: 7 % (ref 5–12)
NEUTROPHILS NFR BLD AUTO: 3.25 10*3/MM3 (ref 1.7–7)
NEUTROPHILS NFR BLD AUTO: 56.9 % (ref 42.7–76)
NITRITE UR QL STRIP: NEGATIVE
NRBC BLD AUTO-RTO: 0 /100 WBC (ref 0–0.2)
PH UR STRIP.AUTO: 5.5 [PH] (ref 4.5–8)
PLATELET # BLD AUTO: 229 10*3/MM3 (ref 140–450)
PMV BLD AUTO: 9.9 FL (ref 6–12)
POTASSIUM SERPL-SCNC: 3.8 MMOL/L (ref 3.5–5.2)
PROT SERPL-MCNC: 6.9 G/DL (ref 6–8.5)
PROT UR QL STRIP: ABNORMAL
QT INTERVAL: 375 MS
QTC INTERVAL: 472 MS
RBC # BLD AUTO: 4.47 10*6/MM3 (ref 3.77–5.28)
RBC # UR STRIP: ABNORMAL /HPF
REF LAB TEST METHOD: ABNORMAL
SODIUM SERPL-SCNC: 141 MMOL/L (ref 136–145)
SP GR UR STRIP: 1.02 (ref 1–1.03)
SQUAMOUS #/AREA URNS HPF: ABNORMAL /HPF
UROBILINOGEN UR QL STRIP: ABNORMAL
WBC # UR STRIP: ABNORMAL /HPF
WBC NRBC COR # BLD AUTO: 5.72 10*3/MM3 (ref 3.4–10.8)

## 2024-10-07 PROCEDURE — 81001 URINALYSIS AUTO W/SCOPE: CPT

## 2024-10-07 PROCEDURE — 93005 ELECTROCARDIOGRAM TRACING: CPT

## 2024-10-07 PROCEDURE — 99285 EMERGENCY DEPT VISIT HI MDM: CPT

## 2024-10-07 PROCEDURE — 83690 ASSAY OF LIPASE: CPT

## 2024-10-07 PROCEDURE — 25510000001 IOPAMIDOL PER 1 ML

## 2024-10-07 PROCEDURE — 85025 COMPLETE CBC W/AUTO DIFF WBC: CPT

## 2024-10-07 PROCEDURE — 87086 URINE CULTURE/COLONY COUNT: CPT

## 2024-10-07 PROCEDURE — 93010 ELECTROCARDIOGRAM REPORT: CPT | Performed by: INTERNAL MEDICINE

## 2024-10-07 PROCEDURE — 80053 COMPREHEN METABOLIC PANEL: CPT

## 2024-10-07 PROCEDURE — 74177 CT ABD & PELVIS W/CONTRAST: CPT

## 2024-10-07 PROCEDURE — 96374 THER/PROPH/DIAG INJ IV PUSH: CPT

## 2024-10-07 PROCEDURE — 25010000002 MORPHINE PER 10 MG

## 2024-10-07 RX ORDER — TAMSULOSIN HYDROCHLORIDE 0.4 MG/1
0.4 CAPSULE ORAL ONCE
Status: COMPLETED | OUTPATIENT
Start: 2024-10-07 | End: 2024-10-07

## 2024-10-07 RX ORDER — ONDANSETRON 4 MG/1
4 TABLET, ORALLY DISINTEGRATING ORAL EVERY 8 HOURS PRN
Qty: 21 TABLET | Refills: 0 | Status: SHIPPED | OUTPATIENT
Start: 2024-10-07

## 2024-10-07 RX ORDER — IOPAMIDOL 755 MG/ML
100 INJECTION, SOLUTION INTRAVASCULAR
Status: COMPLETED | OUTPATIENT
Start: 2024-10-07 | End: 2024-10-07

## 2024-10-07 RX ORDER — SODIUM CHLORIDE 0.9 % (FLUSH) 0.9 %
10 SYRINGE (ML) INJECTION AS NEEDED
Status: DISCONTINUED | OUTPATIENT
Start: 2024-10-07 | End: 2024-10-07 | Stop reason: HOSPADM

## 2024-10-07 RX ORDER — TAMSULOSIN HYDROCHLORIDE 0.4 MG/1
1 CAPSULE ORAL DAILY
Qty: 7 CAPSULE | Refills: 0 | Status: SHIPPED | OUTPATIENT
Start: 2024-10-07 | End: 2024-10-14

## 2024-10-07 RX ADMIN — MORPHINE SULFATE 4 MG: 4 INJECTION, SOLUTION INTRAMUSCULAR; INTRAVENOUS at 10:47

## 2024-10-07 RX ADMIN — TAMSULOSIN HYDROCHLORIDE 0.4 MG: 0.4 CAPSULE ORAL at 13:22

## 2024-10-07 RX ADMIN — Medication 10 ML: at 10:48

## 2024-10-07 RX ADMIN — IOPAMIDOL 100 ML: 755 INJECTION, SOLUTION INTRAVENOUS at 11:57

## 2024-10-07 NOTE — ED PROVIDER NOTES
Subjective   History of Present Illness    59-year-old female with history of chronic anemia, chronic back pain, diabetes, kidney stones, uterine prolapse, diverticulosis with diverticulitis, hypercholesterolemia, migraines, sleep apnea, hypertension, presenting to the emergency department for evaluation of abdominal pain.  Patient reports that it has been on and off for the last 2 weeks, starting in the right lower quadrant and right flank, not moving.  Over the last 2 days its become continuous, poorly characterized.  No fever or chills, no nausea or vomiting.  Reports it has been difficult to urinate but no blood in her urine, no frequency, no dysuria.  No bloody or black stools, no diarrhea, no constipation, does have a history of diverticulitis on the same side which she feels similar to, is not able to say whether or not it feels similar to previous stones.    Review of Systems    ROS as specified in HPI.    Past Medical History:   Diagnosis Date    Arthritis     RIGHT SHOULDER    Arthritis of back     Not sure    Arthritis of neck     Two years ago    Bursitis of hip     Cervical disc disorder     Two maybe three years ago    Chronic back pain     Diabetes mellitus     Elevated cholesterol     GERD (gastroesophageal reflux disease)     H/O seasonal allergies     History of anemia     History of diverticulitis     History of heart murmur in childhood     History of kidney stones     Hypertension     Migraines     PONV (postoperative nausea and vomiting)     Primary osteoarthritis of knees, bilateral 11/29/2022    Sleep apnea with use of continuous positive airway pressure (CPAP)     Uterine prolapse     UTERINE VAG PROLAPSE       Allergies   Allergen Reactions    Penicillins Rash    Flagyl [Metronidazole] Hives    Sulfa Antibiotics Rash       Past Surgical History:   Procedure Laterality Date    ANTERIOR AND POSTERIOR VAGINAL REPAIR N/A 01/15/2019    Procedure: /POSTERIOR REPAIR;  Surgeon: James Holloway,  MD;  Location: [facility] OR;  Service: Gynecology    CERVICAL FUSION      CHOLECYSTECTOMY      COLONOSCOPY N/A 01/18/2017    Procedure: COLONOSCOPY;  Surgeon: Tian Henderson MD;  Location: [facility] OR;  Service:     CYSTOSCOPY URETEROSCOPY LASER LITHOTRIPSY N/A 04/06/2016    Procedure: URETEROSCOPY  ;  Surgeon: Vimal Azul MD;  Location: [facility] OR;  Service:     CYSTOSCOPY URETEROSCOPY LASER LITHOTRIPSY Left 09/01/2019    Procedure: CYSTOSCOPY left retrograde pyelogram;  Surgeon: Vimal Azul MD;  Location: [facility] OR;  Service: Urology    CYSTOSCOPY W/ LASER LITHOTRIPSY Left 06/25/2018    Procedure: CYSTOSCOPY, LEFT URETEROSCOPY, left  STENT PLACEMENT, right retrograde;  Surgeon: Vimal Azul MD;  Location: [facility] OR;  Service: Urology    EXTRACORPOREAL SHOCK WAVE LITHOTRIPSY (ESWL)  2015    JOINT REPLACEMENT  May 20th 2023    KNEE SURGERY      NECK SURGERY      Can't remember date    SACROCOLPOPEXY N/A 01/15/2019    Procedure: ROBOTIC SACROCOLPOPEXY WITH MESH, TOTAL LAPAROSCOPICE HYSTERECTOMY BILATERAL SALPINGECTOMY WITH DAVINCI ;  Surgeon: James Holloway MD;  Location: [facility] OR;  Service: DaVinci    TOTAL KNEE ARTHROPLASTY Right 05/24/2023    Procedure: TOTAL KNEE ARTHROPLASTY WITH CORI ROBOT;  Surgeon: Lambert Brady MD;  Location: [facility];  Service: Robotics - Ortho;  Laterality: Right;       Family History   Problem Relation Age of Onset    Cancer Maternal Grandmother         Kidney cancer    Malig Hyperthermia Neg Hx        Social History     Socioeconomic History    Marital status:    Tobacco Use    Smoking status: Never     Passive exposure: Never    Smokeless tobacco: Never   Vaping Use    Vaping status: Never Used   Substance and Sexual Activity    Alcohol use: No    Drug use: No    Sexual activity: Yes     Partners: Male     Birth control/protection: Hysterectomy           Objective   Physical Exam  Vitals reviewed.   Constitutional:       General:  She is not in acute distress.     Appearance: She is normal weight. She is not toxic-appearing.   HENT:      Head: Normocephalic and atraumatic.      Nose: Nose normal. No congestion.      Mouth/Throat:      Mouth: Mucous membranes are moist.      Pharynx: Oropharynx is clear.   Eyes:      General: No scleral icterus.     Conjunctiva/sclera: Conjunctivae normal.   Cardiovascular:      Rate and Rhythm: Normal rate and regular rhythm.      Pulses: Normal pulses.      Heart sounds: No murmur heard.  Pulmonary:      Effort: Pulmonary effort is normal. No respiratory distress.      Breath sounds: No wheezing, rhonchi or rales.   Abdominal:      General: There is no distension.      Palpations: Abdomen is soft.      Comments: Moderate right CVA as well as bilateral lower quadrant and suprapubic tenderness, no palpable masses, no rebound or guarding.   Musculoskeletal:         General: No swelling. Normal range of motion.      Cervical back: Normal range of motion and neck supple. No rigidity.   Skin:     General: Skin is warm and dry.      Capillary Refill: Capillary refill takes less than 2 seconds.      Coloration: Skin is not jaundiced.   Neurological:      Mental Status: She is alert and oriented to person, place, and time. Mental status is at baseline.   Psychiatric:         Mood and Affect: Mood normal.         Behavior: Behavior normal.         Procedures           ED Course                                             Medical Decision Making  Amount and/or Complexity of Data Reviewed  Labs: ordered.  Radiology: ordered.  ECG/medicine tests: ordered.    Risk  Prescription drug management.        Final diagnoses:   Kidney stone   Hydronephrosis, unspecified hydronephrosis type       ED Disposition  ED Disposition       ED Disposition   Discharge    Condition   Stable    Comment   --               Aayush Mckeon MD  [address] [facility] [address] [phone]    Call in 3 days      [facility]  [address]  [facility] [address]  [phone]  Call   to establish care         Medication List        New Prescriptions      ondansetron ODT 4 MG disintegrating tablet  Commonly known as: ZOFRAN-ODT  Take 1 tablet by mouth Every 8 (Eight) Hours As Needed for Nausea or Vomiting for up to 21 doses.     tamsulosin 0.4 MG capsule 24 hr capsule  Commonly known as: FLOMAX  Take 1 capsule by mouth Daily for 7 days.               Where to Get Your Medications        These medications were sent to [facility] [address] [place] [address] - [address] - [phone]  - [phone]  [address] [facility] [address]      Phone: [phone]   ondansetron ODT 4 MG disintegrating tablet  tamsulosin 0.4 MG capsule 24 hr capsule         ED Course: Patient presenting for 2 weeks of intermittent colicky pain in the right flank, right groin.  Tenderness as above, vital stable without fevers..    Lab work showed maintained renal function, normal white count, normal hemoglobin, UA with leukocyte esterase and gross blood but no bacteria, nitrate negative.  CT abdomen/pelvis showed a right sided 5 mm stone with some moderate hydronephrosis on the right, however given maintained renal function and size of the stone, likely should pass on its own.  Will optimize medical therapy with Flomax, pain control with Tylenol and ibuprofen, Zofran for nausea control.  Will provide contact information for local urology group for follow-up, close return precautions were provided for signs/symptoms of developing infection or failure of the stone to pass.    Patient appropriate for discharge without further workup/ management from the Emergency Department. All questions were answered. Close return and follow-up instructions were provided, and pt was discharged home in stable condition.    EKG: None    Consults: None    Incidental Findings: None         Gulshan Hussein,   10/07/24 1321

## 2024-10-07 NOTE — ED TRIAGE NOTES
"Pt via PV from home with c/o lower abd ain that radiates to R flank, pressure when urinating x2 weeks.   Per pt \"treated by [facility] for kidney/bladder infection buyt no relief\"  "

## 2024-10-07 NOTE — DISCHARGE INSTRUCTIONS
You were seen in the emergency department today for flank pain, abdominal pain. Vital signs + a medical screening exam were performed, and the need for any labwork and/ or imaging were discussed. Results of the above, if performed, were also discussed as well as their clinical significance.     You should schedule a follow-up appointment with your family medicine doctor within the next 2-3 days. If you do not have a family medicine provider we can provide you with contact information to establish care.    Tylenol, ibuprofen every 6 hours for pain if no pre-existing medical conditions preventer use.  Take Zofran as needed every 8 hours for nausea, Flomax daily.  Take your first dose of Flomax tomorrow morning.    Call the provided contact information for [facility] to establish care.    Any prescriptions written today can have a paper copy provided for you to take to any pharmacy you would like in the event that your primary pharmacy is closed.  For any medications that were prescribed as a daily medication and for which you received a dose in the emergency department such as antibiotics, unless otherwise instructed take your 1st dose tomorrow. If you would prefer a paper copy of your prescription, let your treating physician or nurse know.     It is important to remember that symptoms and progression of illness/ injury can change, so do not hesitate to return to the Emergency Department for any new or worsening symptoms.     You should return to the Emergency Department or seek immediate medical care if you develop new or worsening symptoms including but not limited to fever/chills, uncontrolled pain, lightheadedness, vomiting.

## 2024-10-08 LAB — BACTERIA SPEC AEROBE CULT: NORMAL

## 2024-10-22 ENCOUNTER — HOSPITAL ENCOUNTER (OUTPATIENT)
Dept: MRI IMAGING | Facility: HOSPITAL | Age: 59
Discharge: HOME | End: 2024-10-22
Admitting: INTERNAL MEDICINE
Payer: MEDICARE

## 2024-10-22 DIAGNOSIS — M54.41 LOW BACK PAIN WITH RIGHT-SIDED SCIATICA, UNSPECIFIED BACK PAIN LATERALITY, UNSPECIFIED CHRONICITY: ICD-10-CM

## 2024-10-22 PROCEDURE — 72148 MRI LUMBAR SPINE W/O DYE: CPT

## 2024-11-07 ENCOUNTER — HOSPITAL ENCOUNTER (EMERGENCY)
Facility: HOSPITAL | Age: 59
Discharge: HOME | End: 2024-11-07
Attending: STUDENT IN AN ORGANIZED HEALTH CARE EDUCATION/TRAINING PROGRAM
Payer: MEDICARE

## 2024-11-07 ENCOUNTER — APPOINTMENT (OUTPATIENT)
Dept: CT IMAGING | Facility: HOSPITAL | Age: 59
End: 2024-11-07
Payer: MEDICARE

## 2024-11-07 VITALS
TEMPERATURE: 97.8 F | BODY MASS INDEX: 34.14 KG/M2 | HEIGHT: 64 IN | SYSTOLIC BLOOD PRESSURE: 146 MMHG | WEIGHT: 199.96 LBS | DIASTOLIC BLOOD PRESSURE: 87 MMHG | RESPIRATION RATE: 16 BRPM | HEART RATE: 93 BPM | OXYGEN SATURATION: 94 %

## 2024-11-07 DIAGNOSIS — N20.0 KIDNEY STONE: Primary | ICD-10-CM

## 2024-11-07 LAB
ALBUMIN SERPL-MCNC: 4.4 G/DL (ref 3.5–5.2)
ALBUMIN/GLOB SERPL: 1.8 G/DL
ALP SERPL-CCNC: 98 U/L (ref 39–117)
ALT SERPL W P-5'-P-CCNC: 13 U/L (ref 1–33)
ANION GAP SERPL CALCULATED.3IONS-SCNC: 11.3 MMOL/L (ref 5–15)
AST SERPL-CCNC: 16 U/L (ref 1–32)
BACTERIA UR QL AUTO: ABNORMAL /HPF
BASOPHILS # BLD AUTO: 0.05 10*3/MM3 (ref 0–0.2)
BASOPHILS NFR BLD AUTO: 0.7 % (ref 0–1.5)
BILIRUB SERPL-MCNC: 0.3 MG/DL (ref 0–1.2)
BILIRUB UR QL STRIP: NEGATIVE
BUN SERPL-MCNC: 20 MG/DL (ref 6–20)
BUN/CREAT SERPL: 26.7 (ref 7–25)
CALCIUM SPEC-SCNC: 9.5 MG/DL (ref 8.6–10.5)
CHLORIDE SERPL-SCNC: 105 MMOL/L (ref 98–107)
CLARITY UR: ABNORMAL
CO2 SERPL-SCNC: 25.7 MMOL/L (ref 22–29)
COLOR UR: YELLOW
CREAT SERPL-MCNC: 0.75 MG/DL (ref 0.57–1)
DEPRECATED RDW RBC AUTO: 42.7 FL (ref 37–54)
EGFRCR SERPLBLD CKD-EPI 2021: 91.8 ML/MIN/1.73
EOSINOPHIL # BLD AUTO: 0.29 10*3/MM3 (ref 0–0.4)
EOSINOPHIL NFR BLD AUTO: 3.9 % (ref 0.3–6.2)
ERYTHROCYTE [DISTWIDTH] IN BLOOD BY AUTOMATED COUNT: 13.8 % (ref 12.3–15.4)
GLOBULIN UR ELPH-MCNC: 2.5 GM/DL
GLUCOSE SERPL-MCNC: 110 MG/DL (ref 65–99)
GLUCOSE UR STRIP-MCNC: NEGATIVE MG/DL
HCT VFR BLD AUTO: 38.6 % (ref 34–46.6)
HGB BLD-MCNC: 12.4 G/DL (ref 12–15.9)
HGB UR QL STRIP.AUTO: ABNORMAL
HYALINE CASTS UR QL AUTO: ABNORMAL /LPF
IMM GRANULOCYTES # BLD AUTO: 0.02 10*3/MM3 (ref 0–0.05)
IMM GRANULOCYTES NFR BLD AUTO: 0.3 % (ref 0–0.5)
KETONES UR QL STRIP: NEGATIVE
LEUKOCYTE ESTERASE UR QL STRIP.AUTO: ABNORMAL
LYMPHOCYTES # BLD AUTO: 2.65 10*3/MM3 (ref 0.7–3.1)
LYMPHOCYTES NFR BLD AUTO: 35.4 % (ref 19.6–45.3)
MAGNESIUM SERPL-MCNC: 2 MG/DL (ref 1.6–2.6)
MCH RBC QN AUTO: 27.4 PG (ref 26.6–33)
MCHC RBC AUTO-ENTMCNC: 32.1 G/DL (ref 31.5–35.7)
MCV RBC AUTO: 85.4 FL (ref 79–97)
MONOCYTES # BLD AUTO: 0.53 10*3/MM3 (ref 0.1–0.9)
MONOCYTES NFR BLD AUTO: 7.1 % (ref 5–12)
MUCOUS THREADS URNS QL MICRO: ABNORMAL /HPF
NEUTROPHILS NFR BLD AUTO: 3.94 10*3/MM3 (ref 1.7–7)
NEUTROPHILS NFR BLD AUTO: 52.6 % (ref 42.7–76)
NITRITE UR QL STRIP: NEGATIVE
NRBC BLD AUTO-RTO: 0 /100 WBC (ref 0–0.2)
PH UR STRIP.AUTO: 6 [PH] (ref 4.5–8)
PLATELET # BLD AUTO: 265 10*3/MM3 (ref 140–450)
PMV BLD AUTO: 9.8 FL (ref 6–12)
POTASSIUM SERPL-SCNC: 4 MMOL/L (ref 3.5–5.2)
PROT SERPL-MCNC: 6.9 G/DL (ref 6–8.5)
PROT UR QL STRIP: ABNORMAL
RBC # BLD AUTO: 4.52 10*6/MM3 (ref 3.77–5.28)
RBC # UR STRIP: ABNORMAL /HPF
REF LAB TEST METHOD: ABNORMAL
SODIUM SERPL-SCNC: 142 MMOL/L (ref 136–145)
SP GR UR STRIP: 1.02 (ref 1–1.03)
SQUAMOUS #/AREA URNS HPF: ABNORMAL /HPF
UROBILINOGEN UR QL STRIP: ABNORMAL
WBC # UR STRIP: ABNORMAL /HPF
WBC NRBC COR # BLD AUTO: 7.48 10*3/MM3 (ref 3.4–10.8)

## 2024-11-07 PROCEDURE — 80053 COMPREHEN METABOLIC PANEL: CPT | Performed by: STUDENT IN AN ORGANIZED HEALTH CARE EDUCATION/TRAINING PROGRAM

## 2024-11-07 PROCEDURE — 96374 THER/PROPH/DIAG INJ IV PUSH: CPT

## 2024-11-07 PROCEDURE — 25010000002 ONDANSETRON PER 1 MG: Performed by: STUDENT IN AN ORGANIZED HEALTH CARE EDUCATION/TRAINING PROGRAM

## 2024-11-07 PROCEDURE — 25510000001 IOPAMIDOL PER 1 ML: Performed by: STUDENT IN AN ORGANIZED HEALTH CARE EDUCATION/TRAINING PROGRAM

## 2024-11-07 PROCEDURE — 36415 COLL VENOUS BLD VENIPUNCTURE: CPT

## 2024-11-07 PROCEDURE — 99285 EMERGENCY DEPT VISIT HI MDM: CPT | Performed by: STUDENT IN AN ORGANIZED HEALTH CARE EDUCATION/TRAINING PROGRAM

## 2024-11-07 PROCEDURE — 51798 US URINE CAPACITY MEASURE: CPT

## 2024-11-07 PROCEDURE — 85025 COMPLETE CBC W/AUTO DIFF WBC: CPT | Performed by: STUDENT IN AN ORGANIZED HEALTH CARE EDUCATION/TRAINING PROGRAM

## 2024-11-07 PROCEDURE — 25010000002 KETOROLAC TROMETHAMINE PER 15 MG: Performed by: STUDENT IN AN ORGANIZED HEALTH CARE EDUCATION/TRAINING PROGRAM

## 2024-11-07 PROCEDURE — 83735 ASSAY OF MAGNESIUM: CPT | Performed by: STUDENT IN AN ORGANIZED HEALTH CARE EDUCATION/TRAINING PROGRAM

## 2024-11-07 PROCEDURE — 96375 TX/PRO/DX INJ NEW DRUG ADDON: CPT

## 2024-11-07 PROCEDURE — 74177 CT ABD & PELVIS W/CONTRAST: CPT

## 2024-11-07 PROCEDURE — 25010000002 CEFTRIAXONE SODIUM 2 G RECONSTITUTED SOLUTION 1 EACH VIAL: Performed by: STUDENT IN AN ORGANIZED HEALTH CARE EDUCATION/TRAINING PROGRAM

## 2024-11-07 PROCEDURE — 81001 URINALYSIS AUTO W/SCOPE: CPT | Performed by: STUDENT IN AN ORGANIZED HEALTH CARE EDUCATION/TRAINING PROGRAM

## 2024-11-07 RX ORDER — IOPAMIDOL 755 MG/ML
100 INJECTION, SOLUTION INTRAVASCULAR
Status: COMPLETED | OUTPATIENT
Start: 2024-11-07 | End: 2024-11-07

## 2024-11-07 RX ORDER — KETOROLAC TROMETHAMINE 30 MG/ML
15 INJECTION, SOLUTION INTRAMUSCULAR; INTRAVENOUS ONCE
Status: COMPLETED | OUTPATIENT
Start: 2024-11-07 | End: 2024-11-07

## 2024-11-07 RX ORDER — TAMSULOSIN HYDROCHLORIDE 0.4 MG/1
1 CAPSULE ORAL DAILY
Qty: 30 CAPSULE | Refills: 0 | Status: SHIPPED | OUTPATIENT
Start: 2024-11-07

## 2024-11-07 RX ORDER — KETOROLAC TROMETHAMINE 30 MG/ML
15 INJECTION, SOLUTION INTRAMUSCULAR; INTRAVENOUS EVERY 6 HOURS PRN
Status: DISCONTINUED | OUTPATIENT
Start: 2024-11-07 | End: 2024-11-07

## 2024-11-07 RX ORDER — ONDANSETRON 2 MG/ML
4 INJECTION INTRAMUSCULAR; INTRAVENOUS ONCE
Status: COMPLETED | OUTPATIENT
Start: 2024-11-07 | End: 2024-11-07

## 2024-11-07 RX ADMIN — ONDANSETRON 4 MG: 2 INJECTION INTRAMUSCULAR; INTRAVENOUS at 20:08

## 2024-11-07 RX ADMIN — CEFTRIAXONE SODIUM 2000 MG: 2 INJECTION, POWDER, FOR SOLUTION INTRAVENOUS at 20:08

## 2024-11-07 RX ADMIN — KETOROLAC TROMETHAMINE 15 MG: 30 INJECTION, SOLUTION INTRAMUSCULAR; INTRAVENOUS at 20:08

## 2024-11-07 RX ADMIN — IOPAMIDOL 100 ML: 755 INJECTION, SOLUTION INTRAVENOUS at 19:54

## 2024-11-07 NOTE — Clinical Note
ERIK PAZ  [facility] EMERGENCY DEPARTMENT  [address] [place] [address]  Phone: [phone]    Sánchez Carbajal was seen and treated in our emergency department on 11/7/2024.  She may return to work on 11/11/2024.         Thank you for choosing [facility].    Ike Valle MD

## 2024-11-08 NOTE — ED PROVIDER NOTES
Subjective   59-year-old female with a past medical history of of hypertension hypercholesteremia diabetes presenting with a complaint of increased urinary frequency.  She states that she had been treated for 8 days with Keflex by her PCP.  She had suprapubic pain at the time when her PCP saw her and it was presumed that she had a urinary tract infection but she never got officially tested.  She reports has a history of kidney stones and she has blood in her urine and she is not sure if she has a kidney stone.    Review of Systems    Past Medical History:   Diagnosis Date    Arthritis     RIGHT SHOULDER    Arthritis of back     Not sure    Arthritis of neck     Two years ago    Bursitis of hip     Cervical disc disorder     Two maybe three years ago    Chronic back pain     Diabetes mellitus     Elevated cholesterol     GERD (gastroesophageal reflux disease)     H/O seasonal allergies     History of anemia     History of diverticulitis     History of heart murmur in childhood     History of kidney stones     Hypertension     Migraines     PONV (postoperative nausea and vomiting)     Primary osteoarthritis of knees, bilateral 11/29/2022    Sleep apnea with use of continuous positive airway pressure (CPAP)     Uterine prolapse     UTERINE VAG PROLAPSE       Allergies   Allergen Reactions    Penicillins Rash    Flagyl [Metronidazole] Hives    Sulfa Antibiotics Rash       Past Surgical History:   Procedure Laterality Date    ANTERIOR AND POSTERIOR VAGINAL REPAIR N/A 01/15/2019    Procedure: /POSTERIOR REPAIR;  Surgeon: James Holloway MD;  Location: [facility] OR;  Service: Gynecology    CERVICAL FUSION      CHOLECYSTECTOMY      COLONOSCOPY N/A 01/18/2017    Procedure: COLONOSCOPY;  Surgeon: Tian Henderson MD;  Location: [facility] OR;  Service:     CYSTOSCOPY URETEROSCOPY LASER LITHOTRIPSY N/A 04/06/2016    Procedure: URETEROSCOPY  ;  Surgeon: Vimal Azul MD;  Location: [facility] OR;  Service:      CYSTOSCOPY URETEROSCOPY LASER LITHOTRIPSY Left 09/01/2019    Procedure: CYSTOSCOPY left retrograde pyelogram;  Surgeon: Vimal Azul MD;  Location: [facility] MAIN OR;  Service: Urology    CYSTOSCOPY W/ LASER LITHOTRIPSY Left 06/25/2018    Procedure: CYSTOSCOPY, LEFT URETEROSCOPY, left  STENT PLACEMENT, right retrograde;  Surgeon: Vimal Azul MD;  Location:  [facility] OR;  Service: Urology    EXTRACORPOREAL SHOCK WAVE LITHOTRIPSY (ESWL)  2015    JOINT REPLACEMENT  May 20th 2023    KNEE SURGERY      NECK SURGERY      Can't remember date    SACROCOLPOPEXY N/A 01/15/2019    Procedure: ROBOTIC SACROCOLPOPEXY WITH MESH, TOTAL LAPAROSCOPICE HYSTERECTOMY BILATERAL SALPINGECTOMY WITH DAVINCI ;  Surgeon: James Holloway MD;  Location: [facility] MAIN OR;  Service: DaVinci    TOTAL KNEE ARTHROPLASTY Right 05/24/2023    Procedure: TOTAL KNEE ARTHROPLASTY WITH CORI ROBOT;  Surgeon: Lambert Brady MD;  Location: [facility] OR;  Service: Robotics - Ortho;  Laterality: Right;       Family History   Problem Relation Age of Onset    Cancer Maternal Grandmother         Kidney cancer    Malig Hyperthermia Neg Hx        Social History     Socioeconomic History    Marital status:    Tobacco Use    Smoking status: Never     Passive exposure: Never    Smokeless tobacco: Never   Vaping Use    Vaping status: Never Used   Substance and Sexual Activity    Alcohol use: No    Drug use: No    Sexual activity: Yes     Partners: Male     Birth control/protection: Hysterectomy           Objective   Physical Exam  Vitals and nursing note reviewed. Exam conducted with a chaperone present.   Constitutional:       General: She is not in acute distress.     Appearance: Normal appearance. She is not ill-appearing, toxic-appearing or diaphoretic.   HENT:      Head: Normocephalic and atraumatic.      Nose: Nose normal.      Mouth/Throat:      Pharynx: No oropharyngeal exudate or posterior oropharyngeal erythema.   Eyes:      Extraocular Movements:  Extraocular movements intact.      Conjunctiva/sclera: Conjunctivae normal.      Pupils: Pupils are equal, round, and reactive to light.   Cardiovascular:      Rate and Rhythm: Normal rate and regular rhythm.   Pulmonary:      Effort: Pulmonary effort is normal. No respiratory distress.      Breath sounds: Normal breath sounds. No stridor. No wheezing, rhonchi or rales.   Abdominal:      General: Abdomen is flat. There is no distension.      Tenderness: There is abdominal tenderness (Mild suprapubic). There is no guarding or rebound.   Musculoskeletal:         General: No swelling, tenderness, deformity or signs of injury. Normal range of motion.      Cervical back: Normal range of motion.   Skin:     General: Skin is warm and dry.      Capillary Refill: Capillary refill takes less than 2 seconds.      Findings: No erythema or rash.   Neurological:      General: No focal deficit present.      Mental Status: She is alert and oriented to person, place, and time. Mental status is at baseline.      Cranial Nerves: No cranial nerve deficit.      Sensory: No sensory deficit.      Motor: No weakness.   Psychiatric:         Mood and Affect: Mood normal.         Procedures           ED Course  ED Course as of 11/07/24 2043   Thu Nov 07, 2024 2035 Reevaluation patient had a report that she was hypoxic on room air at 87%.  When I went to the room she is on 2 L via nasal cannula saturating 97%  states that she has SAGAR at home wears a CPAP.  Turned the patient's oxygen off had a discussion with her results and over the course of 10 minutes her lowest oxygen saturation was 91% on room air.  She did not report any shortness of breath no chest pain she stated that she has been having a runny nose recently no nasal congestion.  She states that she did not want to stay for any workup of hypoxia.  I notified her that given her age as well as obesity and risk factor of CPAP that she should return to the emergency room if she  develops any symptoms.    Regarding her kidney stones, they are at the [facility], anticipate passing the stone soon.  Will start her on Flomax that she does not have a prescription for this, will also recommend ibuprofen for pain control, she does have large stones but given the location she has a high probability of passing them.  Counseled her for return precautions on decreased urination as she are ready has signs of mild hydro.  Additionally urine not convincing for a urinary tract infection.  I gave her return precautions for septic stone which the patient is not septic at the time of discharge from the emergency room  [AK]      ED Course User Index  [AK] Ike Valle MD                                               Medical Decision Making  This is a well-appearing 59-year-old with suprapubic tenderness and report of being treated for UTI but no official test had been run.  Her results were positive for a stone at the [facility], stone was present bilaterally, mild hydronephrosis but creatinine function was normal.  Counseled her that she is in a gray area for discharge given that she has signs of obstruction but she is still urinating adequately with no signs of kidney injury.  Will give her Flomax to assist with passage of the stone, gave her return precautions for oliguria.  Pain control with ibuprofen at home.  Patient agreeable and has close PCP follow-up    Problems Addressed:  Kidney stone: complicated acute illness or injury    Amount and/or Complexity of Data Reviewed  Labs: ordered.  Radiology: ordered.    Risk  Prescription drug management.        Final diagnoses:   Kidney stone       ED Disposition  ED Disposition       ED Disposition   Discharge    Condition   Stable    Comment   --               Aayush Mckeon MD  [address] [facility] [address] [phone]    In 2 days      [facility] EMERGENCY DEPARTMENT  [address] [place] [address] [phone]  Go to    If symptoms worsen         Medication List        New Prescriptions      tamsulosin 0.4 MG capsule 24 hr capsule  Commonly known as: FLOMAX  Take 1 capsule by mouth Daily.               Where to Get Your Medications        These medications were sent to [facility] [address] [place] [address] - [address] [facility] - [phone]  - [phone]   [address] [facility] [address]      Phone: [phone]   tamsulosin 0.4 MG capsule 24 hr capsule            Ike Valle MD  11/07/24 8834

## 2024-11-12 ENCOUNTER — APPOINTMENT (OUTPATIENT)
Dept: GENERAL RADIOLOGY | Facility: HOSPITAL | Age: 59
End: 2024-11-12
Payer: MEDICARE

## 2024-11-12 ENCOUNTER — APPOINTMENT (OUTPATIENT)
Dept: CT IMAGING | Facility: HOSPITAL | Age: 59
End: 2024-11-12
Payer: MEDICARE

## 2024-11-12 ENCOUNTER — ANESTHESIA EVENT (OUTPATIENT)
Dept: PERIOP | Facility: HOSPITAL | Age: 59
End: 2024-11-12
Payer: MEDICARE

## 2024-11-12 ENCOUNTER — ANESTHESIA (OUTPATIENT)
Dept: PERIOP | Facility: HOSPITAL | Age: 59
End: 2024-11-12
Payer: MEDICARE

## 2024-11-12 ENCOUNTER — HOSPITAL ENCOUNTER (OUTPATIENT)
Facility: HOSPITAL | Age: 59
LOS: 1 days | Discharge: HOME | End: 2024-11-13
Attending: STUDENT IN AN ORGANIZED HEALTH CARE EDUCATION/TRAINING PROGRAM | Admitting: STUDENT IN AN ORGANIZED HEALTH CARE EDUCATION/TRAINING PROGRAM
Payer: MEDICARE

## 2024-11-12 DIAGNOSIS — N13.9 ACUTE BILATERAL OBSTRUCTIVE UROPATHY: Primary | ICD-10-CM

## 2024-11-12 DIAGNOSIS — N39.0 ACUTE UTI: ICD-10-CM

## 2024-11-12 DIAGNOSIS — N20.0 KIDNEY STONE: ICD-10-CM

## 2024-11-12 LAB
ALBUMIN SERPL-MCNC: 4.2 G/DL (ref 3.5–5.2)
ALBUMIN/GLOB SERPL: 1.6 G/DL
ALP SERPL-CCNC: 93 U/L (ref 39–117)
ALT SERPL W P-5'-P-CCNC: 18 U/L (ref 1–33)
ANION GAP SERPL CALCULATED.3IONS-SCNC: 13.8 MMOL/L (ref 5–15)
AST SERPL-CCNC: 19 U/L (ref 1–32)
BACTERIA UR QL AUTO: ABNORMAL /HPF
BASOPHILS # BLD AUTO: 0.04 10*3/MM3 (ref 0–0.2)
BASOPHILS NFR BLD AUTO: 0.4 % (ref 0–1.5)
BILIRUB SERPL-MCNC: 0.4 MG/DL (ref 0–1.2)
BILIRUB UR QL STRIP: NEGATIVE
BUN SERPL-MCNC: 13 MG/DL (ref 6–20)
BUN/CREAT SERPL: 14 (ref 7–25)
CALCIUM SPEC-SCNC: 9.6 MG/DL (ref 8.6–10.5)
CHLORIDE SERPL-SCNC: 99 MMOL/L (ref 98–107)
CLARITY UR: CLEAR
CO2 SERPL-SCNC: 24.2 MMOL/L (ref 22–29)
COLOR UR: ABNORMAL
CREAT SERPL-MCNC: 0.93 MG/DL (ref 0.57–1)
D-LACTATE SERPL-SCNC: 1.1 MMOL/L (ref 0.5–2)
DEPRECATED RDW RBC AUTO: 41.3 FL (ref 37–54)
EGFRCR SERPLBLD CKD-EPI 2021: 70.9 ML/MIN/1.73
EOSINOPHIL # BLD AUTO: 0.26 10*3/MM3 (ref 0–0.4)
EOSINOPHIL NFR BLD AUTO: 2.8 % (ref 0.3–6.2)
ERYTHROCYTE [DISTWIDTH] IN BLOOD BY AUTOMATED COUNT: 13.3 % (ref 12.3–15.4)
GLOBULIN UR ELPH-MCNC: 2.6 GM/DL
GLUCOSE SERPL-MCNC: 105 MG/DL (ref 65–99)
GLUCOSE UR STRIP-MCNC: NEGATIVE MG/DL
HCT VFR BLD AUTO: 37.8 % (ref 34–46.6)
HGB BLD-MCNC: 12.1 G/DL (ref 12–15.9)
HGB UR QL STRIP.AUTO: ABNORMAL
HOLD SPECIMEN: NORMAL
HOLD SPECIMEN: NORMAL
HYALINE CASTS UR QL AUTO: ABNORMAL /LPF
IMM GRANULOCYTES # BLD AUTO: 0.03 10*3/MM3 (ref 0–0.05)
IMM GRANULOCYTES NFR BLD AUTO: 0.3 % (ref 0–0.5)
KETONES UR QL STRIP: ABNORMAL
LEUKOCYTE ESTERASE UR QL STRIP.AUTO: ABNORMAL
LIPASE SERPL-CCNC: 13 U/L (ref 13–60)
LYMPHOCYTES # BLD AUTO: 1.95 10*3/MM3 (ref 0.7–3.1)
LYMPHOCYTES NFR BLD AUTO: 21 % (ref 19.6–45.3)
MCH RBC QN AUTO: 27.3 PG (ref 26.6–33)
MCHC RBC AUTO-ENTMCNC: 32 G/DL (ref 31.5–35.7)
MCV RBC AUTO: 85.3 FL (ref 79–97)
MONOCYTES # BLD AUTO: 0.51 10*3/MM3 (ref 0.1–0.9)
MONOCYTES NFR BLD AUTO: 5.5 % (ref 5–12)
NEUTROPHILS NFR BLD AUTO: 6.5 10*3/MM3 (ref 1.7–7)
NEUTROPHILS NFR BLD AUTO: 70 % (ref 42.7–76)
NITRITE UR QL STRIP: NEGATIVE
NRBC BLD AUTO-RTO: 0 /100 WBC (ref 0–0.2)
PH UR STRIP.AUTO: 5.5 [PH] (ref 5–8)
PLATELET # BLD AUTO: 230 10*3/MM3 (ref 140–450)
PMV BLD AUTO: 9.3 FL (ref 6–12)
POTASSIUM SERPL-SCNC: 3.5 MMOL/L (ref 3.5–5.2)
PROT SERPL-MCNC: 6.8 G/DL (ref 6–8.5)
PROT UR QL STRIP: ABNORMAL
RBC # BLD AUTO: 4.43 10*6/MM3 (ref 3.77–5.28)
RBC # UR STRIP: ABNORMAL /HPF
REF LAB TEST METHOD: ABNORMAL
SODIUM SERPL-SCNC: 137 MMOL/L (ref 136–145)
SP GR UR STRIP: 1.02 (ref 1–1.03)
SQUAMOUS #/AREA URNS HPF: ABNORMAL /HPF
UROBILINOGEN UR QL STRIP: ABNORMAL
WBC # UR STRIP: ABNORMAL /HPF
WBC NRBC COR # BLD AUTO: 9.29 10*3/MM3 (ref 3.4–10.8)
WHOLE BLOOD HOLD COAG: NORMAL
WHOLE BLOOD HOLD SPECIMEN: NORMAL

## 2024-11-12 PROCEDURE — G0378 HOSPITAL OBSERVATION PER HR: HCPCS

## 2024-11-12 PROCEDURE — 25810000003 LACTATED RINGERS PER 1000 ML: Performed by: ANESTHESIOLOGY

## 2024-11-12 PROCEDURE — 81001 URINALYSIS AUTO W/SCOPE: CPT | Performed by: STUDENT IN AN ORGANIZED HEALTH CARE EDUCATION/TRAINING PROGRAM

## 2024-11-12 PROCEDURE — 25810000003 LACTATED RINGERS SOLUTION: Performed by: NURSE PRACTITIONER

## 2024-11-12 PROCEDURE — 82365 CALCULUS SPECTROSCOPY: CPT | Performed by: STUDENT IN AN ORGANIZED HEALTH CARE EDUCATION/TRAINING PROGRAM

## 2024-11-12 PROCEDURE — 83690 ASSAY OF LIPASE: CPT | Performed by: STUDENT IN AN ORGANIZED HEALTH CARE EDUCATION/TRAINING PROGRAM

## 2024-11-12 PROCEDURE — 80053 COMPREHEN METABOLIC PANEL: CPT | Performed by: STUDENT IN AN ORGANIZED HEALTH CARE EDUCATION/TRAINING PROGRAM

## 2024-11-12 PROCEDURE — 25010000002 CEFTRIAXONE PER 250 MG: Performed by: NURSE PRACTITIONER

## 2024-11-12 PROCEDURE — 25810000003 SODIUM CHLORIDE 0.9 % SOLUTION 250 ML FLEX CONT: Performed by: NURSE PRACTITIONER

## 2024-11-12 PROCEDURE — 25010000002 LIDOCAINE 2% SOLUTION: Performed by: NURSE ANESTHETIST, CERTIFIED REGISTERED

## 2024-11-12 PROCEDURE — 74176 CT ABD & PELVIS W/O CONTRAST: CPT

## 2024-11-12 PROCEDURE — 25010000002 KETOROLAC TROMETHAMINE PER 15 MG: Performed by: NURSE PRACTITIONER

## 2024-11-12 PROCEDURE — 25010000002 MORPHINE PER 10 MG: Performed by: NURSE PRACTITIONER

## 2024-11-12 PROCEDURE — C2617 STENT, NON-COR, TEM W/O DEL: HCPCS | Performed by: STUDENT IN AN ORGANIZED HEALTH CARE EDUCATION/TRAINING PROGRAM

## 2024-11-12 PROCEDURE — 25010000002 LIDOCAINE 1 % SOLUTION 50 ML VIAL: Performed by: NURSE PRACTITIONER

## 2024-11-12 PROCEDURE — 83605 ASSAY OF LACTIC ACID: CPT | Performed by: STUDENT IN AN ORGANIZED HEALTH CARE EDUCATION/TRAINING PROGRAM

## 2024-11-12 PROCEDURE — 25010000002 DEXAMETHASONE SODIUM PHOSPHATE 20 MG/5ML SOLUTION: Performed by: NURSE ANESTHETIST, CERTIFIED REGISTERED

## 2024-11-12 PROCEDURE — 25010000002 ONDANSETRON PER 1 MG: Performed by: STUDENT IN AN ORGANIZED HEALTH CARE EDUCATION/TRAINING PROGRAM

## 2024-11-12 PROCEDURE — 85025 COMPLETE CBC W/AUTO DIFF WBC: CPT

## 2024-11-12 PROCEDURE — 87086 URINE CULTURE/COLONY COUNT: CPT | Performed by: NURSE PRACTITIONER

## 2024-11-12 PROCEDURE — 25010000002 SUGAMMADEX 200 MG/2ML SOLUTION: Performed by: NURSE ANESTHETIST, CERTIFIED REGISTERED

## 2024-11-12 PROCEDURE — 25010000002 ONDANSETRON PER 1 MG: Performed by: NURSE PRACTITIONER

## 2024-11-12 PROCEDURE — 96375 TX/PRO/DX INJ NEW DRUG ADDON: CPT

## 2024-11-12 PROCEDURE — 74420 UROGRAPHY RTRGR +-KUB: CPT

## 2024-11-12 PROCEDURE — 99285 EMERGENCY DEPT VISIT HI MDM: CPT

## 2024-11-12 PROCEDURE — 96361 HYDRATE IV INFUSION ADD-ON: CPT

## 2024-11-12 PROCEDURE — 25010000002 ONDANSETRON PER 1 MG

## 2024-11-12 PROCEDURE — 25010000002 PROPOFOL 200 MG/20ML EMULSION: Performed by: NURSE ANESTHETIST, CERTIFIED REGISTERED

## 2024-11-12 PROCEDURE — 36415 COLL VENOUS BLD VENIPUNCTURE: CPT

## 2024-11-12 PROCEDURE — 25010000002 FENTANYL CITRATE (PF) 50 MCG/ML SOLUTION: Performed by: NURSE ANESTHETIST, CERTIFIED REGISTERED

## 2024-11-12 PROCEDURE — 96365 THER/PROPH/DIAG IV INF INIT: CPT

## 2024-11-12 PROCEDURE — 25010000002 SUCCINYLCHOLINE PER 20 MG: Performed by: NURSE ANESTHETIST, CERTIFIED REGISTERED

## 2024-11-12 PROCEDURE — 25510000001 IOPAMIDOL 61 % SOLUTION: Performed by: STUDENT IN AN ORGANIZED HEALTH CARE EDUCATION/TRAINING PROGRAM

## 2024-11-12 DEVICE — URETERAL STENT
Type: IMPLANTABLE DEVICE | Site: URETER | Status: FUNCTIONAL
Brand: CONTOUR™

## 2024-11-12 RX ORDER — SODIUM CHLORIDE 0.9 % (FLUSH) 0.9 %
10 SYRINGE (ML) INJECTION AS NEEDED
Status: DISCONTINUED | OUTPATIENT
Start: 2024-11-12 | End: 2024-11-13

## 2024-11-12 RX ORDER — FLUMAZENIL 0.1 MG/ML
0.2 INJECTION INTRAVENOUS AS NEEDED
Status: DISCONTINUED | OUTPATIENT
Start: 2024-11-12 | End: 2024-11-13 | Stop reason: HOSPADM

## 2024-11-12 RX ORDER — HYDRALAZINE HYDROCHLORIDE 20 MG/ML
5 INJECTION INTRAMUSCULAR; INTRAVENOUS
Status: DISCONTINUED | OUTPATIENT
Start: 2024-11-12 | End: 2024-11-13 | Stop reason: HOSPADM

## 2024-11-12 RX ORDER — NALOXONE HCL 0.4 MG/ML
0.2 VIAL (ML) INJECTION AS NEEDED
Status: DISCONTINUED | OUTPATIENT
Start: 2024-11-12 | End: 2024-11-13 | Stop reason: HOSPADM

## 2024-11-12 RX ORDER — FENTANYL CITRATE 50 UG/ML
50 INJECTION, SOLUTION INTRAMUSCULAR; INTRAVENOUS ONCE AS NEEDED
Status: DISCONTINUED | OUTPATIENT
Start: 2024-11-12 | End: 2024-11-12 | Stop reason: HOSPADM

## 2024-11-12 RX ORDER — SODIUM CHLORIDE 9 MG/ML
40 INJECTION, SOLUTION INTRAVENOUS AS NEEDED
Status: DISCONTINUED | OUTPATIENT
Start: 2024-11-12 | End: 2024-11-13

## 2024-11-12 RX ORDER — IPRATROPIUM BROMIDE AND ALBUTEROL SULFATE 2.5; .5 MG/3ML; MG/3ML
3 SOLUTION RESPIRATORY (INHALATION) ONCE AS NEEDED
Status: DISCONTINUED | OUTPATIENT
Start: 2024-11-12 | End: 2024-11-13 | Stop reason: HOSPADM

## 2024-11-12 RX ORDER — LIDOCAINE HYDROCHLORIDE 20 MG/ML
INJECTION, SOLUTION INFILTRATION; PERINEURAL AS NEEDED
Status: DISCONTINUED | OUTPATIENT
Start: 2024-11-12 | End: 2024-11-12 | Stop reason: SURG

## 2024-11-12 RX ORDER — PROMETHAZINE HYDROCHLORIDE 25 MG/1
25 TABLET ORAL ONCE AS NEEDED
Status: DISCONTINUED | OUTPATIENT
Start: 2024-11-12 | End: 2024-11-13 | Stop reason: HOSPADM

## 2024-11-12 RX ORDER — DROPERIDOL 2.5 MG/ML
0.62 INJECTION, SOLUTION INTRAMUSCULAR; INTRAVENOUS
Status: DISCONTINUED | OUTPATIENT
Start: 2024-11-12 | End: 2024-11-13 | Stop reason: HOSPADM

## 2024-11-12 RX ORDER — ROCURONIUM BROMIDE 10 MG/ML
INJECTION, SOLUTION INTRAVENOUS AS NEEDED
Status: DISCONTINUED | OUTPATIENT
Start: 2024-11-12 | End: 2024-11-12 | Stop reason: SURG

## 2024-11-12 RX ORDER — AMOXICILLIN 250 MG
2 CAPSULE ORAL 2 TIMES DAILY PRN
Status: DISCONTINUED | OUTPATIENT
Start: 2024-11-12 | End: 2024-11-13

## 2024-11-12 RX ORDER — EPHEDRINE SULFATE 50 MG/ML
5 INJECTION, SOLUTION INTRAVENOUS ONCE AS NEEDED
Status: DISCONTINUED | OUTPATIENT
Start: 2024-11-12 | End: 2024-11-13 | Stop reason: HOSPADM

## 2024-11-12 RX ORDER — PROMETHAZINE HYDROCHLORIDE 25 MG/1
25 SUPPOSITORY RECTAL ONCE AS NEEDED
Status: DISCONTINUED | OUTPATIENT
Start: 2024-11-12 | End: 2024-11-13 | Stop reason: HOSPADM

## 2024-11-12 RX ORDER — ACETAMINOPHEN 650 MG/1
650 SUPPOSITORY RECTAL EVERY 4 HOURS PRN
Status: DISCONTINUED | OUTPATIENT
Start: 2024-11-12 | End: 2024-11-13

## 2024-11-12 RX ORDER — SODIUM CHLORIDE 0.9 % (FLUSH) 0.9 %
10 SYRINGE (ML) INJECTION EVERY 12 HOURS SCHEDULED
Status: DISCONTINUED | OUTPATIENT
Start: 2024-11-12 | End: 2024-11-13

## 2024-11-12 RX ORDER — LABETALOL HYDROCHLORIDE 5 MG/ML
5 INJECTION, SOLUTION INTRAVENOUS
Status: DISCONTINUED | OUTPATIENT
Start: 2024-11-12 | End: 2024-11-13 | Stop reason: HOSPADM

## 2024-11-12 RX ORDER — ACETAMINOPHEN 325 MG/1
650 TABLET ORAL EVERY 4 HOURS PRN
Status: DISCONTINUED | OUTPATIENT
Start: 2024-11-12 | End: 2024-11-13

## 2024-11-12 RX ORDER — DIPHENHYDRAMINE HYDROCHLORIDE 50 MG/ML
12.5 INJECTION INTRAMUSCULAR; INTRAVENOUS
Status: DISCONTINUED | OUTPATIENT
Start: 2024-11-12 | End: 2024-11-13 | Stop reason: HOSPADM

## 2024-11-12 RX ORDER — OXYCODONE AND ACETAMINOPHEN 7.5; 325 MG/1; MG/1
1 TABLET ORAL EVERY 4 HOURS PRN
Status: DISCONTINUED | OUTPATIENT
Start: 2024-11-12 | End: 2024-11-13 | Stop reason: HOSPADM

## 2024-11-12 RX ORDER — MAGNESIUM HYDROXIDE 1200 MG/15ML
LIQUID ORAL AS NEEDED
Status: DISCONTINUED | OUTPATIENT
Start: 2024-11-12 | End: 2024-11-12 | Stop reason: HOSPADM

## 2024-11-12 RX ORDER — POLYETHYLENE GLYCOL 3350 17 G/17G
17 POWDER, FOR SOLUTION ORAL DAILY PRN
Status: DISCONTINUED | OUTPATIENT
Start: 2024-11-12 | End: 2024-11-13

## 2024-11-12 RX ORDER — ONDANSETRON 2 MG/ML
4 INJECTION INTRAMUSCULAR; INTRAVENOUS EVERY 6 HOURS PRN
Status: DISCONTINUED | OUTPATIENT
Start: 2024-11-12 | End: 2024-11-13

## 2024-11-12 RX ORDER — FENTANYL CITRATE 50 UG/ML
INJECTION, SOLUTION INTRAMUSCULAR; INTRAVENOUS AS NEEDED
Status: DISCONTINUED | OUTPATIENT
Start: 2024-11-12 | End: 2024-11-12 | Stop reason: SURG

## 2024-11-12 RX ORDER — FENTANYL CITRATE 50 UG/ML
50 INJECTION, SOLUTION INTRAMUSCULAR; INTRAVENOUS
Status: DISCONTINUED | OUTPATIENT
Start: 2024-11-12 | End: 2024-11-13 | Stop reason: HOSPADM

## 2024-11-12 RX ORDER — BISACODYL 5 MG/1
5 TABLET, DELAYED RELEASE ORAL DAILY PRN
Status: DISCONTINUED | OUTPATIENT
Start: 2024-11-12 | End: 2024-11-13

## 2024-11-12 RX ORDER — TAMSULOSIN HYDROCHLORIDE 0.4 MG/1
0.4 CAPSULE ORAL DAILY
Status: DISCONTINUED | OUTPATIENT
Start: 2024-11-12 | End: 2024-11-13

## 2024-11-12 RX ORDER — SUCCINYLCHOLINE CHLORIDE 20 MG/ML
INJECTION INTRAMUSCULAR; INTRAVENOUS AS NEEDED
Status: DISCONTINUED | OUTPATIENT
Start: 2024-11-12 | End: 2024-11-12 | Stop reason: SURG

## 2024-11-12 RX ORDER — SODIUM CHLORIDE, SODIUM LACTATE, POTASSIUM CHLORIDE, CALCIUM CHLORIDE 600; 310; 30; 20 MG/100ML; MG/100ML; MG/100ML; MG/100ML
9 INJECTION, SOLUTION INTRAVENOUS CONTINUOUS
Status: DISCONTINUED | OUTPATIENT
Start: 2024-11-12 | End: 2024-11-13

## 2024-11-12 RX ORDER — MIDAZOLAM HYDROCHLORIDE 1 MG/ML
1 INJECTION, SOLUTION INTRAMUSCULAR; INTRAVENOUS
Status: DISCONTINUED | OUTPATIENT
Start: 2024-11-12 | End: 2024-11-12 | Stop reason: HOSPADM

## 2024-11-12 RX ORDER — ONDANSETRON 4 MG/1
4 TABLET, ORALLY DISINTEGRATING ORAL EVERY 6 HOURS PRN
Status: DISCONTINUED | OUTPATIENT
Start: 2024-11-12 | End: 2024-11-13

## 2024-11-12 RX ORDER — SODIUM CHLORIDE 0.9 % (FLUSH) 0.9 %
3-10 SYRINGE (ML) INJECTION AS NEEDED
Status: DISCONTINUED | OUTPATIENT
Start: 2024-11-12 | End: 2024-11-12 | Stop reason: HOSPADM

## 2024-11-12 RX ORDER — HYDROCODONE BITARTRATE AND ACETAMINOPHEN 5; 325 MG/1; MG/1
1 TABLET ORAL ONCE AS NEEDED
Status: DISCONTINUED | OUTPATIENT
Start: 2024-11-12 | End: 2024-11-13 | Stop reason: HOSPADM

## 2024-11-12 RX ORDER — ACETAMINOPHEN 160 MG/5ML
650 SOLUTION ORAL EVERY 4 HOURS PRN
Status: DISCONTINUED | OUTPATIENT
Start: 2024-11-12 | End: 2024-11-13

## 2024-11-12 RX ORDER — IOPAMIDOL 612 MG/ML
INJECTION, SOLUTION INTRAVASCULAR AS NEEDED
Status: DISCONTINUED | OUTPATIENT
Start: 2024-11-12 | End: 2024-11-12 | Stop reason: HOSPADM

## 2024-11-12 RX ORDER — ONDANSETRON 2 MG/ML
4 INJECTION INTRAMUSCULAR; INTRAVENOUS ONCE AS NEEDED
Status: DISCONTINUED | OUTPATIENT
Start: 2024-11-12 | End: 2024-11-13 | Stop reason: HOSPADM

## 2024-11-12 RX ORDER — BISACODYL 10 MG
10 SUPPOSITORY, RECTAL RECTAL DAILY PRN
Status: DISCONTINUED | OUTPATIENT
Start: 2024-11-12 | End: 2024-11-13

## 2024-11-12 RX ORDER — LIDOCAINE HYDROCHLORIDE 10 MG/ML
0.5 INJECTION, SOLUTION INFILTRATION; PERINEURAL ONCE AS NEEDED
Status: DISCONTINUED | OUTPATIENT
Start: 2024-11-12 | End: 2024-11-12 | Stop reason: HOSPADM

## 2024-11-12 RX ORDER — ONDANSETRON 2 MG/ML
4 INJECTION INTRAMUSCULAR; INTRAVENOUS ONCE
Status: COMPLETED | OUTPATIENT
Start: 2024-11-12 | End: 2024-11-12

## 2024-11-12 RX ORDER — KETOROLAC TROMETHAMINE 15 MG/ML
15 INJECTION, SOLUTION INTRAMUSCULAR; INTRAVENOUS ONCE
Status: COMPLETED | OUTPATIENT
Start: 2024-11-12 | End: 2024-11-12

## 2024-11-12 RX ORDER — MORPHINE SULFATE 2 MG/ML
4 INJECTION, SOLUTION INTRAMUSCULAR; INTRAVENOUS ONCE
Status: COMPLETED | OUTPATIENT
Start: 2024-11-12 | End: 2024-11-12

## 2024-11-12 RX ORDER — PROPOFOL 10 MG/ML
INJECTION, EMULSION INTRAVENOUS AS NEEDED
Status: DISCONTINUED | OUTPATIENT
Start: 2024-11-12 | End: 2024-11-12 | Stop reason: SURG

## 2024-11-12 RX ORDER — DEXAMETHASONE SODIUM PHOSPHATE 4 MG/ML
INJECTION, SOLUTION INTRA-ARTICULAR; INTRALESIONAL; INTRAMUSCULAR; INTRAVENOUS; SOFT TISSUE AS NEEDED
Status: DISCONTINUED | OUTPATIENT
Start: 2024-11-12 | End: 2024-11-12 | Stop reason: SURG

## 2024-11-12 RX ORDER — SODIUM CHLORIDE 0.9 % (FLUSH) 0.9 %
3 SYRINGE (ML) INJECTION EVERY 12 HOURS SCHEDULED
Status: DISCONTINUED | OUTPATIENT
Start: 2024-11-12 | End: 2024-11-12 | Stop reason: HOSPADM

## 2024-11-12 RX ORDER — HYDROMORPHONE HYDROCHLORIDE 1 MG/ML
0.5 INJECTION, SOLUTION INTRAMUSCULAR; INTRAVENOUS; SUBCUTANEOUS
Status: DISCONTINUED | OUTPATIENT
Start: 2024-11-12 | End: 2024-11-13 | Stop reason: HOSPADM

## 2024-11-12 RX ORDER — FAMOTIDINE 10 MG/ML
20 INJECTION, SOLUTION INTRAVENOUS ONCE
Status: COMPLETED | OUTPATIENT
Start: 2024-11-12 | End: 2024-11-12

## 2024-11-12 RX ADMIN — FENTANYL CITRATE 50 MCG: 50 INJECTION, SOLUTION INTRAMUSCULAR; INTRAVENOUS at 22:44

## 2024-11-12 RX ADMIN — SUGAMMADEX 200 MG: 100 INJECTION, SOLUTION INTRAVENOUS at 23:24

## 2024-11-12 RX ADMIN — SUCCINYLCHOLINE CHLORIDE 140 MG: 20 INJECTION, SOLUTION INTRAMUSCULAR; INTRAVENOUS; PARENTERAL at 22:38

## 2024-11-12 RX ADMIN — MORPHINE SULFATE 4 MG: 2 INJECTION, SOLUTION INTRAMUSCULAR; INTRAVENOUS at 17:13

## 2024-11-12 RX ADMIN — SODIUM CHLORIDE, SODIUM LACTATE, POTASSIUM CHLORIDE, CALCIUM CHLORIDE 1000 ML: 20; 30; 600; 310 INJECTION, SOLUTION INTRAVENOUS at 17:15

## 2024-11-12 RX ADMIN — ROCURONIUM BROMIDE 20 MG: 10 INJECTION, SOLUTION INTRAVENOUS at 22:42

## 2024-11-12 RX ADMIN — ONDANSETRON 4 MG: 2 INJECTION, SOLUTION INTRAMUSCULAR; INTRAVENOUS at 17:12

## 2024-11-12 RX ADMIN — LIDOCAINE HYDROCHLORIDE 40 MG: 20 INJECTION, SOLUTION INFILTRATION; PERINEURAL at 22:38

## 2024-11-12 RX ADMIN — ONDANSETRON 4 MG: 2 INJECTION INTRAMUSCULAR; INTRAVENOUS at 22:53

## 2024-11-12 RX ADMIN — SODIUM CHLORIDE, POTASSIUM CHLORIDE, SODIUM LACTATE AND CALCIUM CHLORIDE: 600; 310; 30; 20 INJECTION, SOLUTION INTRAVENOUS at 22:31

## 2024-11-12 RX ADMIN — FAMOTIDINE 20 MG: 10 INJECTION INTRAVENOUS at 21:44

## 2024-11-12 RX ADMIN — DEXAMETHASONE SODIUM PHOSPHATE 6 MG: 4 INJECTION, SOLUTION INTRAMUSCULAR; INTRAVENOUS at 22:53

## 2024-11-12 RX ADMIN — PROPOFOL 50 MG: 10 INJECTION, EMULSION INTRAVENOUS at 22:44

## 2024-11-12 RX ADMIN — CEFTRIAXONE 2000 MG: 2 INJECTION, POWDER, FOR SOLUTION INTRAMUSCULAR; INTRAVENOUS at 18:21

## 2024-11-12 RX ADMIN — KETOROLAC TROMETHAMINE 15 MG: 15 INJECTION, SOLUTION INTRAMUSCULAR; INTRAVENOUS at 18:19

## 2024-11-12 RX ADMIN — SODIUM CHLORIDE 125 MG: 9 INJECTION, SOLUTION INTRAVENOUS at 19:23

## 2024-11-12 RX ADMIN — PROPOFOL 200 MG: 10 INJECTION, EMULSION INTRAVENOUS at 22:38

## 2024-11-12 NOTE — ED PROVIDER NOTES
EMERGENCY DEPARTMENT MD ATTESTATION NOTE    Room Number:  42/42  PCP: Aayush Mckeon MD  Independent Historians: Patient and Family    HPI:  Context: Sánchez Carbajal is a 59 y.o. female with a medical history of SAGAR, kidney stones, arthritis, GERD, diabetes, HTN, migraine who presents to the ED c/o acute bilateral flank pain.  Symptoms began several days ago.  Patient initially seen in outside facility and was diagnosed with bilateral ureteral calculi.  Patient had been discharged home and symptoms were well-managed at that time.  Patient states symptoms have worsened including pain and nausea so she presented for evaluation.      PHYSICAL EXAM    I have reviewed the triage vital signs and nursing notes.    ED Triage Vitals   Temp Heart Rate Resp BP SpO2   11/12/24 1522 11/12/24 1522 11/12/24 1522 11/12/24 1529 11/12/24 1522   97.2 °F (36.2 °C) 97 16 (!) 182/78 96 %      Temp src Heart Rate Source Patient Position BP Location FiO2 (%)   11/12/24 1522 11/12/24 1522 11/12/24 1529 11/12/24 1529 --   Tympanic Monitor Sitting Right arm        Physical Exam  GENERAL: alert, no acute distress uncomfortable appearing and actively retching  SKIN: Warm, dry  HENT: Normocephalic, atraumatic  EYES: no scleral icterus  CV: regular rhythm, regular rate  RESPIRATORY: normal effort, lungs clear  ABDOMEN: soft, nontender, nondistended  MUSCULOSKELETAL: no deformity  NEURO: alert, moves all extremities, follows commands            MEDICATIONS GIVEN IN ER  Medications   sodium chloride 0.9 % flush 10 mL (has no administration in time range)   morphine injection 4 mg (has no administration in time range)   ondansetron (ZOFRAN) injection 4 mg (has no administration in time range)   lactated ringers bolus 1,000 mL (has no administration in time range)   cefTRIAXone (ROCEPHIN) 2,000 mg in sodium chloride 0.9 % 100 mL MBP (has no administration in time range)   ketorolac (TORADOL) injection 15 mg (has no administration in time  range)         ORDERS PLACED DURING THIS VISIT:  Orders Placed This Encounter   Procedures   • CT Abdomen Pelvis Without Contrast   • [place] Draw   • Comprehensive Metabolic Panel   • Lipase   • Urinalysis With Microscopic If Indicated (No Culture) - Urine, Clean Catch   • Lactic Acid, Plasma   • CBC Auto Differential   • Urinalysis, Microscopic Only - Urine, Clean Catch   • Urinalysis With Culture If Indicated -   • NPO Diet NPO Type: Strict NPO   • Undress & Gown   • Urology (on-call MD unless specified)   • Insert Peripheral IV   • CBC & Differential   • Green Top (Gel)   • Lavender Top   • Gold Top - SST   • Light Blue Top         PROCEDURES  Procedures            PROGRESS, DATA ANALYSIS, CONSULTS, AND MEDICAL DECISION MAKING  All labs have been independently interpreted by me.  All radiology studies have been reviewed by me. All EKG's have been independently viewed and interpreted by me.  Discussion below represents my analysis of pertinent findings related to patient's condition, differential diagnosis, treatment plan and final disposition.    Differential diagnosis includes but is not limited to UTI, pyelonephritis, infected kidney stone, ureteral calculus.    Clinical Scores:                   ED Course as of 11/14/24 0905   Tue Nov 12, 2024   1711 Discussed with on-call with urology, they recommend CT scan for repeat imaging and plan for admission. Notifiy when scan is complete.  [JG]   1841 Patient ambulated to bathroom and desaturated to 85%, RN placed on oxygen and hypoxia improved. Still pending CT scan and complaining of pain.  D/t desaturation, will give lidocaine infusion and avoid narcotics at this time.  [JG]   1842 SpO2(!): 85 % [JG]   1842 Device (Oxygen Therapy): room air [JG]   1842 SpO2: 96 % [JG]   1842 Device (Oxygen Therapy): nasal cannula [JG]   2013 Discussed with urology, he recommends admitting patient to OBS unit and keeping patient NPO as he may take her for procedure tonight.  [JG]    2013 I updated the patient on current ED work up, including labs and imaging (if performed) with indication for admission. All questions and concerns addressed and ready for admit at this time.     Pain controlled at this time.  [JG]   2017 Discussed with PETE Armstrong in Obs unit who agrees to admission. No further recommendations. [JG]      ED Course User Index  [JG] Guillermina Larsen APRN       MDM: 59-year-old female presenting for evaluation of bilateral flank pain, nausea, vomiting.  Patient has known bilateral ureteral calculus.  Urology consulted.  CT abdomen and pelvis ordered.  Will plan on admission for symptomatic management.      COMPLEXITY OF CARE  The patient requires admission.    Please note that portions of this document were completed with a voice recognition program.    Note Disclaimer: At [facility], we believe that sharing information builds trust and better relationships. You are receiving this note because you recently visited [facility]. It is possible you will see health information before a provider has talked with you about it. This kind of information can be easy to misunderstand. To help you fully understand what it means for your health, we urge you to discuss this note with your provider.         Luis Manriquez MD  11/12/24 0120       Luis Manriquez MD  11/14/24 1690

## 2024-11-12 NOTE — ED PROVIDER NOTES
EMERGENCY DEPARTMENT ENCOUNTER    Room Number:  42/42  Date seen:  11/12/2024  PCP: Aayush Mckeon MD  Historian/Independent historian: n/a  Chronic or social conditions impacting care: n/a      HPI:  Chief Complaint: bilateral flank pain  A complete HPI/ROS/PMH/PSH/SH/FH are unobtainable due to: n/a  Context: Sánchez Carbajal is a 59 y.o. female who presents to the ED c/o bilateral flank pain.  Patient states that she was seen at [facility] 5 days ago and told she had bilateral kidney stones.  She states that she was prescribed Flomax and told to take Tylenol and ibuprofen for pain and was not given urology to follow-up with.  She states that she does have a history of kidney stones, but is not currently followed with urology.  She reports increased pain.  She does have normal urine output.  No fever.  She has had nausea without vomiting or diarrhea.  No concern for pregnancy.    External Medical record review:   11/7/2024:  Reevaluation patient had a report that she was hypoxic on room air at 87%.  When I went to the room she is on 2 L via nasal cannula saturating 97%  states that she has SAGAR at home wears a CPAP.  Turned the patient's oxygen off had a discussion with her results and over the course of 10 minutes her lowest oxygen saturation was 91% on room air.  She did not report any shortness of breath no chest pain she stated that she has been having a runny nose recently no nasal congestion.  She states that she did not want to stay for any workup of hypoxia.  I notified her that given her age as well as obesity and risk factor of CPAP that she should return to the emergency room if she develops any symptoms.     Regarding her kidney stones, they are at the UVJ, anticipate passing the stone soon.  Will start her on Flomax that she does not have a prescription for this, will also recommend ibuprofen for pain control, she does have large stones but given the location she has a high  probability of passing them.  Counseled her for return precautions on decreased urination as she are ready has signs of mild hydro.  Additionally urine not convincing for a urinary tract infection.  I gave her return precautions for septic stone which the patient is not septic at the time of discharge from the emergency room  [AK]    PAST MEDICAL HISTORY  Active Ambulatory Problems     Diagnosis Date Noted    Left ureteral stone 04/06/2016    Obstructive sleep apnea syndrome     Seasonal allergic rhinitis 01/23/2018    Uterovaginal prolapse 01/15/2019    Ureteral stone with hydronephrosis 09/01/2019    Upper respiratory tract infection 11/24/2019    GERD with apnea 02/09/2021    Insomnia 02/09/2021    Cervical radiculitis 04/19/2022    Primary osteoarthritis of knees, bilateral 11/29/2022    Mechanical knee pain, right 02/20/2023    Primary osteoarthritis of right knee 04/21/2023     Resolved Ambulatory Problems     Diagnosis Date Noted    No Resolved Ambulatory Problems     Past Medical History:   Diagnosis Date    Arthritis     Arthritis of back     Arthritis of neck     Bursitis of hip     Cervical disc disorder     Chronic back pain     Diabetes mellitus     Elevated cholesterol     GERD (gastroesophageal reflux disease)     H/O seasonal allergies     History of anemia     History of diverticulitis     History of heart murmur in childhood     History of kidney stones     Hypertension     Migraines     PONV (postoperative nausea and vomiting)     Sleep apnea with use of continuous positive airway pressure (CPAP)     Uterine prolapse          PAST SURGICAL HISTORY  Past Surgical History:   Procedure Laterality Date    ANTERIOR AND POSTERIOR VAGINAL REPAIR N/A 01/15/2019    Procedure: /POSTERIOR REPAIR;  Surgeon: James Holloway MD;  Location: [facility];  Service: Gynecology    CERVICAL FUSION      CHOLECYSTECTOMY      COLONOSCOPY N/A 01/18/2017    Procedure: COLONOSCOPY;  Surgeon: Tian Henderson,  MD;  Location: [facility] OR;  Service:     CYSTOSCOPY URETEROSCOPY LASER LITHOTRIPSY N/A 04/06/2016    Procedure: URETEROSCOPY  ;  Surgeon: Vimal Azul MD;  Location: [facility] OR;  Service:     CYSTOSCOPY URETEROSCOPY LASER LITHOTRIPSY Left 09/01/2019    Procedure: CYSTOSCOPY left retrograde pyelogram;  Surgeon: Vimal Azul MD;  Location: [facility] OR;  Service: Urology    CYSTOSCOPY W/ LASER LITHOTRIPSY Left 06/25/2018    Procedure: CYSTOSCOPY, LEFT URETEROSCOPY, left  STENT PLACEMENT, right retrograde;  Surgeon: Vimal Azul MD;  Location: [facility] OR;  Service: Urology    EXTRACORPOREAL SHOCK WAVE LITHOTRIPSY (ESWL)  2015    JOINT REPLACEMENT  May 20th 2023    KNEE SURGERY      NECK SURGERY      Can't remember date    SACROCOLPOPEXY N/A 01/15/2019    Procedure: ROBOTIC SACROCOLPOPEXY WITH MESH, TOTAL LAPAROSCOPICE HYSTERECTOMY BILATERAL SALPINGECTOMY WITH DAVINCI ;  Surgeon: James Hololway MD;  Location: [facility] OR;  Service: DaVinci    TOTAL KNEE ARTHROPLASTY Right 05/24/2023    Procedure: TOTAL KNEE ARTHROPLASTY WITH CORI ROBOT;  Surgeon: Lambert Brady MD;  Location: [facility] OR;  Service: Robotics - Ortho;  Laterality: Right;         FAMILY HISTORY  Family History   Problem Relation Age of Onset    Cancer Maternal Grandmother         Kidney cancer    Malig Hyperthermia Neg Hx          SOCIAL HISTORY  Social History     Socioeconomic History    Marital status:    Tobacco Use    Smoking status: Never     Passive exposure: Never    Smokeless tobacco: Never   Vaping Use    Vaping status: Never Used   Substance and Sexual Activity    Alcohol use: No    Drug use: No    Sexual activity: Yes     Partners: Male     Birth control/protection: Hysterectomy         ALLERGIES  Penicillins, Flagyl [metronidazole], and Sulfa antibiotics        REVIEW OF SYSTEMS  Per HPI, otherwise negative.       PHYSICAL EXAM  ED Triage Vitals   Temp Heart Rate Resp BP SpO2   11/12/24 1522 11/12/24 1522 11/12/24  1522 11/12/24 1529 11/12/24 1522   97.2 °F (36.2 °C) 97 16 (!) 182/78 96 %      Temp src Heart Rate Source Patient Position BP Location FiO2 (%)   11/12/24 1522 11/12/24 1522 11/12/24 1529 11/12/24 1529 --   Tympanic Monitor Sitting Right arm        Physical Exam  Vitals and nursing note reviewed.   Constitutional:       Appearance: Normal appearance.   HENT:      Head: Normocephalic and atraumatic.      Mouth/Throat:      Mouth: Mucous membranes are moist.   Eyes:      Conjunctiva/sclera: Conjunctivae normal.   Cardiovascular:      Rate and Rhythm: Normal rate and regular rhythm.      Pulses: Normal pulses.      Heart sounds: Normal heart sounds.   Pulmonary:      Effort: Pulmonary effort is normal.      Breath sounds: Normal breath sounds. No wheezing.   Abdominal:      General: Bowel sounds are normal.      Palpations: Abdomen is soft.      Tenderness: There is no abdominal tenderness. There is right CVA tenderness and left CVA tenderness. There is no guarding.   Skin:     General: Skin is warm.      Capillary Refill: Capillary refill takes less than 2 seconds.   Neurological:      Mental Status: She is alert and oriented to person, place, and time. Mental status is at baseline.   Psychiatric:         Mood and Affect: Mood normal.             LAB RESULTS  Recent Results (from the past 24 hours)   Comprehensive Metabolic Panel    Collection Time: 11/12/24  3:45 PM    Specimen: Arm, Left; Blood   Result Value Ref Range    Glucose 105 (H) 65 - 99 mg/dL    BUN 13 6 - 20 mg/dL    Creatinine 0.93 0.57 - 1.00 mg/dL    Sodium 137 136 - 145 mmol/L    Potassium 3.5 3.5 - 5.2 mmol/L    Chloride 99 98 - 107 mmol/L    CO2 24.2 22.0 - 29.0 mmol/L    Calcium 9.6 8.6 - 10.5 mg/dL    Total Protein 6.8 6.0 - 8.5 g/dL    Albumin 4.2 3.5 - 5.2 g/dL    ALT (SGPT) 18 1 - 33 U/L    AST (SGOT) 19 1 - 32 U/L    Alkaline Phosphatase 93 39 - 117 U/L    Total Bilirubin 0.4 0.0 - 1.2 mg/dL    Globulin 2.6 gm/dL    A/G Ratio 1.6 g/dL     BUN/Creatinine Ratio 14.0 7.0 - 25.0    Anion Gap 13.8 5.0 - 15.0 mmol/L    eGFR 70.9 >60.0 mL/min/1.73   Lipase    Collection Time: 11/12/24  3:45 PM    Specimen: Arm, Left; Blood   Result Value Ref Range    Lipase 13 13 - 60 U/L   Lactic Acid, Plasma    Collection Time: 11/12/24  3:45 PM    Specimen: Arm, Left; Blood   Result Value Ref Range    Lactate 1.1 0.5 - 2.0 mmol/L   Green Top (Gel)    Collection Time: 11/12/24  3:45 PM   Result Value Ref Range    Extra Tube Hold for add-ons.    Lavender Top    Collection Time: 11/12/24  3:45 PM   Result Value Ref Range    Extra Tube hold for add-on    Gold Top - SST    Collection Time: 11/12/24  3:45 PM   Result Value Ref Range    Extra Tube Hold for add-ons.    Light Blue Top    Collection Time: 11/12/24  3:45 PM   Result Value Ref Range    Extra Tube Hold for add-ons.    CBC Auto Differential    Collection Time: 11/12/24  3:45 PM    Specimen: Arm, Left; Blood   Result Value Ref Range    WBC 9.29 3.40 - 10.80 10*3/mm3    RBC 4.43 3.77 - 5.28 10*6/mm3    Hemoglobin 12.1 12.0 - 15.9 g/dL    Hematocrit 37.8 34.0 - 46.6 %    MCV 85.3 79.0 - 97.0 fL    MCH 27.3 26.6 - 33.0 pg    MCHC 32.0 31.5 - 35.7 g/dL    RDW 13.3 12.3 - 15.4 %    RDW-SD 41.3 37.0 - 54.0 fl    MPV 9.3 6.0 - 12.0 fL    Platelets 230 140 - 450 10*3/mm3    Neutrophil % 70.0 42.7 - 76.0 %    Lymphocyte % 21.0 19.6 - 45.3 %    Monocyte % 5.5 5.0 - 12.0 %    Eosinophil % 2.8 0.3 - 6.2 %    Basophil % 0.4 0.0 - 1.5 %    Immature Grans % 0.3 0.0 - 0.5 %    Neutrophils, Absolute 6.50 1.70 - 7.00 10*3/mm3    Lymphocytes, Absolute 1.95 0.70 - 3.10 10*3/mm3    Monocytes, Absolute 0.51 0.10 - 0.90 10*3/mm3    Eosinophils, Absolute 0.26 0.00 - 0.40 10*3/mm3    Basophils, Absolute 0.04 0.00 - 0.20 10*3/mm3    Immature Grans, Absolute 0.03 0.00 - 0.05 10*3/mm3    nRBC 0.0 0.0 - 0.2 /100 WBC   Urinalysis With Microscopic If Indicated (No Culture) - Urine, Clean Catch    Collection Time: 11/12/24  4:14 PM    Specimen:  Urine, Clean Catch   Result Value Ref Range    Color, UA Dark Yellow (A) Yellow, Straw    Appearance, UA Clear Clear    pH, UA 5.5 5.0 - 8.0    Specific Gravity, UA 1.017 1.005 - 1.030    Glucose, UA Negative Negative    Ketones, UA Trace (A) Negative    Bilirubin, UA Negative Negative    Blood, UA Small (1+) (A) Negative    Protein, UA 30 mg/dL (1+) (A) Negative    Leuk Esterase, UA Moderate (2+) (A) Negative    Nitrite, UA Negative Negative    Urobilinogen, UA 0.2 E.U./dL 0.2 - 1.0 E.U./dL   Urinalysis, Microscopic Only - Urine, Clean Catch    Collection Time: 11/12/24  4:14 PM    Specimen: Urine, Clean Catch   Result Value Ref Range    RBC, UA Too Numerous to Count (A) None Seen, 0-2 /HPF    WBC, UA 21-50 (A) None Seen, 0-2 /HPF    Bacteria, UA 1+ (A) None Seen /HPF    Squamous Epithelial Cells, UA 7-12 (A) None Seen, 0-2 /HPF    Hyaline Casts, UA 7-12 None Seen /LPF    Methodology Automated Microscopy        Ordered the above labs and reviewed the results.        RADIOLOGY  CT Abdomen Pelvis Without Contrast    Result Date: 11/12/2024  CT ABDOMEN AND PELVIS WITHOUT IV CONTRAST  HISTORY: Bilateral flank pain  TECHNIQUE: Radiation dose reduction techniques were utilized, including automated exposure control and exposure modulation based on body size. Axial images were obtained through the abdomen and pelvis without the administration of IV contrast. Coronal and sagittal reformatted images were obtained.  COMPARISON: 11/7/2024  FINDINGS:  ABDOMEN: Linear scar atelectasis in the left lung base. Small hiatal hernia. Liver unremarkable. Cholecystectomy. The spleen is unremarkable. There is a 7-8 mm stone located at the distal left ureter just before the UVJ with mild hydronephrosis and hydroureter. There are multiple nonobstructing stones in the lower pole the left kidney largest of these measuring about 6 mm. There is a 8 mm obstructing stone located in the distal right ureter just before the ureterovesical junction  there is associated moderate hydronephrosis. Additional small nonobstructing stones elsewhere in the right kidney. The adrenal glands are unremarkable. Pancreas is unremarkable.  PELVIS: No bladder wall thickening. No free fluid in the pelvis. Sigmoid diverticulosis without evidence of diverticulitis. No acute bony abnormality      Redemonstrated are bilateral obstructing distal ureteral stones located just before the ureterovesical junctions as described above. Associated mild left and moderate right hydronephrosis and hydroureter. Overall findings do not appear significantly changed.  Radiation dose reduction techniques were utilized, including automated exposure control and exposure modulation based on body size.   This report was finalized on 11/12/2024 7:37 PM by Dr. Stanford Orr M.D on Workstation: [id number]       Ordered the above noted radiological studies. Reviewed by me in PACS.        MEDICATIONS GIVEN IN ER  Medications   sodium chloride 0.9 % flush 10 mL (has no administration in time range)   morphine injection 4 mg (4 mg Intravenous Given 11/12/24 1713)   ondansetron (ZOFRAN) injection 4 mg (4 mg Intravenous Given 11/12/24 1712)   lactated ringers bolus 1,000 mL (0 mL Intravenous Stopped 11/12/24 1815)   cefTRIAXone (ROCEPHIN) 2,000 mg in sodium chloride 0.9 % 100 mL MBP (0 mg Intravenous Stopped 11/12/24 1851)   ketorolac (TORADOL) injection 15 mg (15 mg Intravenous Given 11/12/24 1819)   lidocaine (XYLOCAINE) 1 % 125 mg in sodium chloride 0.9 % 250 mL IVPB (125 mg Intravenous Given 11/12/24 1923)           MEDICAL DECISION MAKING, PROGRESS, and CONSULTS    All labs have been independently reviewed by me.  All radiology studies have been reviewed by me and I have also reviewed the radiology report.   EKG's independently viewed and interpreted by me.  Discussion below represents my analysis of pertinent findings related to patient's condition, differential diagnosis, treatment plan and final  disposition.    59-year-old female who presents with bilateral flank pain and known kidney stones.  She did have concern for UTI therefore she was given antibiotics in the ER and urology was consulted.  They did recommend repeat imaging.  Imaging consistent with 5 days ago remains to have bilateral obstructive uropathy.  Plan to admit patient for laser lithotripsy this evening.  No evidence of sepsis.  Patient stable on admission.           Shared decision making/consideration for admission:  admit      Orders placed during this visit:  Orders Placed This Encounter   Procedures    Urine Culture - Urine,    CT Abdomen Pelvis Without Contrast    [place] Draw    Comprehensive Metabolic Panel    Lipase    Urinalysis With Microscopic If Indicated (No Culture) - Urine, Clean Catch    Lactic Acid, Plasma    CBC Auto Differential    Urinalysis, Microscopic Only - Urine, Clean Catch    NPO Diet NPO Type: Strict NPO    Undress & Gown    Urology (on-call MD unless specified)    Insert Peripheral IV    Initiate ED Observation Status    CBC & Differential    Green Top (Gel)    Lavender Top    Gold Top - SST    Light Blue Top         Differential diagnosis include, but not limited to: Pyelonephritis, renal colic, UTI, kidney stone, muscle strain, colitis      Additional orders considered but not ordered: dilaudid    Independent interpretation of labs, radiology studies, and discussions with consultants:    Discussed with Dr. Manriquez, who agrees with plan.     ED Course as of 11/12/24 2019 Tue Nov 12, 2024   1711 Discussed with on-call with urology, they recommend CT scan for repeat imaging and plan for admission. Notifiy when scan is complete.  [JG]   1841 Patient ambulated to bathroom and desaturated to 85%, RN placed on oxygen and hypoxia improved. Still pending CT scan and complaining of pain.  D/t desaturation, will give lidocaine infusion and avoid narcotics at this time.  [JG]   1842 SpO2(!): 85 % [JG]   1842 Device  (Oxygen Therapy): room air [JG]   1842 SpO2: 96 % [JG]   1842 Device (Oxygen Therapy): nasal cannula [JG]   2013 Discussed with urology, he recommends admitting patient to OBS unit and keeping patient NPO as he may take her for procedure tonight.  [JG]   2013 I updated the patient on current ED work up, including labs and imaging (if performed) with indication for admission. All questions and concerns addressed and ready for admit at this time.     Pain controlled at this time.  [JG]   2017 Discussed with PETE Armstrong in Obs unit who agrees to admission. No further recommendations. [JG]      ED Course User Index  [JG] Guillermina Larsen APRN             DIAGNOSIS  Final diagnoses:   Acute bilateral obstructive uropathy   Acute UTI         DISPOSITION  admit        Latest Documented Vital Signs:  As of 20:19 EST  BP- 155/82 HR- 91 Temp- 97.2 °F (36.2 °C) (Tympanic) O2 sat- 96%              --    Please note that portions of this were completed with a voice recognition program.       Note Disclaimer: At [facility], we believe that sharing information builds trust and better relationships. You are receiving this note because you are receiving care at [facility] or recently visited. It is possible you will see health information before a provider has talked with you about it. This kind of information can be easy to misunderstand. To help you fully understand what it means for your health, we urge you to discuss this note with your provider.             Guillermina Larsen APRN  11/13/24 [id number]

## 2024-11-12 NOTE — ED TRIAGE NOTES
Pt c /o bilateral flank pain that started over the last couple days. Pt was seen at [facility] and diagnosed with kidney stones

## 2024-11-13 VITALS
HEART RATE: 97 BPM | WEIGHT: 200 LBS | BODY MASS INDEX: 32.14 KG/M2 | OXYGEN SATURATION: 94 % | RESPIRATION RATE: 18 BRPM | HEIGHT: 66 IN | DIASTOLIC BLOOD PRESSURE: 69 MMHG | SYSTOLIC BLOOD PRESSURE: 132 MMHG | TEMPERATURE: 97.9 F

## 2024-11-13 PROBLEM — N20.1 BILATERAL URETERAL CALCULI: Status: ACTIVE | Noted: 2024-11-13

## 2024-11-13 LAB
ANION GAP SERPL CALCULATED.3IONS-SCNC: 11 MMOL/L (ref 5–15)
BASOPHILS # BLD AUTO: 0.01 10*3/MM3 (ref 0–0.2)
BASOPHILS NFR BLD AUTO: 0.1 % (ref 0–1.5)
BUN SERPL-MCNC: 11 MG/DL (ref 6–20)
BUN/CREAT SERPL: 12.9 (ref 7–25)
CALCIUM SPEC-SCNC: 9.1 MG/DL (ref 8.6–10.5)
CHLORIDE SERPL-SCNC: 104 MMOL/L (ref 98–107)
CO2 SERPL-SCNC: 25 MMOL/L (ref 22–29)
CREAT SERPL-MCNC: 0.85 MG/DL (ref 0.57–1)
DEPRECATED RDW RBC AUTO: 40.3 FL (ref 37–54)
EGFRCR SERPLBLD CKD-EPI 2021: 79 ML/MIN/1.73
EOSINOPHIL # BLD AUTO: 0 10*3/MM3 (ref 0–0.4)
EOSINOPHIL NFR BLD AUTO: 0 % (ref 0.3–6.2)
ERYTHROCYTE [DISTWIDTH] IN BLOOD BY AUTOMATED COUNT: 13.1 % (ref 12.3–15.4)
GLUCOSE BLDC GLUCOMTR-MCNC: 97 MG/DL (ref 70–130)
GLUCOSE SERPL-MCNC: 151 MG/DL (ref 65–99)
HCT VFR BLD AUTO: 38.8 % (ref 34–46.6)
HGB BLD-MCNC: 12.5 G/DL (ref 12–15.9)
IMM GRANULOCYTES # BLD AUTO: 0.04 10*3/MM3 (ref 0–0.05)
IMM GRANULOCYTES NFR BLD AUTO: 0.6 % (ref 0–0.5)
LYMPHOCYTES # BLD AUTO: 0.66 10*3/MM3 (ref 0.7–3.1)
LYMPHOCYTES NFR BLD AUTO: 9.2 % (ref 19.6–45.3)
MCH RBC QN AUTO: 27.5 PG (ref 26.6–33)
MCHC RBC AUTO-ENTMCNC: 32.2 G/DL (ref 31.5–35.7)
MCV RBC AUTO: 85.3 FL (ref 79–97)
MONOCYTES # BLD AUTO: 0.11 10*3/MM3 (ref 0.1–0.9)
MONOCYTES NFR BLD AUTO: 1.5 % (ref 5–12)
NEUTROPHILS NFR BLD AUTO: 6.33 10*3/MM3 (ref 1.7–7)
NEUTROPHILS NFR BLD AUTO: 88.6 % (ref 42.7–76)
NRBC BLD AUTO-RTO: 0 /100 WBC (ref 0–0.2)
PLATELET # BLD AUTO: 243 10*3/MM3 (ref 140–450)
PMV BLD AUTO: 9.6 FL (ref 6–12)
POTASSIUM SERPL-SCNC: 4.2 MMOL/L (ref 3.5–5.2)
RBC # BLD AUTO: 4.55 10*6/MM3 (ref 3.77–5.28)
SODIUM SERPL-SCNC: 140 MMOL/L (ref 136–145)
WBC NRBC COR # BLD AUTO: 7.15 10*3/MM3 (ref 3.4–10.8)

## 2024-11-13 PROCEDURE — 85025 COMPLETE CBC W/AUTO DIFF WBC: CPT | Performed by: STUDENT IN AN ORGANIZED HEALTH CARE EDUCATION/TRAINING PROGRAM

## 2024-11-13 PROCEDURE — G0378 HOSPITAL OBSERVATION PER HR: HCPCS

## 2024-11-13 PROCEDURE — 80048 BASIC METABOLIC PNL TOTAL CA: CPT | Performed by: STUDENT IN AN ORGANIZED HEALTH CARE EDUCATION/TRAINING PROGRAM

## 2024-11-13 PROCEDURE — 82948 REAGENT STRIP/BLOOD GLUCOSE: CPT

## 2024-11-13 RX ORDER — PHENAZOPYRIDINE HYDROCHLORIDE 100 MG/1
100 TABLET, FILM COATED ORAL 3 TIMES DAILY PRN
Qty: 9 TABLET | Refills: 0 | Status: SHIPPED | OUTPATIENT
Start: 2024-11-13 | End: 2024-11-16

## 2024-11-13 RX ORDER — SENNOSIDES A AND B 8.6 MG/1
1 TABLET, FILM COATED ORAL DAILY
Status: DISCONTINUED | OUTPATIENT
Start: 2024-11-13 | End: 2024-11-13 | Stop reason: HOSPADM

## 2024-11-13 RX ORDER — TAMSULOSIN HYDROCHLORIDE 0.4 MG/1
0.4 CAPSULE ORAL DAILY
Status: DISCONTINUED | OUTPATIENT
Start: 2024-11-13 | End: 2024-11-13 | Stop reason: HOSPADM

## 2024-11-13 RX ORDER — CETIRIZINE HYDROCHLORIDE 10 MG/1
10 TABLET ORAL DAILY
Status: DISCONTINUED | OUTPATIENT
Start: 2024-11-13 | End: 2024-11-13 | Stop reason: HOSPADM

## 2024-11-13 RX ORDER — ATORVASTATIN CALCIUM 10 MG/1
10 TABLET, FILM COATED ORAL DAILY
Status: DISCONTINUED | OUTPATIENT
Start: 2024-11-13 | End: 2024-11-13 | Stop reason: HOSPADM

## 2024-11-13 RX ORDER — KETOROLAC TROMETHAMINE 15 MG/ML
15 INJECTION, SOLUTION INTRAMUSCULAR; INTRAVENOUS EVERY 6 HOURS PRN
Status: DISCONTINUED | OUTPATIENT
Start: 2024-11-13 | End: 2024-11-13 | Stop reason: HOSPADM

## 2024-11-13 RX ORDER — OXYCODONE AND ACETAMINOPHEN 10; 325 MG/1; MG/1
1 TABLET ORAL EVERY 4 HOURS PRN
Status: DISCONTINUED | OUTPATIENT
Start: 2024-11-13 | End: 2024-11-13 | Stop reason: HOSPADM

## 2024-11-13 RX ORDER — GABAPENTIN 300 MG/1
600 CAPSULE ORAL EVERY 8 HOURS SCHEDULED
Status: DISCONTINUED | OUTPATIENT
Start: 2024-11-13 | End: 2024-11-13 | Stop reason: HOSPADM

## 2024-11-13 RX ORDER — DOCUSATE SODIUM 100 MG/1
100 CAPSULE, LIQUID FILLED ORAL 2 TIMES DAILY
Qty: 28 CAPSULE | Refills: 0 | Status: SHIPPED | OUTPATIENT
Start: 2024-11-12 | End: 2024-11-26

## 2024-11-13 RX ORDER — HYDROCODONE BITARTRATE AND ACETAMINOPHEN 5; 325 MG/1; MG/1
1 TABLET ORAL EVERY 6 HOURS PRN
Qty: 12 TABLET | Refills: 0 | Status: SHIPPED | OUTPATIENT
Start: 2024-11-12 | End: 2024-11-15

## 2024-11-13 RX ORDER — FAMOTIDINE 20 MG/1
20 TABLET, FILM COATED ORAL 2 TIMES DAILY
Status: DISCONTINUED | OUTPATIENT
Start: 2024-11-13 | End: 2024-11-13 | Stop reason: HOSPADM

## 2024-11-13 RX ORDER — PHENAZOPYRIDINE HYDROCHLORIDE 100 MG/1
100 TABLET, FILM COATED ORAL
Status: DISCONTINUED | OUTPATIENT
Start: 2024-11-13 | End: 2024-11-13 | Stop reason: HOSPADM

## 2024-11-13 RX ORDER — BACLOFEN 10 MG/1
10 TABLET ORAL 2 TIMES DAILY
Status: DISCONTINUED | OUTPATIENT
Start: 2024-11-13 | End: 2024-11-13 | Stop reason: HOSPADM

## 2024-11-13 RX ORDER — MECLIZINE HYDROCHLORIDE 25 MG/1
25 TABLET ORAL 3 TIMES DAILY PRN
Status: DISCONTINUED | OUTPATIENT
Start: 2024-11-13 | End: 2024-11-13 | Stop reason: HOSPADM

## 2024-11-13 RX ORDER — ONDANSETRON 4 MG/1
4 TABLET, ORALLY DISINTEGRATING ORAL EVERY 8 HOURS PRN
Status: DISCONTINUED | OUTPATIENT
Start: 2024-11-13 | End: 2024-11-13 | Stop reason: HOSPADM

## 2024-11-13 RX ORDER — TRAZODONE HYDROCHLORIDE 50 MG/1
150 TABLET, FILM COATED ORAL NIGHTLY
Status: DISCONTINUED | OUTPATIENT
Start: 2024-11-13 | End: 2024-11-13 | Stop reason: HOSPADM

## 2024-11-13 RX ORDER — HYDROCODONE BITARTRATE AND ACETAMINOPHEN 5; 325 MG/1; MG/1
1 TABLET ORAL EVERY 12 HOURS SCHEDULED
Status: DISCONTINUED | OUTPATIENT
Start: 2024-11-13 | End: 2024-11-13 | Stop reason: HOSPADM

## 2024-11-13 RX ORDER — LISINOPRIL 10 MG/1
10 TABLET ORAL DAILY
Status: DISCONTINUED | OUTPATIENT
Start: 2024-11-13 | End: 2024-11-13 | Stop reason: HOSPADM

## 2024-11-13 RX ORDER — CEPHALEXIN 500 MG/1
500 CAPSULE ORAL 3 TIMES DAILY
Qty: 15 CAPSULE | Refills: 0 | Status: SHIPPED | OUTPATIENT
Start: 2024-11-12 | End: 2024-11-17

## 2024-11-13 RX ORDER — HYDROXYZINE PAMOATE 25 MG/1
25 CAPSULE ORAL 3 TIMES DAILY PRN
Status: DISCONTINUED | OUTPATIENT
Start: 2024-11-13 | End: 2024-11-13 | Stop reason: HOSPADM

## 2024-11-13 RX ORDER — DULOXETIN HYDROCHLORIDE 60 MG/1
60 CAPSULE, DELAYED RELEASE ORAL EVERY EVENING
Status: DISCONTINUED | OUTPATIENT
Start: 2024-11-13 | End: 2024-11-13 | Stop reason: HOSPADM

## 2024-11-13 RX ORDER — ARIPIPRAZOLE 5 MG/1
5 TABLET ORAL DAILY
Status: DISCONTINUED | OUTPATIENT
Start: 2024-11-13 | End: 2024-11-13 | Stop reason: HOSPADM

## 2024-11-13 RX ADMIN — LISINOPRIL 10 MG: 10 TABLET ORAL at 09:11

## 2024-11-13 RX ADMIN — PHENAZOPYRIDINE 100 MG: 100 TABLET ORAL at 09:10

## 2024-11-13 RX ADMIN — GABAPENTIN 600 MG: 300 CAPSULE ORAL at 06:06

## 2024-11-13 RX ADMIN — CETIRIZINE HYDROCHLORIDE 10 MG: 10 TABLET ORAL at 09:11

## 2024-11-13 RX ADMIN — TAMSULOSIN HYDROCHLORIDE 0.4 MG: 0.4 CAPSULE ORAL at 09:16

## 2024-11-13 RX ADMIN — ARIPIPRAZOLE 5 MG: 5 TABLET ORAL at 09:11

## 2024-11-13 RX ADMIN — FAMOTIDINE 20 MG: 20 TABLET, FILM COATED ORAL at 09:11

## 2024-11-13 RX ADMIN — HYDROCODONE BITARTRATE AND ACETAMINOPHEN 1 TABLET: 5; 325 TABLET ORAL at 09:16

## 2024-11-13 RX ADMIN — METFORMIN HYDROCHLORIDE 500 MG: 500 TABLET, FILM COATED ORAL at 09:10

## 2024-11-13 RX ADMIN — BACLOFEN 10 MG: 10 TABLET ORAL at 09:10

## 2024-11-13 RX ADMIN — ATORVASTATIN CALCIUM 10 MG: 10 TABLET, FILM COATED ORAL at 09:10

## 2024-11-13 RX ADMIN — SENNOSIDES 1 TABLET: 8.6 TABLET, FILM COATED ORAL at 09:11

## 2024-11-13 RX ADMIN — HYDROCODONE BITARTRATE AND ACETAMINOPHEN 1 TABLET: 5; 325 TABLET ORAL at 02:51

## 2024-11-13 NOTE — CONSULTS
[facility]      Patient Identification:  NAME:  Sánchez Carbajal  Age:  59 y.o.   Sex:  female   :  1965   MRN:  [id number]     Chief complaint: bilateral ureteral stones    History of present illness:      59 year old female with bilateral distal ureteral obstructing stones with proximal hydro. Has been dealing with this for a week. Bilateral flank pain. No fevers, dysuria, nausea or vomiting.     In hospital:  -AVSS, good UOP  -WBC - 9.29  -Creat - 0.93  -UA - contaminated; 21-50, 1+, RBC    -CT - independently interpreted; bilateral hydro and tortuous ureters secondary to distal ureteral stones at UVJ      Asked to see    Past medical history:  Past Medical History:   Diagnosis Date    Arthritis     RIGHT SHOULDER    Arthritis of back     Not sure    Arthritis of neck     Two years ago    Bursitis of hip     Cervical disc disorder     Two maybe three years ago    Chronic back pain     Diabetes mellitus     Elevated cholesterol     GERD (gastroesophageal reflux disease)     H/O seasonal allergies     History of anemia     History of diverticulitis     History of heart murmur in childhood     History of kidney stones     Hypertension     Migraines     PONV (postoperative nausea and vomiting)     Primary osteoarthritis of knees, bilateral 2022    Sleep apnea with use of continuous positive airway pressure (CPAP)     Uterine prolapse     UTERINE VAG PROLAPSE       Past surgical history:  Past Surgical History:   Procedure Laterality Date    ANTERIOR AND POSTERIOR VAGINAL REPAIR N/A 01/15/2019    Procedure: /POSTERIOR REPAIR;  Surgeon: James Holloway MD;  Location: [facility];  Service: Gynecology    CERVICAL FUSION      CHOLECYSTECTOMY      COLONOSCOPY N/A 2017    Procedure: COLONOSCOPY;  Surgeon: Tian Henderson MD;  Location: [facility] OR;  Service:     CYSTOSCOPY URETEROSCOPY LASER LITHOTRIPSY N/A 2016    Procedure: URETEROSCOPY  ;  Surgeon: Vimal MUNSON  MD Jorge Alberto;  Location: [facility] OR;  Service:     CYSTOSCOPY URETEROSCOPY LASER LITHOTRIPSY Left 09/01/2019    Procedure: CYSTOSCOPY left retrograde pyelogram;  Surgeon: Vimal Azul MD;  Location: [facility] OR;  Service: Urology    CYSTOSCOPY W/ LASER LITHOTRIPSY Left 06/25/2018    Procedure: CYSTOSCOPY, LEFT URETEROSCOPY, left  STENT PLACEMENT, right retrograde;  Surgeon: Vimal Azul MD;  Location: [facility] OR;  Service: Urology    EXTRACORPOREAL SHOCK WAVE LITHOTRIPSY (ESWL)  2015    JOINT REPLACEMENT  May 20th 2023    KNEE SURGERY      NECK SURGERY      Can't remember date    SACROCOLPOPEXY N/A 01/15/2019    Procedure: ROBOTIC SACROCOLPOPEXY WITH MESH, TOTAL LAPAROSCOPICE HYSTERECTOMY BILATERAL SALPINGECTOMY WITH DAVINCI ;  Surgeon: James Holloway MD;  Location: [facility] OR;  Service: DaVinci    TOTAL KNEE ARTHROPLASTY Right 05/24/2023    Procedure: TOTAL KNEE ARTHROPLASTY WITH CORI ROBOT;  Surgeon: Lambert Brady MD;  Location: [facility] OR;  Service: Robotics - Ortho;  Laterality: Right;       Allergies:  Penicillins, Flagyl [metronidazole], and Sulfa antibiotics    Home medications:  Medications Prior to Admission   Medication Sig Dispense Refill Last Dose/Taking    ARIPiprazole (ABILIFY) 10 MG tablet        ARIPiprazole (ABILIFY) 15 MG tablet        ARIPiprazole (ABILIFY) 5 MG tablet Take 1 tablet by mouth Daily. Take dos       ARIPiprazole (ABILIFY) 5 MG tablet Take 1 tablet by mouth Daily.       aspirin 81 MG EC tablet Take 1 tablet by mouth 2 (Two) Times a Day. 60 tablet 0     atorvastatin (LIPITOR) 10 MG tablet Take 1 tablet by mouth Every Night.       atorvastatin (LIPITOR) 10 MG tablet Take 1 tablet by mouth Daily.       B Complex Vitamins (vitamin b complex) capsule capsule Take 1 capsule by mouth Daily. Hold 7d prior dos       baclofen (LIORESAL) 10 MG tablet Take 1 tablet by mouth 2 (Two) Times a Day.       Diclofenac Sodium (VOLTAREN) 1 % gel gel        Dulaglutide (Trulicity)  0.75 MG/0.5ML solution pen-injector Inject 0.75 mg under the skin into the appropriate area as directed 1 (One) Time Per Week. On Tuesdays (Patient not taking: Reported on 5/28/2024)       DULoxetine (CYMBALTA) 60 MG capsule Take 1 capsule by mouth Every Evening.       DULoxetine (CYMBALTA) 60 MG capsule Take 1 capsule by mouth Daily.       famotidine (PEPCID) 20 MG tablet Take 1 tablet by mouth 2 (Two) Times a Day.       famotidine (PEPCID) 40 MG tablet Take 0.5 tablets by mouth 2 (Two) Times a Day. Take dos       fluticasone (FLONASE) 50 MCG/ACT nasal spray        gabapentin (NEURONTIN) 600 MG tablet Take 1 tablet by mouth 3 (Three) Times a Day. Take dos       gabapentin (NEURONTIN) 600 MG tablet Take 1 tablet by mouth 3 (Three) Times a Day.       HYDROcodone-acetaminophen (NORCO) 5-325 MG per tablet Take 1 tablet by mouth Every 12 (Twelve) Hours. (Patient not taking: Reported on 3/12/2024)       hydrOXYzine pamoate (VISTARIL) 100 MG capsule        hydrOXYzine pamoate (VISTARIL) 25 MG capsule Take 1 capsule by mouth 3 (Three) Times a Day As Needed for Anxiety.       lisinopril (PRINIVIL,ZESTRIL) 10 MG tablet Take 1 tablet by mouth Daily.       lisinopril (PRINIVIL,ZESTRIL) 10 MG tablet Take 1 tablet by mouth Daily.       Loratadine 10 MG capsule Take 1 capsule by mouth Every Morning.       meclizine (ANTIVERT) 25 MG tablet Take 1 tablet 3 times a day by oral route as needed.       meloxicam (MOBIC) 15 MG tablet Take 1 tablet by mouth Daily. (Patient not taking: Reported on 5/28/2024) 30 tablet 2     metFORMIN (GLUCOPHAGE) 500 MG tablet Take 1 tablet by mouth 2 (Two) Times a Day.       METFORMIN HCL PO Take 500 mg by mouth 2 (Two) Times a Day Before Meals. (Patient not taking: Reported on 5/28/2024)       Mounjaro 2.5 MG/0.5ML solution pen-injector pen ADMINISTER 2.5 MG UNDER THE SKIN EVERY WEEK FOR DIABETES       Multiple Vitamin (MULTI VITAMIN DAILY PO) Take 1 tablet by mouth Daily. Hold 7d prior dos        ondansetron ODT (ZOFRAN-ODT) 4 MG disintegrating tablet Take 1 tablet by mouth Every 8 (Eight) Hours As Needed for Nausea or Vomiting for up to 21 doses. 21 tablet 0     oxaprozin (DAYPRO) 600 MG tablet Take 1 tablet by mouth 2 (Two) Times a Day. Hold 7d prior dos       oxaprozin (DAYPRO) 600 MG tablet Take 1 tablet by mouth 2 (Two) Times a Day.       phenazopyridine (Pyridium) 100 MG tablet Take 1 tablet by mouth 3 (Three) Times a Day. 6 tablet 0     senna 8.6 MG tablet Take 1 tablet by mouth Daily. (Patient not taking: Reported on 5/28/2024) 30 tablet 0     SUMAtriptan (IMITREX) 100 MG tablet TAKE ONE TABLET BY MOUTH AT ONSET OF MIGRAINE SYMPTOMS MAY REPEAT ONCE IN TWO HOURS IF HEADACHE PERSISTS       tamsulosin (FLOMAX) 0.4 MG capsule 24 hr capsule Take 1 capsule by mouth Daily. 30 capsule 0     traZODone (DESYREL) 150 MG tablet Take 1 tablet by mouth Every Night.       traZODone (DESYREL) 150 MG tablet Take 1 tablet by mouth At Night As Needed.       vitamin C (ASCORBIC ACID) 500 MG tablet Take 1 tablet by mouth Daily. Hold 7d prior dos           Hospital medications:  [Transfer Hold] sodium chloride, 10 mL, Intravenous, Q12H  [Transfer Hold] tamsulosin, 0.4 mg, Oral, Daily           [Transfer Hold] acetaminophen **OR** [Transfer Hold] acetaminophen **OR** [Transfer Hold] acetaminophen    [Transfer Hold] senna-docusate sodium **AND** [Transfer Hold] polyethylene glycol **AND** [Transfer Hold] bisacodyl **AND** [Transfer Hold] bisacodyl    [Transfer Hold] Calcium Replacement - Follow Nurse / BPA Driven Protocol    [Transfer Hold] Magnesium Standard Dose Replacement - Follow Nurse / BPA Driven Protocol    [Transfer Hold] ondansetron ODT **OR** [Transfer Hold] ondansetron    [Transfer Hold] Phosphorus Replacement - Follow Nurse / BPA Driven Protocol    Potassium Replacement - Follow Nurse / BPA Driven Protocol    [Transfer Hold] sodium chloride    [Transfer Hold] sodium chloride    [Transfer Hold] sodium  chloride    Family history:  Family History   Problem Relation Age of Onset    Cancer Maternal Grandmother         Kidney cancer    Malig Hyperthermia Neg Hx        Social history:  Social History     Tobacco Use    Smoking status: Never     Passive exposure: Never    Smokeless tobacco: Never   Vaping Use    Vaping status: Never Used   Substance Use Topics    Alcohol use: No    Drug use: No       Review of systems:      Positive for:  nothing  Negative for:  chest pain, cough, sob, o/w neg    Objective:  TMax 24 hours:   Temp (24hrs), Av.2 °F (36.2 °C), Min:97.2 °F (36.2 °C), Max:97.2 °F (36.2 °C)      Vitals Ranges:   Temp:  [97.2 °F (36.2 °C)] 97.2 °F (36.2 °C)  Heart Rate:  [88-97] 91  Resp:  [16-18] 18  BP: (129-182)/(78-91) 155/82    Intake/Output Last 3 shifts:  I/O last 3 completed shifts:  In: 1100 [IV Piggyback:1100]  Out: -      Physical Exam:    General Appearance:    Alert, cooperative, NAD   Back:     No CVA tenderness   Lungs:     Respirations unlabored, no wheezing    Heart:    RRR, intact peripheral pulses   Abdomen:     Soft, NDNT, no masses, no guarding   :    Pelvic not performed, bladder nondistended and nontender   Neuro/Psych:   Orientation intact, mood/affect pleasant       Results review:   I reviewed the patient's new clinical results.    Data review:  Lab Results (last 24 hours)       Procedure Component Value Units Date/Time    Urine Culture - Urine, Urine, Clean Catch [[id number]] Collected: 24    Specimen: Urine, Clean Catch Updated: 24 182    Urinalysis With Microscopic If Indicated (No Culture) - Urine, Clean Catch [[id number]]  (Abnormal) Collected: 24    Specimen: Urine, Clean Catch Updated: 24 1643     Color, UA Dark Yellow     Appearance, UA Clear     pH, UA 5.5     Specific Gravity, UA 1.017     Glucose, UA Negative     Ketones, UA Trace     Bilirubin, UA Negative     Blood, UA Small (1+)     Protein, UA 30 mg/dL (1+)     Leuk Esterase, UA  Moderate (2+)     Nitrite, UA Negative     Urobilinogen, UA 0.2 E.U./dL    Urinalysis, Microscopic Only - Urine, Clean Catch [[id number]]  (Abnormal) Collected: 11/12/24 1614    Specimen: Urine, Clean Catch Updated: 11/12/24 1643     RBC, UA Too Numerous to Count /HPF      WBC, UA 21-50 /HPF      Bacteria, UA 1+ /HPF      Squamous Epithelial Cells, UA 7-12 /HPF      Hyaline Casts, UA 7-12 /LPF      Methodology Automated Microscopy    Comprehensive Metabolic Panel [[id number]]  (Abnormal) Collected: 11/12/24 1545    Specimen: Blood from Arm, Left Updated: 11/12/24 1623     Glucose 105 mg/dL      BUN 13 mg/dL      Creatinine 0.93 mg/dL      Sodium 137 mmol/L      Potassium 3.5 mmol/L      Chloride 99 mmol/L      CO2 24.2 mmol/L      Calcium 9.6 mg/dL      Total Protein 6.8 g/dL      Albumin 4.2 g/dL      ALT (SGPT) 18 U/L      AST (SGOT) 19 U/L      Alkaline Phosphatase 93 U/L      Total Bilirubin 0.4 mg/dL      Globulin 2.6 gm/dL      A/G Ratio 1.6 g/dL      BUN/Creatinine Ratio 14.0     Anion Gap 13.8 mmol/L      eGFR 70.9 mL/min/1.73     Narrative:      GFR Normal >60  Chronic Kidney Disease <60  Kidney Failure <15      Lipase [[id number]]  (Normal) Collected: 11/12/24 1545    Specimen: Blood from Arm, Left Updated: 11/12/24 1618     Lipase 13 U/L     Lactic Acid, Plasma [[id number]]  (Normal) Collected: 11/12/24 1545    Specimen: Blood from Arm, Left Updated: 11/12/24 1613     Lactate 1.1 mmol/L     [place] Draw [[id number]] Collected: 11/12/24 1545    Specimen: Blood from Arm, Left Updated: 11/12/24 1600    Narrative:      The following orders were created for panel order [place] Draw.  Procedure                               Abnormality         Status                     ---------                               -----------         ------                     Green Top (Gel)[[id number]]                                  Final result               Lavender Top[[id number]]                                     Final result                Gold Top - SST[[id number]]                                   Final result               Light Blue Top[[id number]]                                   Final result                 Please view results for these tests on the individual orders.    Green Top (Gel) [[id number]] Collected: 11/12/24 1545    Specimen: Blood from Arm, Left Updated: 11/12/24 1600     Extra Tube Hold for add-ons.     Comment: Auto resulted.       Lavender Top [[id number]] Collected: 11/12/24 1545    Specimen: Blood from Arm, Left Updated: 11/12/24 1600     Extra Tube hold for add-on     Comment: Auto resulted       Gold Top - SST [[id number]] Collected: 11/12/24 1545    Specimen: Blood from Arm, Left Updated: 11/12/24 1600     Extra Tube Hold for add-ons.     Comment: Auto resulted.       Light Blue Top [[id number]] Collected: 11/12/24 1545    Specimen: Blood from Arm, Left Updated: 11/12/24 1600     Extra Tube Hold for add-ons.     Comment: Auto resulted       CBC & Differential [[id number]]  (Normal) Collected: 11/12/24 1545    Specimen: Blood from Arm, Left Updated: 11/12/24 1554    Narrative:      The following orders were created for panel order CBC & Differential.  Procedure                               Abnormality         Status                     ---------                               -----------         ------                     CBC Auto Differential[[id number]]        Normal              Final result                 Please view results for these tests on the individual orders.    CBC Auto Differential [[id number]]  (Normal) Collected: 11/12/24 1545    Specimen: Blood from Arm, Left Updated: 11/12/24 1554     WBC 9.29 10*3/mm3      RBC 4.43 10*6/mm3      Hemoglobin 12.1 g/dL      Hematocrit 37.8 %      MCV 85.3 fL      MCH 27.3 pg      MCHC 32.0 g/dL      RDW 13.3 %      RDW-SD 41.3 fl      MPV 9.3 fL      Platelets 230 10*3/mm3      Neutrophil % 70.0 %      Lymphocyte % 21.0 %      Monocyte % 5.5 %      Eosinophil % 2.8 %       Basophil % 0.4 %      Immature Grans % 0.3 %      Neutrophils, Absolute 6.50 10*3/mm3      Lymphocytes, Absolute 1.95 10*3/mm3      Monocytes, Absolute 0.51 10*3/mm3      Eosinophils, Absolute 0.26 10*3/mm3      Basophils, Absolute 0.04 10*3/mm3      Immature Grans, Absolute 0.03 10*3/mm3      nRBC 0.0 /100 WBC              Imaging:  Imaging Results (Last 24 Hours)       Procedure Component Value Units Date/Time    CT Abdomen Pelvis Without Contrast [[id number]] Collected: 11/12/24 1932     Updated: 11/12/24 1940    Narrative:      CT ABDOMEN AND PELVIS WITHOUT IV CONTRAST     HISTORY: Bilateral flank pain     TECHNIQUE: Radiation dose reduction techniques were utilized, including  automated exposure control and exposure modulation based on body size.  Axial images were obtained through the abdomen and pelvis without the  administration of IV contrast. Coronal and sagittal reformatted images  were obtained.     COMPARISON: 11/7/2024     FINDINGS:      ABDOMEN:  Linear scar atelectasis in the left lung base. Small hiatal hernia.  Liver unremarkable. Cholecystectomy. The spleen is unremarkable. There  is a 7-8 mm stone located at the distal left ureter just before the UVJ  with mild hydronephrosis and hydroureter. There are multiple  nonobstructing stones in the lower pole the left kidney largest of these  measuring about 6 mm. There is a 8 mm obstructing stone located in the  distal right ureter just before the ureterovesical junction there is  associated moderate hydronephrosis. Additional small nonobstructing  stones elsewhere in the right kidney. The adrenal glands are  unremarkable. Pancreas is unremarkable.     PELVIS:  No bladder wall thickening. No free fluid in the pelvis. Sigmoid  diverticulosis without evidence of diverticulitis. No acute bony  abnormality       Impression:      Redemonstrated are bilateral obstructing distal ureteral stones located  just before the ureterovesical junctions as described  above. Associated  mild left and moderate right hydronephrosis and hydroureter. Overall  findings do not appear significantly changed.     Radiation dose reduction techniques were utilized, including automated  exposure control and exposure modulation based on body size.        This report was finalized on 11/12/2024 7:37 PM by Dr. Stanford Orr M.D on Workstation: [id number]                Assessment:     59 year old female with     Bilateral obstructing ureteral stones; new and undiagnosed    Plan:     - urgent bilateral URS, LL, stents  - rocephin in ED  - has residual L renal stone burden and will need L URS, LL vs ESWL      Dariel Adams MD  11/12/24  21:35 EST

## 2024-11-13 NOTE — PLAN OF CARE
Goal Outcome Evaluation:         Patient is alert and oriented x 4, vitals stable, room air except for when sleeping she wears 2L due to sleep apnea, no complaints this shift, up with standby assist, safety precautions in use, plan of care ongoing.

## 2024-11-13 NOTE — DISCHARGE INSTRUCTIONS
[facility]     Home Care after: Cystoscopy / Stent placement    Follow the guidelines below. Call your doctor if you notice any unusual symptoms. Remember: You are under the influence of medication. Do not drive, drink alcoholic beverages, sign legal documents or make major decisions during the next 24 hours.    Wound Care  You will not have a dressing. There are no incisions.  You may have some red-tinged urine.  This will fluctuate and go away.  Typically, a stent is placed in the ureter. This is temporary and will be removed at your next office visit, depending on your clinical course.  It is normal to have some bladder spasms, frequency of urination, and urgency to urinate.  It is okay to use tylenol, ibuprofen, pyridium and/or oxybutynin for her bladder spasms.    Activity  Plan at least a few days off work, more if necessary, especially if your job requires heavy lifting or a lot of physical activities.  If you wish to return to work sooner, that is okay, but light-duty is recommended.  Sexual activity may resume in a few days  No jogging, tennis, heavy weight-lifting or prolonged physical activity for 2 weeks.  No driving while taking narcotic pain medication  You can shower 1 days after your surgery, but DO NOT SOAK in tub baths.  Avoid straining with bowel movements. Narcotics can cause constipation so you can take over-the-counter stool softeners (Senokot-S, Miralax, Colace) per the instructions on the box; hold these pills if you are experiencing diarrhea.       Return to Work/School  You may return to work/school in a few days.  If you need a note, please obtain from Dr. Velazquez's office during your follow-up visit    Bathing  No submersion under water! No tub bath, hot tub, pool, lake, pond, creek, stream, river, etc.  You can shower immediately after your procedure.      Diet  Gradually resume regular diet, as tolerated.   Drinking plenty of water is very important.    Driving (if applicable)  No  driving for 24 hours after surgery due to the anesthetic.  No driving anytime you are on pain medication.    Educational  The medication used to put you to sleep will be acting in your body for the next 24 hours, so you might feel a little sleepy. This feeling will slowly wear off. For the first 24 hours, you should NOT:   Drive a car, operate machinery, or power tools.  Drink any alcoholic drinks (even beer).   Make any important decisions, sign any important or legal papers.  For your safety, we strongly suggest that a responsible adult stay with you for the rest of the day and during the night after you leave the hospital.  Medication  You may take your usual medications unless told otherwise by your doctor.  Do not take any anticoagulation (Ibuprofen, Advil, Aspirin, Eliquis, Xarelto, Coumadin, etc) until cleared by your Doctor.  Narcotic pain medications can cause constipation. You may take an over-the-counter stool softener or laxative if needed.   A bowel movement every day is preferred, every other day is reasonable.      Call your Doctor if:  Call to notify your surgeon if you develop:  Fever greater than 101F  Unmanageable pain despite pain medication  Pain medication not effective or makes you ill  Blood staining continues to worsen for more than 24 hours  Difficulty breathing or unusual shortness of breath, excessive bleeding, drainage at the operative site, fevers, chills, increased pain that is not relieved by pain medications, persistent nausea or vomiting    HOW TO REACH YOUR DOCTOR  Monday - Friday from 8:00 - 4:30, call the Urology Office, [phone].  After hours, weekend and holidays call the [phone] or go to Emergency Room    Follow up care:   The Urology clinic will call to schedule your post-op appointment or next procedure, typically occurring within the next 1-2 weeks   If you do not receive a call within 1 week for scheduling, please call [phone] to make an appointment.

## 2024-11-13 NOTE — ANESTHESIA POSTPROCEDURE EVALUATION
"Patient: Sánchez Carbajal    Procedure Summary       Date: 11/12/24 Room / Location: [facility] OR  / [facility] MAIN OR    Anesthesia Start: 2230 Anesthesia Stop: 2337    Procedure: BILATERAL URETEROSCOPY LASER LITHOTRIPSY WITH STENT INSERTION AND STONE BASKET EXTRACTION RETROGRADE PYELOGRAM (Bilateral) Diagnosis:       Kidney stone      (Kidney stone [N20.0])    Surgeons: Dariel Adams MD Provider: Luis Mendez MD    Anesthesia Type: general ASA Status: 3            Anesthesia Type: general    Vitals  No vitals data found for the desired time range.          Post Anesthesia Care and Evaluation    Patient location during evaluation: PACU  Level of consciousness: awake  Pain management: adequate    Airway patency: patent  Anesthetic complications: No anesthetic complications  PONV Status: controlled  Cardiovascular status: acceptable  Respiratory status: acceptable  Hydration status: acceptable    Comments: /71   Pulse 77   Temp 36.2 °C (97.2 °F) (Tympanic)   Resp 16   Ht 167.6 cm (66\")   Wt 90.7 kg (200 lb)   LMP  (LMP Unknown) Comment: thinks 1-2 yrs ago  SpO2 94%   BMI 32.28 kg/m²       "

## 2024-11-13 NOTE — OP NOTE
Operative Report     [facility]    Patient: Sánchez Carbajal  Age:      59 y.o.  :     1965  Sex:      female    Medical Record:  [id number]    Date of Operation/Procedure:  2024    Pre-operative Diagnosis: Bilateral urolithiasis    Post-operative Diagnosis: Bilateral urolithiasis    Surgeon: Dariel Adams MD    Name of Operation/Procedure:  Procedure(s) and Anesthesia Type:     * BILATERAL URETEROSCOPY LASER LITHOTRIPSY WITH STENT INSERTION AND STONE BASKET EXTRACTION RETROGRADE PYELOGRAM - General    Findings/Complications:      - Bilateral distal obstructing ureteral stones that were impacted and unable to get a wire past either  -Status post bilateral ureteroscopy, laser lithotripsy and stent placement  -Satisfactory stent placement with proximal and distal curls    Description of procedure:     After appropriate discussion of risks and benefits and informed consent obtained patient was transported to the operating room and positioned in supine position on the operative table.  General anesthesia was induced.  Patient was repositioned into dorsolithotomy and prepped and draped in sterile fashion.  Timeout completed.  Patient had received antibiotics on the floor.  Rigid cystoscope inserted per urethra and advanced into the bladder.  The right ureteral orifice was attempted to be cannulated the sensor guidewire and we were able to get into the ureteral orifice but a couple centimeters proximal to the orifice I was unable to advance the wire anymore.  I then attempted a similar procedure on the left side and again was unable to advance the wire up to the renal pelvis.  I then removed the rigid cystoscope and inserted a rigid ureteroscope.  I first entered into the left ureteral orifice.  2 to 3 cm proximal to the orifice I encountered the stone which was impacted.  I then lasered the stone using a 265 laser fiber at 10 W until the stone was broken into small pieces that were able to be  basketed.  I then inserted a 0 tip nitinol basket and basketed out all the fragments in the left ureter and dropped them in the bladder.  I then turned my attention to the right ureteral orifice with the rigid ureteroscope.  I entered into the ureteral orifice and again 2 to 3 cm proximal to the orifice encountered an impacted stone.  I lasered the stone and then basketed the small fragments.  Both distal ureters were cleared completely of stone burden.  Both distal ureters had friable tissue with the stone was impacted in the ureter wall but there was no transmural ureteral injury.  I then inserted a sensor guidewire on the right side up to the renal pelvis under fluoroscopic guidance.  I inserted a Pollack catheter over this wire and up to the renal pelvis.  Of note there was a tortuous proximal ureter and I had to use a maneuver in order to straighten the ureter.  I then shot a retrograde pyelogram which demonstrated this tortuous ureter that had not been straightened and a mild to moderate hydronephrosis.  I then inserted the guidewire for the Pollack and up to the renal pelvis under fluoroscopic guidance.  I inserted a 6 Estonian by 26 cm double-J ureteral stent over the wire to the renal pelvis under fluoroscopic guidance.  The wire was pulled leaving a good proximal and distal curl.  I then turned my attention to the left side where a wire was inserted up to the renal pelvis and a Pollack catheter over this.  I shot a retrograde pyelogram which demonstrated mild to moderate hydronephrosis and the ureter was already straight.  I reinserted the wire for the Pollack catheter and advanced up to the renal pelvis under fluoroscopic guidance.  I then inserted a 6 Estonian by 26 cm double-J ureteral stent over the wire and up to the renal pelvis under fluoroscopic guidance.  The wire was pulled leaving a good proximal distal curl.  The bladder was then emptied and all the stone was in the bladder was sent for analysis.   This concluded the procedure.  The patient was extubated and transported to PACU in stable condition.    Plan: Patient will be admitted  overnight to the observation unit and be discharged home on appropriate antibiotics with plans for follow-up in 1 to 2 weeks for right stent removal and left ureteroscopy and laser lithotripsy of residual stone up in the upper portion of the urinary collecting system    Estimated Blood Loss: none    Specimens:   Order Name Source Comment Collection Info Order Time   STONE ANALYSIS Urinary Bladder  Collected By: Dariel Adams MD 11/12/2024 11:18 PM     Release to patient   Routine Release            Fluids/Drains: Bilateral 6 [place] by 26 cm double-J ureteral stent; no string    Dariel Adams MD  11/12/2024  23:18 EST

## 2024-11-13 NOTE — ANESTHESIA PREPROCEDURE EVALUATION
Anesthesia Evaluation     Patient summary reviewed and Nursing notes reviewed   history of anesthetic complications:  PONV  NPO Solid Status: > 8 hours  NPO Liquid Status: > 8 hours           Airway   Mallampati: II  TM distance: >3 FB  Neck ROM: full  no difficulty expected and No difficulty expected  Dental - normal exam   (+) edentulous    Pulmonary - normal exam   (+) ,sleep apnea on CPAP  Cardiovascular - normal exam    (+) hypertension, hyperlipidemia      Neuro/Psych  (+) headaches, numbness  GI/Hepatic/Renal/Endo    (+) obesity, GERD, renal disease- stones, diabetes mellitus type 2    Musculoskeletal     Abdominal  - normal exam   Substance History      OB/GYN          Other   arthritis,                   Anesthesia Plan    ASA 3     general     (Would consider A rapid sequence Induction.)  intravenous induction     Anesthetic plan, risks, benefits, and alternatives have been provided, discussed and informed consent has been obtained with: patient.

## 2024-11-13 NOTE — CASE MANAGEMENT/SOCIAL WORK
Case Management Discharge Note      Final Note: Home         Selected Continued Care - Admitted Since 11/12/2024       Destination    No services have been selected for the patient.                Durable Medical Equipment    No services have been selected for the patient.                Dialysis/Infusion    No services have been selected for the patient.                Home Medical Care    No services have been selected for the patient.                Therapy    No services have been selected for the patient.                Community Resources    No services have been selected for the patient.                Community & DME    No services have been selected for the patient.                    Transportation Services  Private: Car    Final Discharge Disposition Code: 01 - home or self-care

## 2024-11-13 NOTE — ANESTHESIA PROCEDURE NOTES
Airway  Urgency: elective    Date/Time: 11/12/2024 10:39 PM  Airway not difficult    General Information and Staff    Patient location during procedure: OR  Anesthesiologist: Luis Mendez MD  CRNA/CAA: Ginger Peres CRNA    Indications and Patient Condition  Indications for airway management: airway protection    Preoxygenated: yes  Mask difficulty assessment: 0 - not attempted    Final Airway Details  Final airway type: endotracheal airway      Successful airway: ETT  Cuffed: yes   Successful intubation technique: direct laryngoscopy and RSI  Facilitating devices/methods: intubating stylet  Endotracheal tube insertion site: oral  Blade: Pride  Blade size: 2  ETT size (mm): 7.0  Cormack-Lehane Classification: grade I - full view of glottis  Placement verified by: chest auscultation and capnometry   Cuff volume (mL): 7  Measured from: teeth  ETT/EBT  to teeth (cm): 20  Number of attempts at approach: 1  Assessment: lips, teeth, and gum same as pre-op and atraumatic intubation

## 2024-11-13 NOTE — DISCHARGE SUMMARY
DISCHARGE SUMMARY    Sánchez Carbajal  59 y.o.  1965  female  [id number]    Date of Admission: 11/12/2024  3:37 PM     Date of Discharge:  11/13/2024    Discharge Diagnosis:   Bilateral ureteral stones    Problem List:    Kidney stone      Presenting Problem/History of Present Illness  Kidney stone [N20.0]  Acute bilateral obstructive uropathy [N13.9]  Acute UTI [N39.0]      Hospital Course  Patient is a 59 y.o. female presented with flank/abdominal pain due to bilateral ureteral stones. The patient was taken to the OR on date of admission for the below-mentioned procedure. Postoperatively, diet and activity were advanced without difficulty. The patient remained afebrile and hemodynamically stable throughout the admission, ambulated, and pain was well controlled on oral medications. Sánchez Carbajal was cleared for discharge on 11/13/24 . Follow-up with [facility] will be arranged.    Procedures Performed  Procedure(s):  BILATERAL URETEROSCOPY LASER LITHOTRIPSY WITH STENT INSERTION AND STONE BASKET EXTRACTION RETROGRADE PYELOGRAM       Pertinent Test Results: None Pertinent    Condition on Discharge:  Good    Discharge Disposition: Home      Discharge Medications     Discharge Medications        New Medications        Instructions Start Date   cephalexin 500 MG capsule  Commonly known as: Keflex   500 mg, Oral, 3 Times Daily      docusate sodium 100 MG capsule  Commonly known as: Colace   100 mg, Oral, 2 Times Daily      HYDROcodone-acetaminophen 5-325 MG per tablet  Commonly known as: Norco   1 tablet, Oral, Every 6 Hours PRN             Changes to Medications        Instructions Start Date   phenazopyridine 100 MG tablet  Commonly known as: Pyridium  What changed: Another medication with the same name was added. Make sure you understand how and when to take each.   100 mg, Oral, 3 Times Daily      phenazopyridine 100 MG tablet  Commonly known as: Pyridium  What changed: You were already taking a  medication with the same name, and this prescription was added. Make sure you understand how and when to take each.   100 mg, Oral, 3 Times Daily PRN             Continue These Medications        Instructions Start Date   ARIPiprazole 5 MG tablet  Commonly known as: ABILIFY   1 tablet, Oral, Daily      ARIPiprazole 5 MG tablet  Commonly known as: ABILIFY   5 mg, Oral, Daily, Take dos       ARIPiprazole 15 MG tablet  Commonly known as: ABILIFY       ARIPiprazole 10 MG tablet  Commonly known as: ABILIFY       Aspirin Adult Low Strength 81 MG EC tablet  Generic drug: aspirin   81 mg, Oral, 2 Times Daily      atorvastatin 10 MG tablet  Commonly known as: LIPITOR   1 tablet, Oral, Daily      atorvastatin 10 MG tablet  Commonly known as: LIPITOR   10 mg, Nightly      baclofen 10 MG tablet  Commonly known as: LIORESAL   Take 1 tablet by mouth 2 (Two) Times a Day.      Diclofenac Sodium 1 % gel gel  Commonly known as: VOLTAREN       DULoxetine 60 MG capsule  Commonly known as: CYMBALTA   60 mg, Oral, Every Evening      DULoxetine 60 MG capsule  Commonly known as: CYMBALTA   1 capsule, Daily      famotidine 40 MG tablet  Commonly known as: PEPCID   20 mg, 2 Times Daily      famotidine 20 MG tablet  Commonly known as: PEPCID   20 mg, Oral, 2 Times Daily      fluticasone 50 MCG/ACT nasal spray  Commonly known as: FLONASE       gabapentin 600 MG tablet  Commonly known as: NEURONTIN   1 tablet, Oral, 3 Times Daily      gabapentin 600 MG tablet  Commonly known as: NEURONTIN   600 mg, 3 Times Daily      hydrOXYzine pamoate 25 MG capsule  Commonly known as: VISTARIL   25 mg, Oral, 3 Times Daily PRN      hydrOXYzine pamoate 100 MG capsule  Commonly known as: VISTARIL       lisinopril 10 MG tablet  Commonly known as: PRINIVIL,ZESTRIL   10 mg, Daily      lisinopril 10 MG tablet  Commonly known as: PRINIVIL,ZESTRIL   1 tablet, Oral, Daily      Loratadine 10 MG capsule   10 mg, Every Morning      meclizine 25 MG tablet  Commonly known  as: ANTIVERT   Take 1 tablet 3 times a day by oral route as needed.      METFORMIN HCL PO   500 mg, 2 Times Daily Before Meals      metFORMIN 500 MG tablet  Commonly known as: GLUCOPHAGE   500 mg, 2 Times Daily      Mounjaro 2.5 MG/0.5ML solution pen-injector  Generic drug: Tirzepatide   ADMINISTER 2.5 MG UNDER THE SKIN EVERY WEEK FOR DIABETES      multivitamin tablet tablet  Commonly known as: THERAGRAN   1 tablet, Oral, Daily, Hold 7d prior dos      ondansetron ODT 4 MG disintegrating tablet  Commonly known as: ZOFRAN-ODT   4 mg, Oral, Every 8 Hours PRN      oxaprozin 600 MG tablet  Commonly known as: DAYPRO   600 mg, 2 Times Daily      oxaprozin 600 MG tablet  Commonly known as: DAYPRO   1 tablet, Oral, 2 Times Daily      SUMAtriptan 100 MG tablet  Commonly known as: IMITREX   TAKE ONE TABLET BY MOUTH AT ONSET OF MIGRAINE SYMPTOMS MAY REPEAT ONCE IN TWO HOURS IF HEADACHE PERSISTS      tamsulosin 0.4 MG capsule 24 hr capsule  Commonly known as: FLOMAX   0.4 mg, Oral, Daily      traZODone 150 MG tablet  Commonly known as: DESYREL   1 tablet, Oral, Nightly PRN      traZODone 150 MG tablet  Commonly known as: DESYREL   150 mg, Nightly      Trulicity 0.75 MG/0.5ML solution pen-injector  Generic drug: Dulaglutide   0.75 mg, Subcutaneous, Weekly, On Tuesdays      vitamin b complex capsule capsule   1 capsule, Daily      vitamin C 500 MG tablet  Commonly known as: ASCORBIC ACID   500 mg, Daily             ASK your doctor about these medications        Instructions Start Date   HYDROcodone-acetaminophen 5-325 MG per tablet  Commonly known as: NORCO   1 tablet, Every 12 Hours Scheduled      meloxicam 15 MG tablet  Commonly known as: MOBIC   15 mg, Oral, Daily      senna 8.6 MG tablet  Commonly known as: SENOKOT   1 tablet, Oral, Daily               Follow-up Appointments:  - Follow-up with Urology will be arranged by the [facility].   - The patient should expect a phone call within a few days after Discharge.       Future Appointments   Date Time Provider Department Center   11/19/2024 10:45 AM Lambert Brady MD [facility]   2/11/2025 10:45 AM Seema Moser MD [facility] None   5/30/2025  8:00 AM Lambert Brady MD [facility]       Veronica Melton MD  [facility]  Office: [phone]  Fax: [phone]    Electronically signed by Veronica Melton MD, 11/13/24, 6:10 AM EST.

## 2024-11-13 NOTE — ED NOTES
Nursing report ED to floor  Sánchez Carbajal  59 y.o.  female    HPI :  HPI  Stated Reason for Visit: blateral flank pain    Chief Complaint  Chief Complaint   Patient presents with    Flank Pain       Admitting doctor:   Edouard Dangelo MD    Admitting diagnosis:   The primary encounter diagnosis was Acute bilateral obstructive uropathy. A diagnosis of Acute UTI was also pertinent to this visit.    Code status:   Current Code Status       Date Active Code Status Order ID Comments User Context       11/12/2024 2021 CPR (Attempt to Resuscitate) [id number]  Patti Escobedo APRN ED        Question Answer    Code Status (Patient has no pulse and is not breathing) CPR (Attempt to Resuscitate)    Medical Interventions (Patient has pulse or is breathing) Full Support                    Allergies:   Penicillins, Flagyl [metronidazole], and Sulfa antibiotics    Isolation:   No active isolations    Intake and Output    Intake/Output Summary (Last 24 hours) at 11/12/2024 2033  Last data filed at 11/12/2024 1851  Gross per 24 hour   Intake 1100 ml   Output --   Net 1100 ml       Weight:       11/12/24  1529   Weight: 90.7 kg (200 lb)       Most recent vitals:   Vitals:    11/12/24 1830 11/12/24 1832 11/12/24 1930 11/12/24 1943   BP:    155/82   BP Location:    Right arm   Patient Position:    Lying   Pulse: 88 92 90 91   Resp:  18  18   Temp:       TempSrc:       SpO2: (!) 85% 96% 96% 96%   Weight:       Height:           Active LDAs/IV Access:   Lines, Drains & Airways       Active LDAs       Name Placement date Placement time Site Days    Peripheral IV 11/12/24 1705 Anterior;Distal;Left;Upper Arm 11/12/24  1705  Arm  less than 1                    Labs (abnormal labs have a star):   Labs Reviewed   COMPREHENSIVE METABOLIC PANEL - Abnormal; Notable for the following components:       Result Value    Glucose 105 (*)     All other components within normal limits    Narrative:     GFR Normal >60  Chronic Kidney Disease  <60  Kidney Failure <15     URINALYSIS W/ MICROSCOPIC IF INDICATED (NO CULTURE) - Abnormal; Notable for the following components:    Color, UA Dark Yellow (*)     Ketones, UA Trace (*)     Blood, UA Small (1+) (*)     Protein, UA 30 mg/dL (1+) (*)     Leuk Esterase, UA Moderate (2+) (*)     All other components within normal limits   URINALYSIS, MICROSCOPIC ONLY - Abnormal; Notable for the following components:    RBC, UA Too Numerous to Count (*)     WBC, UA 21-50 (*)     Bacteria, UA 1+ (*)     Squamous Epithelial Cells, UA 7-12 (*)     All other components within normal limits   LIPASE - Normal   LACTIC ACID, PLASMA - Normal   CBC WITH AUTO DIFFERENTIAL - Normal   URINE CULTURE   RAINBOW DRAW    Narrative:     The following orders were created for panel order [place] Draw.  Procedure                               Abnormality         Status                     ---------                               -----------         ------                     Green Top (Gel)[[id number]]                                  Final result               Lavender Top[[id number]]                                     Final result               Gold Top - SST[[id number]]                                   Final result               Light Blue Top[[id number]]                                   Final result                 Please view results for these tests on the individual orders.   CBC AND DIFFERENTIAL    Narrative:     The following orders were created for panel order CBC & Differential.  Procedure                               Abnormality         Status                     ---------                               -----------         ------                     CBC Auto Differential[[id number]]        Normal              Final result                 Please view results for these tests on the individual orders.   GREEN TOP   LAVENDER TOP   GOLD TOP - SST   LIGHT BLUE TOP       EKG:   No orders to display       Meds given in ED:   Medications    sodium chloride 0.9 % flush 10 mL (has no administration in time range)   sodium chloride 0.9 % flush 10 mL (has no administration in time range)   sodium chloride 0.9 % flush 10 mL (has no administration in time range)   sodium chloride 0.9 % infusion 40 mL (has no administration in time range)   ondansetron ODT (ZOFRAN-ODT) disintegrating tablet 4 mg (has no administration in time range)     Or   ondansetron (ZOFRAN) injection 4 mg (has no administration in time range)   Potassium Replacement - Follow Nurse / BPA Driven Protocol (has no administration in time range)   Magnesium Standard Dose Replacement - Follow Nurse / BPA Driven Protocol (has no administration in time range)   Phosphorus Replacement - Follow Nurse / BPA Driven Protocol (has no administration in time range)   Calcium Replacement - Follow Nurse / BPA Driven Protocol (has no administration in time range)   acetaminophen (TYLENOL) tablet 650 mg (has no administration in time range)     Or   acetaminophen (TYLENOL) 160 MG/5ML oral solution 650 mg (has no administration in time range)     Or   acetaminophen (TYLENOL) suppository 650 mg (has no administration in time range)   sennosides-docusate (PERICOLACE) 8.6-50 MG per tablet 2 tablet (has no administration in time range)     And   polyethylene glycol (MIRALAX) packet 17 g (has no administration in time range)     And   bisacodyl (DULCOLAX) EC tablet 5 mg (has no administration in time range)     And   bisacodyl (DULCOLAX) suppository 10 mg (has no administration in time range)   tamsulosin (FLOMAX) 24 hr capsule 0.4 mg (has no administration in time range)   morphine injection 4 mg (4 mg Intravenous Given 11/12/24 1713)   ondansetron (ZOFRAN) injection 4 mg (4 mg Intravenous Given 11/12/24 1712)   lactated ringers bolus 1,000 mL (0 mL Intravenous Stopped 11/12/24 1815)   cefTRIAXone (ROCEPHIN) 2,000 mg in sodium chloride 0.9 % 100 mL MBP (0 mg Intravenous Stopped 11/12/24 1851)   ketorolac  (TORADOL) injection 15 mg (15 mg Intravenous Given 11/12/24 1819)   lidocaine (XYLOCAINE) 1 % 125 mg in sodium chloride 0.9 % 250 mL IVPB (125 mg Intravenous Given 11/12/24 1923)       Imaging results:  CT Abdomen Pelvis Without Contrast    Result Date: 11/12/2024  Redemonstrated are bilateral obstructing distal ureteral stones located just before the ureterovesical junctions as described above. Associated mild left and moderate right hydronephrosis and hydroureter. Overall findings do not appear significantly changed.  Radiation dose reduction techniques were utilized, including automated exposure control and exposure modulation based on body size.   This report was finalized on 11/12/2024 7:37 PM by Dr. Stanford Orr M.D on Workstation: [id number]       Ambulatory status:   Activity as tolerated     Social issues:   Social History     Socioeconomic History    Marital status:    Tobacco Use    Smoking status: Never     Passive exposure: Never    Smokeless tobacco: Never   Vaping Use    Vaping status: Never Used   Substance and Sexual Activity    Alcohol use: No    Drug use: No    Sexual activity: Yes     Partners: Male     Birth control/protection: Hysterectomy       Peripheral Neurovascular  Peripheral Neurovascular (Adult)  Peripheral Neurovascular WDL: WDL    Neuro Cognitive  Neuro Cognitive (Adult)  Cognitive/Neuro/Behavioral WDL: WDL    Learning  Learning Assessment  Learning Readiness and Ability: no barriers identified    Respiratory  Respiratory WDL  Respiratory WDL: WDL    Abdominal Pain       Pain Assessments  Pain (Adult)  (0-10) Pain Rating: Rest: 4  Response to Pain Interventions: interventions effective per patient    NIH Stroke Scale       Carla Goldstein RN  11/12/24 20:33 EST

## 2024-11-13 NOTE — PLAN OF CARE
Goal Outcome Evaluation:    A&OX4, calm and cooperative. Up ad ruth, ambulating in unit. No pain currently, resting in bed. D/C home with planned follow up. Education completed.

## 2024-11-14 LAB — BACTERIA SPEC AEROBE CULT: NO GROWTH

## 2024-11-17 ENCOUNTER — APPOINTMENT (OUTPATIENT)
Dept: CT IMAGING | Facility: HOSPITAL | Age: 59
End: 2024-11-17
Payer: MEDICARE

## 2024-11-17 ENCOUNTER — NURSE TRIAGE (OUTPATIENT)
Dept: CALL CENTER | Facility: HOSPITAL | Age: 59
End: 2024-11-17
Payer: MEDICARE

## 2024-11-17 ENCOUNTER — HOSPITAL ENCOUNTER (EMERGENCY)
Facility: HOSPITAL | Age: 59
Discharge: HOME | End: 2024-11-17
Attending: EMERGENCY MEDICINE | Admitting: EMERGENCY MEDICINE
Payer: MEDICARE

## 2024-11-17 VITALS
RESPIRATION RATE: 16 BRPM | DIASTOLIC BLOOD PRESSURE: 62 MMHG | SYSTOLIC BLOOD PRESSURE: 156 MMHG | HEART RATE: 93 BPM | HEIGHT: 66 IN | WEIGHT: 200 LBS | OXYGEN SATURATION: 93 % | TEMPERATURE: 96.9 F | BODY MASS INDEX: 32.14 KG/M2

## 2024-11-17 DIAGNOSIS — N23 RENAL COLIC, BILATERAL: Primary | ICD-10-CM

## 2024-11-17 DIAGNOSIS — Z98.890 HISTORY OF URETEROSCOPY: ICD-10-CM

## 2024-11-17 LAB
ALBUMIN SERPL-MCNC: 4.3 G/DL (ref 3.5–5.2)
ALBUMIN/GLOB SERPL: 1.5 G/DL
ALP SERPL-CCNC: 100 U/L (ref 39–117)
ALT SERPL W P-5'-P-CCNC: 22 U/L (ref 1–33)
ANION GAP SERPL CALCULATED.3IONS-SCNC: 13.4 MMOL/L (ref 5–15)
AST SERPL-CCNC: 18 U/L (ref 1–32)
BACTERIA UR QL AUTO: ABNORMAL /HPF
BASOPHILS # BLD AUTO: 0.04 10*3/MM3 (ref 0–0.2)
BASOPHILS NFR BLD AUTO: 0.5 % (ref 0–1.5)
BILIRUB SERPL-MCNC: 0.5 MG/DL (ref 0–1.2)
BILIRUB UR QL STRIP: NEGATIVE
BUN SERPL-MCNC: 14 MG/DL (ref 6–20)
BUN/CREAT SERPL: 17.9 (ref 7–25)
CALCIUM SPEC-SCNC: 9.9 MG/DL (ref 8.6–10.5)
CHLORIDE SERPL-SCNC: 102 MMOL/L (ref 98–107)
CLARITY UR: ABNORMAL
CO2 SERPL-SCNC: 24.6 MMOL/L (ref 22–29)
COLOR UR: ABNORMAL
CREAT SERPL-MCNC: 0.78 MG/DL (ref 0.57–1)
DEPRECATED RDW RBC AUTO: 40.5 FL (ref 37–54)
EGFRCR SERPLBLD CKD-EPI 2021: 87.6 ML/MIN/1.73
EOSINOPHIL # BLD AUTO: 0.52 10*3/MM3 (ref 0–0.4)
EOSINOPHIL NFR BLD AUTO: 6 % (ref 0.3–6.2)
ERYTHROCYTE [DISTWIDTH] IN BLOOD BY AUTOMATED COUNT: 13.4 % (ref 12.3–15.4)
GLOBULIN UR ELPH-MCNC: 2.9 GM/DL
GLUCOSE SERPL-MCNC: 130 MG/DL (ref 65–99)
GLUCOSE UR STRIP-MCNC: NEGATIVE MG/DL
HCT VFR BLD AUTO: 40.3 % (ref 34–46.6)
HGB BLD-MCNC: 13.2 G/DL (ref 12–15.9)
HGB UR QL STRIP.AUTO: ABNORMAL
HYALINE CASTS UR QL AUTO: ABNORMAL /LPF
IMM GRANULOCYTES # BLD AUTO: 0.04 10*3/MM3 (ref 0–0.05)
IMM GRANULOCYTES NFR BLD AUTO: 0.5 % (ref 0–0.5)
KETONES UR QL STRIP: NEGATIVE
LEUKOCYTE ESTERASE UR QL STRIP.AUTO: ABNORMAL
LYMPHOCYTES # BLD AUTO: 2.31 10*3/MM3 (ref 0.7–3.1)
LYMPHOCYTES NFR BLD AUTO: 26.6 % (ref 19.6–45.3)
MCH RBC QN AUTO: 27.3 PG (ref 26.6–33)
MCHC RBC AUTO-ENTMCNC: 32.8 G/DL (ref 31.5–35.7)
MCV RBC AUTO: 83.3 FL (ref 79–97)
MONOCYTES # BLD AUTO: 0.56 10*3/MM3 (ref 0.1–0.9)
MONOCYTES NFR BLD AUTO: 6.5 % (ref 5–12)
NEUTROPHILS NFR BLD AUTO: 5.21 10*3/MM3 (ref 1.7–7)
NEUTROPHILS NFR BLD AUTO: 59.9 % (ref 42.7–76)
NITRITE UR QL STRIP: NEGATIVE
NRBC BLD AUTO-RTO: 0 /100 WBC (ref 0–0.2)
PH UR STRIP.AUTO: 6.5 [PH] (ref 5–8)
PLATELET # BLD AUTO: 285 10*3/MM3 (ref 140–450)
PMV BLD AUTO: 9.2 FL (ref 6–12)
POTASSIUM SERPL-SCNC: 3.5 MMOL/L (ref 3.5–5.2)
PROT SERPL-MCNC: 7.2 G/DL (ref 6–8.5)
PROT UR QL STRIP: ABNORMAL
RBC # BLD AUTO: 4.84 10*6/MM3 (ref 3.77–5.28)
RBC # UR STRIP: ABNORMAL /HPF
REF LAB TEST METHOD: ABNORMAL
SODIUM SERPL-SCNC: 140 MMOL/L (ref 136–145)
SP GR UR STRIP: 1.01 (ref 1–1.03)
SQUAMOUS #/AREA URNS HPF: ABNORMAL /HPF
UROBILINOGEN UR QL STRIP: ABNORMAL
WBC # UR STRIP: ABNORMAL /HPF
WBC NRBC COR # BLD AUTO: 8.68 10*3/MM3 (ref 3.4–10.8)

## 2024-11-17 PROCEDURE — 96374 THER/PROPH/DIAG INJ IV PUSH: CPT

## 2024-11-17 PROCEDURE — 25010000002 MORPHINE PER 10 MG: Performed by: EMERGENCY MEDICINE

## 2024-11-17 PROCEDURE — 99284 EMERGENCY DEPT VISIT MOD MDM: CPT

## 2024-11-17 PROCEDURE — 74176 CT ABD & PELVIS W/O CONTRAST: CPT

## 2024-11-17 PROCEDURE — 81001 URINALYSIS AUTO W/SCOPE: CPT | Performed by: PHYSICIAN ASSISTANT

## 2024-11-17 PROCEDURE — 25810000003 SODIUM CHLORIDE 0.9 % SOLUTION: Performed by: PHYSICIAN ASSISTANT

## 2024-11-17 PROCEDURE — 85025 COMPLETE CBC W/AUTO DIFF WBC: CPT | Performed by: PHYSICIAN ASSISTANT

## 2024-11-17 PROCEDURE — 80053 COMPREHEN METABOLIC PANEL: CPT | Performed by: PHYSICIAN ASSISTANT

## 2024-11-17 RX ORDER — MORPHINE SULFATE 2 MG/ML
4 INJECTION, SOLUTION INTRAMUSCULAR; INTRAVENOUS ONCE
Status: COMPLETED | OUTPATIENT
Start: 2024-11-17 | End: 2024-11-17

## 2024-11-17 RX ORDER — ONDANSETRON 8 MG/1
8 TABLET, FILM COATED ORAL EVERY 8 HOURS PRN
Qty: 10 TABLET | Refills: 0 | Status: SHIPPED | OUTPATIENT
Start: 2024-11-17 | End: 2024-11-19

## 2024-11-17 RX ORDER — OXYCODONE AND ACETAMINOPHEN 5; 325 MG/1; MG/1
1 TABLET ORAL ONCE
Status: COMPLETED | OUTPATIENT
Start: 2024-11-17 | End: 2024-11-17

## 2024-11-17 RX ORDER — ONDANSETRON 4 MG/1
4 TABLET, ORALLY DISINTEGRATING ORAL EVERY 8 HOURS PRN
Qty: 12 TABLET | Refills: 0 | Status: SHIPPED | OUTPATIENT
Start: 2024-11-17 | End: 2024-11-19

## 2024-11-17 RX ORDER — SODIUM CHLORIDE 0.9 % (FLUSH) 0.9 %
10 SYRINGE (ML) INJECTION AS NEEDED
Status: DISCONTINUED | OUTPATIENT
Start: 2024-11-17 | End: 2024-11-17 | Stop reason: HOSPADM

## 2024-11-17 RX ORDER — OXYCODONE AND ACETAMINOPHEN 5; 325 MG/1; MG/1
1-2 TABLET ORAL EVERY 6 HOURS PRN
Qty: 12 TABLET | Refills: 0 | Status: SHIPPED | OUTPATIENT
Start: 2024-11-17

## 2024-11-17 RX ADMIN — OXYCODONE AND ACETAMINOPHEN 1 TABLET: 5; 325 TABLET ORAL at 18:06

## 2024-11-17 RX ADMIN — MORPHINE SULFATE 4 MG: 2 INJECTION, SOLUTION INTRAMUSCULAR; INTRAVENOUS at 16:31

## 2024-11-17 RX ADMIN — SODIUM CHLORIDE 500 ML: 9 INJECTION, SOLUTION INTRAVENOUS at 15:37

## 2024-11-17 NOTE — ED PROVIDER NOTES
"SHARED VISIT: This visit was performed by BOTH a physician and an APC. The substantive portion of the medical decision making was performed by this attesting physician who made or approved the management plan and takes responsibility for patient management. All studies in the APC note (if performed) were independently interpreted by me.      The PETE and I have discussed this patient's history, physical exam, and treatment plan.  I have reviewed the documentation and personally had a face to face interaction with the patient. I affirm the documentation and agree with the treatment and plan.  The attached note describes my personal findings.       I provided a substantive portion of the care of the patient.  I personally performed the physical exam in its entirety, and below are my findings.        History  59-year-old female with recent lithotripsy and stent insertion presents with weakness and lightheadedness as well as flank pain.    Physical Exam  Vital Signs reviewed  GENERAL: Alert well-appearing female no obvious distress.  Triage vitals reviewed and are benign.  Temperature 96.8, obese-nontender to palpation  HENT: nares patent  EYES: no scleral icterus  CV: regular rhythm, regular rate  RESPIRATORY: normal effort  ABDOMEN: soft  MUSCULOSKELETAL: no deformity  NEURO: Strength sensation and coordination are grossly intact.  Speech and mentation are unremarkable  SKIN: warm, dry      Assessment and Data Review    ED Course as of 11/17/24 1752   Sun Nov 17, 2024   1530 Pt declines pain medication at this time. [DC]   1612 WBC: 8.68 [DC]   1612 Hemoglobin: 13.2 [DC]   1613 Urinalysis, Microscopic Only - Urine, Clean Catch(!)  Not significantly changed from 5 days ago [DC]   1617 Creatinine: 0.78 [DC]   1617 Sodium: 140 [DC]   1617 Potassium: 3.5 [DC]   1617 Glucose(!): 130 [DC]   1626 Pt reevaluated. Discussed results. Pt reports she \"is miserable\". Will obtain CT to check stent placement and consult urology. She " is now agreeable with pain medication. [DC]   8542 Discussed case with Dr. Velazquez, urology, who agrees with plan for CT and if that is unremarkable would discharge patient home. OK with pain/nausea meds for home if discharged. He would not recommend any additional surgical recommendation sooner. [DC]   7736 Discussed imaging results with patient and recommendations per urology.  Patient is much more comfortable after receiving IV morphine.  We will send prescription for pain medication and nausea medication for home.  She will continue empiric antibiotics as prescribed by urology.  We will give dose of pain medication here as pharmacy will be closed by the time patient is home and she will only be able to  in the morning. [DC]      ED Course User Index  [DC] Radha Jones, PA       I did discuss treatment and evaluation of this patient with JOSE Jones.    I did independently interpret and evaluate ED testing which is notable for the following:    Labs fairly unremarkable without evidence of infection or renal failure.    CT imaging showed no obvious obstruction, stents in the expected location.    Will discharge home with narcotic pain medication after review of Fam report.  Follow-up with urology as outpatient.         Des Ramírez MD  11/19/24 0306

## 2024-11-17 NOTE — PROGRESS NOTES
[facility] Update  11/17/2024  17:02 EST    Called about patient's stent-related symptoms    CT reviewed:   - Stents in good position  - no hydronephrosis  - no sign of UTI or ascending infection  - expected non-obstructing nephrolithiasis    Labs normal    Recommend discharge home on complementary medications as discussed and schedling of next procedure with Dr. Adams per original plan    Urology will sign off; please call with any questions/concerns or clinical change           Hari Velazquez MD  [facility]  General & Reconstructive Urology  Office: [phone]  Fax: [phone]

## 2024-11-17 NOTE — ED PROVIDER NOTES
EMERGENCY DEPARTMENT ENCOUNTER      PCP: Aayush Mckeon MD  Patient Care Team:  Aayush Mckeon MD as PCP - General  Aayush Mckeon MD as PCP - Family Medicine   Independent Historians: Patient    HPI:  Chief Complaint: Hematuria   A complete HPI/ROS/PMH/PSH/SH/FH are unobtainable due to: None    Chronic or social conditions impacting patient care (social determinants of health): None    Context: Sánchez Carbajal is a 59 y.o. female who presents to the ED c/o acute lightheadedness and generalized weakness.  Patient states she is continuing to have hematuria but denies any change in her flank pain or any new urinary symptoms, fevers or chills since undergoing ureteroscopically and lithotripsy on 11/12.  She is currently taking antibiotics as prescribed.  She does not take any blood thinners.  She denies any vomiting or diarrhea.    Review of prior external notes and/or external test results outside of this encounter: Reviewed op note by Dr. Dariel Adams on 11/12/24. Pt underwent bilateral ureteroscopy laser lithotripsy with stent insertion and stone basket extraction retrograde pyelogram. Planned for antibiotics, follow up in 1-2 weeks for right stent removal and left ureteroscopy and laser lithotripsy of residual stone in upper portion of urinary collecting system.  Urine culture on 11/12/24 shows no growth.      PAST MEDICAL HISTORY  Active Ambulatory Problems     Diagnosis Date Noted    Left ureteral stone 04/06/2016    Obstructive sleep apnea syndrome     Seasonal allergic rhinitis 01/23/2018    Uterovaginal prolapse 01/15/2019    Ureteral stone with hydronephrosis 09/01/2019    Upper respiratory tract infection 11/24/2019    GERD with apnea 02/09/2021    Insomnia 02/09/2021    Cervical radiculitis 04/19/2022    Primary osteoarthritis of knees, bilateral 11/29/2022    Mechanical knee pain, right 02/20/2023    Primary osteoarthritis of right knee 04/21/2023    Kidney stone 11/12/2024    Bilateral  ureteral calculi 11/13/2024     Resolved Ambulatory Problems     Diagnosis Date Noted    No Resolved Ambulatory Problems     Past Medical History:   Diagnosis Date    Arthritis     Arthritis of back     Arthritis of neck     Bursitis of hip     Cervical disc disorder     Chronic back pain     Diabetes mellitus     Elevated cholesterol     GERD (gastroesophageal reflux disease)     H/O seasonal allergies     History of anemia     History of diverticulitis     History of heart murmur in childhood     History of kidney stones     Hypertension     Migraines     PONV (postoperative nausea and vomiting)     Sleep apnea with use of continuous positive airway pressure (CPAP)     Uterine prolapse        The patient has a COVID HM Topic on their chart, and they are fully vaccinated.    PAST SURGICAL HISTORY  Past Surgical History:   Procedure Laterality Date    ANTERIOR AND POSTERIOR VAGINAL REPAIR N/A 01/15/2019    Procedure: /POSTERIOR REPAIR;  Surgeon: James Holloway MD;  Location: [facility] OR;  Service: Gynecology    CERVICAL FUSION      CHOLECYSTECTOMY      COLONOSCOPY N/A 01/18/2017    Procedure: COLONOSCOPY;  Surgeon: Tian Henderson MD;  Location: [facility] OR;  Service:     CYSTOSCOPY URETEROSCOPY LASER LITHOTRIPSY N/A 04/06/2016    Procedure: URETEROSCOPY  ;  Surgeon: Vimal Azul MD;  Location: [facility] OR;  Service:     CYSTOSCOPY URETEROSCOPY LASER LITHOTRIPSY Left 09/01/2019    Procedure: CYSTOSCOPY left retrograde pyelogram;  Surgeon: Vimal Azul MD;  Location: [facility] OR;  Service: Urology    CYSTOSCOPY W/ LASER LITHOTRIPSY Left 06/25/2018    Procedure: CYSTOSCOPY, LEFT URETEROSCOPY, left  STENT PLACEMENT, right retrograde;  Surgeon: Vimal Azul MD;  Location: [facility] OR;  Service: Urology    EXTRACORPOREAL SHOCK WAVE LITHOTRIPSY (ESWL)  2015    JOINT REPLACEMENT  May 20th 2023    KNEE SURGERY      NECK SURGERY      Can't remember date    SACROCOLPOPEXY N/A 01/15/2019     Procedure: ROBOTIC SACROCOLPOPEXY WITH MESH, TOTAL LAPAROSCOPICE HYSTERECTOMY BILATERAL SALPINGECTOMY WITH DAVINCI ;  Surgeon: James Holloway MD;  Location: [facility] MAIN OR;  Service: DaVinci    TOTAL KNEE ARTHROPLASTY Right 05/24/2023    Procedure: TOTAL KNEE ARTHROPLASTY WITH CORI ROBOT;  Surgeon: Lambert Brady MD;  Location: [facility] OR;  Service: Robotics - Ortho;  Laterality: Right;    URETEROSCOPY LASER LITHOTRIPSY WITH STENT INSERTION Bilateral 11/12/2024    Procedure: BILATERAL URETEROSCOPY LASER LITHOTRIPSY WITH STENT INSERTION AND STONE BASKET EXTRACTION RETROGRADE PYELOGRAM;  Surgeon: Dariel Adams MD;  Location: [facility] MAIN OR;  Service: Urology;  Laterality: Bilateral;         FAMILY HISTORY  Family History   Problem Relation Age of Onset    Cancer Maternal Grandmother         Kidney cancer    Malig Hyperthermia Neg Hx          SOCIAL HISTORY  Social History     Socioeconomic History    Marital status:    Tobacco Use    Smoking status: Never     Passive exposure: Never    Smokeless tobacco: Never   Vaping Use    Vaping status: Never Used   Substance and Sexual Activity    Alcohol use: No    Drug use: No    Sexual activity: Yes     Partners: Male     Birth control/protection: Hysterectomy         ALLERGIES  Penicillins, Flagyl [metronidazole], and Sulfa antibiotics        REVIEW OF SYSTEMS  Review of Systems   Constitutional:  Negative for chills and fever.   Gastrointestinal:  Negative for diarrhea, nausea and vomiting.   Genitourinary:  Positive for flank pain and hematuria. Negative for difficulty urinating and dysuria.   Neurological:  Positive for weakness.        All systems reviewed and negative except for those discussed in HPI.       PHYSICAL EXAM    I have reviewed the triage vital signs and nursing notes.    ED Triage Vitals   Temp Heart Rate Resp BP SpO2   11/17/24 1500 11/17/24 1500 11/17/24 1500 11/17/24 1503 11/17/24 1500   96.9 °F (36.1 °C) (!) 123 16 (!) 169/110 96 %       Temp src Heart Rate Source Patient Position BP Location FiO2 (%)   11/17/24 1500 11/17/24 1500 -- -- --   Tympanic Monitor          Physical Exam  GENERAL: alert, no acute distress  SKIN: Warm, dry  HENT: Normocephalic, atraumatic  EYES: no scleral icterus  CV: regular rhythm, tachycardic rate  RESPIRATORY: normal effort, lungs clear  ABDOMEN: soft, nontender, nondistended  MUSCULOSKELETAL: no deformity  NEURO: alert, moves all extremities, follows commands          LAB RESULTS  Recent Results (from the past 24 hours)   Comprehensive Metabolic Panel    Collection Time: 11/17/24  3:39 PM    Specimen: Blood   Result Value Ref Range    Glucose 130 (H) 65 - 99 mg/dL    BUN 14 6 - 20 mg/dL    Creatinine 0.78 0.57 - 1.00 mg/dL    Sodium 140 136 - 145 mmol/L    Potassium 3.5 3.5 - 5.2 mmol/L    Chloride 102 98 - 107 mmol/L    CO2 24.6 22.0 - 29.0 mmol/L    Calcium 9.9 8.6 - 10.5 mg/dL    Total Protein 7.2 6.0 - 8.5 g/dL    Albumin 4.3 3.5 - 5.2 g/dL    ALT (SGPT) 22 1 - 33 U/L    AST (SGOT) 18 1 - 32 U/L    Alkaline Phosphatase 100 39 - 117 U/L    Total Bilirubin 0.5 0.0 - 1.2 mg/dL    Globulin 2.9 gm/dL    A/G Ratio 1.5 g/dL    BUN/Creatinine Ratio 17.9 7.0 - 25.0    Anion Gap 13.4 5.0 - 15.0 mmol/L    eGFR 87.6 >60.0 mL/min/1.73   Urinalysis With Microscopic If Indicated (No Culture) - Urine, Clean Catch    Collection Time: 11/17/24  3:39 PM    Specimen: Urine, Clean Catch   Result Value Ref Range    Color, UA Paola (A) Yellow, Straw    Appearance, UA Cloudy (A) Clear    pH, UA 6.5 5.0 - 8.0    Specific Gravity, UA 1.009 1.005 - 1.030    Glucose, UA Negative Negative    Ketones, UA Negative Negative    Bilirubin, UA Negative Negative    Blood, UA Large (3+) (A) Negative    Protein,  mg/dL (2+) (A) Negative    Leuk Esterase, UA Moderate (2+) (A) Negative    Nitrite, UA Negative Negative    Urobilinogen, UA 0.2 E.U./dL 0.2 - 1.0 E.U./dL   CBC Auto Differential    Collection Time: 11/17/24  3:39 PM    Specimen:  Blood   Result Value Ref Range    WBC 8.68 3.40 - 10.80 10*3/mm3    RBC 4.84 3.77 - 5.28 10*6/mm3    Hemoglobin 13.2 12.0 - 15.9 g/dL    Hematocrit 40.3 34.0 - 46.6 %    MCV 83.3 79.0 - 97.0 fL    MCH 27.3 26.6 - 33.0 pg    MCHC 32.8 31.5 - 35.7 g/dL    RDW 13.4 12.3 - 15.4 %    RDW-SD 40.5 37.0 - 54.0 fl    MPV 9.2 6.0 - 12.0 fL    Platelets 285 140 - 450 10*3/mm3    Neutrophil % 59.9 42.7 - 76.0 %    Lymphocyte % 26.6 19.6 - 45.3 %    Monocyte % 6.5 5.0 - 12.0 %    Eosinophil % 6.0 0.3 - 6.2 %    Basophil % 0.5 0.0 - 1.5 %    Immature Grans % 0.5 0.0 - 0.5 %    Neutrophils, Absolute 5.21 1.70 - 7.00 10*3/mm3    Lymphocytes, Absolute 2.31 0.70 - 3.10 10*3/mm3    Monocytes, Absolute 0.56 0.10 - 0.90 10*3/mm3    Eosinophils, Absolute 0.52 (H) 0.00 - 0.40 10*3/mm3    Basophils, Absolute 0.04 0.00 - 0.20 10*3/mm3    Immature Grans, Absolute 0.04 0.00 - 0.05 10*3/mm3    nRBC 0.0 0.0 - 0.2 /100 WBC   Urinalysis, Microscopic Only - Urine, Clean Catch    Collection Time: 11/17/24  3:39 PM    Specimen: Urine, Clean Catch   Result Value Ref Range    RBC, UA Too Numerous to Count (A) None Seen, 0-2 /HPF    WBC, UA 21-50 (A) None Seen, 0-2 /HPF    Bacteria, UA 2+ (A) None Seen /HPF    Squamous Epithelial Cells, UA 3-6 (A) None Seen, 0-2 /HPF    Hyaline Casts, UA 3-6 None Seen /LPF    Methodology Automated Microscopy        Ordered the above labs and independently reviewed and interpreted the results.        RADIOLOGY  CT Abdomen Pelvis Without Contrast    Result Date: 11/17/2024  CT ABDOMEN PELVIS WO CONTRAST-  Radiation dose reduction techniques were utilized, including automated exposure control and exposure modulation based on body size.  Clinical: Evaluate ureteral stents, pain, hematuria, previous lithotripsy  COMPARISON examination 11/12/2024  FINDINGS: 1. The right and left ureteral stents are both in satisfactory position. Multiple bilateral renal calculi seen, interval resolution of the bilateral hydronephrosis.  Urinary bladder is satisfactory in appearance. No calculus is demonstrated along the course of either ureter. Both kidneys are satisfactory in size and shape. The adrenal glands are normal.  2. The liver, pancreas and spleen have a satisfactory appearance, no biliary duct dilatation status post cholecystectomy. Stomach and duodenum within normal limits. Diameter of the aorta is normal. There is diverticulosis of the colon without diverticulitis. The appendix and small bowel have a normal appearance. No free intraperitoneal gas or fluid.  CONCLUSION: Interval resolution of the bilateral hydronephrosis, both ureteral stents are in satisfactory position. Bilateral renal calculi again seen. The bladder is normal in appearance.  This report was finalized on 11/17/2024 5:22 PM by Dr. Parker Mckeon M.D on Workstation: [id number]       I ordered the above noted radiological studies. Independently reviewed and interpreted by me.  See dictation for official radiology interpretation.      PROCEDURES    Procedures      MEDICATIONS GIVEN IN ER    Medications   sodium chloride 0.9 % bolus 500 mL (0 mL Intravenous Stopped 11/17/24 1607)   morphine injection 4 mg (4 mg Intravenous Given 11/17/24 1631)   oxyCODONE-acetaminophen (PERCOCET) 5-325 MG per tablet 1 tablet (1 tablet Oral Given 11/17/24 1806)         PROGRESS, DATA ANALYSIS, CONSULTS, AND MEDICAL DECISION MAKING    All labs have been independently reviewed and interpreted by me.  All radiology studies have been independently reviewed and interpreted by me and discussed with radiologist dictating the report.   EKG's independently reviewed and interpreted by me.  Discussion below represents my analysis of pertinent findings related to patient's condition, differential diagnosis, treatment plan and final disposition.    Differential diagnosis for hematuria includes but is not limited to:    Ureterolithiasis, hemorrhagic cystitis, neoplasm, urethral trauma, hereditary  "hematuria, BPH, DIC, coagulopathy, instrumentation, sickle cell, scurvy, infarction      ED Course as of 11/17/24 2245   Sun Nov 17, 2024   1530 Pt declines pain medication at this time. [DC]   1612 WBC: 8.68 [DC]   1612 Hemoglobin: 13.2 [DC]   1613 Urinalysis, Microscopic Only - Urine, Clean Catch(!)  Not significantly changed from 5 days ago [DC]   1617 Creatinine: 0.78 [DC]   1617 Sodium: 140 [DC]   1617 Potassium: 3.5 [DC]   1617 Glucose(!): 130 [DC]   1626 Pt reevaluated. Discussed results. Pt reports she \"is miserable\". Will obtain CT to check stent placement and consult urology. She is now agreeable with pain medication. [DC]   1642 Discussed case with Dr. Velazquez, urology, who agrees with plan for CT and if that is unremarkable would discharge patient home. OK with pain/nausea meds for home if discharged. He would not recommend any additional surgical recommendation sooner. [DC]   1749 Discussed imaging results with patient and recommendations per urology.  Patient is much more comfortable after receiving IV morphine.  We will send prescription for pain medication and nausea medication for home.  She will continue empiric antibiotics as prescribed by urology.  We will give dose of pain medication here as pharmacy will be closed by the time patient is home and she will only be able to  in the morning. [DC]      ED Course User Index  [DC] Radha Jones PA             AS OF 22:45 EST VITALS:    BP - 156/62  HR - 93  TEMP - 96.9 °F (36.1 °C) (Tympanic)  O2 SATS - 93%        DIAGNOSIS  Final diagnoses:   Renal colic, bilateral   History of ureteroscopy         DISPOSITION  ED Disposition       ED Disposition   Discharge    Condition   Stable    Comment   --                  Note Disclaimer: At [facility], we believe that sharing information builds trust and better relationships. You are receiving this note because you recently visited [facility]. It is possible you will see health information " before a provider has talked with you about it. This kind of information can be easy to misunderstand. To help you fully understand what it means for your health, we urge you to discuss this note with your provider.         Radha Jones PA  11/17/24 3076

## 2024-11-17 NOTE — TELEPHONE ENCOUNTER
Post op urological procedure 11/13/2024 Stones with stent placement  Bright red urine each time she urinates, since procedure. Dr Adams did the procedure.  Feels lightheaded and dizzy.  Drinking lots of water, feels water logged.  She tried to reach Dr Adams  and was told by answering service he is not with [facility].  Unable to reach surgeon through answering service.  Advised to have evaluation at the ED            Reason for Disposition   Sounds like a serious complication to the triager    Additional Information   Negative: Sounds like a life-threatening emergency to the triager   Negative: Chest pain   Negative: Difficulty breathing   Negative: Acting confused (e.g., disoriented, slurred speech) or excessively sleepy   Negative: Post-Op tonsil and adenoid surgery, symptoms or questions about   Negative: Surgical incision symptoms and questions   Negative: [1] Pain or burning with passing urine (urination) AND [2] male   Negative: [1] Pain or burning with passing urine (urination) AND [2] female   Negative: Constipation   Negative: New or worsening leg (calf, thigh) pain   Negative: New or worsening leg swelling   Negative: Dizziness is severe, or persists > 24 hours after surgery   Negative: Pain, redness, swelling, or pus at IV Site   Negative: Symptoms arising from use of a urinary catheter (e.g., Coude, [place])   Negative: Cast problems or questions   Negative: Medication question   Negative: [1] Widespread rash AND [2] bright red, sunburn-like   Negative: [1] SEVERE headache AND [2] after spinal (epidural) anesthesia   Negative: [1] Vomiting AND [2] persists > 4 hours   Negative: [1] Vomiting AND [2] abdomen looks much more swollen than usual   Negative: [1] Drinking very little AND [2] dehydration suspected (e.g., no urine > 12 hours, very dry mouth, very lightheaded)   Negative: Patient sounds very sick or weak to the triager    Answer Assessment - Initial Assessment Questions  1.  "SYMPTOM: \"What's the main symptom you're concerned about?\" (e.g., pain, fever, vomiting)      Bleeding with urination  2. ONSET: \"When did *No Answer*  start?\"      Since 11/13/2024  3. SURGERY: \"What surgery did you have?\"      BILATERAL URETEROSCOPY LASER LITHOTRIPSY WITH STENT INSERTION AND STONE BASKET EXTRACTION RETROGRADE PYELOGRAM  4. DATE of SURGERY: \"When was the surgery?\"       11/13/2024  5. ANESTHESIA: \" What type of anesthesia did you have?\" (e.g., general, spinal, epidural, local)  general  6. PAIN: \"Is there any pain?\" If Yes, ask: \"How bad is it?\"  (Scale 1-10; or mild, moderate, severe)     Right sided pain 8/10  7. FEVER: \"Do you have a fever?\" If Yes, ask: \"What is your temperature, how was it measured, and when did it start?\"      denies  8. VOMITING: \"Is there any vomiting?\" If Yes, ask: \"How many times?\"   dennies  9. BLEEDING: \"Is there any bleeding?\" If Yes, ask: \"How much?\" and \"Where?\"    Every time she urinates she has bright red bleeding  10. OTHER SYMPTOMS: \"Do you have any other symptoms?\" (e.g., drainage from wound, painful urination, constipation)        *No Answer*    Protocols used: Post-Op Symptoms and Questions-ADULT-    "

## 2024-11-18 LAB
CALCIUM OXALATE DIHYDRATE MFR STONE IR: 50 %
COLOR STONE: NORMAL
COM MFR STONE: 50 %
COMPN STONE: NORMAL
LABORATORY COMMENT REPORT: NORMAL
LABORATORY COMMENT REPORT: NORMAL
Lab: NORMAL
Lab: NORMAL
PHOTO: NORMAL
SIZE STONE: NORMAL MM
SPEC SOURCE SUBJ: NORMAL
WT STONE: 140 MG

## 2024-11-19 ENCOUNTER — OFFICE VISIT (OUTPATIENT)
Dept: ORTHOPEDIC SURGERY | Facility: CLINIC | Age: 59
End: 2024-11-19
Payer: MEDICARE

## 2024-11-19 VITALS
HEIGHT: 66 IN | DIASTOLIC BLOOD PRESSURE: 83 MMHG | SYSTOLIC BLOOD PRESSURE: 128 MMHG | WEIGHT: 200 LBS | HEART RATE: 103 BPM | BODY MASS INDEX: 32.14 KG/M2

## 2024-11-19 DIAGNOSIS — M17.12 PRIMARY OSTEOARTHRITIS OF LEFT KNEE: ICD-10-CM

## 2024-11-19 DIAGNOSIS — Z96.651 AFTERCARE FOLLOWING RIGHT KNEE JOINT REPLACEMENT SURGERY: Primary | ICD-10-CM

## 2024-11-19 DIAGNOSIS — Z47.1 AFTERCARE FOLLOWING RIGHT KNEE JOINT REPLACEMENT SURGERY: Primary | ICD-10-CM

## 2024-11-19 PROCEDURE — 20610 DRAIN/INJ JOINT/BURSA W/O US: CPT | Performed by: INTERNAL MEDICINE

## 2024-11-19 PROCEDURE — 73564 X-RAY EXAM KNEE 4 OR MORE: CPT | Performed by: INTERNAL MEDICINE

## 2024-11-19 PROCEDURE — 99213 OFFICE O/P EST LOW 20 MIN: CPT | Performed by: INTERNAL MEDICINE

## 2024-11-19 RX ORDER — TRIAMCINOLONE ACETONIDE 40 MG/ML
80 INJECTION, SUSPENSION INTRA-ARTICULAR; INTRAMUSCULAR
Status: COMPLETED | OUTPATIENT
Start: 2024-11-19 | End: 2024-11-19

## 2024-11-19 RX ORDER — LIDOCAINE HYDROCHLORIDE 10 MG/ML
4 INJECTION, SOLUTION EPIDURAL; INFILTRATION; INTRACAUDAL; PERINEURAL
Status: COMPLETED | OUTPATIENT
Start: 2024-11-19 | End: 2024-11-19

## 2024-11-19 RX ADMIN — LIDOCAINE HYDROCHLORIDE 4 ML: 10 INJECTION, SOLUTION EPIDURAL; INFILTRATION; INTRACAUDAL; PERINEURAL at 11:15

## 2024-11-19 RX ADMIN — TRIAMCINOLONE ACETONIDE 80 MG: 40 INJECTION, SUSPENSION INTRA-ARTICULAR; INTRAMUSCULAR at 11:15

## 2024-11-19 NOTE — PROGRESS NOTES
Subjective:     Patient ID: Sánchez Carbajal is a 59 y.o. female.    Chief Complaint:    History of Present Illness  Sánchez Carbajal returns to clinic today for evaluation of right knee status post total knee arthroplasty on 5/24/2023 and subsequent continued patellofemoral pain.  The patient is also having left knee pain.  She is hopeful to avoid surgery to get some resolution of her symptoms.  She denies any new injury.  She would like a left and possibly right knee injection.     Social History     Occupational History    Not on file   Tobacco Use    Smoking status: Never     Passive exposure: Never    Smokeless tobacco: Never   Vaping Use    Vaping status: Never Used   Substance and Sexual Activity    Alcohol use: No    Drug use: No    Sexual activity: Yes     Partners: Male     Birth control/protection: Hysterectomy      Past Medical History:   Diagnosis Date    Arthritis     RIGHT SHOULDER    Arthritis of back     Not sure    Arthritis of neck     Two years ago    Bursitis of hip     Cervical disc disorder     Two maybe three years ago    Chronic back pain     Diabetes mellitus     Elevated cholesterol     GERD (gastroesophageal reflux disease)     H/O seasonal allergies     History of anemia     History of diverticulitis     History of heart murmur in childhood     History of kidney stones     Hypertension     Migraines     PONV (postoperative nausea and vomiting)     Primary osteoarthritis of knees, bilateral 11/29/2022    Sleep apnea with use of continuous positive airway pressure (CPAP)     Uterine prolapse     UTERINE VAG PROLAPSE     Past Surgical History:   Procedure Laterality Date    ANTERIOR AND POSTERIOR VAGINAL REPAIR N/A 01/15/2019    Procedure: /POSTERIOR REPAIR;  Surgeon: James Holloway MD;  Location: [facility];  Service: Gynecology    CERVICAL FUSION      CHOLECYSTECTOMY      COLONOSCOPY N/A 01/18/2017    Procedure: COLONOSCOPY;  Surgeon: Tian Henderson MD;  Location:  " [facility] OR;  Service:     CYSTOSCOPY URETEROSCOPY LASER LITHOTRIPSY N/A 04/06/2016    Procedure: URETEROSCOPY  ;  Surgeon: Vimal Azul MD;  Location: [facility] OR;  Service:     CYSTOSCOPY URETEROSCOPY LASER LITHOTRIPSY Left 09/01/2019    Procedure: CYSTOSCOPY left retrograde pyelogram;  Surgeon: Vimal Azul MD;  Location: [facility] MAIN OR;  Service: Urology    CYSTOSCOPY W/ LASER LITHOTRIPSY Left 06/25/2018    Procedure: CYSTOSCOPY, LEFT URETEROSCOPY, left  STENT PLACEMENT, right retrograde;  Surgeon: Vimal Azul MD;  Location: [facility] OR;  Service: Urology    EXTRACORPOREAL SHOCK WAVE LITHOTRIPSY (ESWL)  2015    JOINT REPLACEMENT  May 20th 2023    KNEE SURGERY      NECK SURGERY      Can't remember date    SACROCOLPOPEXY N/A 01/15/2019    Procedure: ROBOTIC SACROCOLPOPEXY WITH MESH, TOTAL LAPAROSCOPICE HYSTERECTOMY BILATERAL SALPINGECTOMY WITH DAVINCI ;  Surgeon: James Holloway MD;  Location: [facility] MAIN OR;  Service: DaVinci    TOTAL KNEE ARTHROPLASTY Right 05/24/2023    Procedure: TOTAL KNEE ARTHROPLASTY WITH CORI ROBOT;  Surgeon: Lambert Brady MD;  Location: [facility] OR;  Service: Robotics - Ortho;  Laterality: Right;    URETEROSCOPY LASER LITHOTRIPSY WITH STENT INSERTION Bilateral 11/12/2024    Procedure: BILATERAL URETEROSCOPY LASER LITHOTRIPSY WITH STENT INSERTION AND STONE BASKET EXTRACTION RETROGRADE PYELOGRAM;  Surgeon: Dariel Adams MD;  Location: [facility] MAIN OR;  Service: Urology;  Laterality: Bilateral;       Family History   Problem Relation Age of Onset    Cancer Maternal Grandmother         Kidney cancer    Malig Hyperthermia Neg Hx                  Objective:  Vitals:    11/19/24 1046   BP: 128/83   Pulse: 103   Weight: 90.7 kg (200 lb)   Height: 167.6 cm (66\")         11/19/24  1046   Weight: 90.7 kg (200 lb)     Body mass index is 32.28 kg/m².           Right Knee Exam     Tenderness   The patient is experiencing tenderness in the patella and lateral retinaculum.    Range of " Motion   Extension:  0   Flexion:  120     Tests   Varus: negative Valgus: negative  Lachman:  Anterior - negative    Posterior - negative  Drawer:  Anterior - negative    Posterior - negative    Other   Erythema: absent  Scars: present  Sensation: normal  Pulse: present  Swelling: mild  Effusion: no effusion present      Left Knee Exam     Tenderness   The patient is experiencing tenderness in the medial retinaculum, medial joint line, lateral retinaculum, lateral joint line and patella.    Range of Motion   Extension:  0   Flexion:  120     Tests   Varus: negative Valgus: negative  Lachman:  Anterior - negative    Posterior - negative  Drawer:  Anterior - negative     Posterior - negative    Other   Erythema: absent  Scars: absent  Sensation: normal  Pulse: present  Swelling: mild  Effusion: no effusion present               Imagin views of the bilateral knee were ordered and reviewed by myself in the office today  Indication: bilateral knee pain  Findings: X-rays demonstrate no acute osseous abnormality.  There is no signs of fracture dislocation or subluxation.  There is joint space narrowing, subchondral sclerosis, cystic changes, and periarticular osteophytes most pronounced in the Medial joint.  Right knee shows a total knee arthroplasty expected position with no signs of fracture subsidence or loosening.  There is an unresurfaced patella with lateral facet hypertrophy and wear with a very narrow lateral facet  Comparative studies: None      Assessment:        1. Aftercare following right knee joint replacement surgery    2. Primary osteoarthritis of left knee           Plan:     - Large Joint Arthrocentesis: bilateral knee on 2024 11:15 AM  Indications: pain  Details: 22 G needle, anterolateral approach  Medications (Right): 4 mL lidocaine PF 1% 1 %; 80 mg triamcinolone acetonide 40 MG/ML  Medications (Left): 4 mL lidocaine PF 1% 1 %; 80 mg triamcinolone acetonide 40 MG/ML  Outcome: tolerated well,  no immediate complications  Procedure, treatment alternatives, risks and benefits explained, specific risks discussed. Consent was given by the patient. Immediately prior to procedure a time out was called to verify the correct patient, procedure, equipment, support staff and site/side marked as required. Patient was prepped and draped in the usual sterile fashion (DOUBLE PREPPED RT KNEE).            Discussed treatment options at length with patient at today's visit.  I discussed with the patient that she at the time of surgery had a patella that was too narrow to recommend resurfacing.  We performed a lateral facetectomy and circumferential denervation.  She still having some patellofemoral symptoms.  I discussed with her that we could discuss revision surgery and large lateral facetectomy and possible patellar resurfacing but she would like to hold off for now.  As for her left knee she has developed left knee osteoarthritis. I discussed with the patient that the mainstays of conservative treatment for knee OA include physical therapy, nonsteroidal anti-inflammatories, injections, activity modification, and home exercises.  The patient states that they  would like to try bilateral knee intra-articular corticosteroid injections.  I discussed with her that she is at heightened risk of periprosthetic infection on her right knee with a steroid injection which she would like to proceed.  At this point time the patient does not need to schedule follow-up with me today.  I am happy to see the patient back at any point time however if issues arise or they fail to make a full recovery.  Follow up: As needed      Sánchez Carbajal was in agreement with plan and had all questions answered.     Medications:  No orders of the defined types were placed in this encounter.      Followup:  No follow-ups on file.    Diagnoses and all orders for this visit:    1. Aftercare following right knee joint replacement surgery  (Primary)  -     XR Knee 4+ View Bilateral    2. Primary osteoarthritis of left knee  -     XR Knee 4+ View Bilateral    Other orders  -     - Large Joint Arthrocentesis: bilateral knee          Dictated utilizing Dragon dictation

## 2024-12-18 ENCOUNTER — APPOINTMENT (OUTPATIENT)
Dept: GENERAL RADIOLOGY | Facility: HOSPITAL | Age: 59
End: 2024-12-18
Payer: MEDICARE

## 2024-12-18 ENCOUNTER — HOSPITAL ENCOUNTER (EMERGENCY)
Facility: HOSPITAL | Age: 59
Discharge: HOME | End: 2024-12-18
Attending: EMERGENCY MEDICINE | Admitting: STUDENT IN AN ORGANIZED HEALTH CARE EDUCATION/TRAINING PROGRAM
Payer: MEDICARE

## 2024-12-18 ENCOUNTER — APPOINTMENT (OUTPATIENT)
Dept: CT IMAGING | Facility: HOSPITAL | Age: 59
End: 2024-12-18
Payer: MEDICARE

## 2024-12-18 VITALS
TEMPERATURE: 97.9 F | OXYGEN SATURATION: 95 % | RESPIRATION RATE: 18 BRPM | HEART RATE: 102 BPM | BODY MASS INDEX: 32.14 KG/M2 | HEIGHT: 66 IN | DIASTOLIC BLOOD PRESSURE: 71 MMHG | WEIGHT: 199.96 LBS | SYSTOLIC BLOOD PRESSURE: 122 MMHG

## 2024-12-18 DIAGNOSIS — S09.90XA INJURY OF HEAD, INITIAL ENCOUNTER: ICD-10-CM

## 2024-12-18 DIAGNOSIS — R53.83 OTHER FATIGUE: ICD-10-CM

## 2024-12-18 DIAGNOSIS — S16.1XXA STRAIN OF NECK MUSCLE, INITIAL ENCOUNTER: Primary | ICD-10-CM

## 2024-12-18 DIAGNOSIS — S20.229A CONTUSION OF BACK, UNSPECIFIED LATERALITY, INITIAL ENCOUNTER: ICD-10-CM

## 2024-12-18 PROCEDURE — 99284 EMERGENCY DEPT VISIT MOD MDM: CPT | Performed by: EMERGENCY MEDICINE

## 2024-12-18 PROCEDURE — 72110 X-RAY EXAM L-2 SPINE 4/>VWS: CPT

## 2024-12-18 PROCEDURE — 72125 CT NECK SPINE W/O DYE: CPT

## 2024-12-18 PROCEDURE — 72072 X-RAY EXAM THORAC SPINE 3VWS: CPT

## 2024-12-18 RX ORDER — HYDROCODONE BITARTRATE AND ACETAMINOPHEN 5; 325 MG/1; MG/1
1 TABLET ORAL ONCE
Status: COMPLETED | OUTPATIENT
Start: 2024-12-18 | End: 2024-12-18

## 2024-12-18 RX ADMIN — HYDROCODONE BITARTRATE AND ACETAMINOPHEN 1 TABLET: 5; 325 TABLET ORAL at 15:44

## 2024-12-18 NOTE — ED PROVIDER NOTES
Subjective   History of Present Illness  Patient is a 59-year-old female that had a mechanical fall 2 nights ago.  She had woken up to go to the bathroom and says when she turned around she somehow slipped and fell backwards, she hit with herbuttocks on the floor and her back on the wall.  She did hit her head.  This happened 2 days ago.  No loss of consciousness.  She has aspirin on her med list but says she has not been taking it and is not on any sort of blood thinners.  She is not having a headache but says the back of her head will sometimes feel sore. She has a history of neck surgery and is having pain in her C-spine.  She is also having pain going down the T and L-spine but says it moves around and sometimes it hurts at different areas.  She is not having any other injuries from the fall.  She has noticed that she slept more the last 2 days since she fell.      Review of Systems   Constitutional:  Positive for fatigue.   HENT: Negative.     Eyes: Negative.    Respiratory: Negative.     Cardiovascular: Negative.    Genitourinary: Negative.    Musculoskeletal:  Positive for back pain and neck pain.   Skin: Negative.    Neurological:  Negative for headaches.   Psychiatric/Behavioral: Negative.     All other systems reviewed and are negative.      Past Medical History:   Diagnosis Date    Arthritis     RIGHT SHOULDER    Arthritis of back     Not sure    Arthritis of neck     Two years ago    Bursitis of hip     Cervical disc disorder     Two maybe three years ago    Chronic back pain     Diabetes mellitus     Elevated cholesterol     GERD (gastroesophageal reflux disease)     H/O seasonal allergies     History of anemia     History of diverticulitis     History of heart murmur in childhood     History of kidney stones     Hypertension     Migraines     PONV (postoperative nausea and vomiting)     Primary osteoarthritis of knees, bilateral 11/29/2022    Sleep apnea with use of continuous positive airway pressure  (CPAP)     Uterine prolapse     UTERINE VAG PROLAPSE       Allergies   Allergen Reactions    Penicillins Rash    Flagyl [Metronidazole] Hives    Sulfa Antibiotics Rash       Past Surgical History:   Procedure Laterality Date    ANTERIOR AND POSTERIOR VAGINAL REPAIR N/A 01/15/2019    Procedure: /POSTERIOR REPAIR;  Surgeon: James Holloway MD;  Location: [facility] OR;  Service: Gynecology    CERVICAL FUSION      CHOLECYSTECTOMY      COLONOSCOPY N/A 01/18/2017    Procedure: COLONOSCOPY;  Surgeon: Tian Henderson MD;  Location: [facility] OR;  Service:     CYSTOSCOPY URETEROSCOPY LASER LITHOTRIPSY N/A 04/06/2016    Procedure: URETEROSCOPY  ;  Surgeon: Vimal Azul MD;  Location: [facility] OR;  Service:     CYSTOSCOPY URETEROSCOPY LASER LITHOTRIPSY Left 09/01/2019    Procedure: CYSTOSCOPY left retrograde pyelogram;  Surgeon: Vimal Azul MD;  Location: [facility] OR;  Service: Urology    CYSTOSCOPY W/ LASER LITHOTRIPSY Left 06/25/2018    Procedure: CYSTOSCOPY, LEFT URETEROSCOPY, left  STENT PLACEMENT, right retrograde;  Surgeon: Vimal Azul MD;  Location: [facility] OR;  Service: Urology    EXTRACORPOREAL SHOCK WAVE LITHOTRIPSY (ESWL)  2015    JOINT REPLACEMENT  May 20th 2023    KNEE SURGERY      NECK SURGERY      Can't remember date    SACROCOLPOPEXY N/A 01/15/2019    Procedure: ROBOTIC SACROCOLPOPEXY WITH MESH, TOTAL LAPAROSCOPICE HYSTERECTOMY BILATERAL SALPINGECTOMY WITH DAVINCI ;  Surgeon: James Holloway MD;  Location: [facility];  Service: DaVinci    TOTAL KNEE ARTHROPLASTY Right 05/24/2023    Procedure: TOTAL KNEE ARTHROPLASTY WITH CORI ROBOT;  Surgeon: Lambert Brady MD;  Location: [facility];  Service: Robotics - Ortho;  Laterality: Right;    URETEROSCOPY LASER LITHOTRIPSY WITH STENT INSERTION Bilateral 11/12/2024    Procedure: BILATERAL URETEROSCOPY LASER LITHOTRIPSY WITH STENT INSERTION AND STONE BASKET EXTRACTION RETROGRADE PYELOGRAM;  Surgeon: Dariel Adams MD;  Location:  [facility] MAIN OR;  Service: Urology;  Laterality: Bilateral;       Family History   Problem Relation Age of Onset    Cancer Maternal Grandmother         Kidney cancer    Malig Hyperthermia Neg Hx        Social History     Socioeconomic History    Marital status:    Tobacco Use    Smoking status: Never     Passive exposure: Never    Smokeless tobacco: Never   Vaping Use    Vaping status: Never Used   Substance and Sexual Activity    Alcohol use: No    Drug use: No    Sexual activity: Yes     Partners: Male     Birth control/protection: Hysterectomy           Objective   Physical Exam  Vitals reviewed.   Constitutional:       Appearance: Normal appearance.   Eyes:      Conjunctiva/sclera: Conjunctivae normal.   Cardiovascular:      Rate and Rhythm: Tachycardia present.      Pulses: Normal pulses.      Heart sounds: Normal heart sounds.   Abdominal:      Tenderness: There is no right CVA tenderness or left CVA tenderness.   Musculoskeletal:         General: Tenderness (various points along C,T,L spine) present.      Cervical back: Tenderness present. No rigidity.   Skin:     General: Skin is warm and dry.      Findings: No erythema.   Neurological:      General: No focal deficit present.      Mental Status: She is alert and oriented to person, place, and time.      Gait: Gait normal.   Psychiatric:         Mood and Affect: Mood normal.         Behavior: Behavior normal.         Procedures           ED Course                                                       Medical Decision Making  Presentation patient is well-appearing and nontoxic.  Heart rate is about 100.  Oxygen 95 on room air.  Blood pressure normal.  She complains of pain along the entire spine and says it weighs around.  At first exam she is tender on the mid C-spine and also around the lower T and lower L.  I reexamined her again and she still having spine tenderness but it is at different vertebrae.  She says the pain in her back seems to be moving  around.  However C-spine tenderness is consistent and so I scanned her C-spine which shows only chronic issues.  I also did x-rays of the T and the L-spine and also chronic issues.  She is not having any rib pain.  Do not appreciate bruising.  No cuts or lacerations.    She says she is not having any headache  eye did not pass out or vomiting.  She says she will sometimes feel sore on the back of her head.  Do not feel CAT scan is warranted in this case as it has been over 36 hours.    Patient was concerned about wanting to sleep a lot since she hit her head.  Says she has been napping and said yes she is to sleep through the night.  Offered her blood work to check for anemia or other issues but she does not want further workup including blood work.  She has family doctor for follow-up will keep the appointment.  Says she has pain medicine at home and will take it.  She did take a baclofen today and says she has hydrocodone at home.  She is medically cleared at this time.        Problems Addressed:  Contusion of back, unspecified laterality, initial encounter: complicated acute illness or injury  Other fatigue: complicated acute illness or injury  Strain of neck muscle, initial encounter: complicated acute illness or injury    Amount and/or Complexity of Data Reviewed  Radiology: ordered.    Risk  Prescription drug management.        Final diagnoses:   Strain of neck muscle, initial encounter   Contusion of back, unspecified laterality, initial encounter   Other fatigue       ED Disposition  ED Disposition       ED Disposition   Discharge    Condition   Stable    Comment   --               Aayush Mckeon MD  [address] [facility] [id number] [phone]               Medication List        Stop      ARIPiprazole 10 MG tablet  Commonly known as: ABILIFY     ARIPiprazole 15 MG tablet  Commonly known as: ABILIFY     ARIPiprazole 5 MG tablet  Commonly known as: ABILIFY     Aspirin Adult Low Strength 81 MG  EC tablet  Generic drug: aspirin     atorvastatin 10 MG tablet  Commonly known as: LIPITOR     DULoxetine 60 MG capsule  Commonly known as: CYMBALTA     famotidine 20 MG tablet  Commonly known as: PEPCID     famotidine 40 MG tablet  Commonly known as: PEPCID     gabapentin 600 MG tablet  Commonly known as: NEURONTIN     lisinopril 10 MG tablet  Commonly known as: PRINIVIL,ZESTRIL     meloxicam 15 MG tablet  Commonly known as: MOBIC     metFORMIN 500 MG tablet  Commonly known as: GLUCOPHAGE     METFORMIN HCL PO     oxaprozin 600 MG tablet  Commonly known as: DAYPRO     oxyCODONE-acetaminophen 5-325 MG per tablet  Commonly known as: PERCOCET     senna 8.6 MG tablet  Commonly known as: SENOKOT     traZODone 150 MG tablet  Commonly known as: DESYREL     Trulicity 0.75 MG/0.5ML solution pen-injector  Generic drug: Dulaglutide                 Claudia Navarrete PA-C  12/18/24 [id number]

## 2025-02-11 ENCOUNTER — OFFICE VISIT (OUTPATIENT)
Dept: SLEEP MEDICINE | Facility: HOSPITAL | Age: 60
End: 2025-02-11
Payer: MEDICARE

## 2025-02-11 VITALS
HEIGHT: 64 IN | HEART RATE: 90 BPM | SYSTOLIC BLOOD PRESSURE: 117 MMHG | BODY MASS INDEX: 31.24 KG/M2 | OXYGEN SATURATION: 95 % | WEIGHT: 183 LBS | DIASTOLIC BLOOD PRESSURE: 79 MMHG

## 2025-02-11 DIAGNOSIS — J30.2 SEASONAL ALLERGIC RHINITIS, UNSPECIFIED TRIGGER: ICD-10-CM

## 2025-02-11 DIAGNOSIS — G47.33 OBSTRUCTIVE SLEEP APNEA SYNDROME: Primary | ICD-10-CM

## 2025-02-11 DIAGNOSIS — F51.01 PRIMARY INSOMNIA: ICD-10-CM

## 2025-02-11 PROCEDURE — G0463 HOSPITAL OUTPT CLINIC VISIT: HCPCS

## 2025-02-11 NOTE — PROGRESS NOTES
SLEEP/PULMONARY  CLINIC NOTE      PATIENT IDENTIFICATION:  Name: Sánchez Carbajal  Age: 60 y.o.  Sex: female  :  1965  MRN: [id number]    DATE OF CONSULTATION:  2025     Referring physician:    Aayush Mckeon MD            CHIEF COMPLAINT: SAGAR    History of Present Illness:   Sánchez Carbajal is a 60 y.o. female Pt on CPAP feeling better more energy especially the night he use it more than 4 hours, no sleepiness no fatigue no tiredness, no mask irritation no dryness, compliance report reviewed with pt d discussed with the patient the download data and encouarged the patient to continue to use the CPAP. The residual AHI is is high but still have significant air leak.     Her insomnia symptoms improved with hygiene tips    Review of Systems:   Constitutional: As above   Eyes: negative   ENT/oropharynx: negative   Cardiovascular: negative   Respiratory: negative   Gastrointestinal: negative   Genitourinary: negative   Neurological: negative   Musculoskeletal: negative   Integument/breast: negative   Endocrine: negative   Allergic/Immunologic: negative     Past Medical History:  Past Medical History:   Diagnosis Date    Arthritis     RIGHT SHOULDER    Arthritis of back     Not sure    Arthritis of neck     Two years ago    Bursitis of hip     Cervical disc disorder     Two maybe three years ago    Chronic back pain     Diabetes mellitus     Elevated cholesterol     GERD (gastroesophageal reflux disease)     H/O seasonal allergies     History of anemia     History of diverticulitis     History of heart murmur in childhood     History of kidney stones     Hypertension     Migraines     PONV (postoperative nausea and vomiting)     Primary osteoarthritis of knees, bilateral 2022    Sleep apnea with use of continuous positive airway pressure (CPAP)     Uterine prolapse     UTERINE VAG PROLAPSE       Past Surgical History:  Past Surgical History:   Procedure Laterality Date    ANTERIOR AND  POSTERIOR VAGINAL REPAIR N/A 01/15/2019    Procedure: /POSTERIOR REPAIR;  Surgeon: James Holloway MD;  Location: [facility] MAIN OR;  Service: Gynecology    CERVICAL FUSION      CHOLECYSTECTOMY      COLONOSCOPY N/A 01/18/2017    Procedure: COLONOSCOPY;  Surgeon: Tian Henderson MD;  Location:  [facility] OR;  Service:     CYSTOSCOPY URETEROSCOPY LASER LITHOTRIPSY N/A 04/06/2016    Procedure: URETEROSCOPY  ;  Surgeon: Vimal Azul MD;  Location:  [facility] OR;  Service:     CYSTOSCOPY URETEROSCOPY LASER LITHOTRIPSY Left 09/01/2019    Procedure: CYSTOSCOPY left retrograde pyelogram;  Surgeon: Vimal Azul MD;  [facility];  Service: Urology    CYSTOSCOPY W/ LASER LITHOTRIPSY Left 06/25/2018    Procedure: CYSTOSCOPY, LEFT URETEROSCOPY, left  STENT PLACEMENT, right retrograde;  Surgeon: Vimal Azul MD;  Location: [facility] OR;  Service: Urology    EXTRACORPOREAL SHOCK WAVE LITHOTRIPSY (ESWL)  2015    JOINT REPLACEMENT  May 20th 2023    KNEE SURGERY      NECK SURGERY      Can't remember date    SACROCOLPOPEXY N/A 01/15/2019    Procedure: ROBOTIC SACROCOLPOPEXY WITH MESH, TOTAL LAPAROSCOPICE HYSTERECTOMY BILATERAL SALPINGECTOMY WITH DAVINCI ;  Surgeon: James Holloway MD;  Location: [facility] MAIN OR;  Service: DaVinci    TOTAL KNEE ARTHROPLASTY Right 05/24/2023    Procedure: TOTAL KNEE ARTHROPLASTY WITH CORI ROBOT;  Surgeon: Lambert Brady MD;  Location: [facility] OR;  Service: Robotics - Ortho;  Laterality: Right;    URETEROSCOPY LASER LITHOTRIPSY WITH STENT INSERTION Bilateral 11/12/2024    Procedure: BILATERAL URETEROSCOPY LASER LITHOTRIPSY WITH STENT INSERTION AND STONE BASKET EXTRACTION RETROGRADE PYELOGRAM;  Surgeon: Dariel Adams MD;  Location: [facility] MAIN OR;  Service: Urology;  Laterality: Bilateral;        Family History:  Family History   Problem Relation Age of Onset    Cancer Maternal Grandmother         Kidney cancer    Malig Hyperthermia Neg Hx         Social History:    Social History     Tobacco Use    Smoking status: Never     Passive exposure: Never    Smokeless tobacco: Never   Substance Use Topics    Alcohol use: No        Allergies:  Allergies   Allergen Reactions    Penicillins Rash    Flagyl [Metronidazole] Hives    Sulfa Antibiotics Rash       Home Meds:  (Not in a hospital admission)      Objective:    Vitals Ranges:   Heart Rate:  [90] 90  BP: (117)/(79) 117/79  Body mass index is 31.9 kg/m².     Exam:  General Appearance:  WDWN    HEENT:   without obvious abnormality,  Conjunctiva/corneas clear,  Normal external ear canals, no drainage    Clear orsalmucosa,  Mallampati score 3    Neck:  Supple, symmetrical, trachea midline. No JVD.  Lungs:   Bilateral basal rhonchi bilaterally, respirations unlabored symmetrical wall movement.    Chest wall:  No tenderness or deformity.    Heart:  Regular rate and rhythm, S1 and S2 normal.  Extremities: Trace edema no clubbing or Cyanosis        Data Review:  All labs (24hrs): No results found for this or any previous visit (from the past 24 hours).     Imaging:  CT Cervical Spine Without Contrast  Narrative: CT CERVICAL SPINE WO CONTRAST    Date of Exam: 12/18/2024 3:34 PM EST    Indication: fall.    Comparison: MRI cervical 8/18/2021    Technique: Axial CT images were obtained of the cervical spine without contrast administration.  Sagittal and coronal reconstructions were performed.  Automated exposure control and iterative reconstruction methods were used.    FINDINGS:  Postoperative change are noted status post prior fusion at the C4-C6 spinal levels. There are no signs of hardware failure or loosening. There is no malalignment.     There is no displaced fracture or subluxation. The cervical vertebral body heights are maintained. No compression fracture deformities are seen. There is no malalignment. The posterior facets are intact. Degenerative cystic changes are noted within the   dens.    Residual multilevel cervical  spondylosis is noted. Multilevel posterior facet changes and uncovertebral changes are also seen throughout the cervical spine.     There is no evidence for abnormal edema or fluid collection within the surrounding paraspinal soft tissues. There is no significant hemorrhage or hematoma. Chronic appearing changes are seen in the lung apices. The mastoid air cells are clear.  Impression: 1.Postoperative changes are noted status post prior fusion. There are no signs of hardware failure or loosening. There is no significant malalignment.  2.No evidence for displaced fracture or subluxation throughout the cervical spine.  3.No evidence for acute soft tissue abnormality.   4.Residual changes of cervical spondylosis with associated hypertrophic posterior facet changes and uncovertebral changes are observed.    Electronically Signed: Simon Camarillo MD    12/18/2024 3:49 PM EST    Workstation ID: [id number]  XR Spine Thoracic 3 View  Narrative: XR SPINE THORACIC 3 VW    Date of Exam: 12/18/2024 3:23 PM EST    Indication: fall    Comparison: None available.    FINDINGS:  There is no acute fracture or subluxation. The thoracic vertebral body alignment is within normal limits. Multilevel degenerative changes are noted. No focal osseous abnormalities are seen.  Impression: 1.No evidence for acute fracture or subluxation. There is no compression fracture.    Electronically Signed: Simon Camarillo MD    12/18/2024 3:43 PM EST    Workstation ID: [id number]  XR Spine Lumbar Complete 4+VW  Narrative: XR SPINE LUMBAR COMPLETE 4+VW    Date of Exam: 12/18/2024 3:18 PM EST    Indication: fall    Comparison: None available.    FINDINGS:  There is no acute fracture or subluxation. There is no compression fracture deformity. The lumbar vertebral body alignment is within normal limits. Multilevel discogenic degenerative changes are observed. There is no evidence for pars defect on the   oblique views. Multilevel hypertrophic posterior facet  degenerative changes are also seen. Vascular calcifications are observed in the adjacent soft tissues. Bilateral ureteral stents are noted.  Impression: 1.No evidence for acute fracture or subluxation. No evidence for compression fracture deformity.  2.Mild discogenic degenerative changes and degenerative posterior facet changes are seen throughout the lumbar spine.    Electronically Signed: Simon Camarillo MD    12/18/2024 3:42 PM EST    Workstation ID: [id number]       ASSESSMENT:  Diagnoses and all orders for this visit:    Obstructive sleep apnea syndrome    Seasonal allergic rhinitis, unspecified trigger    Primary insomnia        PLAN:  Will change to a new fitting mask    This patient with obstructive sleep apnea, compliance is improved. Encourage to use it more frequent, I re-emphasized on pt the long and short term benefit of treating SAGAR. The device is benefiting the patient.  The patient is also instructed to get the CPAP supplies from the [employer] and see me in 1 year for follow-up.    Set a bedtime that is early enough for you to get at least 7 hours of sleep.  Don’t go to bed unless you are sleepy.  If you don’t fall asleep after 20 minutes, get out of bed.  Establish a relaxing bedtime routine.  Use your bed only for sleep and sex.  Make your bedroom quiet and relaxing. Keep the room at a comfortable, cool temperature.  Limit exposure to bright light in the evenings.  Turn off electronic devices at least 30 minutes before bedtime.  Don’t eat a large meal before bedtime. If you are hungry at night, eat a light, healthy snack.  Exercise regularly and maintain a healthy diet.  Avoid consuming caffeine in the late afternoon or evening.  Avoid consuming alcohol before bedtime.  Reduce your fluid intake before bedtime.  Avoid naps during the day time.     It is recommended to keep t allergic rhinitis treated optimized to improve quality of sleep    Follow-up 3-month    Seema Moser MD. D,  ABSM.  2/11/2025  11:02 EST

## 2025-03-12 ENCOUNTER — APPOINTMENT (OUTPATIENT)
Dept: GENERAL RADIOLOGY | Facility: HOSPITAL | Age: 60
End: 2025-03-12
Payer: MEDICARE

## 2025-03-12 ENCOUNTER — HOSPITAL ENCOUNTER (EMERGENCY)
Facility: HOSPITAL | Age: 60
Discharge: HOME | End: 2025-03-12
Attending: EMERGENCY MEDICINE
Payer: MEDICARE

## 2025-03-12 VITALS
SYSTOLIC BLOOD PRESSURE: 120 MMHG | DIASTOLIC BLOOD PRESSURE: 82 MMHG | HEIGHT: 66 IN | TEMPERATURE: 97.7 F | HEART RATE: 104 BPM | RESPIRATION RATE: 16 BRPM | WEIGHT: 200 LBS | BODY MASS INDEX: 32.14 KG/M2 | OXYGEN SATURATION: 94 %

## 2025-03-12 DIAGNOSIS — J10.1 INFLUENZA A (H1N1): Primary | ICD-10-CM

## 2025-03-12 LAB
FLUAV RNA RESP QL NAA+PROBE: DETECTED
FLUBV RNA RESP QL NAA+PROBE: NOT DETECTED
RSV RNA RESP QL NAA+PROBE: NOT DETECTED
SARS-COV-2 RNA RESP QL NAA+PROBE: NOT DETECTED

## 2025-03-12 PROCEDURE — 71045 X-RAY EXAM CHEST 1 VIEW: CPT

## 2025-03-12 PROCEDURE — 94618 PULMONARY STRESS TESTING: CPT

## 2025-03-12 PROCEDURE — 99283 EMERGENCY DEPT VISIT LOW MDM: CPT | Performed by: EMERGENCY MEDICINE

## 2025-03-12 PROCEDURE — 87637 SARSCOV2&INF A&B&RSV AMP PRB: CPT | Performed by: PHYSICIAN ASSISTANT

## 2025-03-12 RX ORDER — AZITHROMYCIN 250 MG/1
500 TABLET, FILM COATED ORAL ONCE
Status: COMPLETED | OUTPATIENT
Start: 2025-03-12 | End: 2025-03-12

## 2025-03-12 RX ORDER — OXYMETAZOLINE HYDROCHLORIDE 0.05 G/100ML
1 SPRAY NASAL ONCE
Status: COMPLETED | OUTPATIENT
Start: 2025-03-12 | End: 2025-03-12

## 2025-03-12 RX ORDER — CEFDINIR 300 MG/1
300 CAPSULE ORAL ONCE
Status: COMPLETED | OUTPATIENT
Start: 2025-03-12 | End: 2025-03-12

## 2025-03-12 RX ORDER — AZITHROMYCIN 250 MG/1
TABLET, FILM COATED ORAL
Qty: 4 TABLET | Refills: 0 | Status: SHIPPED | OUTPATIENT
Start: 2025-03-12

## 2025-03-12 RX ORDER — DEXTROMETHORPHAN HYDROBROMIDE AND PROMETHAZINE HYDROCHLORIDE 15; 6.25 MG/5ML; MG/5ML
5 SYRUP ORAL NIGHTLY
Qty: 118 ML | Refills: 0 | Status: SHIPPED | OUTPATIENT
Start: 2025-03-12

## 2025-03-12 RX ORDER — GUAIFENESIN/DEXTROMETHORPHAN 100-10MG/5
5 SYRUP ORAL ONCE
Status: COMPLETED | OUTPATIENT
Start: 2025-03-12 | End: 2025-03-12

## 2025-03-12 RX ORDER — CEFDINIR 300 MG/1
300 CAPSULE ORAL 2 TIMES DAILY
Qty: 14 CAPSULE | Refills: 0 | Status: SHIPPED | OUTPATIENT
Start: 2025-03-12

## 2025-03-12 RX ADMIN — GUAIFENESIN AND DEXTROMETHORPHAN 5 ML: 20; 200 SYRUP ORAL at 13:38

## 2025-03-12 RX ADMIN — AZITHROMYCIN DIHYDRATE 500 MG: 250 TABLET, FILM COATED ORAL at 13:39

## 2025-03-12 RX ADMIN — OXYMETAZOLINE HYDROCHLORIDE 1 SPRAY: 0.5 SPRAY NASAL at 13:39

## 2025-03-12 RX ADMIN — CEFDINIR 300 MG: 300 CAPSULE ORAL at 13:39

## 2025-03-12 NOTE — PROGRESS NOTES
Exercise Oximetry    Patient Name:Sánchez Carbajal   MRN: [id number]   Date: 03/12/25             ROOM AIR Baseline at rest   SpO2%  96   Heart Rate  112   Blood Pressure  130/73       EXERCISE  SpO2% HR Supplemental Oxygen, LPM   1 MINUTE 96 114    2 MINUTES 94 116    3 MINUTES 95 118    4 MINUTES 95 118    5 MINUTES 95 118    6 MINUTES 95 118      Recovery       Time to Baseline/Recovery (minutes) 2   SpO2 % 96      Oxygen  Room air     Distance Walked (meters) 97.5 meters       Pre Post   Dyspnea (Ramon Scale) 3 3   Fatigue (Ramon Scale) 8 8     Comments: Used forehead probe while testing

## 2025-03-12 NOTE — ED PROVIDER NOTES
Subjective   History of Present Illness  60-year-old lady whose  currently has influenza.  Says she has been feeling bad for about 3 days.  She denies history of lung disease.  She takes an over-the-counter decongestant earlier this morning and it helps.  Says she is not running fevers.      Review of Systems   Constitutional:  Positive for fatigue.   HENT:  Positive for congestion.    Eyes: Negative.    Respiratory:  Positive for cough. Negative for wheezing.    Gastrointestinal: Negative.    Genitourinary: Negative.    Musculoskeletal:  Positive for myalgias.   Skin: Negative.    Neurological: Negative.    Psychiatric/Behavioral: Negative.     All other systems reviewed and are negative.      Past Medical History:   Diagnosis Date    Arthritis     RIGHT SHOULDER    Arthritis of back     Not sure    Arthritis of neck     Two years ago    Bursitis of hip     Cervical disc disorder     Two maybe three years ago    Chronic back pain     Diabetes mellitus     Elevated cholesterol     GERD (gastroesophageal reflux disease)     H/O seasonal allergies     History of anemia     History of diverticulitis     History of heart murmur in childhood     History of kidney stones     Hypertension     Migraines     PONV (postoperative nausea and vomiting)     Primary osteoarthritis of knees, bilateral 11/29/2022    Sleep apnea with use of continuous positive airway pressure (CPAP)     Uterine prolapse     UTERINE VAG PROLAPSE       Allergies   Allergen Reactions    Penicillins Rash    Flagyl [Metronidazole] Hives    Sulfa Antibiotics Rash       Past Surgical History:   Procedure Laterality Date    ANTERIOR AND POSTERIOR VAGINAL REPAIR N/A 01/15/2019    Procedure: /POSTERIOR REPAIR;  Surgeon: James Holloway MD;  Location: [facility] OR;  Service: Gynecology    CERVICAL FUSION      CHOLECYSTECTOMY      COLONOSCOPY N/A 01/18/2017    Procedure: COLONOSCOPY;  Surgeon: Tian Henderson MD;  Location: [facility] OR;   Service:     CYSTOSCOPY URETEROSCOPY LASER LITHOTRIPSY N/A 04/06/2016    Procedure: URETEROSCOPY  ;  Surgeon: Vimal Azul MD;  Location:  [facility] OR;  Service:     CYSTOSCOPY URETEROSCOPY LASER LITHOTRIPSY Left 09/01/2019    Procedure: CYSTOSCOPY left retrograde pyelogram;  Surgeon: Vimal Azul MD;  Location: [facility] MAIN OR;  Service: Urology    CYSTOSCOPY W/ LASER LITHOTRIPSY Left 06/25/2018    Procedure: CYSTOSCOPY, LEFT URETEROSCOPY, left  STENT PLACEMENT, right retrograde;  Surgeon: Vimal Azul MD;  Location: [facility] OR;  Service: Urology    EXTRACORPOREAL SHOCK WAVE LITHOTRIPSY (ESWL)  2015    JOINT REPLACEMENT  May 20th 2023    KNEE SURGERY      NECK SURGERY      Can't remember date    SACROCOLPOPEXY N/A 01/15/2019    Procedure: ROBOTIC SACROCOLPOPEXY WITH MESH, TOTAL LAPAROSCOPICE HYSTERECTOMY BILATERAL SALPINGECTOMY WITH DAVINCI ;  Surgeon: James Holloway MD;  Location: [facility] OR;  Service: DaVinci    TOTAL KNEE ARTHROPLASTY Right 05/24/2023    Procedure: TOTAL KNEE ARTHROPLASTY WITH CORI ROBOT;  Surgeon: Lambert Brady MD;  Location: [facility] OR;  Service: Robotics - Ortho;  Laterality: Right;    URETEROSCOPY LASER LITHOTRIPSY WITH STENT INSERTION Bilateral 11/12/2024    Procedure: BILATERAL URETEROSCOPY LASER LITHOTRIPSY WITH STENT INSERTION AND STONE BASKET EXTRACTION RETROGRADE PYELOGRAM;  Surgeon: Dariel Adams MD;  Location: [facility] OR;  Service: Urology;  Laterality: Bilateral;       Family History   Problem Relation Age of Onset    Cancer Maternal Grandmother         Kidney cancer    Malig Hyperthermia Neg Hx        Social History     Socioeconomic History    Marital status:    Tobacco Use    Smoking status: Never     Passive exposure: Never    Smokeless tobacco: Never   Vaping Use    Vaping status: Never Used   Substance and Sexual Activity    Alcohol use: No    Drug use: No    Sexual activity: Yes     Partners: Male     Birth control/protection: Hysterectomy            Objective   Physical Exam  Vitals reviewed.   Constitutional:       Appearance: Normal appearance. She is normal weight.   HENT:      Head: Normocephalic.   Eyes:      Conjunctiva/sclera: Conjunctivae normal.   Cardiovascular:      Rate and Rhythm: Tachycardia present.      Pulses: Normal pulses.      Heart sounds: Normal heart sounds.   Pulmonary:      Effort: Pulmonary effort is normal.      Comments: Coarse sounds throughout  Abdominal:      Tenderness: There is no right CVA tenderness or left CVA tenderness.   Musculoskeletal:         General: Normal range of motion.      Right lower leg: No edema.      Left lower leg: No edema.   Skin:     General: Skin is warm.   Neurological:      Mental Status: She is alert.      Gait: Gait normal.   Psychiatric:         Mood and Affect: Mood normal.         Behavior: Behavior normal.       Procedures           ED Course                                                       Medical Decision Making  Patient is positive for flu A.  Chest x-ray shows some atelectasis in the left lower lobe but I am in to treat it like a pneumonia.  I do hear some coarse sounds which could just be from her nasal congestion I am not sure.  I do not hear anything in her lungs.    First Mucinex DM and some Afrin nasal spray.  I told her to only use Afrin for 3 days.  She says she is aware of that.  She has decongestants.    Her oxygen was normal but I noticed when I walked in the room it would be in the low 90s.  I did ask respiratory to walk around which they did and she had no problems with her breathing.  I think she does not tolerate the mask well and sits slumped in the bed.  I gave her very strict return precautions for breathing.    Amount and/or Complexity of Data Reviewed  Radiology: ordered.    Risk  OTC drugs.  Prescription drug management.        Final diagnoses:   Influenza A (H1N1)       ED Disposition  ED Disposition       ED Disposition   Discharge    Condition   Stable     Comment   --               Aayush Mckeon MD  [address] [facility] [address] [phone]    In 3 days           Medication List        New Prescriptions      azithromycin 250 MG tablet  Commonly known as: ZITHROMAX  Take one tablet daily starting tomorrow.     cefdinir 300 MG capsule  Commonly known as: OMNICEF  Take 1 capsule by mouth 2 (Two) Times a Day.     promethazine-dextromethorphan 6.25-15 MG/5ML syrup  Commonly known as: PROMETHAZINE-DM  Take 5 mL by mouth Every Night. May repeat dose after 4 hours               Where to Get Your Medications        These medications were sent to [facility] [address] [place] [address] - [address] [facility] - [phone]  - [phone]   [address] [facility] [address]      Phone: [phone]   azithromycin 250 MG tablet  cefdinir 300 MG capsule  promethazine-dextromethorphan 6.25-15 MG/5ML syrup            Claudia Navarrete PA-C  03/12/25 9225

## 2025-04-17 NOTE — CASE MANAGEMENT/SOCIAL WORK
Continued Stay Note  KATINA Dacosta     Patient Name: Sánchez Carbajal  MRN: [id number]  Today's Date: 5/17/2023    Admit Date: (Not on file)    Plan: Home with  and OP PT at [facility] rehab   Discharge Plan     Row Name 05/17/23 1448       Plan    Plan Home with  and [facility]    Patient/Family in Agreement with Plan yes    Plan Comments CM called and spoke with patient today regarding pre admission discharge planning for her surgery with Dr. Brady on 4/24/23. Patient states she lives with her  Julien in a single story house with two steps to gain entry and no handrails. She states she normally has no issues entering the home or maneuvering inside. She also states that her  will be able to assist with needs at home and provide ride at discharge. We discussed her plans for PT and she states that she would like to do OP at [facility] and is agreeable for  to arrange this service. We then discussed DME and she states the only DME she has at home is a shower chair and she will need a rolling walker and BSC and is agreeable for CM to use [employer]. Patient states she plans on returning home at discharge with her  to help as needed and OP PT and had no other questions or concerns regarding discharge plans. WES called and spoke with Allison Wick at [facility] and she has scheduled an appointment for 5/26/23 @ 7:00am. CM will follow.               Discharge Codes    No documentation.                     Jackie Mcfarland RN     Was A Bandage Applied: Yes Wheelchair

## 2025-05-13 ENCOUNTER — OFFICE VISIT (OUTPATIENT)
Dept: SLEEP MEDICINE | Facility: HOSPITAL | Age: 60
End: 2025-05-13
Payer: MEDICARE

## 2025-05-13 VITALS
HEIGHT: 64 IN | HEART RATE: 87 BPM | SYSTOLIC BLOOD PRESSURE: 114 MMHG | DIASTOLIC BLOOD PRESSURE: 74 MMHG | OXYGEN SATURATION: 95 % | BODY MASS INDEX: 32.44 KG/M2 | WEIGHT: 190 LBS

## 2025-05-13 DIAGNOSIS — F51.01 PRIMARY INSOMNIA: ICD-10-CM

## 2025-05-13 DIAGNOSIS — J30.2 SEASONAL ALLERGIC RHINITIS, UNSPECIFIED TRIGGER: ICD-10-CM

## 2025-05-13 DIAGNOSIS — G47.33 OBSTRUCTIVE SLEEP APNEA SYNDROME: Primary | ICD-10-CM

## 2025-05-13 DIAGNOSIS — K21.9 GERD WITH APNEA: ICD-10-CM

## 2025-05-13 DIAGNOSIS — R06.81 GERD WITH APNEA: ICD-10-CM

## 2025-05-13 PROCEDURE — G0463 HOSPITAL OUTPT CLINIC VISIT: HCPCS

## 2025-05-13 NOTE — PROGRESS NOTES
SLEEP/PULMONARY  CLINIC NOTE      PATIENT IDENTIFICATION:  Name: Sánchez Carbajal  Age: 60 y.o.  Sex: female  :  1965  MRN: [id number]    DATE OF CONSULTATION:  2025     Referring physician:    Aayush Mckeon MD            CHIEF COMPLAINT: SAGAR    History of Present Illness:   Sánchez Carbajal is a 60 y.o. female Pt on CPAP feeling better more energy especially the night he use it more than 4 hours, no sleepiness no fatigue no tiredness, no mask irritation no dryness, compliance report reviewed with pt d discussed with the patient the download data and encouarged the patient to continue to use the CPAP. The residual AHI is acceptable there is air leak and she is using nasal mask.         Review of Systems:   Constitutional: As above   Eyes: negative   ENT/oropharynx: negative   Cardiovascular: negative   Respiratory: negative   Gastrointestinal: negative   Genitourinary: negative   Neurological: negative   Musculoskeletal: negative   Integument/breast: negative   Endocrine: negative   Allergic/Immunologic: negative     Past Medical History:  Past Medical History:   Diagnosis Date    Arthritis     RIGHT SHOULDER    Arthritis of back     Not sure    Arthritis of neck     Two years ago    Bursitis of hip     Cervical disc disorder     Two maybe three years ago    Chronic back pain     Diabetes mellitus     Elevated cholesterol     GERD (gastroesophageal reflux disease)     H/O seasonal allergies     History of anemia     History of diverticulitis     History of heart murmur in childhood     History of kidney stones     Hypertension     Migraines     PONV (postoperative nausea and vomiting)     Primary osteoarthritis of knees, bilateral 2022    Sleep apnea with use of continuous positive airway pressure (CPAP)     Uterine prolapse     UTERINE VAG PROLAPSE       Past Surgical History:  Past Surgical History:   Procedure Laterality Date    ANTERIOR AND POSTERIOR VAGINAL REPAIR N/A 01/15/2019     Procedure: /POSTERIOR REPAIR;  Surgeon: James Holloway MD;  Location: [facility] MAIN OR;  Service: Gynecology    CERVICAL FUSION      CHOLECYSTECTOMY      COLONOSCOPY N/A 01/18/2017    Procedure: COLONOSCOPY;  Surgeon: Tian Henderson MD;  Location: [facility] OR;  Service:     CYSTOSCOPY URETEROSCOPY LASER LITHOTRIPSY N/A 04/06/2016    Procedure: URETEROSCOPY  ;  Surgeon: Vimal Azul MD;  Location:  [facility] OR;  Service:     CYSTOSCOPY URETEROSCOPY LASER LITHOTRIPSY Left 09/01/2019    Procedure: CYSTOSCOPY left retrograde pyelogram;  Surgeon: Vimal Azul MD;  Location: [facility] MAIN OR;  Service: Urology    CYSTOSCOPY W/ LASER LITHOTRIPSY Left 06/25/2018    Procedure: CYSTOSCOPY, LEFT URETEROSCOPY, left  STENT PLACEMENT, right retrograde;  Surgeon: Vimal Azul MD;  Location: [facility] OR;  Service: Urology    EXTRACORPOREAL SHOCK WAVE LITHOTRIPSY (ESWL)  2015    JOINT REPLACEMENT  May 20th 2023    KNEE SURGERY      NECK SURGERY      Can't remember date    SACROCOLPOPEXY N/A 01/15/2019    Procedure: ROBOTIC SACROCOLPOPEXY WITH MESH, TOTAL LAPAROSCOPICE HYSTERECTOMY BILATERAL SALPINGECTOMY WITH DAVINCI ;  Surgeon: James Holloway MD;  Location: [facility] MAIN OR;  Service: DaVinci    TOTAL KNEE ARTHROPLASTY Right 05/24/2023    Procedure: TOTAL KNEE ARTHROPLASTY WITH CORI ROBOT;  Surgeon: Lambert Brady MD;  Location: [facility] OR;  Service: Robotics - Ortho;  Laterality: Right;    URETEROSCOPY LASER LITHOTRIPSY WITH STENT INSERTION Bilateral 11/12/2024    Procedure: BILATERAL URETEROSCOPY LASER LITHOTRIPSY WITH STENT INSERTION AND STONE BASKET EXTRACTION RETROGRADE PYELOGRAM;  Surgeon: Dariel Adams MD;  Location: [facility] MAIN OR;  Service: Urology;  Laterality: Bilateral;        Family History:  Family History   Problem Relation Age of Onset    Cancer Maternal Grandmother         Kidney cancer    Malig Hyperthermia Neg Hx         Social History:   Social History     Tobacco Use    Smoking  status: Never     Passive exposure: Never    Smokeless tobacco: Never   Substance Use Topics    Alcohol use: No        Allergies:  Allergies   Allergen Reactions    Penicillins Rash    Flagyl [Metronidazole] Hives    Sulfa Antibiotics Rash       Home Meds:  (Not in a hospital admission)      Objective:    Vitals Ranges:   Heart Rate:  [87] 87  BP: (114)/(74) 114/74  Body mass index is 33.12 kg/m².     Exam:  General Appearance:  WDWN    HEENT:   without obvious abnormality,  Conjunctiva/corneas clear,  Normal external ear canals, no drainage    Clear orsalmucosa,  Mallampati score 3    Neck:  Supple, symmetrical, trachea midline. No JVD.  Lungs:   Bilateral basal rhonchi bilaterally, respirations unlabored symmetrical wall movement.    Chest wall:  No tenderness or deformity.    Heart:  Regular rate and rhythm, S1 and S2 normal.  Extremities: Trace edema no clubbing or Cyanosis        Data Review:  All labs (24hrs): No results found for this or any previous visit (from the past 24 hours).     Imaging:  XR Chest 1 View  Narrative: XR CHEST 1 VW    Date of Exam: 3/12/2025 12:36 PM EDT    Indication: cough    Comparison: Two-view chest x-ray 11/24/2019.    Findings:  Few linear opacities in the left lung base. No consolidation or large pleural effusion is seen. Cardiomediastinal contours appear within normal limits.  Impression: Impression:  Few linear opacities in the left lung base, likely subsegmental atelectasis. No focal consolidation.    Electronically Signed: Cande Pulido    3/12/2025 12:43 PM EDT    Workstation ID: [id number]       ASSESSMENT:  Diagnoses and all orders for this visit:    Obstructive sleep apnea syndrome    Primary insomnia    GERD with apnea    Seasonal allergic rhinitis, unspecified trigger        PLAN:  Continue fitting mask    This patient with obstructive sleep apnea, compliance is improved. Encourage to use it more frequent, I re-emphasized on pt the long and short term benefit of treating  SAGAR. The device is benefiting the patient.  The patient is also instructed to get the CPAP supplies from the [employer] and see me in 1 year for follow-up.    Set a bedtime that is early enough for you to get at least 7 hours of sleep.  Don’t go to bed unless you are sleepy.  If you don’t fall asleep after 20 minutes, get out of bed.  Establish a relaxing bedtime routine.  Use your bed only for sleep and sex.  Make your bedroom quiet and relaxing. Keep the room at a comfortable, cool temperature.  Limit exposure to bright light in the evenings.  Turn off electronic devices at least 30 minutes before bedtime.  Don’t eat a large meal before bedtime. If you are hungry at night, eat a light, healthy snack.  Exercise regularly and maintain a healthy diet.  Avoid consuming caffeine in the late afternoon or evening.  Avoid consuming alcohol before bedtime.  Reduce your fluid intake before bedtime.  Avoid naps during the day time.     Treating SAGAR will improve GERD symptoms control    It is recommended to keep a treatment for allergic optimized to improve quality of sleep    Follow-up 6 months    Seema Moser MD. D, ABSM.  5/13/2025  10:48 EDT

## 2025-05-27 ENCOUNTER — OFFICE VISIT (OUTPATIENT)
Dept: ORTHOPEDIC SURGERY | Facility: CLINIC | Age: 60
End: 2025-05-27
Payer: MEDICARE

## 2025-05-27 VITALS — WEIGHT: 190 LBS | BODY MASS INDEX: 33.66 KG/M2 | HEIGHT: 63 IN

## 2025-05-27 DIAGNOSIS — Z96.651 AFTERCARE FOLLOWING RIGHT KNEE JOINT REPLACEMENT SURGERY: Primary | ICD-10-CM

## 2025-05-27 DIAGNOSIS — Z47.1 AFTERCARE FOLLOWING RIGHT KNEE JOINT REPLACEMENT SURGERY: Primary | ICD-10-CM

## 2025-05-27 DIAGNOSIS — M17.12 PRIMARY OSTEOARTHRITIS OF LEFT KNEE: ICD-10-CM

## 2025-05-27 RX ORDER — TRIAMCINOLONE ACETONIDE 40 MG/ML
80 INJECTION, SUSPENSION INTRA-ARTICULAR; INTRAMUSCULAR
Status: COMPLETED | OUTPATIENT
Start: 2025-05-27 | End: 2025-05-27

## 2025-05-27 RX ORDER — LIDOCAINE HYDROCHLORIDE 10 MG/ML
4 INJECTION, SOLUTION EPIDURAL; INFILTRATION; INTRACAUDAL; PERINEURAL
Status: COMPLETED | OUTPATIENT
Start: 2025-05-27 | End: 2025-05-27

## 2025-05-27 RX ORDER — GABAPENTIN 600 MG/1
600 TABLET ORAL 3 TIMES DAILY
COMMUNITY
Start: 2024-12-13

## 2025-05-27 RX ORDER — LISINOPRIL 10 MG/1
10 TABLET ORAL DAILY
COMMUNITY
Start: 2025-02-28

## 2025-05-27 RX ORDER — ATORVASTATIN CALCIUM 10 MG/1
10 TABLET, FILM COATED ORAL DAILY
COMMUNITY
Start: 2025-05-08

## 2025-05-27 RX ORDER — DULOXETIN HYDROCHLORIDE 20 MG/1
CAPSULE, DELAYED RELEASE ORAL
COMMUNITY
Start: 2025-05-08

## 2025-05-27 RX ORDER — OXYBUTYNIN CHLORIDE 10 MG/1
TABLET, EXTENDED RELEASE ORAL
COMMUNITY
Start: 2024-12-12

## 2025-05-27 RX ADMIN — LIDOCAINE HYDROCHLORIDE 4 ML: 10 INJECTION, SOLUTION EPIDURAL; INFILTRATION; INTRACAUDAL; PERINEURAL at 08:19

## 2025-05-27 RX ADMIN — TRIAMCINOLONE ACETONIDE 80 MG: 40 INJECTION, SUSPENSION INTRA-ARTICULAR; INTRAMUSCULAR at 08:19

## 2025-05-27 NOTE — PROGRESS NOTES
Subjective:     Patient ID: Sánchez Carbajal is a 60 y.o. female.    Chief Complaint:    History of Present Illness  Sánchez Carbajal returns to clinic today for evaluation of right knee status post total knee arthroplasty performed by myself  2 years  ago.  She is no longer attending physical therapy and progressing well.  The patient denies any fevers chills or drainage from their surgical incision. She is happy with the result so far. She states that the pain and swelling are improving.  She is ambulatory today with no limp or assistive device  .  She states that she still has some anterior knee pain but she is just going to live with it for now.  As for her left knee she states the injection helped tremendously that was given back in November.  She is hoping another 1 today.     Social History     Occupational History    Not on file   Tobacco Use    Smoking status: Never     Passive exposure: Never    Smokeless tobacco: Never   Vaping Use    Vaping status: Never Used   Substance and Sexual Activity    Alcohol use: No    Drug use: No    Sexual activity: Yes     Partners: Male     Birth control/protection: Hysterectomy      Past Medical History:   Diagnosis Date    Arthritis     RIGHT SHOULDER    Arthritis of back     Not sure    Arthritis of neck     Two years ago    Bursitis of hip     Cervical disc disorder     Two maybe three years ago    Chronic back pain     Diabetes mellitus     Elevated cholesterol     GERD (gastroesophageal reflux disease)     H/O seasonal allergies     History of anemia     History of diverticulitis     History of heart murmur in childhood     History of kidney stones     Hypertension     Migraines     PONV (postoperative nausea and vomiting)     Primary osteoarthritis of knees, bilateral 11/29/2022    Sleep apnea with use of continuous positive airway pressure (CPAP)     Uterine prolapse     UTERINE VAG PROLAPSE     Past Surgical History:   Procedure Laterality Date    ANTERIOR AND  POSTERIOR VAGINAL REPAIR N/A 01/15/2019    Procedure: /POSTERIOR REPAIR;  Surgeon: James Holloway MD;  Location: [facility] MAIN OR;  Service: Gynecology    CERVICAL FUSION      CHOLECYSTECTOMY      COLONOSCOPY N/A 01/18/2017    Procedure: COLONOSCOPY;  Surgeon: Tian Henderson MD;  Location: [facility] OR;  Service:     CYSTOSCOPY URETEROSCOPY LASER LITHOTRIPSY N/A 04/06/2016    Procedure: URETEROSCOPY  ;  Surgeon: Vimal Azul MD;  Location: [facility] OR;  Service:     CYSTOSCOPY URETEROSCOPY LASER LITHOTRIPSY Left 09/01/2019    Procedure: CYSTOSCOPY left retrograde pyelogram;  Surgeon: Vimal Azul MD;  Location: [facility] MAIN OR;  Service: Urology    CYSTOSCOPY W/ LASER LITHOTRIPSY Left 06/25/2018    Procedure: CYSTOSCOPY, LEFT URETEROSCOPY, left  STENT PLACEMENT, right retrograde;  Surgeon: Vimal Azul MD;  Location: [facility] OR;  Service: Urology    EXTRACORPOREAL SHOCK WAVE LITHOTRIPSY (ESWL)  2015    HYSTERECTOMY  5 years ago    JOINT REPLACEMENT  May 20th 2023    KNEE SURGERY      NECK SURGERY      Can't remember date    SACROCOLPOPEXY N/A 01/15/2019    Procedure: ROBOTIC SACROCOLPOPEXY WITH MESH, TOTAL LAPAROSCOPICE HYSTERECTOMY BILATERAL SALPINGECTOMY WITH DAVINCI ;  Surgeon: James Holloway MD;  Location: [facility] MAIN OR;  Service: DaVinci    TOTAL KNEE ARTHROPLASTY Right 05/24/2023    Procedure: TOTAL KNEE ARTHROPLASTY WITH CORI ROBOT;  Surgeon: Lambert Brady MD;  Location: [facility] OR;  Service: Robotics - Ortho;  Laterality: Right;    URETEROSCOPY LASER LITHOTRIPSY WITH STENT INSERTION Bilateral 11/12/2024    Procedure: BILATERAL URETEROSCOPY LASER LITHOTRIPSY WITH STENT INSERTION AND STONE BASKET EXTRACTION RETROGRADE PYELOGRAM;  Surgeon: Dariel Adams MD;  Location: [facility] MAIN OR;  Service: Urology;  Laterality: Bilateral;       Family History   Problem Relation Age of Onset    Cancer Maternal Grandmother         Kidney cancer    Malig Hyperthermia Neg Hx               "    Objective:  Vitals:    05/27/25 0805   Weight: 86.2 kg (190 lb)   Height: 160 cm (63\")         05/27/25 0805   Weight: 86.2 kg (190 lb)     Body mass index is 33.66 kg/m².  BMI is >= 30 and <35. (Class 1 Obesity). The following options were offered after discussion;: referral to primary care        Right Knee Exam     Tenderness   The patient is experiencing tenderness in the patella.    Range of Motion   Extension:  0   Flexion:  120     Tests   Varus: negative Valgus: negative  Lachman:  Anterior - negative    Posterior - negative  Drawer:  Anterior - negative    Posterior - negative    Other   Erythema: absent  Scars: present  Sensation: normal  Pulse: present  Swelling: mild  Effusion: no effusion present      Left Knee Exam     Tenderness   The patient is experiencing tenderness in the medial retinaculum, patella and medial joint line.    Range of Motion   Extension:  normal   Flexion:  normal     Tests   Fe:  Medial - positive Lateral - negative  Varus: negative Valgus: negative  Lachman:  Anterior - negative    Posterior - negative  Drawer:  Anterior - negative     Posterior - negative    Other   Erythema: absent  Scars: absent  Sensation: normal  Pulse: present  Swelling: mild  Effusion: no effusion present               Imaging: no new    Assessment:        1. Aftercare following right knee joint replacement surgery    2. Primary osteoarthritis of left knee           Plan:    - Large Joint Arthrocentesis: L knee on 5/27/2025 8:19 AM  Indications: pain  Details: 22 G needle, anterolateral approach  Medications: 80 mg triamcinolone acetonide 40 MG/ML; 4 mL lidocaine PF 1% 1 %  Outcome: tolerated well, no immediate complications  Procedure, treatment alternatives, risks and benefits explained, specific risks discussed. Consent was given by the patient. Immediately prior to procedure a time out was called to verify the correct patient, procedure, equipment, support staff and site/side marked as " required. Patient was prepped and draped in the usual sterile fashion.                 Discussed treatment options at length with patient at today's visit. I discussed with the patient that they are doing well from my standpoint.  I am happy with the progress that they have made.  They are ambulatory today with no assistive device and no limp or residual deficits.  I would like them to continue to work on range of motion and strengthening exercises.   The patient has no restrictions from my standpoint.  The patient's surgical incision is healed without signs of infection.  I would like the patient to notify our office immediately if they note any increasing redness, pain, fevers, chills, or drainage of any kind from their surgical incision as this would be abnormal at this point in time.  I discussed with the patient that at this point in time we do not need to see them back for another follow-up appointment.  I did discuss however that I am happy to see the patient at any point if issues questions or concerns arise.  The patient voiced understanding and agreement with the plan.   As for her left knee she has developed osteoarthritis. I discussed with the patient that the mainstays of conservative treatment for knee OA include physical therapy, nonsteroidal anti-inflammatories, injections, activity modification, and home exercises.  The patient states that they  would like to try left knee steroid injection.  At this point time the patient does not need to schedule follow-up with me today.  I am happy to see the patient back at any point time however if issues arise or they fail to make a full recovery.  Follow up: As needed      Sánchez Carbajal was in agreement with plan and had all questions answered.     Medications:  No orders of the defined types were placed in this encounter.      Followup:  No follow-ups on file.    Diagnoses and all orders for this visit:    1. Aftercare following right knee joint  replacement surgery (Primary)    2. Primary osteoarthritis of left knee    Other orders  -     - Large Joint Arthrocentesis: L knee          Dictated utilizing Dragon dictation

## 2025-07-11 ENCOUNTER — TRANSCRIBE ORDERS (OUTPATIENT)
Dept: ADMINISTRATIVE | Facility: HOSPITAL | Age: 60
End: 2025-07-11
Payer: MEDICARE

## 2025-07-11 DIAGNOSIS — R07.9 CHEST PAIN, UNSPECIFIED TYPE: Primary | ICD-10-CM

## 2025-07-23 ENCOUNTER — HOSPITAL ENCOUNTER (OUTPATIENT)
Dept: NUCLEAR MEDICINE | Facility: HOSPITAL | Age: 60
Discharge: HOME | End: 2025-07-23
Payer: MEDICARE

## 2025-07-23 ENCOUNTER — HOSPITAL ENCOUNTER (OUTPATIENT)
Dept: CARDIOLOGY | Facility: HOSPITAL | Age: 60
Discharge: HOME | End: 2025-07-23
Payer: MEDICARE

## 2025-07-23 DIAGNOSIS — R07.9 CHEST PAIN, UNSPECIFIED TYPE: ICD-10-CM

## 2025-07-23 LAB
BH CV REST NUCLEAR ISOTOPE DOSE: 12 MCI
BH CV STRESS BP STAGE 1: NORMAL
BH CV STRESS DURATION MIN STAGE 1: 4
BH CV STRESS DURATION SEC STAGE 1: 0
BH CV STRESS GRADE STAGE 1: 0
BH CV STRESS HR STAGE 1: 143
BH CV STRESS METS STAGE 1: 2.3
BH CV STRESS NUCLEAR ISOTOPE DOSE: 36 MCI
BH CV STRESS PROTOCOL 1: NORMAL
BH CV STRESS RECOVERY BP: NORMAL MMHG
BH CV STRESS RECOVERY HR: 110 BPM
BH CV STRESS SPEED STAGE 1: 1.7
BH CV STRESS STAGE 1: 1
MAXIMAL PREDICTED HEART RATE: 160 BPM
PERCENT MAX PREDICTED HR: 89.38 %
SPECT HRT GATED+EF W RNC IV: 77 %
STRESS BASELINE BP: NORMAL MMHG
STRESS BASELINE HR: 82 BPM
STRESS O2 SAT REST: 94 %
STRESS PERCENT HR: 105 %
STRESS POST ESTIMATED WORKLOAD: 2.3 METS
STRESS POST EXERCISE DUR MIN: 4 MIN
STRESS POST EXERCISE DUR SEC: 0 SEC
STRESS POST O2 SAT PEAK: 94 %
STRESS POST PEAK BP: NORMAL MMHG
STRESS POST PEAK HR: 143 BPM
STRESS TARGET HR: 136 BPM

## 2025-07-23 PROCEDURE — 34310000005 TECHNETIUM SESTAMIBI: Performed by: INTERNAL MEDICINE

## 2025-07-23 PROCEDURE — 25010000002 REGADENOSON 0.4 MG/5ML SOLUTION: Performed by: INTERNAL MEDICINE

## 2025-07-23 PROCEDURE — A9500 TC99M SESTAMIBI: HCPCS | Performed by: INTERNAL MEDICINE

## 2025-07-23 PROCEDURE — 78452 HT MUSCLE IMAGE SPECT MULT: CPT

## 2025-07-23 PROCEDURE — 93017 CV STRESS TEST TRACING ONLY: CPT

## 2025-07-23 RX ORDER — REGADENOSON 0.08 MG/ML
0.4 INJECTION, SOLUTION INTRAVENOUS
Status: COMPLETED | OUTPATIENT
Start: 2025-07-23 | End: 2025-07-23

## 2025-07-23 RX ADMIN — REGADENOSON 0.4 MG: 0.08 INJECTION, SOLUTION INTRAVENOUS at 08:41

## 2025-07-23 RX ADMIN — TECHNETIUM TC 99M SESTAMIBI 1 DOSE: 1 INJECTION INTRAVENOUS at 07:19

## 2025-07-23 RX ADMIN — TECHNETIUM TC 99M SESTAMIBI 1 DOSE: 1 INJECTION INTRAVENOUS at 08:41

## (undated) DEVICE — VAGINAL PACKING: Brand: DEROYAL

## (undated) DEVICE — 1842 FOAM BLOCK NEEDLE COUNTER: Brand: DEVON

## (undated) DEVICE — ENDOPATH XCEL BLADELESS TROCARS WITH STABILITY SLEEVES: Brand: ENDOPATH XCEL

## (undated) DEVICE — SYR LUERLOK 30CC

## (undated) DEVICE — DISPOSABLE TOURNIQUET CUFF SINGLE BLADDER, SINGLE PORT AND QUICK CONNECT CONNECTOR: Brand: COLOR CUFF

## (undated) DEVICE — ENDOPATH XCEL BLUNT TIP TROCARS WITH SMOOTH SLEEVES: Brand: ENDOPATH XCEL

## (undated) DEVICE — ADHS SKIN SURG TISS VISC PREMIERPRO EXOFIN HI/VISC FAST/DRY

## (undated) DEVICE — PK URETSCP 40

## (undated) DEVICE — DRP SURG U/DRP INVISISHIELD PA 48X52IN

## (undated) DEVICE — DUAL CUT SAGITTAL BLADE

## (undated) DEVICE — DISPOSABLE DRAPE, STERILE, FOR A CDS-3072 6 FOOT TABLE: Brand: PEDIGO PRODUCTS, INC.

## (undated) DEVICE — 3M™ STERI-DRAPE™ INSTRUMENT POUCH 1018L: Brand: STERI-DRAPE™

## (undated) DEVICE — GLV SURG BIOGEL LTX PF 8

## (undated) DEVICE — PEEL-AWAY TOGA, 2X LARGE: Brand: FLYTE

## (undated) DEVICE — MASK,FACE,SHIELD,BLUE,ANTI FOG,TIES: Brand: MEDLINE

## (undated) DEVICE — CATH URETRL PA CONE TP 8F

## (undated) DEVICE — PAD SANI MAXI W/ADHS SNG WRP 11IN

## (undated) DEVICE — PK CYSTO 90

## (undated) DEVICE — GLV SURG NEOLON 2G PF LF 7.5 STRL

## (undated) DEVICE — TIDISHIELD UROLOGY DRAIN BAGS FROSTY VINYL STERILE FITS SIEMENS UROSKOP ACCESS 20 PER CASE: Brand: TIDISHIELD

## (undated) DEVICE — SOL ISO/ALC RUB 70PCT 4OZ

## (undated) DEVICE — DRAPE,UNDERBUTTOCKS,PCH,STERILE: Brand: MEDLINE

## (undated) DEVICE — BNDG ADHS PLSTC 1X3IN LF

## (undated) DEVICE — CANNULA SEAL

## (undated) DEVICE — IRRIGATOR BULB ASEPTO 60CC STRL

## (undated) DEVICE — OBT BLADLES ENDOWRIST DAVINCI/S 8MM

## (undated) DEVICE — NDL SPINE 18G 31/2IN PNK

## (undated) DEVICE — TRAP FLD MINIVAC MEGADYNE 100ML

## (undated) DEVICE — SUT MNCRYL PLS ANTIB UD 4/0 PS2 18IN

## (undated) DEVICE — PREP SOL POVIDONE/IODINE BT 4OZ

## (undated) DEVICE — TRY SKINPREP DRYPREP

## (undated) DEVICE — PK KN TOTL 90

## (undated) DEVICE — SUT PDS 0 CT1 36IN Z346H

## (undated) DEVICE — INTENDED FOR TISSUE SEPARATION, AND OTHER PROCEDURES THAT REQUIRE A SHARP SURGICAL BLADE TO PUNCTURE OR CUT.: Brand: BARD-PARKER ® STAINLESS STEEL BLADES

## (undated) DEVICE — SYR LUERLOK 50ML

## (undated) DEVICE — DRSNG WND BORDR/ADHS NONADHR/GZ LF 2X2IN STRL

## (undated) DEVICE — FIBR LASR FLEXIVAPULSE365 HOLMIUM FLT/TP 1P/U

## (undated) DEVICE — GOWN ISOL W/THUMB UNIV BLU BX/15

## (undated) DEVICE — TBG PENCL TELESCP MEGADYNE SMOKE EVAC 10FT

## (undated) DEVICE — CATH FOL SIMPLYLATEX SILELAST 18F 17IN

## (undated) DEVICE — Device

## (undated) DEVICE — GLV SURG SENSICARE PI LF PF 7.5

## (undated) DEVICE — 3M™ IOBAN™ 2 ANTIMICROBIAL INCISE DRAPE 6651EZ: Brand: IOBAN™ 2

## (undated) DEVICE — DRP ARM DAVINCI

## (undated) DEVICE — CATHETER,FOLEY,100%SILICONE,16FR,10ML,LF: Brand: MEDLINE

## (undated) DEVICE — FOAM BUMP ROUND LARGE: Brand: MEDLINE INDUSTRIES, INC.

## (undated) DEVICE — SUT MNCRYL 2/0 CT1 36IN

## (undated) DEVICE — SUT PDS 2/0 SH 27IN Z317H

## (undated) DEVICE — SUT PROLN 2/0 SH 36IN 8523H

## (undated) DEVICE — CONMED DISPOSABLE BIPOLAR CABLE, 10' (3.05M): Brand: CONMED

## (undated) DEVICE — SOL IRR H2O BTL 1000ML STRL

## (undated) DEVICE — SUCTION CANISTER, 3000CC,SAFELINER: Brand: DEROYAL

## (undated) DEVICE — SOL ANTISTICK CAUTRY ELECTROLUBE LF

## (undated) DEVICE — DEV SUT GRSPR CLOSUR 15CM 14G

## (undated) DEVICE — Device: Brand: DEFENDO AIR/WATER/SUCTION AND BIOPSY VALVE

## (undated) DEVICE — ENCORE® LATEX ORTHO SIZE 8, STERILE LATEX POWDER-FREE SURGICAL GLOVE: Brand: ENCORE

## (undated) DEVICE — SUT VIC 0 CT1 CR8 DYED JJ31G

## (undated) DEVICE — GLV SURG TRIUMPH CLASSIC PF LTX 8 STRL

## (undated) DEVICE — TIP COVER ACCESSORY

## (undated) DEVICE — 3M™ STERI-STRIP™ REINFORCED ADHESIVE SKIN CLOSURES, R1547, 1/2 IN X 4 IN (12 MM X 100 MM), 6 STRIPS/ENVELOPE: Brand: 3M™ STERI-STRIP™

## (undated) DEVICE — GOWN,NON-REINFORCED,SIRUS,SET IN SLV,XXL: Brand: MEDLINE

## (undated) DEVICE — GLV SURG SENSICARE PI LF PF 8 GRN STRL

## (undated) DEVICE — BW-412T DISP COMBO CLEANING BRUSH: Brand: SINGLE USE COMBINATION CLEANING BRUSH

## (undated) DEVICE — SUT VIC 0/0 UR6 27IN DYED J603H

## (undated) DEVICE — NITINOL WIRE WITH HYDROPHILIC TIP: Brand: SENSOR

## (undated) DEVICE — WRAP KNEE COLD THERAPY

## (undated) DEVICE — MANIP UTER RUMI 2 KOH EFFICIENT 3CM BL

## (undated) DEVICE — MAT FLR ABSORBENT LG 4FT 10 2.5FT

## (undated) DEVICE — EXTRCT STN NCIRCLE TIPLSS 2.2F1X115CM

## (undated) DEVICE — FRAZIER SUCTION INSTRUMENT 10 FR W/CONTROL VENT & OBTURATOR: Brand: FRAZIER

## (undated) DEVICE — SPNG GZ WOVN 4X4IN 12PLY 10/BX STRL

## (undated) DEVICE — SUT VIC 0 TIES 18IN J912G

## (undated) DEVICE — MEDICINE CUP, GRADUATED, STER: Brand: MEDLINE

## (undated) DEVICE — JACKT LAB F/R KNIT CUFF/COLR XLG BLU

## (undated) DEVICE — ANTIBACTERIAL UNDYED BRAIDED (POLYGLACTIN 910), SYNTHETIC ABSORBABLE SUTURE: Brand: COATED VICRYL

## (undated) DEVICE — ENDOGATOR AUXILIARY WATER JET CONNECTOR: Brand: ENDOGATOR

## (undated) DEVICE — TRANSPOSAL ULTRAFLEX DUO/QUAD ULTRA CART MANIFOLD

## (undated) DEVICE — APPL CHLORAPREP HI/LITE 26ML ORNG

## (undated) DEVICE — GOWN,NON-REINFORCED,SIRUS,SET IN SLV,XL: Brand: MEDLINE

## (undated) DEVICE — URETERAL DILATATION SYSTEM

## (undated) DEVICE — SUT VIC 0 CT1 36IN J946H

## (undated) DEVICE — ST IRR CYSTO W/SPK 77IN LF

## (undated) DEVICE — LOU LITHOTOMY ROBOTIC: Brand: MEDLINE INDUSTRIES, INC.

## (undated) DEVICE — COVER,MAYO STAND,STERILE: Brand: MEDLINE

## (undated) DEVICE — DRAPE,REIN 53X77,STERILE: Brand: MEDLINE

## (undated) DEVICE — KT MIX CMT PALAMIX/UNO VAC WO/CMT

## (undated) DEVICE — PCH INST SURG INVISISHIELD 2PCKT

## (undated) DEVICE — SUT MNCRYL 2/0 SH 27IN Y317H

## (undated) DEVICE — SAFESECURE,SECUREMENT,FOLEY CATH,STERILE: Brand: MEDLINE

## (undated) DEVICE — VIOLET POLYDIOXANONE POLYMER, SYNTHETIC ABSORBABLE SUTURE CLIPS: Brand: LAPRA-TY

## (undated) DEVICE — SOL NACL 0.9PCT 1000ML

## (undated) DEVICE — MANIP UTER RUMI TP 6.7MMX8CM BLU

## (undated) DEVICE — SYS IRR PUMP SGL ACTN VAC SYR 10CC

## (undated) DEVICE — DEV CONTRL TISS STRATAFIX SPIRAL PDS PLS CT1 2-0 45CM: Type: IMPLANTABLE DEVICE | Site: KNEE | Status: NON-FUNCTIONAL

## (undated) DEVICE — PRT BIOP SEALS

## (undated) DEVICE — VESSEL SEALER: Brand: ENDOWRIST;DAVINCI SI

## (undated) DEVICE — TP SXN YANKR BULB STRL

## (undated) DEVICE — 3M™ STERI-STRIP™ COMPOUND BENZOIN TINCTURE 40 BAGS/CARTON 4 CARTONS/CASE C1544: Brand: 3M™ STERI-STRIP™

## (undated) DEVICE — INTENT TO BE USED WITH SUTURE MATERIAL FOR TISSUE CLOSURE: Brand: RICHARD-ALLAN® NEEDLE 1/2 CIRCLE TAPER

## (undated) DEVICE — DECANT BG O JET

## (undated) DEVICE — SYR LL 3CC